# Patient Record
Sex: FEMALE | Race: WHITE | Employment: OTHER | URBAN - METROPOLITAN AREA
[De-identification: names, ages, dates, MRNs, and addresses within clinical notes are randomized per-mention and may not be internally consistent; named-entity substitution may affect disease eponyms.]

---

## 2017-01-01 ENCOUNTER — HOSPITAL ENCOUNTER (OUTPATIENT)
Dept: OTHER | Age: 57
Discharge: HOME | End: 2017-01-31
Attending: NURSE PRACTITIONER | Admitting: NURSE PRACTITIONER

## 2017-01-04 ENCOUNTER — HOSPITAL ENCOUNTER (OUTPATIENT)
Dept: PHYSICAL THERAPY | Age: 57
Discharge: HOME | End: 2017-01-04
Admitting: NURSE PRACTITIONER

## 2017-01-18 ENCOUNTER — HOSPITAL ENCOUNTER (OUTPATIENT)
Dept: PHYSICAL THERAPY | Age: 57
Discharge: HOME | End: 2017-01-18
Admitting: NURSE PRACTITIONER

## 2017-01-24 PROBLEM — R07.9 CHEST PAIN: Status: ACTIVE | Noted: 2017-01-24

## 2017-02-01 ENCOUNTER — HOSPITAL ENCOUNTER (OUTPATIENT)
Dept: OTHER | Age: 57
Discharge: HOME | End: 2017-02-28
Attending: NURSE PRACTITIONER | Admitting: NURSE PRACTITIONER

## 2017-02-09 ENCOUNTER — HOSPITAL ENCOUNTER (OUTPATIENT)
Dept: PHYSICAL THERAPY | Age: 57
Discharge: HOME | End: 2017-02-28
Attending: NURSE PRACTITIONER | Admitting: NURSE PRACTITIONER

## 2017-02-15 ENCOUNTER — HOSPITAL ENCOUNTER (OUTPATIENT)
Dept: PHYSICAL THERAPY | Age: 57
Discharge: HOME | End: 2017-02-15
Admitting: NURSE PRACTITIONER

## 2017-02-16 ENCOUNTER — OFFICE VISIT (OUTPATIENT)
Dept: CARDIOLOGY CLINIC | Age: 57
End: 2017-02-16

## 2017-02-16 VITALS
HEART RATE: 90 BPM | WEIGHT: 166.4 LBS | BODY MASS INDEX: 30.62 KG/M2 | DIASTOLIC BLOOD PRESSURE: 74 MMHG | HEIGHT: 62 IN | SYSTOLIC BLOOD PRESSURE: 134 MMHG

## 2017-02-16 DIAGNOSIS — Z98.61 POST PTCA: ICD-10-CM

## 2017-02-16 DIAGNOSIS — R07.9 CHEST PAIN, UNSPECIFIED TYPE: Primary | ICD-10-CM

## 2017-02-16 PROCEDURE — 99214 OFFICE O/P EST MOD 30 MIN: CPT | Performed by: INTERNAL MEDICINE

## 2017-02-16 PROCEDURE — 93000 ELECTROCARDIOGRAM COMPLETE: CPT | Performed by: INTERNAL MEDICINE

## 2017-02-16 RX ORDER — CLOPIDOGREL BISULFATE 75 MG/1
75 TABLET ORAL DAILY
Qty: 90 TABLET | Refills: 3 | Status: SHIPPED | OUTPATIENT
Start: 2017-02-16 | End: 2017-12-09 | Stop reason: SDUPTHER

## 2017-02-20 DIAGNOSIS — Z98.61 POST PTCA: Primary | ICD-10-CM

## 2017-02-22 ENCOUNTER — HOSPITAL ENCOUNTER (OUTPATIENT)
Dept: PHYSICAL THERAPY | Age: 57
Discharge: HOME | End: 2017-02-22
Admitting: NURSE PRACTITIONER

## 2017-02-23 ENCOUNTER — HOSPITAL ENCOUNTER (OUTPATIENT)
Dept: ULTRASOUND IMAGING | Age: 57
Discharge: HOME | End: 2017-02-23
Attending: NURSE PRACTITIONER | Admitting: NURSE PRACTITIONER

## 2017-02-23 DIAGNOSIS — M79.604 LEG PAIN, BILATERAL: ICD-10-CM

## 2017-02-23 DIAGNOSIS — M79.605 LEG PAIN, BILATERAL: ICD-10-CM

## 2017-02-23 DIAGNOSIS — M79.604 PAIN OF RIGHT LEG: ICD-10-CM

## 2017-02-28 ENCOUNTER — PROCEDURE VISIT (OUTPATIENT)
Dept: CARDIOLOGY CLINIC | Age: 57
End: 2017-02-28

## 2017-02-28 DIAGNOSIS — R07.9 CHEST PAIN, UNSPECIFIED TYPE: ICD-10-CM

## 2017-02-28 DIAGNOSIS — Z98.61 POST PTCA: ICD-10-CM

## 2017-02-28 PROCEDURE — 93015 CV STRESS TEST SUPVJ I&R: CPT | Performed by: INTERNAL MEDICINE

## 2017-03-01 ENCOUNTER — HOSPITAL ENCOUNTER (OUTPATIENT)
Dept: OTHER | Age: 57
Discharge: HOME | End: 2017-03-31
Attending: NURSE PRACTITIONER | Admitting: NURSE PRACTITIONER

## 2017-03-22 ENCOUNTER — HOSPITAL ENCOUNTER (OUTPATIENT)
Dept: PHYSICAL THERAPY | Age: 57
Discharge: HOME | End: 2017-03-22
Admitting: NURSE PRACTITIONER

## 2017-03-29 ENCOUNTER — HOSPITAL ENCOUNTER (OUTPATIENT)
Dept: PHYSICAL THERAPY | Age: 57
Discharge: HOME | End: 2017-03-29
Admitting: NURSE PRACTITIONER

## 2017-03-30 ENCOUNTER — OFFICE VISIT (OUTPATIENT)
Dept: CARDIOLOGY CLINIC | Age: 57
End: 2017-03-30

## 2017-03-30 VITALS
BODY MASS INDEX: 29.81 KG/M2 | DIASTOLIC BLOOD PRESSURE: 70 MMHG | WEIGHT: 162 LBS | HEART RATE: 72 BPM | SYSTOLIC BLOOD PRESSURE: 122 MMHG | HEIGHT: 62 IN

## 2017-03-30 DIAGNOSIS — Z98.61 POST PTCA: Primary | ICD-10-CM

## 2017-03-30 PROCEDURE — 99213 OFFICE O/P EST LOW 20 MIN: CPT | Performed by: INTERNAL MEDICINE

## 2017-03-31 PROBLEM — R07.2 PRECORDIAL PAIN: Status: ACTIVE | Noted: 2017-01-24

## 2017-04-01 ENCOUNTER — HOSPITAL ENCOUNTER (OUTPATIENT)
Dept: OTHER | Age: 57
Discharge: HOME | End: 2017-04-30
Attending: NURSE PRACTITIONER | Admitting: NURSE PRACTITIONER

## 2017-04-03 ENCOUNTER — TELEPHONE (OUTPATIENT)
Dept: CARDIOLOGY CLINIC | Age: 57
End: 2017-04-03

## 2017-04-05 ENCOUNTER — HOSPITAL ENCOUNTER (OUTPATIENT)
Dept: PHYSICAL THERAPY | Age: 57
Discharge: HOME | End: 2017-04-05
Admitting: NURSE PRACTITIONER

## 2017-04-12 ENCOUNTER — HOSPITAL ENCOUNTER (OUTPATIENT)
Dept: PHYSICAL THERAPY | Age: 57
Discharge: HOME | End: 2017-04-12
Admitting: NURSE PRACTITIONER

## 2017-04-13 ENCOUNTER — PROCEDURE VISIT (OUTPATIENT)
Dept: CARDIOLOGY CLINIC | Age: 57
End: 2017-04-13

## 2017-04-13 DIAGNOSIS — I73.9 CLAUDICATION (HCC): Primary | ICD-10-CM

## 2017-04-13 DIAGNOSIS — Z98.61 POST PTCA: ICD-10-CM

## 2017-04-13 PROCEDURE — 93925 LOWER EXTREMITY STUDY: CPT | Performed by: INTERNAL MEDICINE

## 2017-04-13 PROCEDURE — 93922 UPR/L XTREMITY ART 2 LEVELS: CPT | Performed by: INTERNAL MEDICINE

## 2017-04-17 ENCOUNTER — HOSPITAL ENCOUNTER (OUTPATIENT)
Dept: WOMENS IMAGING | Age: 57
Discharge: HOME | End: 2017-04-17
Attending: NURSE PRACTITIONER | Admitting: NURSE PRACTITIONER

## 2017-04-17 DIAGNOSIS — Z12.31 VISIT FOR SCREENING MAMMOGRAM: ICD-10-CM

## 2017-04-18 ENCOUNTER — TELEPHONE (OUTPATIENT)
Dept: CARDIOLOGY CLINIC | Age: 57
End: 2017-04-18

## 2017-04-19 PROBLEM — M79.604 LEG PAIN, BILATERAL: Status: ACTIVE | Noted: 2017-04-19

## 2017-04-19 PROBLEM — M79.605 LEG PAIN, BILATERAL: Status: ACTIVE | Noted: 2017-04-19

## 2017-04-20 ENCOUNTER — OFFICE VISIT (OUTPATIENT)
Dept: CARDIOLOGY CLINIC | Age: 57
End: 2017-04-20

## 2017-04-20 VITALS
HEIGHT: 62 IN | BODY MASS INDEX: 29.59 KG/M2 | SYSTOLIC BLOOD PRESSURE: 128 MMHG | HEART RATE: 78 BPM | DIASTOLIC BLOOD PRESSURE: 74 MMHG | WEIGHT: 160.8 LBS

## 2017-04-20 DIAGNOSIS — Z98.61 POST PTCA: Primary | ICD-10-CM

## 2017-04-20 PROCEDURE — 99214 OFFICE O/P EST MOD 30 MIN: CPT | Performed by: INTERNAL MEDICINE

## 2017-04-20 RX ORDER — TRAZODONE HYDROCHLORIDE 50 MG/1
TABLET ORAL
Refills: 2 | COMMUNITY
Start: 2017-03-28 | End: 2017-06-08 | Stop reason: ALTCHOICE

## 2017-04-24 ENCOUNTER — HOSPITAL ENCOUNTER (OUTPATIENT)
Dept: ULTRASOUND IMAGING | Age: 57
Discharge: HOME | End: 2017-04-24
Attending: NURSE PRACTITIONER | Admitting: NURSE PRACTITIONER

## 2017-04-24 DIAGNOSIS — R19.02 LEFT UPPER QUADRANT ABDOMINAL MASS: ICD-10-CM

## 2017-04-25 ENCOUNTER — TELEPHONE (OUTPATIENT)
Dept: CARDIOLOGY CLINIC | Age: 57
End: 2017-04-25

## 2017-04-26 ENCOUNTER — HOSPITAL ENCOUNTER (OUTPATIENT)
Dept: PHYSICAL THERAPY | Age: 57
Discharge: HOME | End: 2017-04-26
Admitting: NURSE PRACTITIONER

## 2017-05-01 ENCOUNTER — HOSPITAL ENCOUNTER (OUTPATIENT)
Dept: CT IMAGING | Age: 57
Discharge: HOME | End: 2017-05-01
Attending: SURGERY | Admitting: SURGERY

## 2017-05-01 ENCOUNTER — HOSPITAL ENCOUNTER (OUTPATIENT)
Dept: OTHER | Age: 57
Discharge: HOME | End: 2017-05-31
Attending: NURSE PRACTITIONER | Admitting: NURSE PRACTITIONER

## 2017-05-01 DIAGNOSIS — F17.219 NICOTINE DEPENDENCE, CIGARETTES, WITH UNSPECIFIED NICOTINE-INDUCED DISORDERS: ICD-10-CM

## 2017-05-10 ENCOUNTER — HOSPITAL ENCOUNTER (OUTPATIENT)
Dept: PHYSICAL THERAPY | Age: 57
Discharge: HOME | End: 2017-05-10
Admitting: NURSE PRACTITIONER

## 2017-05-26 ENCOUNTER — OFFICE VISIT (OUTPATIENT)
Dept: CARDIOLOGY CLINIC | Age: 57
End: 2017-05-26

## 2017-05-26 ENCOUNTER — PROCEDURE VISIT (OUTPATIENT)
Dept: CARDIOLOGY CLINIC | Age: 57
End: 2017-05-26

## 2017-05-26 VITALS
DIASTOLIC BLOOD PRESSURE: 80 MMHG | HEIGHT: 62 IN | BODY MASS INDEX: 28.45 KG/M2 | WEIGHT: 154.6 LBS | HEART RATE: 66 BPM | SYSTOLIC BLOOD PRESSURE: 118 MMHG

## 2017-05-26 DIAGNOSIS — R42 DIZZINESS: ICD-10-CM

## 2017-05-26 DIAGNOSIS — R07.2 PRECORDIAL PAIN: Primary | ICD-10-CM

## 2017-05-26 DIAGNOSIS — R07.2 PRECORDIAL PAIN: ICD-10-CM

## 2017-05-26 DIAGNOSIS — R42 DIZZINESS: Primary | ICD-10-CM

## 2017-05-26 PROCEDURE — 93880 EXTRACRANIAL BILAT STUDY: CPT | Performed by: INTERNAL MEDICINE

## 2017-05-26 PROCEDURE — 99214 OFFICE O/P EST MOD 30 MIN: CPT | Performed by: INTERNAL MEDICINE

## 2017-05-26 RX ORDER — GABAPENTIN 100 MG/1
100 CAPSULE ORAL 2 TIMES DAILY
COMMUNITY
Start: 2017-05-04 | End: 2018-09-10

## 2017-05-26 RX ORDER — HYDROCHLOROTHIAZIDE 12.5 MG/1
12.5 TABLET ORAL DAILY
COMMUNITY
Start: 2017-04-28 | End: 2017-12-09 | Stop reason: SDUPTHER

## 2017-05-30 ENCOUNTER — TELEPHONE (OUTPATIENT)
Dept: CARDIOLOGY CLINIC | Age: 57
End: 2017-05-30

## 2017-05-30 ENCOUNTER — HOSPITAL ENCOUNTER (OUTPATIENT)
Dept: GENERAL RADIOLOGY | Age: 57
Discharge: HOME | End: 2017-05-30
Attending: INTERNAL MEDICINE | Admitting: INTERNAL MEDICINE

## 2017-05-30 LAB
ANION GAP SERPL CALCULATED.3IONS-SCNC: 12 MMOL/L (ref 4–16)
APTT: 30 SECONDS (ref 21.2–33)
BUN BLDV-MCNC: 11 MG/DL (ref 6–23)
CALCIUM SERPL-MCNC: 9.2 MG/DL (ref 8.3–10.6)
CHLORIDE BLD-SCNC: 104 MMOL/L (ref 99–110)
CO2: 30 MMOL/L (ref 21–32)
CREAT SERPL-MCNC: 0.8 MG/DL (ref 0.6–1.1)
GFR AFRICAN AMERICAN: >60 ML/MIN/1.73M2
GFR NON-AFRICAN AMERICAN: >60 ML/MIN/1.73M2
GLUCOSE BLD-MCNC: 87 MG/DL (ref 70–140)
HCT VFR BLD CALC: 39.7 % (ref 37–47)
HEMOGLOBIN: 13 GM/DL (ref 12.5–16)
INR BLD: 0.92 INDEX
MCH RBC QN AUTO: 29.3 PG (ref 27–31)
MCHC RBC AUTO-ENTMCNC: 32.7 % (ref 32–36)
MCV RBC AUTO: 89.4 FL (ref 78–100)
PDW BLD-RTO: 14.2 % (ref 11.7–14.9)
PLATELET # BLD: 298 K/CU MM (ref 140–440)
PMV BLD AUTO: 10.4 FL (ref 7.5–11.1)
POTASSIUM SERPL-SCNC: 3.4 MMOL/L (ref 3.5–5.1)
PROTHROMBIN TIME: 10.5 SECONDS (ref 9.12–12.5)
RBC # BLD: 4.44 M/CU MM (ref 4.2–5.4)
SODIUM BLD-SCNC: 146 MMOL/L (ref 135–145)
WBC # BLD: 7.1 K/CU MM (ref 4–10.5)

## 2017-06-01 ENCOUNTER — HOSPITAL ENCOUNTER (OUTPATIENT)
Dept: OTHER | Age: 57
Discharge: HOME | End: 2017-06-22
Attending: NURSE PRACTITIONER | Admitting: NURSE PRACTITIONER

## 2017-06-08 ENCOUNTER — OFFICE VISIT (OUTPATIENT)
Dept: CARDIOLOGY CLINIC | Age: 57
End: 2017-06-08

## 2017-06-08 VITALS
DIASTOLIC BLOOD PRESSURE: 78 MMHG | HEIGHT: 62 IN | WEIGHT: 153 LBS | SYSTOLIC BLOOD PRESSURE: 116 MMHG | BODY MASS INDEX: 28.16 KG/M2 | HEART RATE: 68 BPM

## 2017-06-08 DIAGNOSIS — Z98.61 POST PTCA: Primary | ICD-10-CM

## 2017-06-08 PROCEDURE — 99214 OFFICE O/P EST MOD 30 MIN: CPT | Performed by: INTERNAL MEDICINE

## 2017-06-08 RX ORDER — DONEPEZIL HYDROCHLORIDE 10 MG/1
10 TABLET, FILM COATED ORAL NIGHTLY
COMMUNITY
End: 2017-08-23 | Stop reason: ALTCHOICE

## 2017-06-08 RX ORDER — DIPHENOXYLATE HYDROCHLORIDE AND ATROPINE SULFATE 2.5; .025 MG/1; MG/1
TABLET ORAL
COMMUNITY
Start: 2017-05-05 | End: 2018-09-10

## 2017-06-08 RX ORDER — OMEPRAZOLE 40 MG/1
CAPSULE, DELAYED RELEASE ORAL
Refills: 3 | COMMUNITY
Start: 2017-05-23 | End: 2019-07-18

## 2017-07-05 ENCOUNTER — TELEPHONE (OUTPATIENT)
Dept: CARDIOLOGY CLINIC | Age: 57
End: 2017-07-05

## 2017-08-23 ENCOUNTER — HOSPITAL ENCOUNTER (OUTPATIENT)
Dept: SURGERY | Age: 57
Discharge: HOME | End: 2017-08-23
Attending: INTERNAL MEDICINE | Admitting: INTERNAL MEDICINE

## 2017-08-23 VITALS
TEMPERATURE: 97.3 F | BODY MASS INDEX: 27.6 KG/M2 | RESPIRATION RATE: 16 BRPM | OXYGEN SATURATION: 98 % | HEART RATE: 63 BPM | WEIGHT: 150 LBS | HEIGHT: 62 IN | SYSTOLIC BLOOD PRESSURE: 133 MMHG | DIASTOLIC BLOOD PRESSURE: 83 MMHG

## 2017-08-23 RX ORDER — RANITIDINE 150 MG/1
150 CAPSULE ORAL 2 TIMES DAILY
Status: ON HOLD | COMMUNITY
End: 2019-01-28 | Stop reason: ALTCHOICE

## 2017-08-23 RX ORDER — DONEPEZIL HYDROCHLORIDE 10 MG/1
10 TABLET, FILM COATED ORAL NIGHTLY
COMMUNITY
End: 2018-09-10

## 2017-08-23 RX ORDER — HYDROCODONE BITARTRATE AND ACETAMINOPHEN 5; 325 MG/1; MG/1
1 TABLET ORAL 4 TIMES DAILY PRN
COMMUNITY
End: 2018-09-10

## 2017-08-23 RX ORDER — SODIUM CHLORIDE, SODIUM LACTATE, POTASSIUM CHLORIDE, CALCIUM CHLORIDE 600; 310; 30; 20 MG/100ML; MG/100ML; MG/100ML; MG/100ML
INJECTION, SOLUTION INTRAVENOUS CONTINUOUS
Status: DISCONTINUED | OUTPATIENT
Start: 2017-08-23 | End: 2017-08-24 | Stop reason: HOSPADM

## 2017-08-23 RX ORDER — WATER / MINERAL OIL / WHITE PETROLATUM 16 OZ
CREAM TOPICAL PRN
COMMUNITY
End: 2018-09-10

## 2017-08-23 RX ORDER — TRAZODONE HYDROCHLORIDE 50 MG/1
50 TABLET ORAL NIGHTLY
COMMUNITY
End: 2018-09-10

## 2017-08-23 RX ADMIN — SODIUM CHLORIDE, SODIUM LACTATE, POTASSIUM CHLORIDE, CALCIUM CHLORIDE: 600; 310; 30; 20 INJECTION, SOLUTION INTRAVENOUS at 08:14

## 2017-08-23 ASSESSMENT — PAIN SCALES - GENERAL
PAINLEVEL_OUTOF10: 0
PAINLEVEL_OUTOF10: 0

## 2017-08-23 ASSESSMENT — PAIN - FUNCTIONAL ASSESSMENT: PAIN_FUNCTIONAL_ASSESSMENT: 0-10

## 2017-08-24 ENCOUNTER — HOSPITAL ENCOUNTER (OUTPATIENT)
Dept: OTHER | Age: 57
Discharge: HOME | End: 2017-08-31
Attending: NURSE PRACTITIONER | Admitting: NURSE PRACTITIONER

## 2017-08-24 ASSESSMENT — PAIN SCALES - GENERAL: PAINLEVEL_OUTOF10: 5

## 2017-08-24 ASSESSMENT — PAIN DESCRIPTION - ORIENTATION: ORIENTATION: RIGHT;LEFT

## 2017-08-24 ASSESSMENT — PAIN DESCRIPTION - LOCATION: LOCATION: RECTUM

## 2017-08-31 ENCOUNTER — HOSPITAL ENCOUNTER (OUTPATIENT)
Dept: PHYSICAL THERAPY | Age: 57
Discharge: HOME | End: 2017-08-31

## 2017-09-01 ENCOUNTER — HOSPITAL ENCOUNTER (OUTPATIENT)
Dept: OTHER | Age: 57
Discharge: HOME | End: 2017-09-30
Attending: NURSE PRACTITIONER | Admitting: NURSE PRACTITIONER

## 2017-09-07 ENCOUNTER — HOSPITAL ENCOUNTER (OUTPATIENT)
Dept: PHYSICAL THERAPY | Age: 57
Discharge: HOME | End: 2017-09-07

## 2017-09-11 PROBLEM — K21.9 GERD (GASTROESOPHAGEAL REFLUX DISEASE): Status: ACTIVE | Noted: 2017-09-11

## 2017-09-11 PROBLEM — I10 HTN (HYPERTENSION): Status: ACTIVE | Noted: 2017-09-11

## 2017-09-11 PROBLEM — I25.10 CAD (CORONARY ARTERY DISEASE): Status: ACTIVE | Noted: 2017-09-11

## 2017-09-21 ENCOUNTER — HOSPITAL ENCOUNTER (OUTPATIENT)
Dept: PHYSICAL THERAPY | Age: 57
Discharge: HOME | End: 2017-09-21

## 2017-09-28 ENCOUNTER — HOSPITAL ENCOUNTER (OUTPATIENT)
Dept: PHYSICAL THERAPY | Age: 57
Discharge: HOME | End: 2017-09-28

## 2017-10-01 ENCOUNTER — HOSPITAL ENCOUNTER (OUTPATIENT)
Dept: OTHER | Age: 57
Discharge: HOME | End: 2017-10-30
Attending: NURSE PRACTITIONER | Admitting: NURSE PRACTITIONER

## 2017-10-04 ENCOUNTER — HOSPITAL ENCOUNTER (OUTPATIENT)
Dept: PHYSICAL THERAPY | Age: 57
Discharge: HOME | End: 2017-10-04

## 2017-10-04 NOTE — FLOWSHEET NOTE
Physical Therapy Daily Treatment Note  Date:  10/4/2017    Patient Name:  Alannah Moore    :  1960  MRN: [id number]  Restrictions/Precautions:    Diagnosis: B knee pain  Treatment Diagnosis: B knee pain  Insurance/Certification information: was g coded- allowed 27 PCY has had 13    Next Physician Visit:  Referring Physician:    Mauricio Burns  Visit# / total visits: 7 /                  Initial Pain level: 8/10 R knee and ankle       Subjective:  Pt continues to c/o pain.     Any changes to Ambulatory Summary Sheet? no             Goals:  Short term goals  Time Frame for Short term goals: check @10 sessions  Short term goal 1:  KOOS test- @eval=   Long term goals  Time Frame for Long term goals : 11/15/17  Long term goal 1:  KOOS   Patient's goal:less knee pain  Exercise/Equipment/Modalities Date 17 (2) Date 17 (3) Date 17 #4 14 (5) 17 (6) 10/4/17 (7)     Outcome measure used KOOS JR          On visit#1           nustep Seat 7 load 2 x 10 min Seat 7 load 2 x 10 min Seat 7 load 2 x 10 min Seat 7 load 2 x 10 min Seat 7 load 2 x 10 min Seat 7 load 2 x 10 min     Vision fitness bike L1 x 5 min L1 x 5 min L1 x 5 min L1 x 5 min L1 x 5 min L1 x 5 min     Sit to stands  x 5 reps w/ UE support x 5 reps w/ UE support x 5 reps w/ UE support x 5 reps w/ UE support x 5 reps w/ UE support x 5 reps w/ UE support     AAROM R/L FLEX w/ strap focus R/L FLEX w/ strap focus x 8 reps  R/L FLEX w/ strap focus x 8 reps R/L FLEX w/ strap focus x 8 reps R/L FLEX w/ strap focus x 8 reps R/L FLEX w/ strap focus x 8 reps     LAQ R/L x 5 ea R/L x 5 ea R/L x 5 ea R/L x 5 ea R/L x 5 ea R/L x 5 ea     Hot pack R knee post exercise x 10 min R knee post exercise  R knee post exercise x 10 min R knee post exercise x 10 min B knee post exercise x 10 min B knee post exercise x 10 min                                                                                     Progress/goals assessment

## 2017-10-30 ENCOUNTER — HOSPITAL ENCOUNTER (OUTPATIENT)
Dept: OTHER | Age: 57
Discharge: HOME | End: 2017-10-31
Attending: NURSE PRACTITIONER | Admitting: NURSE PRACTITIONER

## 2017-11-01 ENCOUNTER — HOSPITAL ENCOUNTER (OUTPATIENT)
Dept: OTHER | Age: 57
Discharge: HOME | End: 2017-11-30
Attending: NURSE PRACTITIONER | Admitting: NURSE PRACTITIONER

## 2017-11-28 ENCOUNTER — HOSPITAL ENCOUNTER (OUTPATIENT)
Dept: PHYSICAL THERAPY | Age: 57
Discharge: HOME | End: 2017-11-28

## 2017-12-01 ENCOUNTER — HOSPITAL ENCOUNTER (OUTPATIENT)
Dept: OTHER | Age: 57
Discharge: HOME | End: 2017-12-31
Attending: NURSE PRACTITIONER | Admitting: NURSE PRACTITIONER

## 2017-12-05 ENCOUNTER — HOSPITAL ENCOUNTER (OUTPATIENT)
Dept: PHYSICAL THERAPY | Age: 57
Discharge: HOME | End: 2017-12-05

## 2017-12-05 NOTE — FLOWSHEET NOTE
Physical Therapy Daily Treatment Note  Date:  2017    Patient Name:  Monika Swain    :  1960  MRN: [id number]  Restrictions/Precautions:    Diagnosis: B knee pain  Treatment Diagnosis: B knee pain  Insurance/Certification information: was g coded-  Next Physician Visit:  Referring Physician:    [address] [place] [address] 17  Visit# / total visits: 13 / 15                 Initial Pain level: 8/10 B knee      Subjective:  Pt states her back is still a little sore.   Any changes to Ambulatory Summary Sheet? no             Goals:  Short term goals  Time Frame for Short term goals: check @10 sessions  Short term goal 1:  KOOS test- @eval=   Long term goals  Time Frame for Long term goals : 11/15/17  Long term goal 1:  KOOS   Patient's goal:less knee pain  Exercise/Equipment/Modalities 17 (6) 10/4/17 (7) 10/30/17 #8 17 #9 17 #10 17 #11 17 (12) 17 (13)     Outcome measure used KOOS JR          On visit#1    KOOS KOOS         nustep Seat 7 load 2 x 10 min Seat 7 load 2 x 10 min Seat 7 load 2 x 10 min Seat 7 load 2 x 12min Seat 7 load 2 x 12min Seat 7 load 2 x 12min Seat 7 load 2 x 12min Seat 7 load 2 x 12min     Vision fitness bike L1 x 5 min L1 x 5 min Recumbent seat 1` x10 min Recumbent seat 1` x12 min Recumbent seat 1` x12 min Recumbent seat 1` x12 min L1 x 12 min  Vision bike L1 x 12 min  Vision bike     Sit to stands  x 5 reps w/ UE support x 5 reps w/ UE support x 5 reps w/ UE support --- ----        AAROM R/L FLEX w/ strap focus x 8 reps R/L FLEX w/ strap focus x 8 reps --- R/L FLEX w/ AAROM x 5 reps R/L FLEX w/ AAROM x 10 reps --- R/L FLEX w/ AAROM x 10 reps R/L FLEX w/ AAROM x 10 reps     LAQ R/L x 5 ea R/L x 5 ea -- ----            Supine bike AROM R/L x 5 reps Supine bike AROM R/L x 5 reps Supine bike AROM R/L x 5 reps --- Supine bike AAROM R/L x 5 reps Supine bike AAROM R/L x 5 reps          Supine hip ABD w/ YTB on ankles 3 x10- slide board --- Supine hip ABD w/ YTB on ankles  x10- slide board Supine hip ABD w/ YTB on ankles  x10- slide board                                                                         Progress/goals assessment (every 10 visits) by  PT               HEP:           Objective   findings: Right knee AA/flex = 110 degrees Right knee AA/flex = 110 degrees Right knee AA/flex = 110 degrees, ambulating w cane Right knee AA/flex = 110 degrees, ambulating w cane Right knee AA/flex = 110 degrees, ambulating w cane  Was able to complete more of her exercises today without increasing pain.  Requires max cueing for participation   Manual treatments/provider interaction:  Verbal and manual cueing on proper performance of the prescribed exercise or of the designated task Verbal and manual cueing on proper performance of the prescribed exercise or of the designated task Verbal and manual cueing on proper performance of the prescribed exercise or of the designated task Verbal and manual cueing on proper performance of the prescribed exercise or of the designated task Verbal and manual cueing on proper performance of the prescribed exercise or of the designated task Verbal and manual cueing on proper performance of the prescribed exercise or of the designated task Verbal and manual cueing on proper performance of the prescribed exercise or of the designated task Verbal and manual cueing on proper performance of the prescribed exercise or of the designated task   Response to intervention:   Right knee remained 8/10 Muscle fatigue, right knee 8/10 Muscle fatigue, B knee 8/10 Muscle fatigue, B knee 8/10 Muscle fatigue Muscle fatigue Reports of muscle fatigue Reports of muscle fatigue   Plan for Next Session: continue Cont 1x/week Cont 1x/week Cont 1x/week Cont 1x/week Cont 1x/week Cont 1x/week Continue x 1 week     Modality/intervention used:  [x] Therapeutic Exercise  [] Modalities:  [] Therapeutic Activity     [] Ultrasound  [] Elec  Stim  []

## 2017-12-09 ENCOUNTER — OFFICE VISIT (OUTPATIENT)
Dept: CARDIOLOGY CLINIC | Age: 57
End: 2017-12-09

## 2017-12-09 VITALS
SYSTOLIC BLOOD PRESSURE: 110 MMHG | BODY MASS INDEX: 28.34 KG/M2 | HEIGHT: 64 IN | HEART RATE: 86 BPM | DIASTOLIC BLOOD PRESSURE: 70 MMHG | WEIGHT: 166 LBS

## 2017-12-09 DIAGNOSIS — R07.9 CHEST PAIN, UNSPECIFIED TYPE: ICD-10-CM

## 2017-12-09 DIAGNOSIS — K44.9 GASTROESOPHAGEAL REFLUX DISEASE WITH HIATAL HERNIA: Chronic | ICD-10-CM

## 2017-12-09 DIAGNOSIS — Z72.0 CURRENT TOBACCO USE: ICD-10-CM

## 2017-12-09 DIAGNOSIS — K21.9 GASTROESOPHAGEAL REFLUX DISEASE WITH HIATAL HERNIA: Chronic | ICD-10-CM

## 2017-12-09 PROCEDURE — G8428 CUR MEDS NOT DOCUMENT: HCPCS | Performed by: INTERNAL MEDICINE

## 2017-12-09 PROCEDURE — 3017F COLORECTAL CA SCREEN DOC REV: CPT | Performed by: INTERNAL MEDICINE

## 2017-12-09 PROCEDURE — G8417 CALC BMI ABV UP PARAM F/U: HCPCS | Performed by: INTERNAL MEDICINE

## 2017-12-09 PROCEDURE — 99213 OFFICE O/P EST LOW 20 MIN: CPT | Performed by: INTERNAL MEDICINE

## 2017-12-09 PROCEDURE — G8484 FLU IMMUNIZE NO ADMIN: HCPCS | Performed by: INTERNAL MEDICINE

## 2017-12-09 PROCEDURE — G8598 ASA/ANTIPLAT THER USED: HCPCS | Performed by: INTERNAL MEDICINE

## 2017-12-09 PROCEDURE — 3014F SCREEN MAMMO DOC REV: CPT | Performed by: INTERNAL MEDICINE

## 2017-12-09 PROCEDURE — 4004F PT TOBACCO SCREEN RCVD TLK: CPT | Performed by: INTERNAL MEDICINE

## 2017-12-09 RX ORDER — HYDROCHLOROTHIAZIDE 12.5 MG/1
12.5 TABLET ORAL DAILY
Qty: 90 TABLET | Refills: 3 | Status: ON HOLD | OUTPATIENT
Start: 2017-12-09 | End: 2019-05-29 | Stop reason: HOSPADM

## 2017-12-09 RX ORDER — CLOPIDOGREL BISULFATE 75 MG/1
75 TABLET ORAL DAILY
Qty: 90 TABLET | Refills: 3 | Status: SHIPPED | OUTPATIENT
Start: 2017-12-09 | End: 2019-01-03 | Stop reason: SDUPTHER

## 2017-12-09 RX ORDER — RANOLAZINE 1000 MG/1
1000 TABLET, EXTENDED RELEASE ORAL 2 TIMES DAILY
Qty: 180 TABLET | Refills: 3 | Status: SHIPPED | OUTPATIENT
Start: 2017-12-09 | End: 2018-12-15 | Stop reason: SDUPTHER

## 2017-12-09 RX ORDER — LISINOPRIL 30 MG/1
30 TABLET ORAL DAILY
Qty: 30 TABLET | Refills: 6 | Status: ON HOLD | OUTPATIENT
Start: 2017-12-09 | End: 2019-01-28 | Stop reason: ALTCHOICE

## 2018-01-01 ENCOUNTER — HOSPITAL ENCOUNTER (OUTPATIENT)
Dept: OTHER | Age: 58
Discharge: HOME | End: 2018-01-15
Attending: NURSE PRACTITIONER | Admitting: NURSE PRACTITIONER

## 2018-01-16 ENCOUNTER — HOSPITAL ENCOUNTER (OUTPATIENT)
Dept: CT IMAGING | Age: 58
Discharge: HOME | End: 2018-01-16
Attending: NURSE PRACTITIONER | Admitting: NURSE PRACTITIONER

## 2018-01-16 DIAGNOSIS — R16.0 HEPATOMEGALY: ICD-10-CM

## 2018-01-16 RX ORDER — SODIUM CHLORIDE 0.9 % (FLUSH) 0.9 %
10 SYRINGE (ML) INJECTION ONCE
Status: COMPLETED | OUTPATIENT
Start: 2018-01-16 | End: 2018-01-16

## 2018-01-16 RX ADMIN — Medication 10 ML: at 12:13

## 2018-01-29 ENCOUNTER — HOSPITAL ENCOUNTER (OUTPATIENT)
Dept: PHYSICAL THERAPY | Age: 58
Discharge: HOME | End: 2018-01-31
Attending: NURSE PRACTITIONER | Admitting: NURSE PRACTITIONER

## 2018-01-29 ASSESSMENT — PAIN SCALES - GENERAL: PAINLEVEL_OUTOF10: 5

## 2018-01-29 NOTE — PROGRESS NOTES
Physical Therapy  Initial Assessment  Date: 2018  Patient Name: Gustavo Field  MRN: [id number]  : 1960     Treatment Diagnosis: B knee pain/ unsteadiness w/ walking    Restrictions       Subjective   General  Chart Reviewed: Yes  Patient assessed for rehabilitation services?: Yes  Additional Pertinent Hx: COPD; [place]- cochlear implant  Family / Caregiver Present: No  Diagnosis: R knee pain/unsteadiness w/ walking  Follows Commands: Within Functional Limits  PT Visit Information  Onset Date:  (~ 4 years)  Subjective  Subjective: patient reports Hx of ongoing B_ R>>L knee pain- COPD limits walking/exercise activity-uses cane for ADL  Pain Assessment  Pain Level: 5    Vision/Hearing       Orientation  Orientation  Overall Orientation Status: Within Normal Limits    Social/Functional History  Social/Functional History  Home Layout: One level  ADL Assistance: Independent  Homemaking Assistance: Independent  Ambulation Assistance: Independent  Transfer Assistance: Independent  Active : Yes  Patient's  Info: will arrange trasnpsortation for PT sessions  Occupation: Retired  Objective     Observation/Palpation  Posture: Fair    PROM RLE (degrees)  R Knee Flexion 0-145: 100*  PROM LLE (degrees)  L Knee Flexion 0-145: 110*    Strength RLE  Comment: grossly 3+/5 hip/knee/ankle except 3-/5 knee EXT  Strength LLE  Comment: grossly 3+/5 hip/knee/ankle except 3/5 knee EXT                 Ambulation 1  Device: Single point cane  Assistance: Modified Independent  Quality of Gait: ANTALGIC GAIT                            Assessment  Patient is a  61 yo female who presents with knee pain which impacts on  ADL; patient's goal is to have less knee pain ;patient reports that knee pain limits activities including walking activity; PT to address patient's goals, impairments and activity limitations with skilled interventions checked in plan of care;patient's level of function has been impacted by knee pain for @ least 3 1/2 years; did not observe any barriers to learning during PT eval; learning preferences include demonstration, practice, and handouts;patient appears to be motivated to participate in an active PT program and to be compliant with HEP expectations;patient assisted in developing treatment plan and goals;cane DME is currently being used;      Current functional level (based on )   :  Conditions Requiring Skilled Therapeutic Intervention  Body structures, Functions, Activity limitations: Decreased strength;Decreased ROM; Decreased high-level IADLs;Decreased functional mobility   Treatment Diagnosis: B knee pain/ unsteadiness w/ walking  Decision Making: Medium Complexity  History: COPD  Exam: LEFS/knee ROM-strength  Clinical Presentation: evolving - pain increased w/ activity  REQUIRES PT FOLLOW UP: Yes         Plan        G-Code       OutComes Score                                           Goals  Short term goals  Time Frame for Short term goals: check @10 sessions  Short term goal 2: 57/80 LEFS  Long term goals  Time Frame for Long term goals : check by 3/23/18  Long term goal 1: 62/80 LEFS- @ eval= 53/80       Therapy Time   Individual Concurrent Group Co-treatment   Time In 1100         Time Out 1155         Minutes 55         Timed Code Treatment Minutes: 40 Minutes       XIOMARA Glover, PT

## 2018-01-29 NOTE — FLOWSHEET NOTE
Physical Therapy Daily Treatment Note  Date:  2018    Patient Name:  Berenice Bass    :  1960  MRN: [id number]  Restrictions/Precautions:    Diagnosis: R knee pain/unsteadiness w/ walking  Treatment Diagnosis: B knee pain/ unsteadiness w/ walking  Insurance/Certification information:  30 PCY  Next Physician Visit:  Referring Physician:    POC EXPIRATION DATE 3/23/18  Visit# / total visits:  1/ 6                 Initial Pain level: 5/10 R knee    Subjective: Any changes to Ambulatory Summary Sheet?              Goals:  Short term goals  Time Frame for Short term goals: check @10 sessions  Short term goal 2:  LEFS  Long term goals  Time Frame for Long term goals : check by 3/23/18  Long term goal 1:  LEFS- @ eval= 5380  Patient's goal:less knee pain  Skilled PT activities: Date 18 Date Date Date     Outcome measure used   LEFS       On visit#1         sci fit Seat 7 x 10 min load 1        Vision fitness recumbent x15 min L 1        Supine bike X5 reps initiate w/ small roll SAQ - AAROM into FLEX        Supine YTB ABD x10 reps R/L                                                                                            Progress/goals assessment (every 10 visits) by  PT           HEP:       Objective   findings:       Provider interaction:  evaluation      Response to intervention:   Muscle fatigue/pain w/ exercise      Plan for Next Session: progress as able        Modality/intervention used:  [x] Therapeutic Exercise  [] Modalities:  [] Therapeutic Activity     [] Ultrasound  [] Elec  Stim  [] Gait Training      [] Cervical Traction [] Lumbar Traction  [] Neuromuscular Re-education    [] Cold/hotpack [] Iontophoresis   [] Instruction in HEP      [] Vasopneumatic     [] Manual Therapy               [] Self care home management                    (    ) Dry needling    Post Tx Pain Ratin/10 R knee    Plan:(Fequency/duration/# of visits)   [] Continue per plan of care [] Vickey Mora

## 2018-01-30 ENCOUNTER — HOSPITAL ENCOUNTER (OUTPATIENT)
Dept: GENERAL RADIOLOGY | Age: 58
Discharge: HOME | End: 2018-01-30
Attending: INTERNAL MEDICINE | Admitting: INTERNAL MEDICINE

## 2018-01-30 LAB
ALBUMIN SERPL-MCNC: 4.3 GM/DL (ref 3.4–5)
ALP BLD-CCNC: 96 IU/L (ref 40–128)
ALT SERPL-CCNC: <5 U/L (ref 10–40)
ANION GAP SERPL CALCULATED.3IONS-SCNC: 15 MMOL/L (ref 4–16)
AST SERPL-CCNC: 13 IU/L (ref 15–37)
BASOPHILS ABSOLUTE: 0.1 K/CU MM
BASOPHILS RELATIVE PERCENT: 1.3 % (ref 0–1)
BILIRUB SERPL-MCNC: 0.2 MG/DL (ref 0–1)
BUN BLDV-MCNC: 17 MG/DL (ref 6–23)
CALCIUM SERPL-MCNC: 9.4 MG/DL (ref 8.3–10.6)
CHLORIDE BLD-SCNC: 103 MMOL/L (ref 99–110)
CO2: 29 MMOL/L (ref 21–32)
CREAT SERPL-MCNC: 0.8 MG/DL (ref 0.6–1.1)
DIFFERENTIAL TYPE: ABNORMAL
EOSINOPHILS ABSOLUTE: 0.3 K/CU MM
EOSINOPHILS RELATIVE PERCENT: 3.5 % (ref 0–3)
GFR AFRICAN AMERICAN: >60 ML/MIN/1.73M2
GFR NON-AFRICAN AMERICAN: >60 ML/MIN/1.73M2
GLUCOSE FASTING: 80 MG/DL (ref 70–99)
HAV IGM SER IA-ACNC: NON REACTIVE
HCT VFR BLD CALC: 39.8 % (ref 37–47)
HEMOGLOBIN: 12.3 GM/DL (ref 12.5–16)
HEPATITIS B SURFACE ANTIGEN: NON REACTIVE
HEPATITIS C ANTIBODY: NON REACTIVE
IGA: 182 MG/DL (ref 69–382)
IGG,SERUM: 1067 MG/DL (ref 723–1685)
IGM,SERUM: 53 MG/DL (ref 62–277)
IMMATURE NEUTROPHIL %: 0.2 % (ref 0–0.43)
INR BLD: 0.93 INDEX
LYMPHOCYTES ABSOLUTE: 2 K/CU MM
LYMPHOCYTES RELATIVE PERCENT: 22.6 % (ref 24–44)
MCH RBC QN AUTO: 27.8 PG (ref 27–31)
MCHC RBC AUTO-ENTMCNC: 30.9 % (ref 32–36)
MCV RBC AUTO: 89.8 FL (ref 78–100)
MONOCYTES ABSOLUTE: 0.5 K/CU MM
MONOCYTES RELATIVE PERCENT: 6 % (ref 0–4)
NUCLEATED RBC %: 0 %
PDW BLD-RTO: 14.5 % (ref 11.7–14.9)
PLATELET # BLD: 374 K/CU MM (ref 140–440)
PMV BLD AUTO: 9.6 FL (ref 7.5–11.1)
POTASSIUM SERPL-SCNC: 4.6 MMOL/L (ref 3.5–5.1)
PROTHROMBIN TIME: 10.8 SECONDS
RBC # BLD: 4.43 M/CU MM (ref 4.2–5.4)
SEGMENTED NEUTROPHILS ABSOLUTE COUNT: 5.8 K/CU MM
SEGMENTED NEUTROPHILS RELATIVE PERCENT: 66.4 % (ref 36–66)
SODIUM BLD-SCNC: 147 MMOL/L (ref 135–145)
TOTAL IMMATURE NEUTOROPHIL: 0.02 K/CU MM
TOTAL NUCLEATED RBC: 0 K/CU MM
TOTAL PROTEIN: 7 GM/DL (ref 6.4–8.2)
WBC # BLD: 8.7 K/CU MM (ref 4–10.5)

## 2018-02-01 ENCOUNTER — HOSPITAL ENCOUNTER (OUTPATIENT)
Dept: OTHER | Age: 58
Discharge: HOME | End: 2018-02-28
Attending: NURSE PRACTITIONER | Admitting: NURSE PRACTITIONER

## 2018-02-01 LAB
ANTI-MITOCHON TITER: 4.7
ANTI-NUCLEAR ANTIBODY (ANA): NORMAL
CERULOPLASMIN: 32
F-ACTIN AB, IGG: 9
MS ALPHA-FETOPROTEIN: 2

## 2018-02-02 LAB
ALPHA-1 ANTITRYPSIN PHENOTYPE: NORMAL
ALPHA-1 ANTITRYPSIN: 125

## 2018-02-09 ENCOUNTER — HOSPITAL ENCOUNTER (OUTPATIENT)
Dept: GENERAL RADIOLOGY | Age: 58
Discharge: HOME | End: 2018-02-09
Attending: NURSE PRACTITIONER | Admitting: NURSE PRACTITIONER

## 2018-02-09 DIAGNOSIS — R09.89 LUNG CRACKLES: ICD-10-CM

## 2018-02-13 ENCOUNTER — TELEPHONE (OUTPATIENT)
Dept: CARDIOLOGY CLINIC | Age: 58
End: 2018-02-13

## 2018-02-13 ENCOUNTER — HOSPITAL ENCOUNTER (OUTPATIENT)
Dept: GENERAL RADIOLOGY | Age: 58
Discharge: HOME | End: 2018-02-13
Attending: NURSE PRACTITIONER | Admitting: NURSE PRACTITIONER

## 2018-02-13 LAB — TSH HIGH SENSITIVITY: 0.55 UIU/ML (ref 0.27–4.2)

## 2018-02-19 ENCOUNTER — HOSPITAL ENCOUNTER (OUTPATIENT)
Dept: GENERAL RADIOLOGY | Age: 58
Discharge: HOME | End: 2018-02-19
Attending: NURSE PRACTITIONER | Admitting: NURSE PRACTITIONER

## 2018-02-19 DIAGNOSIS — J18.9 PNEUMONIA OF RIGHT LOWER LOBE DUE TO INFECTIOUS ORGANISM: ICD-10-CM

## 2018-03-01 ENCOUNTER — HOSPITAL ENCOUNTER (OUTPATIENT)
Dept: OTHER | Age: 58
Discharge: HOME | End: 2018-03-31
Attending: NURSE PRACTITIONER | Admitting: NURSE PRACTITIONER

## 2018-03-06 ENCOUNTER — HOSPITAL ENCOUNTER (OUTPATIENT)
Dept: GENERAL RADIOLOGY | Age: 58
Discharge: HOME | End: 2018-03-06
Attending: INTERNAL MEDICINE | Admitting: INTERNAL MEDICINE

## 2018-03-06 LAB
ANION GAP SERPL CALCULATED.3IONS-SCNC: 11 MMOL/L (ref 4–16)
BACTERIA: NEGATIVE /HPF
BILIRUBIN URINE: NEGATIVE MG/DL
BLOOD, URINE: NEGATIVE
BUN BLDV-MCNC: 9 MG/DL (ref 6–23)
CALCIUM OXALATE CRYSTALS: ABNORMAL /HPF
CALCIUM SERPL-MCNC: 9.8 MG/DL (ref 8.3–10.6)
CHLORIDE BLD-SCNC: 103 MMOL/L (ref 99–110)
CLARITY: CLEAR
CO2: 31 MMOL/L (ref 21–32)
COLOR: YELLOW
CREAT SERPL-MCNC: 0.8 MG/DL (ref 0.6–1.1)
GFR AFRICAN AMERICAN: >60 ML/MIN/1.73M2
GFR NON-AFRICAN AMERICAN: >60 ML/MIN/1.73M2
GLUCOSE BLD-MCNC: 89 MG/DL (ref 70–99)
GLUCOSE, URINE: NEGATIVE MG/DL
KETONES, URINE: NEGATIVE MG/DL
LEUKOCYTE ESTERASE, URINE: ABNORMAL
MAGNESIUM: 1.8 MG/DL (ref 1.8–2.4)
MUCUS: ABNORMAL HPF
NITRITE URINE, QUANTITATIVE: NEGATIVE
PH, URINE: 5 (ref 5–8)
POTASSIUM SERPL-SCNC: 4.2 MMOL/L (ref 3.5–5.1)
PROTEIN UA: NEGATIVE MG/DL
RBC URINE: 2 /HPF (ref 0–6)
SODIUM BLD-SCNC: 145 MMOL/L (ref 135–145)
SPECIFIC GRAVITY UA: 1.02 (ref 1–1.03)
SQUAMOUS EPITHELIAL: 3 /HPF
TRICHOMONAS: ABNORMAL /HPF
UROBILINOGEN, URINE: NORMAL MG/DL (ref 0.2–1)
WBC UA: <1 /HPF (ref 0–5)

## 2018-03-07 ENCOUNTER — HOSPITAL ENCOUNTER (OUTPATIENT)
Dept: PHYSICAL THERAPY | Age: 58
Discharge: HOME | End: 2018-03-07
Attending: NURSE PRACTITIONER

## 2018-03-20 ENCOUNTER — HOSPITAL ENCOUNTER (OUTPATIENT)
Dept: GENERAL RADIOLOGY | Age: 58
Discharge: HOME | End: 2018-03-20
Attending: NURSE PRACTITIONER | Admitting: NURSE PRACTITIONER

## 2018-03-20 DIAGNOSIS — M25.432 SWELLING OF JOINT, WRIST, LEFT: ICD-10-CM

## 2018-03-20 DIAGNOSIS — M25.561 ACUTE PAIN OF RIGHT KNEE: ICD-10-CM

## 2018-04-01 ENCOUNTER — HOSPITAL ENCOUNTER (OUTPATIENT)
Dept: OTHER | Age: 58
Discharge: HOME | End: 2018-04-30
Attending: NURSE PRACTITIONER | Admitting: NURSE PRACTITIONER

## 2018-04-04 ENCOUNTER — HOSPITAL ENCOUNTER (OUTPATIENT)
Dept: ULTRASOUND IMAGING | Age: 58
Discharge: HOME | End: 2018-04-04
Attending: NURSE PRACTITIONER | Admitting: NURSE PRACTITIONER

## 2018-04-04 DIAGNOSIS — K76.0 FATTY LIVER: ICD-10-CM

## 2018-04-04 DIAGNOSIS — K76.0 FATTY (CHANGE OF) LIVER, NOT ELSEWHERE CLASSIFIED: ICD-10-CM

## 2018-04-04 LAB
BASOPHILS ABSOLUTE: 0.1 K/CU MM
BASOPHILS RELATIVE PERCENT: 1.2 % (ref 0–1)
DIFFERENTIAL TYPE: ABNORMAL
EOSINOPHILS ABSOLUTE: 0.4 K/CU MM
EOSINOPHILS RELATIVE PERCENT: 5.1 % (ref 0–3)
FERRITIN: 17 NG/ML (ref 15–150)
FOLATE: 2.7 NG/ML (ref 3.1–17.5)
HCT VFR BLD CALC: 41.2 % (ref 37–47)
HEMOGLOBIN: 13 GM/DL (ref 12.5–16)
IMMATURE NEUTROPHIL %: 0.4 % (ref 0–0.43)
IRON: 64 UG/DL (ref 37–145)
LYMPHOCYTES ABSOLUTE: 1.7 K/CU MM
LYMPHOCYTES RELATIVE PERCENT: 20.6 % (ref 24–44)
MCH RBC QN AUTO: 28.1 PG (ref 27–31)
MCHC RBC AUTO-ENTMCNC: 31.6 % (ref 32–36)
MCV RBC AUTO: 89.2 FL (ref 78–100)
MONOCYTES ABSOLUTE: 0.6 K/CU MM
MONOCYTES RELATIVE PERCENT: 7 % (ref 0–4)
NUCLEATED RBC %: 0 %
PCT TRANSFERRIN: 18 % (ref 10–44)
PDW BLD-RTO: 15 % (ref 11.7–14.9)
PLATELET # BLD: 320 K/CU MM (ref 140–440)
PMV BLD AUTO: 9.7 FL (ref 7.5–11.1)
RBC # BLD: 4.62 M/CU MM (ref 4.2–5.4)
RETICULOCYTE COUNT PCT: 2.3 % (ref 0.2–2.2)
SEGMENTED NEUTROPHILS ABSOLUTE COUNT: 5.4 K/CU MM
SEGMENTED NEUTROPHILS RELATIVE PERCENT: 65.7 % (ref 36–66)
TOTAL IMMATURE NEUTOROPHIL: 0.03 K/CU MM
TOTAL IRON BINDING CAPACITY: 361 UG/DL (ref 250–450)
TOTAL NUCLEATED RBC: 0 K/CU MM
UNSATURATED IRON BINDING CAPACITY: 297 UG/DL (ref 110–370)
VITAMIN B-12: 563.5 PG/ML (ref 211–911)
WBC # BLD: 8.2 K/CU MM (ref 4–10.5)

## 2018-08-30 ENCOUNTER — HOSPITAL ENCOUNTER (OUTPATIENT)
Dept: WOMENS IMAGING | Age: 58
Discharge: HOME | End: 2018-08-30
Attending: NURSE PRACTITIONER | Admitting: NURSE PRACTITIONER

## 2018-08-30 DIAGNOSIS — Z12.31 VISIT FOR SCREENING MAMMOGRAM: ICD-10-CM

## 2018-09-10 ENCOUNTER — OFFICE VISIT (OUTPATIENT)
Dept: CARDIOLOGY CLINIC | Age: 58
End: 2018-09-10

## 2018-09-10 VITALS
BODY MASS INDEX: 31.65 KG/M2 | HEIGHT: 62 IN | WEIGHT: 172 LBS | SYSTOLIC BLOOD PRESSURE: 120 MMHG | DIASTOLIC BLOOD PRESSURE: 80 MMHG | HEART RATE: 76 BPM

## 2018-09-10 DIAGNOSIS — I10 ESSENTIAL HYPERTENSION: ICD-10-CM

## 2018-09-10 DIAGNOSIS — I25.10 CORONARY ARTERY DISEASE INVOLVING NATIVE CORONARY ARTERY OF NATIVE HEART WITHOUT ANGINA PECTORIS: Primary | ICD-10-CM

## 2018-09-10 PROCEDURE — G8417 CALC BMI ABV UP PARAM F/U: HCPCS | Performed by: INTERNAL MEDICINE

## 2018-09-10 PROCEDURE — G8598 ASA/ANTIPLAT THER USED: HCPCS | Performed by: INTERNAL MEDICINE

## 2018-09-10 PROCEDURE — 99213 OFFICE O/P EST LOW 20 MIN: CPT | Performed by: INTERNAL MEDICINE

## 2018-09-10 PROCEDURE — 4004F PT TOBACCO SCREEN RCVD TLK: CPT | Performed by: INTERNAL MEDICINE

## 2018-09-10 PROCEDURE — G8427 DOCREV CUR MEDS BY ELIG CLIN: HCPCS | Performed by: INTERNAL MEDICINE

## 2018-09-10 PROCEDURE — 3017F COLORECTAL CA SCREEN DOC REV: CPT | Performed by: INTERNAL MEDICINE

## 2018-09-10 PROCEDURE — 3014F SCREEN MAMMO DOC REV: CPT | Performed by: INTERNAL MEDICINE

## 2018-09-10 RX ORDER — CHOLECALCIFEROL (VITAMIN D3) 125 MCG
5 CAPSULE ORAL NIGHTLY PRN
COMMUNITY
End: 2021-12-02

## 2018-09-10 RX ORDER — NITROGLYCERIN 0.4 MG/1
0.4 TABLET SUBLINGUAL EVERY 5 MIN PRN
Qty: 25 TABLET | Refills: 3 | Status: SHIPPED | OUTPATIENT
Start: 2018-09-10 | End: 2020-06-29 | Stop reason: SDUPTHER

## 2018-09-10 NOTE — PROGRESS NOTES
CARDIOLOGY NOTE      9/10/2018    RE: Hue Gutierrez  (1960)                               TO:  Dr. Nataliia Hernandez, APRN - CNP    CC:    HP:  Thank you for involving me in taking care of your  patient Hue Gutierrez, who is a  60y.o. year old      female with past medical  history of  Cad, htn, hyperlipidimea  is  seen today Patient  during this  visit has no cardiac complains.       Vitals:    09/10/18 1145   BP: 120/80   Pulse: 76       Current Outpatient Prescriptions   Medication Sig Dispense Refill    melatonin 3 MG TABS tablet Take 5 mg by mouth daily      CALAMINE-ZINC OXIDE EX Apply topically      memantine (NAMENDA) 5 MG tablet Take 5 mg by mouth 2 times daily      albuterol sulfate HFA (VENTOLIN HFA) 108 (90 Base) MCG/ACT inhaler Inhale 2 puffs into the lungs as needed for Shortness of Breath 1 Inhaler 5    metoprolol tartrate (LOPRESSOR) 25 MG tablet Take 25 mg by mouth 2 times daily Take 1/2 tab      ondansetron (ZOFRAN) 4 MG tablet Take 4 mg by mouth every 8 hours as needed for Nausea or Vomiting      hydrOXYzine (ATARAX) 25 MG tablet Take 25 mg by mouth every 8 hours as needed for Itching      vitamin B-1 (THIAMINE) 100 MG tablet Take 100 mg by mouth daily      vitamin D (ERGOCALCIFEROL) 21998 units CAPS capsule Take 50,000 Units by mouth once a week      lisinopril (PRINIVIL;ZESTRIL) 30 MG tablet Take 1 tablet by mouth daily 30 tablet 6    clopidogrel (PLAVIX) 75 MG tablet Take 1 tablet by mouth daily 90 tablet 3    ranolazine (RANEXA) 1000 MG extended release tablet Take 1 tablet by mouth 2 times daily 180 tablet 3    hydrochlorothiazide (HYDRODIURIL) 12.5 MG tablet Take 1 tablet by mouth daily 90 tablet 3    ranitidine (ZANTAC) 150 MG capsule Take 150 mg by mouth 2 times daily      Acetaminophen (TYLENOL 8 HOUR ARTHRITIS PAIN PO) Take 1,300 mg by mouth 2 times daily as needed      omeprazole (PRILOSEC) 40 MG delayed release capsule TAKE 1 TABLET BY MOUTH EVERY DAY  H/O Doppler ultrasound 05/26/2017    carotid - normal study    H/O exercise stress test 02/28/2017    normal study    Hiatal hernia     History of exercise stress test 02/28/2017    treadmill    History of nuclear stress test 8/7/15    EF 70%. WNL    [place] (hard of hearing)     Bilateral Ears    Hx of cardiovascular stress test 9/2013 9/13 EF70% Normal.10/12- LVSF normal. EF 61%. Normal perfusion althought pt complained of chest pain with Lexiscan. report in epic; 11/11, possible apical ischemia but abnormality secondary to technical artifact needs to be considered, regional wall motion abnormality with low normal LVSF by pierson scan. 8/10, 12/09    Hx of Doppler ultrasound 2/2011 2/2011-Carotid no significant obstructive lesions noted in the extracranial carotid system. Antegrade flow noted inthe vertebral arteries.  Hx of Doppler ultrasound 1/2012 1/2012-peripheral - both abis and duplex studies of both lower extremities do not  reveal any significant peripheral vascular disease at this time.  Hx of Doppler ultrasound 1/22/2016    Arterial: Peripheral vascular noninvasive arterial studies do not reveal any significant atherosclerotic disease.  Hx of echocardiogram 6/2013 6/13- Mild to mod MR/TR. 10/12-LVSF normal. EF 60%. Mild mitral and tricuspid insuff. reprot in epic; 8/6/10, normal dimension of the LV, normal global and regional LVSF with an EF of 60%, no significant valvuopathy is seen.  12/05    Hypertension     \"on medication since age 25's\" follow with Dr Severo Heron Irregular heart beat     \"have irreg heart beat\" dont know what you call it\"    Migraines Last Migraine In 2014    Palpitations     Panic attacks     Pneumonia Last Episode In 2014    Prolonged emergence from general anesthesia     Restless leg     S/P cardiac cath 2/21/14    S/P PTCA (percutaneous transluminal coronary angioplasty) 01/26/2017    2017, CX stented    Shortness of breath     Teeth missing     Upper And Lower    Tobacco use     Unspecified sleep apnea     \"had sleep study 2-3 yrs ago\" have cpap and try to use it\"     Past Surgical History:   Procedure Laterality Date    COLONOSCOPY  2011    COLONOSCOPY  08/23/2017    colon polyp, diverticulosis, hemorrhoids    DENTAL SURGERY      Teeth Extracted In Past    ENDOSCOPY, COLON, DIAGNOSTIC  10-16-15    GASTRIC FUNDOPLICATION  92/75/4974    Robotic laparoscopic nissen with mesh    KIDNEY SURGERY Left 2-15    \"At [facility] Had Left Kidney Mass Removed\" Benign    TONSILLECTOMY  1960's    TUBAL LIGATION  1993     Family History   Problem Relation Age of Onset    Heart Disease Mother     Heart Disease Father     High Blood Pressure Father     Cancer Sister     Early Death Sister 43    Asthma Brother      Social History   Substance Use Topics    Smoking status: Current Some Day Smoker     Packs/day: 0.25     Years: 44.00     Types: Cigarettes     Start date: 10/21/1971     Last attempt to quit: 8/21/2015    Smokeless tobacco: Never Used    Alcohol use 1.2 oz/week     2 Shots of liquor per week      Comment: occassionally          Review of systems:  HEENT: Neg  Card:neg   GI;Neg  : Neg  Neuro: Neg  Psych: Neg  Derm: Neg  MS; Neg  All: Documented  Constitutional: Neg    Objective:      Physical Exam:  /80   Pulse 76   Ht 5' 2\" (1.575 m)   Wt 172 lb (78 kg)   BMI 31.46 kg/m²   Wt Readings from Last 3 Encounters:   09/10/18 172 lb (78 kg)   07/19/18 165 lb (74.8 kg)   04/12/18 167 lb (75.8 kg)     Body mass index is 31.46 kg/m². GENERAL - Alert, oriented, pleasant, in no apparent distress. Head unremarkable  Eyes  Not injected conjunctiva  ENT  normal mucosa  Neck - Supple. No jugular venous distention noted. No carotid bruits. Cardiovascular  Normal S1 and S2 without obvious murmur or gallop. Extremities - No cyanosis, clubbing, or significant edema. Pulmonary  No respiratory distress. No wheezes or rales.

## 2018-09-11 ENCOUNTER — HOSPITAL ENCOUNTER (OUTPATIENT)
Dept: ULTRASOUND IMAGING | Age: 58
Discharge: HOME | End: 2018-09-11
Attending: INTERNAL MEDICINE | Admitting: INTERNAL MEDICINE

## 2018-09-11 DIAGNOSIS — R10.11 RUQ PAIN: ICD-10-CM

## 2018-09-26 ENCOUNTER — HOSPITAL ENCOUNTER (OUTPATIENT)
Dept: PHYSICAL THERAPY | Age: 58
Setting detail: THERAPIES SERIES
Discharge: HOME | End: 2018-09-26
Payer: COMMERCIAL

## 2018-09-26 PROCEDURE — 97110 THERAPEUTIC EXERCISES: CPT

## 2018-09-26 NOTE — FLOWSHEET NOTE
Physical Therapy Daily Treatment Note  Date:  2018    Patient Name:  Melany Monahan    :  1960  MRN: [id number]  Other position/activity restrictions: none   Diagnosis: R knee pain; LBP  Treatment Diagnosis: B knee pain/ unsteadiness w/ walking  Date of injury/Date of Surgery:chronic pain   Insurance: [address] [place] [address]   Practitioner- [address] [place] [address]  Referring Practitioner Follow-Up:     POC Signed: pending  POC Date Range:  -18  Progress Note Due:  10 sessions  Visit# / total visits:3  /10                    Goals:   Long term goals  Time Frame for Long term goals : check @ 10 sessions-18  Long term goal 1: compliant with HEP  Long term goal 2: 10/28 KOOS jr/ @ eval=  Patient goals : less knee pain    Summary of Eval:     Patient primary complaints:knee pain  History of condition:@ least 4 years  Current functional limitations: requires cane,  pain with all ADL  PLOF:sedentary  Prominent clinical findings:knee EXT weakness/ knee ROM WNL -painful  Progressive treatment plan will emphasize:knee EXT strength/HEP  Barriers to learning:[place]  Potential barriers to progress:chronic pain  The patient appears/reports motivated to regain PLOF: yes  INITIAL PAIN LEVEL:  7/10 R knee   SUBJECTIVE: Patient continues to c/o pain. Any changes to Ambulatory Summary Sheet? Any major status changes?      ACTIVITIES: Date 18 Date 18 #2 Date 18 (3)   Outcomes Measure KOOS jr     Vision fitness bike L2 x15 min L2 x15 min L2 x15 min     LAQ X 3 reps R/L x5 rpes R/L CGA  x 5 reps R/L     SLR 10 reps R/L x10 reps R/L   x10 reps R/L   PROM Supine B knees B knees bilat knees                                                                                                                                                                          HEP: SLR -20per day review Encouraged compliance with HEP   Supervision/Cues:  Cued for exercise performance  Verbal and tactile cueing   Objectives: See eval  C/o

## 2018-10-03 ENCOUNTER — HOSPITAL ENCOUNTER (OUTPATIENT)
Dept: PHYSICAL THERAPY | Age: 58
Setting detail: THERAPIES SERIES
Discharge: HOME | End: 2018-10-03
Payer: COMMERCIAL

## 2018-10-03 PROCEDURE — 97110 THERAPEUTIC EXERCISES: CPT

## 2018-10-03 NOTE — FLOWSHEET NOTE
compliance with HEP review   Supervision/Cues:  Cued for exercise performance  Verbal and tactile cueing Verbal and manual cueing on proper performance of the prescribed exercise or of the designated task     Objectives: See eval  C/o increase pain while performing knee ROM.     Response to intervention: Muscle strain with exercise  No increase in pain    Post Tx Pain Ratin/10  R knee 7/10 7/10 5/10   Overall change since Evaluation:       Prognosis: fair      Plan for Next Session: Reinforce HEP Cont PT continue Cont PT     Plan:  1__x/week x _10_ weeks    Intervention used today:  [x] Therapeutic Exercise    [] Therapeutic Activity     [] Ultrasound  [] Elec  Stim  [] Gait Training      [] Cervical Traction [] Lumbar Traction  [] Neuromuscular Re-education    [] Cold/hotpack [] Iontophoresis   [x] Instruction in HEP      [] Vasopneumatic     [] Manual Therapy               [] Self care home management                    (    ) Dry needling  See eval  Time In:145  Time Out:230  Timed Code Treatment Minutes:45   Total Treatment Minutes: 45    Electronically signed by:  Radha Carpenter 10/3/2018, 1:02 PM

## 2018-10-10 ENCOUNTER — HOSPITAL ENCOUNTER (OUTPATIENT)
Dept: PHYSICAL THERAPY | Age: 58
Setting detail: THERAPIES SERIES
Discharge: HOME | End: 2018-10-10
Payer: COMMERCIAL

## 2018-10-10 PROCEDURE — 97530 THERAPEUTIC ACTIVITIES: CPT

## 2018-10-17 ENCOUNTER — HOSPITAL ENCOUNTER (OUTPATIENT)
Dept: PHYSICAL THERAPY | Age: 58
Setting detail: THERAPIES SERIES
Discharge: HOME | End: 2018-10-17
Payer: COMMERCIAL

## 2018-10-17 PROCEDURE — 97110 THERAPEUTIC EXERCISES: CPT

## 2018-10-17 NOTE — FLOWSHEET NOTE
Physical Therapy Daily Treatment Note  Date:  10/17/2018    Patient Name:  Dionicio Le    :  1960  MRN: [id number]  Other position/activity restrictions: none   Diagnosis: R knee pain; LBP  Treatment Diagnosis: B knee pain/ unsteadiness w/ walking  Date of injury/Date of Surgery:chronic pain   Insurance: [address] [place] [address]   Practitioner- [address] [place] [address]  Referring Practitioner Follow-Up:     POC Signed: pending  POC Date Range:  -18  Progress Note Due:  10 sessions  Visit# / total visits:6/10                    Goals:   Long term goals  Time Frame for Long term goals : check @ 10 sessions-18  Long term goal 1: compliant with HEP  Long term goal 2: 10/28 KOOS jr/ @ eval=  Patient goals : less knee pain    Summary of Eval:     Patient primary complaints:knee pain  History of condition:@ least 4 years  Current functional limitations: requires cane,  pain with all ADL  PLOF:sedentary  Prominent clinical findings:knee EXT weakness/ knee ROM WNL -painful  Progressive treatment plan will emphasize:knee EXT strength/HEP  Barriers to learning:[place]  Potential barriers to progress:chronic pain  The patient appears/reports motivated to regain PLOF: yes  INITIAL PAIN LEVEL: 5/10 R knee   SUBJECTIVE: pt reports R knee pain with ADL    Any changes to Ambulatory Summary Sheet? Any major status changes?      ACTIVITIES: Date 18 Date 18 #2 Date 18 (3) Date 10/3/18 #4 10/10/18 #5 10/17/18 #6   Outcomes Measure KOOS jr        Vision fitness bike L2 x15 min L2 x15 min L2 x15 min l2 x15 min L3 12 x 15 min Recumbent x 5 min     LAQ X 3 reps R/L x5 rpes R/L CGA  x 5 reps R/L 2x5 reps R/L 2x5 reps R/L x15     SLR 10 reps R/L x10 reps R/L   x10 reps R/L 10 reps R/L 10 reps R/L 2 x10   PROM Supine B knees B knees bilat knees cher knees Heel slides 10 reps R/L min A ----           nustep x10 min

## 2018-10-24 ENCOUNTER — HOSPITAL ENCOUNTER (OUTPATIENT)
Dept: PHYSICAL THERAPY | Age: 58
Setting detail: THERAPIES SERIES
Discharge: HOME | End: 2018-10-24
Payer: COMMERCIAL

## 2018-10-24 PROCEDURE — 97110 THERAPEUTIC EXERCISES: CPT

## 2018-10-31 ENCOUNTER — HOSPITAL ENCOUNTER (OUTPATIENT)
Dept: PHYSICAL THERAPY | Age: 58
Setting detail: THERAPIES SERIES
Discharge: HOME | End: 2018-10-31
Payer: COMMERCIAL

## 2018-10-31 PROCEDURE — 97110 THERAPEUTIC EXERCISES: CPT

## 2018-11-07 ENCOUNTER — HOSPITAL ENCOUNTER (OUTPATIENT)
Dept: PHYSICAL THERAPY | Age: 58
Setting detail: THERAPIES SERIES
Discharge: HOME | End: 2018-11-07
Payer: COMMERCIAL

## 2018-11-07 PROCEDURE — 97110 THERAPEUTIC EXERCISES: CPT

## 2018-11-07 NOTE — FLOWSHEET NOTE
Physical Therapy Daily Treatment Note  Date:  2018    Patient Name:  Uyen Landry    :  1960  MRN: [id number]  Other position/activity restrictions: none   Diagnosis: R knee pain; LBP  Treatment Diagnosis: B knee pain/ unsteadiness w/ walking  Date of injury/Date of Surgery:chronic pain   Insurance: [address] [place] [address]   Practitioner- [address] [place] [address]  Referring Practitioner Follow-Up:     POC Signed: pending  POC Date Range:  -18  Progress Note Due:  10 sessions  Visit# / total visits:9/10                    Goals:   Long term goals  Time Frame for Long term goals : check @ 10 sessions-18  Long term goal 1: compliant with HEP  Long term goal 2: 10/28 MIKE jr/ @ eval=  Patient goals : less knee pain    Summary of Eval:     Patient primary complaints:knee pain  History of condition:@ least 4 years  Current functional limitations: requires cane,  pain with all ADL  PLOF:sedentary  Prominent clinical findings:knee EXT weakness/ knee ROM WNL -painful  Progressive treatment plan will emphasize:knee EXT strength/HEP  Barriers to learning:[place]  Potential barriers to progress:chronic pain  The patient appears/reports motivated to regain PLOF: yes  INITIAL PAIN LEVEL: 6-/10 R knee   SUBJECTIVE: Pt continues to c/o knee pain. Any changes to Ambulatory Summary Sheet? Any major status changes?      ACTIVITIES: 10/17/18 #6 10/24/18 (7) 10/31/18 (8) 18 (9)   Outcomes Measure       Vision fitness bike Recumbent x 5 min Recumbent x 5 min Recumbent x 5 min Recumbent x 5 min     LAQ x15  x 15 reps R/L x 15 reps R/L x 15 reps R/L     SLR 2 x10 2x10 reps R/L 2x10 reps R/L 2x10 reps R/L   PROM ---- -------        nustep x10 min nustep seat 6 WL 3  x10 min nustep seat 6 WL 3  x10 min nustep seat 6 WL 3  x10 min                                                                                                                                                                                          HEP: Encouraged

## 2018-11-14 ENCOUNTER — HOSPITAL ENCOUNTER (OUTPATIENT)
Dept: PHYSICAL THERAPY | Age: 58
Setting detail: THERAPIES SERIES
Discharge: HOME | End: 2018-11-14
Payer: COMMERCIAL

## 2018-11-14 PROCEDURE — 97110 THERAPEUTIC EXERCISES: CPT

## 2018-12-15 DIAGNOSIS — K44.9 GASTROESOPHAGEAL REFLUX DISEASE WITH HIATAL HERNIA: Chronic | ICD-10-CM

## 2018-12-15 DIAGNOSIS — R07.9 CHEST PAIN, UNSPECIFIED TYPE: ICD-10-CM

## 2018-12-15 DIAGNOSIS — Z72.0 CURRENT TOBACCO USE: ICD-10-CM

## 2018-12-15 DIAGNOSIS — K21.9 GASTROESOPHAGEAL REFLUX DISEASE WITH HIATAL HERNIA: Chronic | ICD-10-CM

## 2018-12-17 RX ORDER — RANOLAZINE 1000 MG/1
1000 TABLET, FILM COATED, EXTENDED RELEASE ORAL 2 TIMES DAILY
Qty: 180 TABLET | Refills: 2 | Status: SHIPPED | OUTPATIENT
Start: 2018-12-17 | End: 2020-01-24

## 2019-01-03 RX ORDER — CLOPIDOGREL BISULFATE 75 MG/1
75 TABLET ORAL DAILY
Qty: 90 TABLET | Refills: 3 | Status: SHIPPED | OUTPATIENT
Start: 2019-01-03 | End: 2020-01-09

## 2019-01-09 ENCOUNTER — HOSPITAL ENCOUNTER (OUTPATIENT)
Dept: ULTRASOUND IMAGING | Age: 59
Discharge: HOME | End: 2019-01-09
Payer: COMMERCIAL

## 2019-01-09 DIAGNOSIS — K76.0 FATTY LIVER: ICD-10-CM

## 2019-01-09 PROCEDURE — 76705 ECHO EXAM OF ABDOMEN: CPT

## 2019-01-09 PROCEDURE — 93975 VASCULAR STUDY: CPT

## 2019-01-23 RX ORDER — DONEPEZIL HYDROCHLORIDE 10 MG/1
10 TABLET, FILM COATED ORAL NIGHTLY
Refills: 3 | COMMUNITY
Start: 2019-01-03

## 2019-01-27 ENCOUNTER — ANESTHESIA EVENT (OUTPATIENT)
Dept: OPERATING ROOM | Age: 59
End: 2019-01-27
Payer: COMMERCIAL

## 2019-01-27 ASSESSMENT — LIFESTYLE VARIABLES: SMOKING_STATUS: 1

## 2019-01-28 ENCOUNTER — ANESTHESIA (OUTPATIENT)
Dept: OPERATING ROOM | Age: 59
End: 2019-01-28
Payer: COMMERCIAL

## 2019-01-28 ENCOUNTER — HOSPITAL ENCOUNTER (OUTPATIENT)
Age: 59
Setting detail: OUTPATIENT SURGERY
Discharge: HOME | End: 2019-01-28
Attending: INTERNAL MEDICINE | Admitting: INTERNAL MEDICINE
Payer: COMMERCIAL

## 2019-01-28 ENCOUNTER — HOSPITAL ENCOUNTER (OUTPATIENT)
Dept: GENERAL RADIOLOGY | Age: 59
Discharge: HOME | End: 2019-01-28
Payer: COMMERCIAL

## 2019-01-28 VITALS — SYSTOLIC BLOOD PRESSURE: 103 MMHG | DIASTOLIC BLOOD PRESSURE: 59 MMHG | OXYGEN SATURATION: 98 %

## 2019-01-28 VITALS
BODY MASS INDEX: 32.57 KG/M2 | HEIGHT: 62 IN | WEIGHT: 177 LBS | TEMPERATURE: 97.6 F | HEART RATE: 74 BPM | RESPIRATION RATE: 16 BRPM | DIASTOLIC BLOOD PRESSURE: 88 MMHG | OXYGEN SATURATION: 98 % | SYSTOLIC BLOOD PRESSURE: 115 MMHG

## 2019-01-28 DIAGNOSIS — R13.10 DYSPHAGIA, UNSPECIFIED TYPE: ICD-10-CM

## 2019-01-28 PROCEDURE — 2580000003 HC RX 258: Performed by: INTERNAL MEDICINE

## 2019-01-28 PROCEDURE — 7100000011 HC PHASE II RECOVERY - ADDTL 15 MIN: Performed by: INTERNAL MEDICINE

## 2019-01-28 PROCEDURE — 3700000000 HC ANESTHESIA ATTENDED CARE: Performed by: INTERNAL MEDICINE

## 2019-01-28 PROCEDURE — 3609012700 HC EGD DILATION SAVORY: Performed by: INTERNAL MEDICINE

## 2019-01-28 PROCEDURE — 2500000003 HC RX 250 WO HCPCS: Performed by: NURSE ANESTHETIST, CERTIFIED REGISTERED

## 2019-01-28 PROCEDURE — 6360000002 HC RX W HCPCS: Performed by: NURSE ANESTHETIST, CERTIFIED REGISTERED

## 2019-01-28 PROCEDURE — 76000 FLUOROSCOPY <1 HR PHYS/QHP: CPT

## 2019-01-28 PROCEDURE — 7100000010 HC PHASE II RECOVERY - FIRST 15 MIN: Performed by: INTERNAL MEDICINE

## 2019-01-28 PROCEDURE — 3700000001 HC ADD 15 MINUTES (ANESTHESIA): Performed by: INTERNAL MEDICINE

## 2019-01-28 PROCEDURE — 2709999900 HC NON-CHARGEABLE SUPPLY: Performed by: INTERNAL MEDICINE

## 2019-01-28 RX ORDER — LIDOCAINE HYDROCHLORIDE 20 MG/ML
INJECTION, SOLUTION EPIDURAL; INFILTRATION; INTRACAUDAL; PERINEURAL PRN
Status: DISCONTINUED | OUTPATIENT
Start: 2019-01-28 | End: 2019-01-28 | Stop reason: SDUPTHER

## 2019-01-28 RX ORDER — LORATADINE 10 MG/1
10 TABLET ORAL DAILY
COMMUNITY
End: 2020-07-23

## 2019-01-28 RX ORDER — LISINOPRIL AND HYDROCHLOROTHIAZIDE 20; 12.5 MG/1; MG/1
1 TABLET ORAL DAILY
Status: ON HOLD | COMMUNITY
End: 2019-05-29 | Stop reason: HOSPADM

## 2019-01-28 RX ORDER — SODIUM CHLORIDE, SODIUM LACTATE, POTASSIUM CHLORIDE, CALCIUM CHLORIDE 600; 310; 30; 20 MG/100ML; MG/100ML; MG/100ML; MG/100ML
INJECTION, SOLUTION INTRAVENOUS CONTINUOUS
Status: DISCONTINUED | OUTPATIENT
Start: 2019-01-28 | End: 2019-01-28 | Stop reason: HOSPADM

## 2019-01-28 RX ORDER — BUDESONIDE AND FORMOTEROL FUMARATE DIHYDRATE 160; 4.5 UG/1; UG/1
2 AEROSOL RESPIRATORY (INHALATION) 2 TIMES DAILY PRN
COMMUNITY
End: 2019-05-21 | Stop reason: SDUPTHER

## 2019-01-28 RX ORDER — DEXTROMETHORPHAN POLISTIREX 30 MG/5ML
60 SUSPENSION ORAL 2 TIMES DAILY PRN
Status: ON HOLD | COMMUNITY
End: 2019-05-29 | Stop reason: HOSPADM

## 2019-01-28 RX ORDER — BETAMETHASONE DIPROPIONATE 0.05 %
OINTMENT (GRAM) TOPICAL 2 TIMES DAILY
Status: ON HOLD | COMMUNITY
End: 2019-05-29 | Stop reason: HOSPADM

## 2019-01-28 RX ORDER — DICYCLOMINE HCL 20 MG
20 TABLET ORAL 3 TIMES DAILY PRN
Status: ON HOLD | COMMUNITY
End: 2021-06-23 | Stop reason: HOSPADM

## 2019-01-28 RX ORDER — QUETIAPINE FUMARATE 25 MG/1
25 TABLET, FILM COATED ORAL DAILY PRN
COMMUNITY
End: 2019-07-18 | Stop reason: ALTCHOICE

## 2019-01-28 RX ORDER — PROPOFOL 10 MG/ML
INJECTION, EMULSION INTRAVENOUS PRN
Status: DISCONTINUED | OUTPATIENT
Start: 2019-01-28 | End: 2019-01-28 | Stop reason: SDUPTHER

## 2019-01-28 RX ADMIN — PROPOFOL 40 MG: 10 INJECTION, EMULSION INTRAVENOUS at 08:18

## 2019-01-28 RX ADMIN — PROPOFOL 30 MG: 10 INJECTION, EMULSION INTRAVENOUS at 08:20

## 2019-01-28 RX ADMIN — SODIUM CHLORIDE, POTASSIUM CHLORIDE, SODIUM LACTATE AND CALCIUM CHLORIDE: 600; 310; 30; 20 INJECTION, SOLUTION INTRAVENOUS at 08:10

## 2019-01-28 RX ADMIN — SODIUM CHLORIDE, POTASSIUM CHLORIDE, SODIUM LACTATE AND CALCIUM CHLORIDE: 600; 310; 30; 20 INJECTION, SOLUTION INTRAVENOUS at 08:16

## 2019-01-28 RX ADMIN — PROPOFOL 60 MG: 10 INJECTION, EMULSION INTRAVENOUS at 08:16

## 2019-01-28 RX ADMIN — LIDOCAINE HYDROCHLORIDE 200 MG: 20 INJECTION, SOLUTION EPIDURAL; INFILTRATION; INTRACAUDAL; PERINEURAL at 08:16

## 2019-01-28 RX ADMIN — PROPOFOL 40 MG: 10 INJECTION, EMULSION INTRAVENOUS at 08:23

## 2019-01-28 ASSESSMENT — ENCOUNTER SYMPTOMS: SHORTNESS OF BREATH: 1

## 2019-01-28 ASSESSMENT — PAIN DESCRIPTION - DESCRIPTORS: DESCRIPTORS: ACHING;CONSTANT

## 2019-01-28 ASSESSMENT — PAIN - FUNCTIONAL ASSESSMENT: PAIN_FUNCTIONAL_ASSESSMENT: 0-10

## 2019-01-28 ASSESSMENT — PAIN SCALES - GENERAL: PAINLEVEL_OUTOF10: 0

## 2019-04-08 ENCOUNTER — HOSPITAL ENCOUNTER (OUTPATIENT)
Dept: GENERAL RADIOLOGY | Age: 59
Discharge: HOME | End: 2019-04-08
Payer: COMMERCIAL

## 2019-04-08 ENCOUNTER — HOSPITAL ENCOUNTER (OUTPATIENT)
Age: 59
Discharge: HOME | End: 2019-04-08
Payer: COMMERCIAL

## 2019-04-08 DIAGNOSIS — J43.2 CENTRILOBULAR EMPHYSEMA (HCC): ICD-10-CM

## 2019-04-08 LAB
ANION GAP SERPL CALCULATED.3IONS-SCNC: 9 MMOL/L (ref 4–16)
BACTERIA: ABNORMAL /HPF
BILIRUBIN URINE: NEGATIVE MG/DL
BLOOD, URINE: NEGATIVE
BUN BLDV-MCNC: 16 MG/DL (ref 6–23)
CALCIUM SERPL-MCNC: 8.7 MG/DL (ref 8.3–10.6)
CHLORIDE BLD-SCNC: 104 MMOL/L (ref 99–110)
CLARITY: ABNORMAL
CO2: 30 MMOL/L (ref 21–32)
COLOR: YELLOW
CREAT SERPL-MCNC: 0.9 MG/DL (ref 0.6–1.1)
GFR AFRICAN AMERICAN: >60 ML/MIN/1.73M2
GFR NON-AFRICAN AMERICAN: >60 ML/MIN/1.73M2
GLUCOSE BLD-MCNC: 73 MG/DL (ref 70–99)
GLUCOSE, URINE: NEGATIVE MG/DL
KETONES, URINE: NEGATIVE MG/DL
LEUKOCYTE ESTERASE, URINE: ABNORMAL
MUCUS: ABNORMAL HPF
NITRITE URINE, QUANTITATIVE: NEGATIVE
PH, URINE: 5 (ref 5–8)
POTASSIUM SERPL-SCNC: 4.3 MMOL/L (ref 3.5–5.1)
PROTEIN UA: 30 MG/DL
RBC URINE: 4 /HPF (ref 0–6)
SODIUM BLD-SCNC: 143 MMOL/L (ref 135–145)
SPECIFIC GRAVITY UA: 1.02 (ref 1–1.03)
SQUAMOUS EPITHELIAL: 18 /HPF
TRICHOMONAS: ABNORMAL /HPF
UROBILINOGEN, URINE: NORMAL MG/DL (ref 0.2–1)
WBC UA: 11 /HPF (ref 0–5)

## 2019-04-08 PROCEDURE — 80048 BASIC METABOLIC PNL TOTAL CA: CPT

## 2019-04-08 PROCEDURE — 81001 URINALYSIS AUTO W/SCOPE: CPT

## 2019-04-08 PROCEDURE — 36415 COLL VENOUS BLD VENIPUNCTURE: CPT

## 2019-04-08 PROCEDURE — 71046 X-RAY EXAM CHEST 2 VIEWS: CPT

## 2019-04-09 ENCOUNTER — HOSPITAL ENCOUNTER (OUTPATIENT)
Dept: PHYSICAL THERAPY | Age: 59
Setting detail: THERAPIES SERIES
Discharge: HOME | End: 2019-04-09
Payer: COMMERCIAL

## 2019-04-09 PROCEDURE — 97162 PT EVAL MOD COMPLEX 30 MIN: CPT

## 2019-04-09 ASSESSMENT — PAIN SCALES - GENERAL: PAINLEVEL_OUTOF10: 5

## 2019-04-29 ENCOUNTER — HOSPITAL ENCOUNTER (OUTPATIENT)
Dept: PHYSICAL THERAPY | Age: 59
Setting detail: THERAPIES SERIES
Discharge: HOME | End: 2019-04-29
Payer: COMMERCIAL

## 2019-04-29 ASSESSMENT — PAIN DESCRIPTION - PAIN TYPE: TYPE: CHRONIC PAIN

## 2019-04-29 ASSESSMENT — PAIN SCALES - GENERAL
PAINLEVEL_OUTOF10: 5
PAINLEVEL_OUTOF10: 5

## 2019-04-29 NOTE — FLOWSHEET NOTE
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  Date of Injury/Surgery: n/a  Treatment Diagnosis: Treatment Diagnosis: B knee pain/ unsteadiness w/ walking    Insurance/Certification information:   [facility]  Referring Physician:  Referring Practitioner: Andree Navarro  Next Doctor Visit:    Plan of care signed (Y/N):    Outcome Measure:   Visit# / total visits:  1 /10  Pain level: 5/10 B knees  Goals:       Short term goals  Time Frame for Short term goals: defer to LTG  Long term goals  Time Frame for Long term goals : check in 10 weeks  Long term goal 1: patient's goal - walk steadier    Summary of Evaluation:    Patient primary complaints: : B knee pain/ unsteadiness w/ walking   History of condition:ongoing mobility limitations - has COPD and cochlear implant  Current functional limitations: ambulates with cane  PLOF:has used cane for over  1 year  Prominent clinical findings:sways with gait pattern  Progressive treatment plan will emphasize:balance/gait activities  Barriers to learning:/preferred learning style:  none/ written- demonstration -practice  Potential barriers to progress:none  The patient appears/reports motivated to regain PLOF: yes          Subjective:  See eval         Any changes in Ambulatory Summary Sheet?   None        Objective:  See eval           Exercises: (No more than 4 columns)   Exercise/Equipment Date 19 Date Date           WARM UP                     TABLE                                       STANDING      Alternate marches      aeromat standing      Solid surface standing with EC      Stair climbing  4\" PROPRIOCEPTION                                    MODALITIES                      Other Therapeutic Activities/Education:        Home Exercise Program:        Manual Treatments:        Modalities:        Communication with other providers:        Assessment:  (Response towards treatment session) (Pain Rating)  No loss of balance         Plan for Next Session:        Time In / Time Out:   1300- 1330        Timed Code/Total Treatment Minutes:  Finished eval- untimed minutes      Next Progress Note due:        Plan of Care Interventions:  [x] Therapeutic Exercise  [] Modalities:  [x] Therapeutic Activity     [] Ultrasound  [] Estim  [x] Gait Training      [] Cervical Traction [] Lumbar Traction  [x] Neuromuscular Re-education    [] Cold/hotpack [] Iontophoresis   [x] Instruction in HEP      [] Vasopneumatic   [] Dry Needling    [x] Manual Therapy               [x] Aquatic Therapy              Electronically signed by:  Xuan Mccurdy 4/29/2019, 5:57 PM

## 2019-04-29 NOTE — PLAN OF CARE
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 Assessment:  ASSESSMENT  Patient primary complaints: : B knee pain/ unsteadiness w/ walking   History of condition:ongoing mobility limitations - has COPD and cochlear implant  Current functional limitations: ambulates with cane  PLOF:has used cane for over  1 year  Prominent clinical findings:sways with gait pattern  Progressive treatment plan will emphasize:balance/gait activities  Barriers to learning:/preferred learning style:  none/ written- demonstration -practice  Potential barriers to progress:none  The patient appears/reports motivated to regain PLOF: yes  Planned Services:  [x] Therapeutic Exercise    [] Aquatics:  [x] Therapeutic Activity    [] Ultrasound  [] Elec Stimulation  [x] Gait Training     [] Cervical Traction [] Lumbar Traction  [x] Neuromuscular Re-education [] Cold/hotpack [] Iontophoresis   [x] Instruction in HEP       [x] Manual Therapy     [] vasopneumatic            [x] Self care home management        []Dry needling trigger point point/pain management    Plan of Care Date Range: 7/15/19     ?    Frequency/Duration:  # Days per week: [x] 1 day # Weeks: [] 1 week [] 5

## 2019-04-29 NOTE — PROGRESS NOTES
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  Follows Commands: Within Functional Limits  Subjective  Subjective: patient reports ongoing difficulty with walking- unsteady on her feet  Pain Screening  Patient Currently in Pain: Yes  Pain Assessment  Pain Level: 5  Vital Signs  Patient Currently in Pain: Yes    Vision/Hearing  Vision  Vision: Within Functional Limits  Hearing  Hearing: Within functional limits  Hearing Exceptions: Right hearing aid;Hard of hearing/hearing concerns    Orientation  Orientation  Overall Orientation Status: Within Normal Limits    Social/Functional History  Social/Functional History  Lives With: Spouse  ADL Assistance: Independent  Homemaking Assistance: Independent  Ambulation Assistance: Independent  Transfer Assistance: Independent  Active : No  Patient's  Info: will arrange trasnpsortation for PT sessions  Occupation: Retired    Objective     Observation/Palpation  Posture: Fair    PROM RLE (degrees)  RLE PROM: WFL  RLE General PROM: limited by pain  PROM LLE (degrees)  LLE PROM: WFL  LLE General PROM: limited by pain    Strength RLE  Comment: grossly 3+/5 hip/knee/ankle except 3-/5 knee EXT  Strength LLE  Comment: grossly 3+/5 hip/knee/ankle except 3-/5 knee EXT                                                   Assessment   Conditions Requiring Skilled Therapeutic Intervention  Body structures, Functions, Activity limitations: Decreased strength;Decreased ROM; Decreased high-level IADLs;Decreased functional mobility   Treatment Diagnosis: B knee pain/ unsteadiness w/ walking  Decision Making: Medium Complexity  History: PF - COPD  Exam: leg strength/ leg ROM  Clinical Presentation: changing characteristics of function due to unsteadiness with walking  REQUIRES PT FOLLOW UP: Yes         Plan        G-Code       OutComes Score                                                  AM-PAC Score             Goals  Short term goals  Time Frame for Short term goals: defer to LTG  Long term goals  Time Frame for Long term goals : check in 10 weeks  Long term goal 1: patient's goal - walk steadier       Therapy Time   Individual Concurrent Group Co-treatment   Time In 1535         Time Out 1600         Minutes 25                 J Misbah Andujar, PT

## 2019-05-08 ENCOUNTER — HOSPITAL ENCOUNTER (OUTPATIENT)
Dept: PHYSICAL THERAPY | Age: 59
Setting detail: THERAPIES SERIES
Discharge: HOME | End: 2019-05-08
Payer: COMMERCIAL

## 2019-05-08 PROCEDURE — 97112 NEUROMUSCULAR REEDUCATION: CPT

## 2019-05-08 NOTE — FLOWSHEET NOTE
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  Date of Injury/Surgery: n/a  Treatment Diagnosis: Treatment Diagnosis: B knee pain/ unsteadiness w/ walking    Insurance/Certification information:   [facility]  Referring Physician:  Referring Practitioner: Alis Garber  Next Doctor Visit:    Plan of care signed (Y/N):    Outcome Measure:   Visit# / total visits:  2 /10  Pain level: 5/10 B knees and right ankle  Goals:       Short term goals  Time Frame for Short term goals: defer to LTG  Long term goals  Time Frame for Long term goals : check in 10 weeks  Long term goal 1: patient's goal - walk steadier    Summary of Evaluation:    Patient primary complaints: : B knee pain/ unsteadiness w/ walking   History of condition:ongoing mobility limitations - has COPD and cochlear implant  Current functional limitations: ambulates with cane  PLOF:has used cane for over  1 year  Prominent clinical findings:sways with gait pattern  Progressive treatment plan will emphasize:balance/gait activities  Barriers to learning:/preferred learning style:  none/ written- demonstration -practice  Potential barriers to progress:none  The patient appears/reports motivated to regain PLOF: yes          Subjective:  Pt c/o pain in both her knees and right ankle. Any changes in Ambulatory Summary Sheet? None        Objective: Intermittent LOB with balance activities.             Exercises: (No more than 4 columns)   Exercise/Equipment Date 19 Date 19 (2) Date           WARM UP                     TABLE                                       STANDING      Alternate

## 2019-05-14 ENCOUNTER — APPOINTMENT (OUTPATIENT)
Dept: GENERAL RADIOLOGY | Age: 59
End: 2019-05-14
Payer: COMMERCIAL

## 2019-05-14 ENCOUNTER — APPOINTMENT (OUTPATIENT)
Dept: CT IMAGING | Age: 59
End: 2019-05-14
Payer: COMMERCIAL

## 2019-05-14 ENCOUNTER — HOSPITAL ENCOUNTER (EMERGENCY)
Age: 59
Discharge: HOME | End: 2019-05-14
Attending: EMERGENCY MEDICINE
Payer: COMMERCIAL

## 2019-05-14 VITALS
OXYGEN SATURATION: 95 % | DIASTOLIC BLOOD PRESSURE: 96 MMHG | BODY MASS INDEX: 30.73 KG/M2 | SYSTOLIC BLOOD PRESSURE: 127 MMHG | TEMPERATURE: 98.2 F | HEIGHT: 62 IN | HEART RATE: 76 BPM | WEIGHT: 167 LBS | RESPIRATION RATE: 18 BRPM

## 2019-05-14 DIAGNOSIS — L03.211 CELLULITIS OF FACE: Primary | ICD-10-CM

## 2019-05-14 DIAGNOSIS — M54.50 CHRONIC RIGHT-SIDED LOW BACK PAIN WITHOUT SCIATICA: ICD-10-CM

## 2019-05-14 DIAGNOSIS — G89.29 CHRONIC RIGHT-SIDED LOW BACK PAIN WITHOUT SCIATICA: ICD-10-CM

## 2019-05-14 LAB
ALBUMIN SERPL-MCNC: 3.8 GM/DL (ref 3.4–5)
ALP BLD-CCNC: 113 IU/L (ref 40–129)
ALT SERPL-CCNC: <5 U/L (ref 10–40)
ANION GAP SERPL CALCULATED.3IONS-SCNC: 11 MMOL/L (ref 4–16)
AST SERPL-CCNC: 13 IU/L (ref 15–37)
BACTERIA: NEGATIVE /HPF
BASOPHILS ABSOLUTE: 0.1 K/CU MM
BASOPHILS RELATIVE PERCENT: 0.8 % (ref 0–1)
BILIRUB SERPL-MCNC: 0.4 MG/DL (ref 0–1)
BILIRUBIN URINE: NEGATIVE MG/DL
BLOOD, URINE: ABNORMAL
BUN BLDV-MCNC: 11 MG/DL (ref 6–23)
CALCIUM SERPL-MCNC: 9.6 MG/DL (ref 8.3–10.6)
CHLORIDE BLD-SCNC: 100 MMOL/L (ref 99–110)
CLARITY: CLEAR
CO2: 27 MMOL/L (ref 21–32)
COLOR: YELLOW
CREAT SERPL-MCNC: 0.9 MG/DL (ref 0.6–1.1)
DIFFERENTIAL TYPE: ABNORMAL
EOSINOPHILS ABSOLUTE: 0.1 K/CU MM
EOSINOPHILS RELATIVE PERCENT: 1.3 % (ref 0–3)
GFR AFRICAN AMERICAN: >60 ML/MIN/1.73M2
GFR NON-AFRICAN AMERICAN: >60 ML/MIN/1.73M2
GLUCOSE BLD-MCNC: 101 MG/DL (ref 70–99)
GLUCOSE, URINE: NEGATIVE MG/DL
HCT VFR BLD CALC: 42 % (ref 37–47)
HEMOGLOBIN: 12.8 GM/DL (ref 12.5–16)
IMMATURE NEUTROPHIL %: 0.4 % (ref 0–0.43)
KETONES, URINE: NEGATIVE MG/DL
LEUKOCYTE ESTERASE, URINE: ABNORMAL
LIPASE: 42 IU/L (ref 13–60)
LYMPHOCYTES ABSOLUTE: 1.3 K/CU MM
LYMPHOCYTES RELATIVE PERCENT: 11.9 % (ref 24–44)
MCH RBC QN AUTO: 27 PG (ref 27–31)
MCHC RBC AUTO-ENTMCNC: 30.5 % (ref 32–36)
MCV RBC AUTO: 88.6 FL (ref 78–100)
MONOCYTES ABSOLUTE: 0.8 K/CU MM
MONOCYTES RELATIVE PERCENT: 7.2 % (ref 0–4)
MUCUS: ABNORMAL HPF
NITRITE URINE, QUANTITATIVE: NEGATIVE
NUCLEATED RBC %: 0 %
PDW BLD-RTO: 14.6 % (ref 11.7–14.9)
PH, URINE: 6 (ref 5–8)
PLATELET # BLD: 335 K/CU MM (ref 140–440)
PMV BLD AUTO: 9.7 FL (ref 7.5–11.1)
POTASSIUM SERPL-SCNC: 4.3 MMOL/L (ref 3.5–5.1)
PROTEIN UA: NEGATIVE MG/DL
RBC # BLD: 4.74 M/CU MM (ref 4.2–5.4)
RBC URINE: 1 /HPF (ref 0–6)
SEGMENTED NEUTROPHILS ABSOLUTE COUNT: 8.5 K/CU MM
SEGMENTED NEUTROPHILS RELATIVE PERCENT: 78.4 % (ref 36–66)
SODIUM BLD-SCNC: 138 MMOL/L (ref 135–145)
SPECIFIC GRAVITY UA: 1.06 (ref 1–1.03)
SPECIFIC GRAVITY UA: ABNORMAL (ref 1–1.03)
SQUAMOUS EPITHELIAL: 1 /HPF
TOTAL IMMATURE NEUTOROPHIL: 0.04 K/CU MM
TOTAL NUCLEATED RBC: 0 K/CU MM
TOTAL PROTEIN: 7.4 GM/DL (ref 6.4–8.2)
TRICHOMONAS: ABNORMAL /HPF
TROPONIN T: <0.01 NG/ML
UROBILINOGEN, URINE: NORMAL MG/DL (ref 0.2–1)
WBC # BLD: 10.9 K/CU MM (ref 4–10.5)
WBC UA: <1 /HPF (ref 0–5)

## 2019-05-14 PROCEDURE — 6370000000 HC RX 637 (ALT 250 FOR IP): Performed by: EMERGENCY MEDICINE

## 2019-05-14 PROCEDURE — 93005 ELECTROCARDIOGRAM TRACING: CPT | Performed by: EMERGENCY MEDICINE

## 2019-05-14 PROCEDURE — 36415 COLL VENOUS BLD VENIPUNCTURE: CPT

## 2019-05-14 PROCEDURE — 70487 CT MAXILLOFACIAL W/DYE: CPT

## 2019-05-14 PROCEDURE — 74176 CT ABD & PELVIS W/O CONTRAST: CPT

## 2019-05-14 PROCEDURE — 84484 ASSAY OF TROPONIN QUANT: CPT

## 2019-05-14 PROCEDURE — 85025 COMPLETE CBC W/AUTO DIFF WBC: CPT

## 2019-05-14 PROCEDURE — 70450 CT HEAD/BRAIN W/O DYE: CPT

## 2019-05-14 PROCEDURE — 80053 COMPREHEN METABOLIC PANEL: CPT

## 2019-05-14 PROCEDURE — 71046 X-RAY EXAM CHEST 2 VIEWS: CPT

## 2019-05-14 PROCEDURE — 2580000003 HC RX 258: Performed by: EMERGENCY MEDICINE

## 2019-05-14 PROCEDURE — 83690 ASSAY OF LIPASE: CPT

## 2019-05-14 PROCEDURE — 99284 EMERGENCY DEPT VISIT MOD MDM: CPT

## 2019-05-14 PROCEDURE — 81001 URINALYSIS AUTO W/SCOPE: CPT

## 2019-05-14 PROCEDURE — 6360000004 HC RX CONTRAST MEDICATION: Performed by: EMERGENCY MEDICINE

## 2019-05-14 RX ORDER — ACETAMINOPHEN 500 MG
1000 TABLET ORAL ONCE
Status: COMPLETED | OUTPATIENT
Start: 2019-05-14 | End: 2019-05-14

## 2019-05-14 RX ORDER — CLINDAMYCIN HYDROCHLORIDE 150 MG/1
450 CAPSULE ORAL 3 TIMES DAILY
Qty: 90 CAPSULE | Refills: 0 | Status: ON HOLD | OUTPATIENT
Start: 2019-05-14 | End: 2019-05-29 | Stop reason: HOSPADM

## 2019-05-14 RX ORDER — SODIUM CHLORIDE 0.9 % (FLUSH) 0.9 %
10 SYRINGE (ML) INJECTION
Status: COMPLETED | OUTPATIENT
Start: 2019-05-14 | End: 2019-05-14

## 2019-05-14 RX ADMIN — SODIUM CHLORIDE, PRESERVATIVE FREE 10 ML: 5 INJECTION INTRAVENOUS at 17:06

## 2019-05-14 RX ADMIN — ACETAMINOPHEN 1000 MG: 500 TABLET ORAL at 15:43

## 2019-05-14 RX ADMIN — IOPAMIDOL 75 ML: 755 INJECTION, SOLUTION INTRAVENOUS at 17:06

## 2019-05-14 ASSESSMENT — PAIN DESCRIPTION - PAIN TYPE: TYPE: ACUTE PAIN

## 2019-05-14 ASSESSMENT — PAIN SCALES - GENERAL
PAINLEVEL_OUTOF10: 6
PAINLEVEL_OUTOF10: 6

## 2019-05-14 ASSESSMENT — PAIN DESCRIPTION - LOCATION: LOCATION: JAW

## 2019-05-14 ASSESSMENT — PAIN DESCRIPTION - ORIENTATION: ORIENTATION: RIGHT

## 2019-05-14 NOTE — ED PROVIDER NOTES
Triage Chief Complaint:   Facial Swelling (onset last week, with headaches starting Sunday night. ) and Dizziness      [place]:  Brittni Lovett is a 60 y.o. female that presents to emergency department with facial swelling. . Patient has a history of dementia per sister who is at bedside. Patient does live alone but Sr. states she lives next door. Patient has a home health nurse that comes to visit her and last saw her on Friday. She saw her today and noticed some facial swelling on the right side. Sister states she did see her over the weekend but did not notice his facial swelling. Patient is a very poor historian and cannot tell me exactly when this started. She does state that she threw up once a couple days ago but none today. She complains of some right sided lower back pain. It does not radiate to her legs. She denies difficulty urinating. She has chronic pain to both her knees and has previously done PT for this but continues with pain. This is not new according to the sister. Patient also complains of right sided abdominal pain, unsure when this actually started    Past Medical History:   Diagnosis Date    Anxiety     Arthritis     \"Both Legs\"    Asthma     Back problem     \"Curved Spine\"    Bronchitis Last Episode In 2015    CAD (coronary artery disease)     Sees Dr. Erinn Boyle; 8-16-16 (noted on 10-4-2012 that patient does not have CAD s/p cardiac catheterization).  Cancer ([address]     skin ca on skin    Chest pain     in ER with this dx 7/2015- admitted- stress test done as outpt 8/7/2015(see report)    Chipped tooth     Upper And Lower    Chronic back pain     COPD (chronic obstructive pulmonary disease) ([facility] [address] 06/01/2017    Sees Dr. Tracey Burnette    Cough     Occ. Nonproductive Cough    Emphysema     GERD (gastroesophageal reflux disease)     H/O 24 hour EKG monitoring     3/14/2013-Signif for runs of sinus tachycardia, fastest recorded at 132 bpm lasting almost 38 mins.  betamethasone dipropionate (DIPROLENE) 0.05 % ointment Apply topically 2 times daily Apply topically 2 times daily.       donepezil (ARICEPT) 10 MG tablet TAKE 1 TABLET BY MOUTH EVERYDAY AT daily  3    clopidogrel (PLAVIX) 75 MG tablet Take 1 tablet by mouth daily 90 tablet 3    RANEXA 1000 MG extended release tablet TAKE 1 TABLET BY MOUTH 2 TIMES DAILY 180 tablet 2    pantoprazole sodium (PROTONIX) 40 MG PACK packet Take 40 mg by mouth 2 times daily (before meals)       traZODone (DESYREL) 50 MG tablet Take 50 mg by mouth nightly as needed       melatonin 3 MG TABS tablet Take 5 mg by mouth nightly as needed       CALAMINE-ZINC OXIDE EX Apply topically      nitroGLYCERIN (NITROSTAT) 0.4 MG SL tablet Place 1 tablet under the tongue every 5 minutes as needed for Chest pain 25 tablet 3    memantine (NAMENDA) 5 MG tablet Take 5 mg by mouth 2 times daily      metoprolol tartrate (LOPRESSOR) 25 MG tablet Take 12.5 mg by mouth 2 times daily Take 1/2 tab       ondansetron (ZOFRAN) 4 MG tablet Take 4 mg by mouth every 8 hours as needed for Nausea or Vomiting      hydrOXYzine (ATARAX) 25 MG tablet Take 50 mg by mouth every 8 hours as needed for Itching       vitamin B-1 (THIAMINE) 100 MG tablet Take 100 mg by mouth daily      vitamin D (ERGOCALCIFEROL) 50142 units CAPS capsule Take 50,000 Units by mouth once a week      hydrochlorothiazide (HYDRODIURIL) 12.5 MG tablet Take 1 tablet by mouth daily 90 tablet 3    Acetaminophen (TYLENOL 8 HOUR ARTHRITIS PAIN PO) Take 1,300 mg by mouth 2 times daily as needed      omeprazole (PRILOSEC) 40 MG delayed release capsule TAKE 1 TABLET BY MOUTH EVERY DAY  3    sertraline (ZOLOFT) 50 MG tablet TAKE 1 TABLET BY ORAL ROUTE EVERY DAY  2    atorvastatin (LIPITOR) 40 MG tablet Take 40 mg by mouth daily      ipratropium-albuterol (DUONEB) 0.5-2.5 (3) MG/3ML SOLN nebulizer solution INHALE 3 MILLILITER BY NEBULIZATION ROUTE 4 TIMES EVERY DAY  2    aspirin EC 81 MG EC tablet Take 1 tablet by mouth daily. (Patient taking differently: Take 81 mg by mouth every morning ) 30 tablet 6     Allergies   Allergen Reactions    Adhesive Tape Rash    Aspirin Nausea And Vomiting     Chewable sts not coated.  Pcn [Penicillins] Nausea And Vomiting and Rash     Nursing Notes Reviewed    ROS:  At least 10 systems reviewed and otherwise negative except as in the [address] Physical Exam:  ED Triage Vitals [05/14/19 1446]   Enc Vitals Group      BP (!) 127/96      Pulse 76      Resp 18      Temp 98.2 °F (36.8 °C)      Temp Source Oral      SpO2 95 %      Weight 167 lb (75.8 kg)      Height 5' 2\" (1.575 m)      Head Circumference       Peak Flow       Pain Score       Pain Loc       Pain Edu? Excl. in [address]? My pulse oximetry interpretation is which is within the normal range    GENERAL APPEARANCE: Awake and alert. Cooperative. No acute distress. HEAD:  Atraumatic. EYES: EOM's grossly intact. ENT: Mucous membranes are moist.  No trismus. Multiple teeth have been extracted. Slight swelling to right upper back gum line. One decaying tooth present. NECK:  Trachea midline. HEART: RRR. Radial pulses 2+. LUNGS: Respirations unlabored. CTAB  ABDOMEN: Soft. Mild right lower abd ttp without guarding or rebound. EXTREMITIES: No acute deformities. No midline CTLS spinal TTP. Full ROM of both lower extremities with normal sensation. Mild right paralumbar muscle TTP. No bruising or skin abnormalities  SKIN: Warm and dry. NEUROLOGICAL: No gross facial drooping. Moves all 4 extremities spontaneously. PSYCHIATRIC: Normal mood.     I have reviewed and interpreted all of the currently available lab results from this visit (if applicable):  Results for orders placed or performed during the hospital encounter of 05/14/19   Comprehensive Metabolic Panel   Result Value Ref Range    Sodium 138 135 - 145 MMOL/L    Potassium 4.3 3.5 - 5.1 MMOL/L    Chloride 100 99 - 110 mMol/L    CO2 27 21 - 32 MMOL/L    BUN 11 6 - 23 MG/DL    CREATININE 0.9 0.6 - 1.1 MG/DL    Glucose 101 (H) 70 - 99 MG/DL    Calcium 9.6 8.3 - 10.6 MG/DL    Alb 3.8 3.4 - 5.0 GM/DL    Total Protein 7.4 6.4 - 8.2 GM/DL    Total Bilirubin 0.4 0.0 - 1.0 MG/DL    ALT <5 (L) 10 - 40 U/L    AST 13 (L) 15 - 37 IU/L    Alkaline Phosphatase 113 40 - 129 IU/L    GFR Non-African American >60 >60 mL/min/1.73m2    GFR African American >60 >60 mL/min/1.73m2    Anion Gap 11 4 - 16   CBC Auto Differential   Result Value Ref Range    WBC 10.9 (H) 4.0 - 10.5 K/CU MM    RBC 4.74 4.2 - 5.4 M/CU MM    Hemoglobin 12.8 12.5 - 16.0 GM/DL    Hematocrit 42.0 37 - 47 %    MCV 88.6 78 - 100 FL    MCH 27.0 27 - 31 PG    MCHC 30.5 (L) 32.0 - 36.0 %    RDW 14.6 11.7 - 14.9 %    Platelets 033 673 - 111 K/CU MM    MPV 9.7 7.5 - 11.1 FL    Differential Type AUTOMATED DIFFERENTIAL     Segs Relative 78.4 (H) 36 - 66 %    Lymphocytes % 11.9 (L) 24 - 44 %    Monocytes % 7.2 (H) 0 - 4 %    Eosinophils % 1.3 0 - 3 %    Basophils % 0.8 0 - 1 %    Segs Absolute 8.5 K/CU MM    Lymphocytes # 1.3 K/CU MM    Monocytes # 0.8 K/CU MM    Eosinophils # 0.1 K/CU MM    Basophils # 0.1 K/CU MM    Nucleated RBC % 0.0 %    Total Nucleated RBC 0.0 K/CU MM    Total Immature Neutrophil 0.04 K/CU MM    Immature Neutrophil % 0.4 0 - 0.43 %   Troponin   Result Value Ref Range    Troponin T <0.010 <0.01 NG/ML   Urinalysis Reflex to Culture   Result Value Ref Range    Color, UA YELLOW YELLOW    Clarity, UA CLEAR CLEAR    Glucose, Urine NEGATIVE NEGATIVE MG/DL    Bilirubin Urine NEGATIVE NEGATIVE MG/DL    Ketones, Urine NEGATIVE NEGATIVE MG/DL    Specific Gravity, UA 1.057 (H) 1.001 - 1.035    Specific Gravity, UA (H) 1.001 - 1.035     (NOTE)  CONSIDER URINE OSMOLARITY TEST IF CLINICALLY INDICATED        Blood, Urine MODERATE (A) NEGATIVE    pH, Urine 6.0 5.0 - 8.0    Protein, UA NEGATIVE NEGATIVE MG/DL    Urobilinogen, Urine NORMAL 0.2 - 1.0 MG/DL    Nitrite Urine, Quantitative NEGATIVE NEGATIVE    Leukocyte Cellulitis of face    2.  Chronic right-sided low back pain without sciatica        Disposition Vitals:  [unfilled], [unfilled], [unfilled], [unfilled]    Disposition referral (if applicable):  LAVERN Woodard - CNP  [place]  [phone]    Schedule an appointment as soon as possible for a visit   If symptoms worsen      Disposition medications (if applicable):  Discharge Medication List as of 5/14/2019  6:44 PM      START taking these medications    Details   clindamycin (CLEOCIN) 150 MG capsule Take 3 capsules by mouth 3 times daily for 10 days, Disp-90 capsule, R-0Print               (Please note that portions of this note may have been completed with a voice recognition program. Efforts were made to edit the dictations but occasionally words are mis-transcribed.)    MD Kenneth Torrez MD  05/14/19 2025

## 2019-05-14 NOTE — ED NOTES
Reviewed discharge instructions with patient and family. All voice understanding/deny questions. Removed from floor in wheelchair.        Rosalinda Arnold RN  05/14/19 0041

## 2019-05-14 NOTE — ED TRIAGE NOTES
Pt presents to ED from home with c/o facial swelling on right jaw, dizziness and facial pain. Pt is [place].

## 2019-05-15 ENCOUNTER — HOSPITAL ENCOUNTER (OUTPATIENT)
Dept: PHYSICAL THERAPY | Age: 59
Setting detail: THERAPIES SERIES
Discharge: HOME | End: 2019-05-15
Payer: COMMERCIAL

## 2019-05-15 PROCEDURE — 97112 NEUROMUSCULAR REEDUCATION: CPT

## 2019-05-15 PROCEDURE — 93010 ELECTROCARDIOGRAM REPORT: CPT | Performed by: INTERNAL MEDICINE

## 2019-05-15 NOTE — FLOWSHEET NOTE
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  Date of Injury/Surgery: n/a  Treatment Diagnosis: Treatment Diagnosis: B knee pain/ unsteadiness w/ walking    Insurance/Certification information:   [employer]  Referring Physician:  Referring Practitioner: Killian Velásquez  Next Doctor Visit:    Plan of care signed (Y/N):    Outcome Measure:   Visit# / total visits:  3 /10  Pain level: 5/10 B knees   Goals:       Short term goals  Time Frame for Short term goals: defer to LTG  Long term goals  Time Frame for Long term goals : check in 10 weeks  Long term goal 1: [facility]    Summary of Evaluation:    Patient primary complaints: : B knee pain/ unsteadiness w/ walking   History of condition:ongoing mobility limitations - has COPD and cochlear implant  Current functional limitations: ambulates with cane  PLOF:has used cane for over  1 year  Prominent clinical findings:sways with gait pattern  Progressive treatment plan will emphasize:balance/gait activities  Barriers to learning:/preferred learning style:  none/ written- demonstration -practice  Potential barriers to progress:none  The patient appears/reports motivated to regain PLOF: yes          Subjective:  Pt c/o pain in both her knees and right ankle. Any changes in Ambulatory Summary Sheet? None        Objective: Intermittent LOB with balance activities.             Exercises: (No more than 4 columns)   Exercise/Equipment Date 19 Date 19 (2) Date 5/15/19           WARM UP                     TABLE                                       STANDING      Alternate march x 10 reps bilat UE supoort x 10 reps bilat UE support 6\"   aeromat standing   x 30 sec w/o UE support x 30 sec w/o UE support   aeromat  holding ball arm raises, side/side x 5 reps each holding ball arm raises, side/side x 5 reps each   Solid surface standing with EC  5ct x 5 reps 5ct x 5 reps   Stair climbing  4\" 4 inch steps with bilat UE support 4 inch steps with bilat UE support- reciprocal patern                          PROPRIOCEPTION                                    MODALITIES                      Other Therapeutic Activities/Education:        Home Exercise Program:        Manual Treatments:        Modalities:        Communication with other providers:        Assessment:  (Response towards treatment session) (Pain Rating)  Pain remained 5/10 bilat knees         Plan for Next Session:        Time In / Time Out:   1430- 1500      Timed Code/Total Treatment Minutes:  30\" NRE x2//30\"      Next Progress Note due:        Plan of Care Interventions:  [x] Therapeutic Exercise  [] Modalities:  [x] Therapeutic Activity     [] Ultrasound  [] Estim  [x] Gait Training      [] Cervical Traction [] Lumbar Traction  [x] Neuromuscular Re-education    [] Cold/hotpack [] Iontophoresis   [x] Instruction in HEP      [] Vasopneumatic   [] Dry Needling    [x] Manual Therapy               [x] Aquatic Therapy              Electronically signed by:  Azael Childs 5/15/2019, 2:21 PM

## 2019-05-17 LAB
EKG ATRIAL RATE: 85 BPM
EKG DIAGNOSIS: NORMAL
EKG P AXIS: 70 DEGREES
EKG P-R INTERVAL: 164 MS
EKG Q-T INTERVAL: 360 MS
EKG QRS DURATION: 74 MS
EKG QTC CALCULATION (BAZETT): 428 MS
EKG R AXIS: 63 DEGREES
EKG T AXIS: 59 DEGREES
EKG VENTRICULAR RATE: 85 BPM

## 2019-05-20 ENCOUNTER — HOSPITAL ENCOUNTER (OUTPATIENT)
Dept: PHYSICAL THERAPY | Age: 59
Setting detail: THERAPIES SERIES
Discharge: HOME | End: 2019-05-20
Payer: COMMERCIAL

## 2019-05-20 PROCEDURE — 97112 NEUROMUSCULAR REEDUCATION: CPT

## 2019-05-20 NOTE — FLOWSHEET NOTE
TABLE                                             STANDING       Alternate marches  x 10 reps bilat UE supoort x 10 reps bilat UE support 6\" 6 inch step alt touch x 10 reps with bilat UE support   aeromat standing   x 30 sec w/o UE support x 30 sec w/o UE support x 30 sec w/o UE support   aeromat  holding ball arm raises, side/side x 5 reps each holding ball arm raises, side/side x 5 reps each holding ball arm raises, side/side x 10 reps each   Solid surface standing with EC  5ct x 5 reps 5ct x 5 reps 5ct x 5 reps   Stair climbing  4\" 4 inch steps with bilat UE support 4 inch steps with bilat UE support- reciprocal patern 4 and 6 inch steps reciprocal pattern with bilat handrails SBA   Standing ball toss R/L     x 10 reps   Standing bounce/catch R/L      x 10 reps               PROPRIOCEPTION                                          MODALITIES                         Other Therapeutic Activities/Education:        Home Exercise Program:        Manual Treatments:        Modalities:        Communication with other providers:        Assessment:  (Response towards treatment session) (Pain Rating)  Pain remained 5/10 bilat knees and right ankle. Reports of min fatigue.          Plan for Next Session:        Time In / Time Out:   6693-7836      Timed Code/Total Treatment Minutes:  15/52, (3) NR      Next Progress Note due:        Plan of Care Interventions:  [x] Therapeutic Exercise  [] Modalities:  [x] Therapeutic Activity     [] Ultrasound  [] Estim  [x] Gait Training      [] Cervical Traction [] Lumbar Traction  [x] Neuromuscular Re-education    [] Cold/hotpack [] Iontophoresis   [x] Instruction in HEP      [] Vasopneumatic   [] Dry Needling    [x] Manual Therapy               [x] Aquatic Therapy              Electronically signed by:  Burke Moscoso 5/20/2019, 1:47 PM

## 2019-05-23 ENCOUNTER — APPOINTMENT (OUTPATIENT)
Dept: GENERAL RADIOLOGY | Age: 59
DRG: 313 | End: 2019-05-23
Payer: COMMERCIAL

## 2019-05-23 ENCOUNTER — HOSPITAL ENCOUNTER (INPATIENT)
Age: 59
LOS: 6 days | Discharge: NURSING, REHAB OR LONG-TERM CARE | DRG: 313 | End: 2019-05-29
Attending: EMERGENCY MEDICINE | Admitting: FAMILY MEDICINE
Payer: COMMERCIAL

## 2019-05-23 ENCOUNTER — ANESTHESIA EVENT (OUTPATIENT)
Dept: OPERATING ROOM | Age: 59
DRG: 313 | End: 2019-05-23
Payer: COMMERCIAL

## 2019-05-23 ENCOUNTER — APPOINTMENT (OUTPATIENT)
Dept: CT IMAGING | Age: 59
DRG: 313 | End: 2019-05-23
Payer: COMMERCIAL

## 2019-05-23 DIAGNOSIS — S82.852A CLOSED TRIMALLEOLAR FRACTURE OF LEFT ANKLE, INITIAL ENCOUNTER: Primary | ICD-10-CM

## 2019-05-23 DIAGNOSIS — S93.432A ANKLE SYNDESMOSIS DISRUPTION, LEFT, INITIAL ENCOUNTER: ICD-10-CM

## 2019-05-23 PROBLEM — S82.892A CLOSED LEFT ANKLE FRACTURE, INITIAL ENCOUNTER: Status: ACTIVE | Noted: 2019-05-23

## 2019-05-23 LAB
ANION GAP SERPL CALCULATED.3IONS-SCNC: 8 MMOL/L (ref 4–16)
BUN BLDV-MCNC: 11 MG/DL (ref 6–23)
CALCIUM SERPL-MCNC: 9.2 MG/DL (ref 8.3–10.6)
CHLORIDE BLD-SCNC: 99 MMOL/L (ref 99–110)
CO2: 31 MMOL/L (ref 21–32)
CREAT SERPL-MCNC: 1 MG/DL (ref 0.6–1.1)
GFR AFRICAN AMERICAN: >60 ML/MIN/1.73M2
GFR NON-AFRICAN AMERICAN: 57 ML/MIN/1.73M2
GLUCOSE BLD-MCNC: 114 MG/DL (ref 70–99)
HCT VFR BLD CALC: 39 % (ref 37–47)
HEMOGLOBIN: 11.8 GM/DL (ref 12.5–16)
INR BLD: 1.06 INDEX
MCH RBC QN AUTO: 27.1 PG (ref 27–31)
MCHC RBC AUTO-ENTMCNC: 30.3 % (ref 32–36)
MCV RBC AUTO: 89.7 FL (ref 78–100)
PDW BLD-RTO: 15.1 % (ref 11.7–14.9)
PLATELET # BLD: 374 K/CU MM (ref 140–440)
PMV BLD AUTO: 9 FL (ref 7.5–11.1)
POTASSIUM SERPL-SCNC: 4.6 MMOL/L (ref 3.5–5.1)
PROTHROMBIN TIME: 12.1 SECONDS (ref 9.12–12.5)
RBC # BLD: 4.35 M/CU MM (ref 4.2–5.4)
SODIUM BLD-SCNC: 138 MMOL/L (ref 135–145)
WBC # BLD: 12.2 K/CU MM (ref 4–10.5)

## 2019-05-23 PROCEDURE — 36415 COLL VENOUS BLD VENIPUNCTURE: CPT

## 2019-05-23 PROCEDURE — 73501 X-RAY EXAM HIP UNI 1 VIEW: CPT

## 2019-05-23 PROCEDURE — 4500000028 HC INTERMEDIATE PROCEDURE

## 2019-05-23 PROCEDURE — 1200000000 HC SEMI PRIVATE

## 2019-05-23 PROCEDURE — 85610 PROTHROMBIN TIME: CPT

## 2019-05-23 PROCEDURE — 27780 TREATMENT OF FIBULA FRACTURE: CPT | Performed by: ORTHOPAEDIC SURGERY

## 2019-05-23 PROCEDURE — 6370000000 HC RX 637 (ALT 250 FOR IP): Performed by: NURSE PRACTITIONER

## 2019-05-23 PROCEDURE — 85027 COMPLETE CBC AUTOMATED: CPT

## 2019-05-23 PROCEDURE — 99222 1ST HOSP IP/OBS MODERATE 55: CPT | Performed by: ORTHOPAEDIC SURGERY

## 2019-05-23 PROCEDURE — 73080 X-RAY EXAM OF ELBOW: CPT

## 2019-05-23 PROCEDURE — 6360000002 HC RX W HCPCS: Performed by: EMERGENCY MEDICINE

## 2019-05-23 PROCEDURE — 2580000003 HC RX 258: Performed by: NURSE PRACTITIONER

## 2019-05-23 PROCEDURE — 71046 X-RAY EXAM CHEST 2 VIEWS: CPT

## 2019-05-23 PROCEDURE — 6360000002 HC RX W HCPCS: Performed by: NURSE PRACTITIONER

## 2019-05-23 PROCEDURE — 73610 X-RAY EXAM OF ANKLE: CPT

## 2019-05-23 PROCEDURE — 70450 CT HEAD/BRAIN W/O DYE: CPT

## 2019-05-23 PROCEDURE — 73560 X-RAY EXAM OF KNEE 1 OR 2: CPT

## 2019-05-23 PROCEDURE — 99285 EMERGENCY DEPT VISIT HI MDM: CPT

## 2019-05-23 PROCEDURE — 96374 THER/PROPH/DIAG INJ IV PUSH: CPT

## 2019-05-23 PROCEDURE — 80048 BASIC METABOLIC PNL TOTAL CA: CPT

## 2019-05-23 RX ORDER — MORPHINE SULFATE 4 MG/ML
4 INJECTION, SOLUTION INTRAMUSCULAR; INTRAVENOUS EVERY 4 HOURS PRN
Status: DISCONTINUED | OUTPATIENT
Start: 2019-05-23 | End: 2019-05-25

## 2019-05-23 RX ORDER — ALBUTEROL SULFATE 90 UG/1
2 AEROSOL, METERED RESPIRATORY (INHALATION) PRN
Status: DISCONTINUED | OUTPATIENT
Start: 2019-05-23 | End: 2019-05-23

## 2019-05-23 RX ORDER — SODIUM CHLORIDE 0.9 % (FLUSH) 0.9 %
10 SYRINGE (ML) INJECTION EVERY 12 HOURS SCHEDULED
Status: DISCONTINUED | OUTPATIENT
Start: 2019-05-23 | End: 2019-05-29 | Stop reason: HOSPADM

## 2019-05-23 RX ORDER — IPRATROPIUM BROMIDE AND ALBUTEROL SULFATE 2.5; .5 MG/3ML; MG/3ML
1 SOLUTION RESPIRATORY (INHALATION) EVERY 4 HOURS PRN
Status: DISCONTINUED | OUTPATIENT
Start: 2019-05-23 | End: 2019-05-29 | Stop reason: HOSPADM

## 2019-05-23 RX ORDER — ALBUTEROL SULFATE 90 UG/1
2 AEROSOL, METERED RESPIRATORY (INHALATION) EVERY 4 HOURS PRN
Status: DISCONTINUED | OUTPATIENT
Start: 2019-05-23 | End: 2019-05-29 | Stop reason: HOSPADM

## 2019-05-23 RX ORDER — ONDANSETRON 2 MG/ML
4 INJECTION INTRAMUSCULAR; INTRAVENOUS EVERY 6 HOURS PRN
Status: DISCONTINUED | OUTPATIENT
Start: 2019-05-23 | End: 2019-05-29 | Stop reason: HOSPADM

## 2019-05-23 RX ORDER — ATORVASTATIN CALCIUM 40 MG/1
40 TABLET, FILM COATED ORAL NIGHTLY
Status: DISCONTINUED | OUTPATIENT
Start: 2019-05-23 | End: 2019-05-29 | Stop reason: HOSPADM

## 2019-05-23 RX ORDER — ACETAMINOPHEN 325 MG/1
650 TABLET ORAL EVERY 4 HOURS PRN
Status: DISCONTINUED | OUTPATIENT
Start: 2019-05-23 | End: 2019-05-29 | Stop reason: HOSPADM

## 2019-05-23 RX ORDER — HYDROXYZINE HYDROCHLORIDE 25 MG/1
50 TABLET, FILM COATED ORAL EVERY 8 HOURS PRN
Status: DISCONTINUED | OUTPATIENT
Start: 2019-05-23 | End: 2019-05-25

## 2019-05-23 RX ORDER — CETIRIZINE HYDROCHLORIDE 10 MG/1
10 TABLET ORAL DAILY
Status: DISCONTINUED | OUTPATIENT
Start: 2019-05-23 | End: 2019-05-29 | Stop reason: HOSPADM

## 2019-05-23 RX ORDER — SODIUM CHLORIDE 9 MG/ML
INJECTION, SOLUTION INTRAVENOUS CONTINUOUS
Status: DISPENSED | OUTPATIENT
Start: 2019-05-24 | End: 2019-05-24

## 2019-05-23 RX ORDER — MORPHINE SULFATE 4 MG/ML
4 INJECTION, SOLUTION INTRAMUSCULAR; INTRAVENOUS ONCE
Status: COMPLETED | OUTPATIENT
Start: 2019-05-23 | End: 2019-05-23

## 2019-05-23 RX ORDER — FAMOTIDINE 20 MG/1
20 TABLET, FILM COATED ORAL 2 TIMES DAILY
Status: DISCONTINUED | OUTPATIENT
Start: 2019-05-23 | End: 2019-05-29 | Stop reason: HOSPADM

## 2019-05-23 RX ORDER — SERTRALINE HYDROCHLORIDE 100 MG/1
100 TABLET, FILM COATED ORAL DAILY
Status: DISCONTINUED | OUTPATIENT
Start: 2019-05-23 | End: 2019-05-29 | Stop reason: HOSPADM

## 2019-05-23 RX ORDER — MEMANTINE HYDROCHLORIDE 5 MG/1
5 TABLET ORAL 2 TIMES DAILY
Status: DISCONTINUED | OUTPATIENT
Start: 2019-05-23 | End: 2019-05-29 | Stop reason: HOSPADM

## 2019-05-23 RX ORDER — HYDROCHLOROTHIAZIDE 12.5 MG/1
12.5 TABLET ORAL DAILY
Status: DISCONTINUED | OUTPATIENT
Start: 2019-05-23 | End: 2019-05-25

## 2019-05-23 RX ORDER — TRAZODONE HYDROCHLORIDE 50 MG/1
50 TABLET ORAL NIGHTLY PRN
Status: DISCONTINUED | OUTPATIENT
Start: 2019-05-23 | End: 2019-05-29 | Stop reason: HOSPADM

## 2019-05-23 RX ORDER — CEFAZOLIN SODIUM 2 G/50ML
2 SOLUTION INTRAVENOUS
Status: DISCONTINUED | OUTPATIENT
Start: 2019-05-23 | End: 2019-05-24 | Stop reason: HOSPADM

## 2019-05-23 RX ORDER — QUETIAPINE FUMARATE 25 MG/1
25 TABLET, FILM COATED ORAL 2 TIMES DAILY
Status: DISCONTINUED | OUTPATIENT
Start: 2019-05-23 | End: 2019-05-29 | Stop reason: HOSPADM

## 2019-05-23 RX ORDER — RANOLAZINE 500 MG/1
1000 TABLET, EXTENDED RELEASE ORAL 2 TIMES DAILY
Status: DISCONTINUED | OUTPATIENT
Start: 2019-05-23 | End: 2019-05-29 | Stop reason: HOSPADM

## 2019-05-23 RX ORDER — DONEPEZIL HYDROCHLORIDE 10 MG/1
10 TABLET, FILM COATED ORAL NIGHTLY
Status: DISCONTINUED | OUTPATIENT
Start: 2019-05-23 | End: 2019-05-29 | Stop reason: HOSPADM

## 2019-05-23 RX ORDER — SODIUM CHLORIDE 0.9 % (FLUSH) 0.9 %
10 SYRINGE (ML) INJECTION PRN
Status: DISCONTINUED | OUTPATIENT
Start: 2019-05-23 | End: 2019-05-29 | Stop reason: HOSPADM

## 2019-05-23 RX ADMIN — MORPHINE SULFATE 4 MG: 4 INJECTION INTRAVENOUS at 17:04

## 2019-05-23 RX ADMIN — MORPHINE SULFATE 4 MG: 4 INJECTION, SOLUTION INTRAMUSCULAR; INTRAVENOUS at 18:27

## 2019-05-23 RX ADMIN — HYDROCHLOROTHIAZIDE 12.5 MG: 12.5 TABLET ORAL at 18:26

## 2019-05-23 RX ADMIN — SODIUM CHLORIDE, PRESERVATIVE FREE 10 ML: 5 INJECTION INTRAVENOUS at 22:33

## 2019-05-23 RX ADMIN — FAMOTIDINE 20 MG: 20 TABLET ORAL at 22:40

## 2019-05-23 RX ADMIN — MORPHINE SULFATE 4 MG: 4 INJECTION, SOLUTION INTRAMUSCULAR; INTRAVENOUS at 22:33

## 2019-05-23 RX ADMIN — ONDANSETRON 4 MG: 2 INJECTION INTRAMUSCULAR; INTRAVENOUS at 18:27

## 2019-05-23 RX ADMIN — RANOLAZINE 1000 MG: 500 TABLET, FILM COATED, EXTENDED RELEASE ORAL at 22:32

## 2019-05-23 RX ADMIN — SERTRALINE HYDROCHLORIDE 100 MG: 100 TABLET ORAL at 18:27

## 2019-05-23 RX ADMIN — QUETIAPINE FUMARATE 25 MG: 25 TABLET ORAL at 22:32

## 2019-05-23 RX ADMIN — ATORVASTATIN CALCIUM 40 MG: 40 TABLET, FILM COATED ORAL at 22:33

## 2019-05-23 RX ADMIN — METOPROLOL TARTRATE 12.5 MG: 25 TABLET ORAL at 22:32

## 2019-05-23 RX ADMIN — DONEPEZIL HYDROCHLORIDE 10 MG: 10 TABLET, FILM COATED ORAL at 22:33

## 2019-05-23 RX ADMIN — CETIRIZINE HYDROCHLORIDE 10 MG: 10 TABLET, FILM COATED ORAL at 18:26

## 2019-05-23 RX ADMIN — TRAZODONE HYDROCHLORIDE 50 MG: 50 TABLET ORAL at 22:32

## 2019-05-23 RX ADMIN — MEMANTINE HYDROCHLORIDE 5 MG: 5 TABLET ORAL at 22:32

## 2019-05-23 ASSESSMENT — PAIN SCALES - GENERAL
PAINLEVEL_OUTOF10: 10

## 2019-05-23 ASSESSMENT — PAIN DESCRIPTION - FREQUENCY: FREQUENCY: CONTINUOUS

## 2019-05-23 ASSESSMENT — PAIN DESCRIPTION - PROGRESSION
CLINICAL_PROGRESSION: NOT CHANGED
CLINICAL_PROGRESSION_2: NOT CHANGED

## 2019-05-23 ASSESSMENT — PAIN DESCRIPTION - ORIENTATION
ORIENTATION: LEFT
ORIENTATION_2: LEFT
ORIENTATION: LEFT

## 2019-05-23 ASSESSMENT — PAIN DESCRIPTION - DESCRIPTORS
DESCRIPTORS_2: SHARP;STABBING
DESCRIPTORS: SHARP;STABBING

## 2019-05-23 ASSESSMENT — PAIN DESCRIPTION - PAIN TYPE
TYPE_2: ACUTE PAIN
TYPE: ACUTE PAIN
TYPE: ACUTE PAIN

## 2019-05-23 ASSESSMENT — PAIN DESCRIPTION - INTENSITY: RATING_2: 10

## 2019-05-23 ASSESSMENT — PAIN DESCRIPTION - LOCATION
LOCATION: ANKLE
LOCATION_2: KNEE
LOCATION: LEG

## 2019-05-23 ASSESSMENT — PAIN DESCRIPTION - DURATION: DURATION_2: CONTINUOUS

## 2019-05-23 ASSESSMENT — PAIN DESCRIPTION - ONSET
ONSET: ON-GOING
ONSET_2: ON-GOING

## 2019-05-23 ASSESSMENT — LIFESTYLE VARIABLES: SMOKING_STATUS: 1

## 2019-05-23 NOTE — H&P
History and Physical      Name:  Vidhya Guidry /Age/Sex: 1960  (60 y.o. female)   MRN & CSN:  [id number] & [id number] Admission Date/Time: 2019  1:34 PM   Location:  [facility] PCP: Jero Leigh, [address] Day: 1    Discussed patient and POC with Dr. Armida Bagley and Plan:     Vidhya Guidry is a 60 y.o.  female  who presents with Left leg pain s/p fall today    - Acute left ankle and left prox fibular metadiaphyseal junction  S/p fall from syncopal sx  Fractures noted on radiograph  Ortho consulted; to take to OR tomorrow  NPO at [facility]  IVF when NPO  EKG, PT/INR, labs  Pain control with cont Spo2    - Fall  Unclear etiology, poor historian  ? ?? Polypharmacy; see below  CT Head negative for acute process    - HTN  Pt unclear what she takes; per her med list she takes hctz and zestoretic? ? As well as metop  Holding zestoretic as ED reports SBP on arrival    - COPD  Stable  Cont ventolin, symbicort, duoneb    - Dementia- Cont aricept    - Depression/mood disorder- Cont seroquel    - HLD- Cont statin      Discussed patient with ED physician    Diet General; NPO at [facility]   DVT Prophylaxis [] Lovenox, []  Heparin, [] SCDs, [] Ambulation Holding do to pending OR   GI Prophylaxis [] PPI,  [x] H2 Blocker,  [] Carafate,  [] Diet/Tube Feeds   Code Status Full    Disposition Patient requires continued admission due to surgical correction of left leg fractures   MDM [] Low, [x] Moderate,[]  High  Patient's risk as above due to      [] One or more chronic illnesses with exacerbation progression      [x] Two or more stable chronic illnesses      [] Undiagnosed new problem with uncertain prognosis      [x] Elective major surgery      []Prescription drug management     History of Present Illness:     Chief Complaint: Left leg pain    Vidhya Guidry is a 60 y.o.  female  who presents from home with left leg pain after a fall, onset today.   The fall was unwitnessed but occurred in the proximity of her sister, who states the patient did not lose consciousness. The patient has a hx of dementia and can answer questions but is a poor historian. Her sister, who is HCPOA, is at bedside and helps with hx. Sister states patient lives next door to her and while the patient was at her house today she fell- this was not witnessed but patient was discovered almost immediately with no LOC. Patient denies mechanical fall and thinks she was light-headed but states she is not sure. She denies she was having chest pain/pressure, palpitations, or SOB at the time. States she was standing when she fell and denies recently standing from sitting position. She has a home health RN who helps take care of her, in addition to her sister. She reports pain in LEFT ankle and left knee, rates at 7/10. She takes plavix for PCI/CAD hx.  Confirms full code status. Ten point ROS reviewed negative, unless as noted above    Objective:   No intake or output data in the 24 hours ending 05/23/19 1842   Vitals:   Vitals:    05/23/19 1800   BP: 117/64   Pulse: 60   Resp: 15   Temp: 98.5 °F (36.9 °C)   SpO2: 94%     Physical Exam:   GEN Awake female, sitting upright in bed in no apparent distress. Appears given age. EYES Pupils are 3mm and PERRLA bilat. EOMs intact. No scleral erythema, discharge, or conjunctivitis. HENT Mucous membranes are moist. Oral pharynx without exudates, no evidence of thrush. NECK Supple, no apparent thyromegaly or masses. RESP Clear to auscultation, no wheezes, rales or rhonchi. Symmetric chest movement while on room air. CARDIO/VASC S1/S2 auscultated. Regular rate without appreciable murmurs, rubs, or gallops. No JVD or carotid bruits. Peripheral pulses equal bilaterally and palpable. No peripheral edema. GI Abdomen is soft without significant tenderness, masses, or guarding. Bowel sounds are normoactive. Rectal exam deferred.  No costovertebral angle tenderness.  Lopez catheter is not present. HEME/LYMPH No palpable cervical lymphadenopathy and no hepatosplenomegaly. No petechiae or ecchymoses. MSK No gross joint deformities. Unable to test strength in LLE due to pain; has + CMS in LLE. Able to move toes. 5/5 Strength bilaterally to Bilat UE and RLE  SKIN Normal coloration, warm, dry. NEURO Cranial nerves appear grossly intact, normal speech, no lateralizing weakness. Sensation intact and equal bilaterally  PSYCH Awake, alert, oriented x 4. Affect appropriate. Past Medical History:      Past Medical History:   Diagnosis Date    Anxiety     Arthritis     \"Both Legs\"    Asthma     Back problem     \"Curved Spine\"    Bronchitis Last Episode In 2015    CAD (coronary artery disease)     Sees Dr. Mauri Olmedo; 8-16-16 (noted on 10-4-2012 that patient does not have CAD s/p cardiac catheterization).  Cancer ([facility] [address]     skin ca on skin    Chest pain     in ER with this dx 7/2015- admitted- stress test done as outpt 8/7/2015(see report)    Chipped tooth     Upper And Lower    Chronic back pain     COPD (chronic obstructive pulmonary disease) ([facility] [address] 06/01/2017    Sees Dr. Cabrera Stands    Cough     Occ. Nonproductive Cough    Emphysema     GERD (gastroesophageal reflux disease)     H/O 24 hour EKG monitoring     3/14/2013-Signif for runs of sinus tachycardia, fastest recorded at 132 bpm lasting almost 38 mins. Dr Siobhan Forte. -report in [facility]    H/O cardiac catheterization     10/4/2012- No CAD. False positive stress test.Dr Valdez-report in [facility];     H/O cardiac catheterization     H/O Doppler ultrasound 04/13/2017    LE doppler - normal study    H/O Doppler ultrasound 05/26/2017    carotid - normal study    H/O exercise stress test 02/28/2017    normal study    Hiatal hernia     History of exercise stress test 02/28/2017    treadmill    History of nuclear stress test 8/7/15    EF 70%.  WNL    [place] (hard of hearing)     Bilateral Ears    Hx of cardiovascular stress test 9/2013 9/13 EF70% Normal.10/12- LVSF normal. EF 61%. Normal perfusion althought pt complained of chest pain with Lexiscan. report in [facility]; 11/11, possible apical ischemia but abnormality secondary to technical artifact needs to be considered, regional wall motion abnormality with low normal LVSF by pierson scan. 8/10, 12/09    Hx of Doppler ultrasound 2/2011 2/2011-Carotid no significant obstructive lesions noted in the extracranial carotid system. Antegrade flow noted inthe vertebral arteries.  Hx of Doppler ultrasound 1/2012 1/2012-peripheral - both abis and duplex studies of both lower extremities do not  reveal any significant peripheral vascular disease at this time.  Hx of Doppler ultrasound 1/22/2016    Arterial: Peripheral vascular noninvasive arterial studies do not reveal any significant atherosclerotic disease.  Hx of echocardiogram 6/2013 6/13- Mild to mod MR/TR. 10/12-LVSF normal. EF 60%. Mild mitral and tricuspid insuff. reprot in epic; 8/6/10, normal dimension of the LV, normal global and regional LVSF with an EF of 60%, no significant valvuopathy is seen. 12/05    Hypertension     \"on medication since age 25's\" follow with Dr Jorge Wynne Irregular heart beat     \"have irreg heart beat\" dont know what you call it\"    Migraines Last Migraine In 2014    Palpitations     Panic attacks     Pneumonia Last Episode In 2014    Prolonged emergence from general anesthesia     Restless leg     S/P cardiac cath 2/21/14    S/P PTCA (percutaneous transluminal coronary angioplasty) 01/26/2017    2017, CX stented    Shortness of breath     Teeth missing     Upper And Lower    Tobacco use     Unspecified sleep apnea     \"had sleep study 2-3 yrs ago\" have cpap and try to use it\"     PSHX:  has a past surgical history that includes Kidney surgery (Left, 2-15); Dental surgery; Tonsillectomy (1960's); Tubal ligation (1993); Gastric fundoplication (83/28/0653); Colonoscopy (2011);  Colonoscopy (08/23/2017); Endoscopy, colon, diagnostic (10-16-15); Endoscopy, colon, diagnostic (01/28/2019); and Upper gastrointestinal endoscopy (N/A, 1/28/2019). Allergies: Allergies   Allergen Reactions    Adhesive Tape Rash    Aspirin Nausea And Vomiting     Chewable sts not coated.  Pcn [Penicillins] Nausea And Vomiting and Rash       FAM HX: family history includes Asthma in her brother; Cancer in her sister; Early Death (age of onset: 43) in her sister; Heart Disease in her father and mother; High Blood Pressure in her father. Soc HX:   Social History     Socioeconomic History    Marital status:      Spouse name: None    Number of children: 1    Years of education: 15    Highest education level: None   Occupational History    None   Social Needs    Financial resource strain: None    Food insecurity:     Worry: None     Inability: None    Transportation needs:     Medical: None     Non-medical: None   Tobacco Use    Smoking status: Current Some Day Smoker     Packs/day: 0.25     Years: 44.00     Pack years: 11.00     Types: Cigarettes     Start date: 10/21/1971     Last attempt to quit: 8/21/2015     Years since quitting: 3.7    Smokeless tobacco: Never Used   Substance and Sexual Activity    Alcohol use:  Yes     Alcohol/week: 1.2 oz     Types: 2 Shots of liquor per week     Comment: occassionally    Drug use: No    Sexual activity: Never   Lifestyle    Physical activity:     Days per week: None     Minutes per session: None    Stress: None   Relationships    Social connections:     Talks on phone: None     Gets together: None     Attends Religion service: None     Active member of club or organization: None     Attends meetings of clubs or organizations: None     Relationship status: None    Intimate partner violence:     Fear of current or ex partner: None     Emotionally abused: None     Physically abused: None     Forced sexual activity: None   Other Topics Concern    None Social History Narrative    None       Medications:   Medications:    ceFAZolin  2 g Intravenous 30 Min Pre-Op    sodium chloride flush  10 mL Intravenous 2 times per day    famotidine  20 mg Oral BID    atorvastatin  40 mg Oral Nightly    mometasone-formoterol  2 puff Inhalation BID    donepezil  10 mg Oral Nightly    hydrochlorothiazide  12.5 mg Oral Daily    cetirizine  10 mg Oral Daily    memantine  5 mg Oral BID    metoprolol tartrate  12.5 mg Oral BID    sertraline  100 mg Oral Daily    ranolazine  1,000 mg Oral BID    QUEtiapine  25 mg Oral BID      Data:     XR ANKLE LEFT (MIN 3 VIEWS) [[id number]] Collected: 05/23/19 1506      Order Status: Completed Updated: 05/23/19 1606     Narrative:       EXAMINATION:  TWO XRAY VIEWS OF THE LEFT KNEE; ONE XRAY VIEW OF THE PELVIS AND TWO XRAY  VIEWS LEFT HIP; 3 XRAY VIEWS OF THE LEFT ANKLE    5/23/2019 2:29 pm    COMPARISON:  None. HISTORY:  ORDERING SYSTEM PROVIDED HISTORY: pain  TECHNOLOGIST PROVIDED HISTORY:  Reason for exam:->pain  Ordering Physician Provided Reason for Exam: fall, left knee pain    FINDINGS:  Left hip: Two views provided.  No focal soft tissue abnormality.  Normal bone  mineral density.  Normal bilateral sacroiliac and hip alignment and joint  spaces.  There is an ovoid well-circumscribed calcification overlying the  left hemipelvis which corresponds with a rim calcified focus of remote  mesenteric fat infarction identified on a prior CT abdomen/pelvis 05/14/2019. No acute fracture. Left knee: Two views provided.  No focal soft tissue swelling or effusion. Normal bone mineral density and knee alignment.  The patella, distal femur,  and proximal tibia are intact.  There is an acute oblique fracture of the  fibular metadiaphyseal junction with minimal displacement and no angulation. Left ankle:  Three views provided.  Medial soft tissue swelling.  Normal bone  mineral density.  There is an acute medial malleolar fracture with 3 mm  lateral displacement of the fragment.  There widening of the distal  tibiofibular syndesmosis and medial ankle mortise space consistent with grade  1 lateral subluxation. Wayna Donte is an acute slightly distracted posterior  malleolar fracture.  Acute nondisplaced nonangulated oblique fracture of the  distal fibular metaphysis extending through the level of the syndesmosis. The talar dome is intact.  The hindfoot demonstrates normal alignment with no  acute talar or calcaneal fracture.  Normal midfoot alignment.     Impression:       1. Normal left hip alignment with no acute fracture. 2. Normal left knee alignment with an acute minimally displaced nonangulated  fracture of the left proximal fibular metadiaphyseal junction. 3. Acute left ankle trimalleolar fracture with 3 mm medial displacement of  the medial malleolar fragment, slight distraction of the posterior malleolar  fracture, and anatomically aligned distal fibular fracture. 4. Posttraumatic distal tibiofibular syndesmotic disruption with grade 1  lateral talar subluxation.     XR HIP 1 VW W PELVIS LEFT [[id number]] Collected: 05/23/19 1506     Order Status: Completed Updated: 05/23/19 1605     Narrative:       EXAMINATION:  TWO XRAY VIEWS OF THE LEFT KNEE; ONE XRAY VIEW OF THE PELVIS AND TWO XRAY  VIEWS LEFT HIP; 3 XRAY VIEWS OF THE LEFT ANKLE    5/23/2019 2:29 pm    COMPARISON:  None. HISTORY:  ORDERING SYSTEM PROVIDED HISTORY: pain  TECHNOLOGIST PROVIDED HISTORY:  Reason for exam:->pain  Ordering Physician Provided Reason for Exam: fall, left knee pain    FINDINGS:  Left hip: Two views provided.  No focal soft tissue abnormality.  Normal bone  mineral density.  Normal bilateral sacroiliac and hip alignment and joint  spaces.  There is an ovoid well-circumscribed calcification overlying the  left hemipelvis which corresponds with a rim calcified focus of remote  mesenteric fat infarction identified on a prior CT abdomen/pelvis 05/14/2019.   No exam:->pain  Ordering Physician Provided Reason for Exam: fall, left knee pain    FINDINGS:  Left hip: Two views provided.  No focal soft tissue abnormality.  Normal bone  mineral density.  Normal bilateral sacroiliac and hip alignment and joint  spaces.  There is an ovoid well-circumscribed calcification overlying the  left hemipelvis which corresponds with a rim calcified focus of remote  mesenteric fat infarction identified on a prior CT abdomen/pelvis 05/14/2019. No acute fracture. Left knee: Two views provided.  No focal soft tissue swelling or effusion. Normal bone mineral density and knee alignment.  The patella, distal femur,  and proximal tibia are intact.  There is an acute oblique fracture of the  fibular metadiaphyseal junction with minimal displacement and no angulation. Left ankle: Three views provided.  Medial soft tissue swelling.  Normal bone  mineral density.  There is an acute medial malleolar fracture with 3 mm  lateral displacement of the fragment.  There widening of the distal  tibiofibular syndesmosis and medial ankle mortise space consistent with grade  1 lateral subluxation. Shari Peaches is an acute slightly distracted posterior  malleolar fracture.  Acute nondisplaced nonangulated oblique fracture of the  distal fibular metaphysis extending through the level of the syndesmosis. The talar dome is intact.  The hindfoot demonstrates normal alignment with no  acute talar or calcaneal fracture.  Normal midfoot alignment.     Impression:       1. Normal left hip alignment with no acute fracture. 2. Normal left knee alignment with an acute minimally displaced nonangulated  fracture of the left proximal fibular metadiaphyseal junction. 3. Acute left ankle trimalleolar fracture with 3 mm medial displacement of  the medial malleolar fragment, slight distraction of the posterior malleolar  fracture, and anatomically aligned distal fibular fracture.   4. Posttraumatic distal tibiofibular APRN - NP on 5/23/2019 at 6:42 PM

## 2019-05-23 NOTE — ED TRIAGE NOTES
Pt presents to the ED today via EMS after having a fall; pt reportedly lost her balance and hit her head with a loc. Pt has c/o left leg pain as well.

## 2019-05-23 NOTE — CONSULTS
althought pt complained of chest pain with Lexiscan. report in [facility]; 11/11, possible apical ischemia but abnormality secondary to technical artifact needs to be considered, regional wall motion abnormality with low normal LVSF by pierson scan. 8/10, 12/09    Hx of Doppler ultrasound 2/2011 2/2011-Carotid no significant obstructive lesions noted in the extracranial carotid system. Antegrade flow noted inthe vertebral arteries.  Hx of Doppler ultrasound 1/2012 1/2012-peripheral - both abis and duplex studies of both lower extremities do not  reveal any significant peripheral vascular disease at this time.  Hx of Doppler ultrasound 1/22/2016    Arterial: Peripheral vascular noninvasive arterial studies do not reveal any significant atherosclerotic disease.  Hx of echocardiogram 6/2013 6/13- Mild to mod MR/TR. 10/12-LVSF normal. EF 60%. Mild mitral and tricuspid insuff. reprot in epic; 8/6/10, normal dimension of the LV, normal global and regional LVSF with an EF of 60%, no significant valvuopathy is seen. 12/05    Hypertension     \"on medication since age 25's\" follow with Dr Zoila Cortez Irregular heart beat     \"have irreg heart beat\" dont know what you call it\"    Migraines Last Migraine In 2014    Palpitations     Panic attacks     Pneumonia Last Episode In 2014    Prolonged emergence from general anesthesia     Restless leg     S/P cardiac cath 2/21/14    S/P PTCA (percutaneous transluminal coronary angioplasty) 01/26/2017    2017, CX stented    Shortness of breath     Teeth missing     Upper And Lower    Tobacco use     Unspecified sleep apnea     \"had sleep study 2-3 yrs ago\" have cpap and try to use it\"       CURRENT MEDICATIONS  Prior to Admission medications    Medication Sig Start Date End Date Taking?  Authorizing Provider   budesonide-formoterol (SYMBICORT) 160-4.5 MCG/ACT AERO Inhale 2 puffs into the lungs daily 5/21/19   Niranjan Johnson MD   clindamycin (CLEOCIN) Paras Huertas MD   memantine (NAMENDA) 5 MG tablet Take 5 mg by mouth 2 times daily    Historical Provider, MD   metoprolol tartrate (LOPRESSOR) 25 MG tablet Take 12.5 mg by mouth 2 times daily Take 1/2 tab     Historical Provider, MD   ondansetron (ZOFRAN) 4 MG tablet Take 4 mg by mouth every 8 hours as needed for Nausea or Vomiting    Historical Provider, MD   hydrOXYzine (ATARAX) 25 MG tablet Take 50 mg by mouth every 8 hours as needed for Itching     Historical Provider, MD   vitamin B-1 (THIAMINE) 100 MG tablet Take 100 mg by mouth daily    Historical Provider, MD   vitamin D (ERGOCALCIFEROL) 58905 units CAPS capsule Take 50,000 Units by mouth once a week    Historical Provider, MD   hydrochlorothiazide (HYDRODIURIL) 12.5 MG tablet Take 1 tablet by mouth daily 12/9/17   Paras Huertas MD   Acetaminophen (TYLENOL 8 HOUR ARTHRITIS PAIN PO) Take 1,300 mg by mouth 2 times daily as needed    Historical Provider, MD   omeprazole (PRILOSEC) 40 MG delayed release capsule TAKE 1 TABLET BY MOUTH EVERY DAY 5/23/17   Historical Provider, MD   sertraline (ZOLOFT) 50 MG tablet TAKE 1 TABLET BY ORAL ROUTE EVERY DAY 5/27/17   Historical Provider, MD   atorvastatin (LIPITOR) 40 MG tablet Take 40 mg by mouth daily    Historical Provider, MD   ipratropium-albuterol (DUONEB) 0.5-2.5 (3) MG/3ML SOLN nebulizer solution INHALE 3 MILLILITER BY NEBULIZATION ROUTE 4 TIMES EVERY DAY 7/20/16   Historical Provider, MD   aspirin EC 81 MG EC tablet Take 1 tablet by mouth daily. Patient taking differently: Take 81 mg by mouth every morning  2/10/15   Oscar Ellison MD       ALLERGIES  Allergies   Allergen Reactions    Adhesive Tape Rash    Aspirin Nausea And Vomiting     Chewable sts not coated.     Pcn [Penicillins] Nausea And Vomiting and Rash       SURGICAL HISTORY  Past Surgical History:   Procedure Laterality Date    COLONOSCOPY  2011    COLONOSCOPY  08/23/2017    colon polyp, diverticulosis, hemorrhoids    DENTAL SURGERY      Teeth Fear of current or ex partner: None     Emotionally abused: None     Physically abused: None     Forced sexual activity: None   Other Topics Concern    None   Social History Narrative    None       REVIEW OF SYSTEMS   Review of Systems no loc  No cp, no sob, no vision problems, has hearing loss, no abdominal pain, no dysuria, no leg swelling, edema, no numbness  PHYSICAL EXAM  VITAL SIGNS: /67   Pulse 69   Temp 98.6 °F (37 °C) (Oral)   Resp 15   Ht 5' 2\" (1.575 m)   Wt 169 lb (76.7 kg)   SpO2 97%   BMI 30.91 kg/m²   General Appearance Alert, well appearing, No acute distress   Eyes clear   Ears, Nose, Throat clear    Neck Supple, non tender   Respiratory Lungs: clear breath sounds bilateral   Cardiovascular Heart regular rate and rythm   Gastrointestinal Abdomen: soft, non-tender, non-distended   Lymphatics No adenopathy   Musculoskeletal Left ankle in ao splint. Good cap refill toes, light touch intact. No swelling left knee. Able to slr bilaterally. 5/5 strength in upper extremities. No pain or deformity in upper ex or right lower ext   Skin Normal. No rash or lesions   Neurological Awake, alert and oriented. No focal deficits. Motor and Sensory intact   Psychiatric Normal       LABS   No results for input(s): WBC, HEMOGLOBIN, HCT, PLT, NA, K, CL, CO2, BUN, CREATININE, CALCIUM, PHOS, ALBUMIN, MG, INR, PTT, CKMB, TROPONINI, AST, ALT, LIPASE, LACTATE, TSH in the last 72 hours.     Invalid input(s): GLU, PT, CK1, TBILI, URINE      IMAGING  LEFT ANKLE TRIMALLEOLAR FX AND SYNDESMOSIS DISRUPTION  , LEFT PROXIMAL FIBULA FRACTURE  ASSESSMENT     Patient Active Problem List   Diagnosis    Chest pain    GERD (gastroesophageal reflux disease)    Current tobacco use    COPD (chronic obstructive pulmonary disease) ([facility])    Essential hypertension    Palpitations    Tobacco use    Gastroesophageal reflux disease with hiatal hernia    S/P Nissen fundoplication (without gastrostomy tube) procedure  Precordial pain    Post PTCA    Leg pain, bilateral    Syncope and collapse    Dizziness    Angina pectoris (HCC)    HTN (hypertension)    CAD (coronary artery disease)    GERD (gastroesophageal reflux disease)    Ankle syndesmosis disruption, left, initial encounter    Closed trimalleolar fracture of left ankle        60 y.o. female with LEFT ANKLE TRIMALLEOLAR FRACTURE AND SYNDESMOSIS INJURY  PLAN   1. ORIF LEFT TRIMALLEOLAR FRACTURE  2.  ORIF LEFT SYNDESMOSIS  3.  nonop fx care proximal fibula  SCHEDULED FOR TOMORROW AM  NPO AFTER MIDNIGHT     Electronically signed by: Nancy David MD, 5/23/2019 4:19 PM

## 2019-05-23 NOTE — ED PROVIDER NOTES
missing     Upper And Lower    Tobacco use     Unspecified sleep apnea     \"had sleep study 2-3 yrs ago\" have cpap and try to use it\"     Past Surgical History:   Procedure Laterality Date    COLONOSCOPY  2011    COLONOSCOPY  08/23/2017    colon polyp, diverticulosis, hemorrhoids    DENTAL SURGERY      Teeth Extracted In Past    ENDOSCOPY, COLON, DIAGNOSTIC  10-16-15    ENDOSCOPY, COLON, DIAGNOSTIC  01/28/2019    Hiatal hernia, savary dil #17 used    GASTRIC FUNDOPLICATION  13/40/4846    Robotic laparoscopic nissen with mesh    KIDNEY SURGERY Left 2-15    \"At [facility] Had Left Kidney Mass Removed\" Benign    TONSILLECTOMY  1960's    TUBAL LIGATION  1993    UPPER GASTROINTESTINAL ENDOSCOPY N/A 1/28/2019    EGD DILATION SAVARY #17MM performed by Nicolas Mchugh MD at [facility] 12     Family History   Problem Relation Age of Onset    Heart Disease Mother     Heart Disease Father     High Blood Pressure Father     Cancer Sister     Early Death Sister 43    Asthma Brother      Social History     Socioeconomic History    Marital status:      Spouse name: Not on file    Number of children: 1    Years of education: 15    Highest education level: Not on file   Occupational History    Not on file   Social Needs    Financial resource strain: Not on file    Food insecurity:     Worry: Not on file     Inability: Not on file    Transportation needs:     Medical: Not on file     Non-medical: Not on file   Tobacco Use    Smoking status: Current Some Day Smoker     Packs/day: 0.25     Years: 44.00     Pack years: 11.00     Types: Cigarettes     Start date: 10/21/1971     Last attempt to quit: 8/21/2015     Years since quitting: 3.7    Smokeless tobacco: Never Used   Substance and Sexual Activity    Alcohol use:  Yes     Alcohol/week: 1.2 oz     Types: 2 Shots of liquor per week     Comment: occassionally    Drug use: No    Sexual activity: Never   Lifestyle    Physical activity:     Days per week: Not on file     Minutes per session: Not on file    Stress: Not on file   Relationships    Social connections:     Talks on phone: Not on file     Gets together: Not on file     Attends Yazidi service: Not on file     Active member of club or organization: Not on file     Attends meetings of clubs or organizations: Not on file     Relationship status: Not on file    Intimate partner violence:     Fear of current or ex partner: Not on file     Emotionally abused: Not on file     Physically abused: Not on file     Forced sexual activity: Not on file   Other Topics Concern    Not on file   Social History Narrative    Not on file     No current facility-administered medications for this encounter. Current Outpatient Medications   Medication Sig Dispense Refill    budesonide-formoterol (SYMBICORT) 160-4.5 MCG/ACT AERO Inhale 2 puffs into the lungs daily 1 Inhaler 3    clindamycin (CLEOCIN) 150 MG capsule Take 3 capsules by mouth 3 times daily for 10 days 90 capsule 0    albuterol sulfate HFA (VENTOLIN HFA) 108 (90 Base) MCG/ACT inhaler Inhale 2 puffs into the lungs as needed for Shortness of Breath 1 Inhaler 5    lisinopril-hydrochlorothiazide (PRINZIDE;ZESTORETIC) 20-12.5 MG per tablet Take 1 tablet by mouth daily      QUEtiapine (SEROQUEL) 25 MG tablet Take 25 mg by mouth daily as needed      dicyclomine (BENTYL) 20 MG tablet Take 20 mg by mouth 3 times daily as needed      mupirocin (BACTROBAN) 2 % ointment Apply topically 3 times daily as needed Apply topically 3 times daily.  dextromethorphan (DELSYM) 30 MG/5ML extended release liquid Take 60 mg by mouth 2 times daily as needed for Cough      loratadine (CLARITIN) 10 MG tablet Take 10 mg by mouth daily      betamethasone dipropionate (DIPROLENE) 0.05 % ointment Apply topically 2 times daily Apply topically 2 times daily.       donepezil (ARICEPT) 10 MG tablet TAKE 1 TABLET BY MOUTH EVERYDAY AT daily  3    clopidogrel (PLAVIX) 75 MG tablet Take 1 tablet by mouth daily 90 tablet 3    RANEXA 1000 MG extended release tablet TAKE 1 TABLET BY MOUTH 2 TIMES DAILY 180 tablet 2    pantoprazole sodium (PROTONIX) 40 MG PACK packet Take 40 mg by mouth 2 times daily (before meals)       traZODone (DESYREL) 50 MG tablet Take 50 mg by mouth nightly as needed       melatonin 3 MG TABS tablet Take 5 mg by mouth nightly as needed       CALAMINE-ZINC OXIDE EX Apply topically      nitroGLYCERIN (NITROSTAT) 0.4 MG SL tablet Place 1 tablet under the tongue every 5 minutes as needed for Chest pain 25 tablet 3    memantine (NAMENDA) 5 MG tablet Take 5 mg by mouth 2 times daily      metoprolol tartrate (LOPRESSOR) 25 MG tablet Take 12.5 mg by mouth 2 times daily Take 1/2 tab       ondansetron (ZOFRAN) 4 MG tablet Take 4 mg by mouth every 8 hours as needed for Nausea or Vomiting      hydrOXYzine (ATARAX) 25 MG tablet Take 50 mg by mouth every 8 hours as needed for Itching       vitamin B-1 (THIAMINE) 100 MG tablet Take 100 mg by mouth daily      vitamin D (ERGOCALCIFEROL) 56482 units CAPS capsule Take 50,000 Units by mouth once a week      hydrochlorothiazide (HYDRODIURIL) 12.5 MG tablet Take 1 tablet by mouth daily 90 tablet 3    Acetaminophen (TYLENOL 8 HOUR ARTHRITIS PAIN PO) Take 1,300 mg by mouth 2 times daily as needed      omeprazole (PRILOSEC) 40 MG delayed release capsule TAKE 1 TABLET BY MOUTH EVERY DAY  3    sertraline (ZOLOFT) 50 MG tablet TAKE 1 TABLET BY ORAL ROUTE EVERY DAY  2    atorvastatin (LIPITOR) 40 MG tablet Take 40 mg by mouth daily      ipratropium-albuterol (DUONEB) 0.5-2.5 (3) MG/3ML SOLN nebulizer solution INHALE 3 MILLILITER BY NEBULIZATION ROUTE 4 TIMES EVERY DAY  2    aspirin EC 81 MG EC tablet Take 1 tablet by mouth daily. (Patient taking differently: Take 81 mg by mouth every morning ) 30 tablet 6     Allergies   Allergen Reactions    Adhesive Tape Rash    Aspirin Nausea And Vomiting     Chewable sts not coated.     Pcn [Penicillins] Nausea And Vomiting and Rash       Nursing Notes Reviewed    Physical Exam:  ED Triage Vitals [05/23/19 1338]   Enc Vitals Group      BP 96/66      Pulse 68      Resp 16      Temp 98.6 °F (37 °C)      Temp Source Oral      SpO2 98 %      Weight 169 lb (76.7 kg)      Height 5' 2\" (1.575 m)      Head Circumference       Peak Flow       Pain Score       Pain Loc       Pain Edu? Excl. in [address]? My pulse ox interpretation is - normal    General appearance:  No acute distress. Head: Normocephalic, atraumatic  Face: No bony deformity  Skin:  Warm. Dry. No acute wounds  Eye:  Extraocular movements intact. Ears, nose, mouth and throat:  Oral mucosa moist   Neck:  Trachea midline. Extremity:  No swelling. Normal ROM. No tenderness to palpation x4 extremities   Back: No midline CTLS tenderness to palpation or step-offs  Heart:  Regular rate and rhythm  Perfusion:  intact  Respiratory:  Lungs clear to auscultation bilaterally. Respirations nonlabored. Abdominal:  Normal bowel sounds. Soft. Nontender. Non distended. Neurological:  Alert and oriented times 3.  5 out of 5 motor strength and sensation intact to light touch in all extremities      I have reviewed and interpreted all of the currently available lab results from this visit (if applicable):  No results found for this visit on 05/23/19. Radiographs (if obtained):  [] The following radiograph was interpreted by myself in the absence of a radiologist:   [] Radiologist's Report Reviewed:  XR ANKLE LEFT (MIN 3 VIEWS)   Final Result   1. Normal left hip alignment with no acute fracture. 2. Normal left knee alignment with an acute minimally displaced nonangulated   fracture of the left proximal fibular metadiaphyseal junction.    3. Acute left ankle trimalleolar fracture with 3 mm medial displacement of   the medial malleolar fragment, slight distraction of the posterior malleolar   fracture, and anatomically aligned distal fibular fracture. 4. Posttraumatic distal tibiofibular syndesmotic disruption with grade 1   lateral talar subluxation. XR CHEST STANDARD (2 VW)   Final Result   Mild left basilar atelectasis. XR ELBOW LEFT (MIN 3 VIEWS)   Final Result   No acute findings in the left elbow. XR HIP 1 VW W PELVIS LEFT   Final Result   1. Normal left hip alignment with no acute fracture. 2. Normal left knee alignment with an acute minimally displaced nonangulated   fracture of the left proximal fibular metadiaphyseal junction. 3. Acute left ankle trimalleolar fracture with 3 mm medial displacement of   the medial malleolar fragment, slight distraction of the posterior malleolar   fracture, and anatomically aligned distal fibular fracture. 4. Posttraumatic distal tibiofibular syndesmotic disruption with grade 1   lateral talar subluxation. XR KNEE LEFT (1-2 VIEWS)   Final Result   1. Normal left hip alignment with no acute fracture. 2. Normal left knee alignment with an acute minimally displaced nonangulated   fracture of the left proximal fibular metadiaphyseal junction. 3. Acute left ankle trimalleolar fracture with 3 mm medial displacement of   the medial malleolar fragment, slight distraction of the posterior malleolar   fracture, and anatomically aligned distal fibular fracture. 4. Posttraumatic distal tibiofibular syndesmotic disruption with grade 1   lateral talar subluxation. CT Head WO Contrast   Final Result   Left parieto-occipital scalp contusion. No underlying displaced calvarial   fracture or acute intracranial abnormality. Stable chronic findings, as above. Unchanged chronic mastoid effusions and postsurgical changes of right   cochlear implant placement.                EKG (if obtained): (All EKG's are interpreted by myself in the absence of a cardiologist)    Chart review shows recent radiographs:  Ct Abdomen Pelvis Wo Contrast Additional Contrast? None    Result Date: 5/14/2019  EXAMINATION: CT OF THE ABDOMEN AND PELVIS WITHOUT CONTRAST 5/14/2019 5:09 pm TECHNIQUE: CT of the abdomen and pelvis was performed without the administration of intravenous contrast. Multiplanar reformatted images are provided for review. Dose modulation, iterative reconstruction, and/or weight based adjustment of the mA/kV was utilized to reduce the radiation dose to as low as reasonably achievable. COMPARISON: 1/16/2018 HISTORY: ORDERING SYSTEM PROVIDED HISTORY: right lower abd pain TECHNOLOGIST PROVIDED HISTORY: Additional Contrast?->None Ordering Physician Provided Reason for Exam: rlq pain Mechanism of Injury: pain Additional signs and symptoms: none Relevant Medical/Surgical History:  lt kidnet sx FINDINGS: Lower Chest: Lung bases are clear. Organs: Evaluation of the solid organs is limited without intravenous contrast. No liver lesion. Cholecystectomy. No pancreatic lesion. No splenomegaly. No left adrenal lesion. Right adrenal adenoma. Left renal cyst.  No hydronephrosis. No renal calculus. GI/Bowel: No bowel obstruction. Small hiatal hernia. No acute inflammatory process. Sigmoid diverticulosis without acute diverticulitis. No appendicitis. Pelvis: No free fluid. No bladder calculus. Peritoneum/Retroperitoneum: No aortic aneurysm. No adenopathy. Bones/Soft Tissues: No acute soft tissue abnormality. Lumbar spine degenerative changes. No acute intra-abdominal process. Xr Chest Standard (2 Vw)    Result Date: 5/23/2019  EXAMINATION: TWO XRAY VIEWS OF THE CHEST 5/23/2019 2:29 pm COMPARISON: May 14, 2019 HISTORY: ORDERING SYSTEM PROVIDED HISTORY: Chest pain TECHNOLOGIST PROVIDED HISTORY: Reason for exam:->Chest pain Ordering Physician Provided Reason for Exam: fall FINDINGS: Stable cardiomediastinal silhouette. Mild left basilar atelectasis is noted. There is no definite pleural effusion or pneumothorax. Osseous structures are stable.      Mild left basilar focal soft tissue swelling or effusion. Normal bone mineral density and knee alignment. The patella, distal femur, and proximal tibia are intact. There is an acute oblique fracture of the fibular metadiaphyseal junction with minimal displacement and no angulation. Left ankle: Three views provided. Medial soft tissue swelling. Normal bone mineral density. There is an acute medial malleolar fracture with 3 mm lateral displacement of the fragment. There widening of the distal tibiofibular syndesmosis and medial ankle mortise space consistent with grade 1 lateral subluxation. There is an acute slightly distracted posterior malleolar fracture. Acute nondisplaced nonangulated oblique fracture of the distal fibular metaphysis extending through the level of the syndesmosis. The talar dome is intact. The hindfoot demonstrates normal alignment with no acute talar or calcaneal fracture. Normal midfoot alignment. 1. Normal left hip alignment with no acute fracture. 2. Normal left knee alignment with an acute minimally displaced nonangulated fracture of the left proximal fibular metadiaphyseal junction. 3. Acute left ankle trimalleolar fracture with 3 mm medial displacement of the medial malleolar fragment, slight distraction of the posterior malleolar fracture, and anatomically aligned distal fibular fracture. 4. Posttraumatic distal tibiofibular syndesmotic disruption with grade 1 lateral talar subluxation. Xr Ankle Left (min 3 Views)    Result Date: 5/23/2019  EXAMINATION: TWO XRAY VIEWS OF THE LEFT KNEE; ONE XRAY VIEW OF THE PELVIS AND TWO XRAY VIEWS LEFT HIP; 3 XRAY VIEWS OF THE LEFT ANKLE 5/23/2019 2:29 pm COMPARISON: None. HISTORY: ORDERING SYSTEM PROVIDED HISTORY: pain TECHNOLOGIST PROVIDED HISTORY: Reason for exam:->pain Ordering Physician Provided Reason for Exam: fall, left knee pain FINDINGS: Left hip: Two views provided. No focal soft tissue abnormality. Normal bone mineral density.   Normal bilateral sacroiliac and hip alignment and joint spaces. There is an ovoid well-circumscribed calcification overlying the left hemipelvis which corresponds with a rim calcified focus of remote mesenteric fat infarction identified on a prior CT abdomen/pelvis 05/14/2019. No acute fracture. Left knee: Two views provided. No focal soft tissue swelling or effusion. Normal bone mineral density and knee alignment. The patella, distal femur, and proximal tibia are intact. There is an acute oblique fracture of the fibular metadiaphyseal junction with minimal displacement and no angulation. Left ankle: Three views provided. Medial soft tissue swelling. Normal bone mineral density. There is an acute medial malleolar fracture with 3 mm lateral displacement of the fragment. There widening of the distal tibiofibular syndesmosis and medial ankle mortise space consistent with grade 1 lateral subluxation. There is an acute slightly distracted posterior malleolar fracture. Acute nondisplaced nonangulated oblique fracture of the distal fibular metaphysis extending through the level of the syndesmosis. The talar dome is intact. The hindfoot demonstrates normal alignment with no acute talar or calcaneal fracture. Normal midfoot alignment. 1. Normal left hip alignment with no acute fracture. 2. Normal left knee alignment with an acute minimally displaced nonangulated fracture of the left proximal fibular metadiaphyseal junction. 3. Acute left ankle trimalleolar fracture with 3 mm medial displacement of the medial malleolar fragment, slight distraction of the posterior malleolar fracture, and anatomically aligned distal fibular fracture. 4. Posttraumatic distal tibiofibular syndesmotic disruption with grade 1 lateral talar subluxation.      Ct Head Wo Contrast    Result Date: 5/23/2019  EXAMINATION: CT OF THE HEAD WITHOUT CONTRAST  5/23/2019 2:21 pm TECHNIQUE: CT of the head was performed without the administration of intravenous contrast. Dose modulation, iterative reconstruction, and/or weight based adjustment of the mA/kV was utilized to reduce the radiation dose to as low as reasonably achievable. COMPARISON: CT head 05/14/2019, 10/06/2017 HISTORY: ORDERING SYSTEM PROVIDED HISTORY: HEAD TRAUMA, CLOSED, MILD, GCS >= 13, NO RISK FACTORS, NEURO EXAM NORMAL TECHNOLOGIST PROVIDED HISTORY: Has a \"code stroke\" or \"stroke alert\" been called? ->No Ordering Physician Provided Reason for Exam: Head trauma, closed, mild, GCS >= 13, no risk factors, neuro exam normal Acuity: Acute Type of Exam: Initial Mechanism of Injury: fall Relevant Medical/Surgical History: lost her balance and hit her head with a loc FINDINGS: BRAIN/VENTRICLES: There is no acute intracranial hemorrhage, mass effect or midline shift. No abnormal extra-axial fluid collection. The gray-white differentiation is maintained without evidence of an acute infarct. There is no evidence of hydrocephalus. Stable mild diffuse cerebral volume loss with proportionate prominence of the ventricles and sulci. Unchanged mild patchy low attenuation in the subcortical, periventricular deep white matter which likely reflects sequela of chronic microvascular ischemia. ORBITS: The visualized portion of the orbits demonstrate no acute abnormality. SINUSES: The visualized paranasal sinuses and mastoid air cells demonstrate no acute abnormality. SOFT TISSUES/SKULL:  Left parieto-occipital scalp contusion. No underlying acute displaced calvarial fracture. Postsurgical changes of right cochlear implants. Associated right mastoid effusion is unchanged. Small left mastoid effusion is unchanged. Clear included paranasal sinuses. Left parieto-occipital scalp contusion. No underlying displaced calvarial fracture or acute intracranial abnormality. Stable chronic findings, as above. Unchanged chronic mastoid effusions and postsurgical changes of right cochlear implant placement.      Ct iterative reconstruction, and/or weight based adjustment of the mA/kV was utilized to reduce the radiation dose to as low as reasonably achievable. COMPARISON: None HISTORY: ORDERING SYSTEM PROVIDED HISTORY: Facial swelling TECHNOLOGIST PROVIDED HISTORY: Ordering Physician Provided Reason for Exam: Dizzy rt side facial swelling. Acuity: Acute Type of Exam: Initial Additional signs and symptoms: 75 mL Isovue 370 Relevant Medical/Surgical History: no sx/ HTN FINDINGS: PHARYNX/LARYNX: The palatine tonsils are normal in appearance. The tongue is normal in appearance. The valleculae, epiglottis, aryepiglottic folds and pyriform sinuses appear unremarkable. No mass or abscess is seen. SALIVARY GLANDS: The parotid and submandibular glands appear unremarkable. LYMPH NODES: No cervical lymphadenopathy is seen. SOFT TISSUES: There is subcutaneous edema in the right face superficial to the right mandible with infiltration of subcutaneous fat and overlying skin thickening. Findings suggest cellulitis. No evidence of discrete drainable fluid collection. No evidence of abscess. DENTITION: Patient is partially edentulous. There are numerous caries of the remaining mandibular and maxillary dentition. There is a periapical lucency surrounding the remaining portion of the right mandibular premolar that may represent a periapical abscess. No evidence of overlying cortical destruction. BRAIN/ORBITS/SINUSES: Mild mucosal thickening of the bilateral maxillary sinuses. No evidence of air-fluid levels. No evidence of acute sinusitis. The left mastoid air cells are clear. Postsurgical changes likely from prior partial right mastoidectomy. The remaining mastoid air cells on the right are opacified. Limited images of the intracranial contents demonstrate no acute abnormality. The globes are intact. BONES:  No evidence of acute facial bone fracture.   Postsurgical changes of the right temporal bone and right mastoid air cells as described above. Nonspecific mild subcutaneous edema and skin thickening of the right facial soft tissues overlying the right mandible suggesting cellulitis. No evidence of abscess. Partially edentulous patient with multiple caries of the remaining dentition. Periapical lucency of the remaining portion of the right mandibular premolar may represent a periapical abscess. Extensive postsurgical changes of the right mastoid air cells with sequela of chronic mastoiditis noted. No acute sinusitis. Mild mucosal thickening of the maxillary sinuses suggesting chronic sinusitis. Xr Hip 1 Vw W Pelvis Left    Result Date: 5/23/2019  EXAMINATION: TWO XRAY VIEWS OF THE LEFT KNEE; ONE XRAY VIEW OF THE PELVIS AND TWO XRAY VIEWS LEFT HIP; 3 XRAY VIEWS OF THE LEFT ANKLE 5/23/2019 2:29 pm COMPARISON: None. HISTORY: ORDERING SYSTEM PROVIDED HISTORY: pain TECHNOLOGIST PROVIDED HISTORY: Reason for exam:->pain Ordering Physician Provided Reason for Exam: fall, left knee pain FINDINGS: Left hip: Two views provided. No focal soft tissue abnormality. Normal bone mineral density. Normal bilateral sacroiliac and hip alignment and joint spaces. There is an ovoid well-circumscribed calcification overlying the left hemipelvis which corresponds with a rim calcified focus of remote mesenteric fat infarction identified on a prior CT abdomen/pelvis 05/14/2019. No acute fracture. Left knee: Two views provided. No focal soft tissue swelling or effusion. Normal bone mineral density and knee alignment. The patella, distal femur, and proximal tibia are intact. There is an acute oblique fracture of the fibular metadiaphyseal junction with minimal displacement and no angulation. Left ankle: Three views provided. Medial soft tissue swelling. Normal bone mineral density. There is an acute medial malleolar fracture with 3 mm lateral displacement of the fragment.   There widening of the distal tibiofibular syndesmosis and medial ankle software and may contain errors related to that system including errors in grammar, punctuation, and spelling, as well as words and phrases that may be inappropriate. If there are any questions or concerns please feel free to contact the dictating provider for clarification.         Kandy Moore MD  05/23/19 [id number]

## 2019-05-24 ENCOUNTER — ANESTHESIA (OUTPATIENT)
Dept: OPERATING ROOM | Age: 59
DRG: 313 | End: 2019-05-24
Payer: COMMERCIAL

## 2019-05-24 ENCOUNTER — APPOINTMENT (OUTPATIENT)
Dept: GENERAL RADIOLOGY | Age: 59
DRG: 313 | End: 2019-05-24
Payer: COMMERCIAL

## 2019-05-24 VITALS
SYSTOLIC BLOOD PRESSURE: 131 MMHG | DIASTOLIC BLOOD PRESSURE: 66 MMHG | RESPIRATION RATE: 2 BRPM | TEMPERATURE: 98.6 F | OXYGEN SATURATION: 99 %

## 2019-05-24 LAB
ANION GAP SERPL CALCULATED.3IONS-SCNC: 8 MMOL/L (ref 4–16)
BASOPHILS ABSOLUTE: 0.1 K/CU MM
BASOPHILS RELATIVE PERCENT: 0.6 % (ref 0–1)
BUN BLDV-MCNC: 13 MG/DL (ref 6–23)
CALCIUM SERPL-MCNC: 8.3 MG/DL (ref 8.3–10.6)
CHLORIDE BLD-SCNC: 102 MMOL/L (ref 99–110)
CO2: 31 MMOL/L (ref 21–32)
CREAT SERPL-MCNC: 1 MG/DL (ref 0.6–1.1)
DIFFERENTIAL TYPE: ABNORMAL
EOSINOPHILS ABSOLUTE: 0.1 K/CU MM
EOSINOPHILS RELATIVE PERCENT: 1.5 % (ref 0–3)
GFR AFRICAN AMERICAN: >60 ML/MIN/1.73M2
GFR NON-AFRICAN AMERICAN: 57 ML/MIN/1.73M2
GLUCOSE BLD-MCNC: 102 MG/DL (ref 70–99)
HCT VFR BLD CALC: 34.7 % (ref 37–47)
HEMOGLOBIN: 10.4 GM/DL (ref 12.5–16)
IMMATURE NEUTROPHIL %: 0.5 % (ref 0–0.43)
LYMPHOCYTES ABSOLUTE: 1.2 K/CU MM
LYMPHOCYTES RELATIVE PERCENT: 13.7 % (ref 24–44)
MCH RBC QN AUTO: 27.2 PG (ref 27–31)
MCHC RBC AUTO-ENTMCNC: 30 % (ref 32–36)
MCV RBC AUTO: 90.8 FL (ref 78–100)
MONOCYTES ABSOLUTE: 0.6 K/CU MM
MONOCYTES RELATIVE PERCENT: 6.9 % (ref 0–4)
NUCLEATED RBC %: 0 %
PDW BLD-RTO: 15.3 % (ref 11.7–14.9)
PLATELET # BLD: 332 K/CU MM (ref 140–440)
PMV BLD AUTO: 9.1 FL (ref 7.5–11.1)
POTASSIUM SERPL-SCNC: 4.2 MMOL/L (ref 3.5–5.1)
RBC # BLD: 3.82 M/CU MM (ref 4.2–5.4)
SEGMENTED NEUTROPHILS ABSOLUTE COUNT: 6.7 K/CU MM
SEGMENTED NEUTROPHILS RELATIVE PERCENT: 76.8 % (ref 36–66)
SODIUM BLD-SCNC: 141 MMOL/L (ref 135–145)
TOTAL IMMATURE NEUTOROPHIL: 0.04 K/CU MM
TOTAL NUCLEATED RBC: 0 K/CU MM
WBC # BLD: 8.7 K/CU MM (ref 4–10.5)

## 2019-05-24 PROCEDURE — 1200000000 HC SEMI PRIVATE

## 2019-05-24 PROCEDURE — 2500000003 HC RX 250 WO HCPCS: Performed by: NURSE ANESTHETIST, CERTIFIED REGISTERED

## 2019-05-24 PROCEDURE — 76000 FLUOROSCOPY <1 HR PHYS/QHP: CPT

## 2019-05-24 PROCEDURE — 2580000003 HC RX 258: Performed by: NURSE PRACTITIONER

## 2019-05-24 PROCEDURE — 2580000003 HC RX 258: Performed by: NURSE ANESTHETIST, CERTIFIED REGISTERED

## 2019-05-24 PROCEDURE — 85025 COMPLETE CBC W/AUTO DIFF WBC: CPT

## 2019-05-24 PROCEDURE — 99231 SBSQ HOSP IP/OBS SF/LOW 25: CPT | Performed by: ORTHOPAEDIC SURGERY

## 2019-05-24 PROCEDURE — 6360000002 HC RX W HCPCS: Performed by: ORTHOPAEDIC SURGERY

## 2019-05-24 PROCEDURE — 36415 COLL VENOUS BLD VENIPUNCTURE: CPT

## 2019-05-24 PROCEDURE — C1713 ANCHOR/SCREW BN/BN,TIS/BN: HCPCS | Performed by: ORTHOPAEDIC SURGERY

## 2019-05-24 PROCEDURE — 27829 TREAT LOWER LEG JOINT: CPT | Performed by: ORTHOPAEDIC SURGERY

## 2019-05-24 PROCEDURE — 3600000014 HC SURGERY LEVEL 4 ADDTL 15MIN: Performed by: ORTHOPAEDIC SURGERY

## 2019-05-24 PROCEDURE — 94761 N-INVAS EAR/PLS OXIMETRY MLT: CPT

## 2019-05-24 PROCEDURE — 6360000002 HC RX W HCPCS: Performed by: ANESTHESIOLOGY

## 2019-05-24 PROCEDURE — 3700000001 HC ADD 15 MINUTES (ANESTHESIA): Performed by: ORTHOPAEDIC SURGERY

## 2019-05-24 PROCEDURE — 3700000000 HC ANESTHESIA ATTENDED CARE: Performed by: ORTHOPAEDIC SURGERY

## 2019-05-24 PROCEDURE — 0QSH04Z REPOSITION LEFT TIBIA WITH INTERNAL FIXATION DEVICE, OPEN APPROACH: ICD-10-PCS | Performed by: ORTHOPAEDIC SURGERY

## 2019-05-24 PROCEDURE — 7100000001 HC PACU RECOVERY - ADDTL 15 MIN: Performed by: ORTHOPAEDIC SURGERY

## 2019-05-24 PROCEDURE — 2709999900 HC NON-CHARGEABLE SUPPLY: Performed by: ORTHOPAEDIC SURGERY

## 2019-05-24 PROCEDURE — 2580000003 HC RX 258: Performed by: ORTHOPAEDIC SURGERY

## 2019-05-24 PROCEDURE — 2720000010 HC SURG SUPPLY STERILE: Performed by: ORTHOPAEDIC SURGERY

## 2019-05-24 PROCEDURE — 6360000002 HC RX W HCPCS: Performed by: NURSE ANESTHETIST, CERTIFIED REGISTERED

## 2019-05-24 PROCEDURE — 93010 ELECTROCARDIOGRAM REPORT: CPT | Performed by: INTERNAL MEDICINE

## 2019-05-24 PROCEDURE — 27822 TREATMENT OF ANKLE FRACTURE: CPT | Performed by: ORTHOPAEDIC SURGERY

## 2019-05-24 PROCEDURE — 80048 BASIC METABOLIC PNL TOTAL CA: CPT

## 2019-05-24 PROCEDURE — 93005 ELECTROCARDIOGRAM TRACING: CPT | Performed by: NURSE PRACTITIONER

## 2019-05-24 PROCEDURE — 6370000000 HC RX 637 (ALT 250 FOR IP): Performed by: ORTHOPAEDIC SURGERY

## 2019-05-24 PROCEDURE — 94640 AIRWAY INHALATION TREATMENT: CPT

## 2019-05-24 PROCEDURE — 7100000000 HC PACU RECOVERY - FIRST 15 MIN: Performed by: ORTHOPAEDIC SURGERY

## 2019-05-24 PROCEDURE — 3600000004 HC SURGERY LEVEL 4 BASE: Performed by: ORTHOPAEDIC SURGERY

## 2019-05-24 PROCEDURE — 6360000002 HC RX W HCPCS: Performed by: NURSE PRACTITIONER

## 2019-05-24 PROCEDURE — 0QSK04Z REPOSITION LEFT FIBULA WITH INTERNAL FIXATION DEVICE, OPEN APPROACH: ICD-10-PCS | Performed by: ORTHOPAEDIC SURGERY

## 2019-05-24 DEVICE — SCREW BNE L16MM DIA2.7MM CORT S STL ST LOK FULL THRD T8: Type: IMPLANTABLE DEVICE | Site: ANKLE | Status: FUNCTIONAL

## 2019-05-24 DEVICE — SCREW BNE L14MM DIA2.7MM CORT S STL ST LOK FULL THRD T8: Type: IMPLANTABLE DEVICE | Site: ANKLE | Status: FUNCTIONAL

## 2019-05-24 DEVICE — SCREW BNE L50MM DIA4.5MM PROX CORT TIB S STL ST LOK FULL: Type: IMPLANTABLE DEVICE | Site: ANKLE | Status: FUNCTIONAL

## 2019-05-24 DEVICE — SCREW BNE L12MM DIA2.7MM CORT S STL ST LOK FULL THRD T8: Type: IMPLANTABLE DEVICE | Site: ANKLE | Status: FUNCTIONAL

## 2019-05-24 DEVICE — SCREW BNE L18MM DIA2.7MM CORT S STL ST LOK FULL THRD T8: Type: IMPLANTABLE DEVICE | Site: ANKLE | Status: FUNCTIONAL

## 2019-05-24 DEVICE — SCREW BNE L40MM DIA4MM S STL CANN SHT 1/3 THRD SM HEX SOCK: Type: IMPLANTABLE DEVICE | Site: ANKLE | Status: FUNCTIONAL

## 2019-05-24 DEVICE — IMPLANTABLE DEVICE: Type: IMPLANTABLE DEVICE | Site: ANKLE | Status: FUNCTIONAL

## 2019-05-24 RX ORDER — ROCURONIUM BROMIDE 10 MG/ML
INJECTION, SOLUTION INTRAVENOUS PRN
Status: DISCONTINUED | OUTPATIENT
Start: 2019-05-24 | End: 2019-05-24 | Stop reason: SDUPTHER

## 2019-05-24 RX ORDER — ACETAMINOPHEN 10 MG/ML
1000 INJECTION, SOLUTION INTRAVENOUS ONCE
Status: COMPLETED | OUTPATIENT
Start: 2019-05-24 | End: 2019-05-24

## 2019-05-24 RX ORDER — GLYCOPYRROLATE 1 MG/5 ML
SYRINGE (ML) INTRAVENOUS PRN
Status: DISCONTINUED | OUTPATIENT
Start: 2019-05-24 | End: 2019-05-24 | Stop reason: SDUPTHER

## 2019-05-24 RX ORDER — MEPERIDINE HYDROCHLORIDE 25 MG/ML
12.5 INJECTION INTRAMUSCULAR; INTRAVENOUS; SUBCUTANEOUS EVERY 5 MIN PRN
Status: DISCONTINUED | OUTPATIENT
Start: 2019-05-24 | End: 2019-05-24 | Stop reason: HOSPADM

## 2019-05-24 RX ORDER — FENTANYL CITRATE 50 UG/ML
INJECTION, SOLUTION INTRAMUSCULAR; INTRAVENOUS PRN
Status: DISCONTINUED | OUTPATIENT
Start: 2019-05-24 | End: 2019-05-24 | Stop reason: SDUPTHER

## 2019-05-24 RX ORDER — EPHEDRINE SULFATE 50 MG/ML
INJECTION, SOLUTION INTRAVENOUS PRN
Status: DISCONTINUED | OUTPATIENT
Start: 2019-05-24 | End: 2019-05-24 | Stop reason: SDUPTHER

## 2019-05-24 RX ORDER — LABETALOL HYDROCHLORIDE 5 MG/ML
5 INJECTION, SOLUTION INTRAVENOUS EVERY 10 MIN PRN
Status: DISCONTINUED | OUTPATIENT
Start: 2019-05-24 | End: 2019-05-24 | Stop reason: HOSPADM

## 2019-05-24 RX ORDER — DIPHENHYDRAMINE HYDROCHLORIDE 50 MG/ML
12.5 INJECTION INTRAMUSCULAR; INTRAVENOUS
Status: DISCONTINUED | OUTPATIENT
Start: 2019-05-24 | End: 2019-05-24 | Stop reason: HOSPADM

## 2019-05-24 RX ORDER — DEXAMETHASONE SODIUM PHOSPHATE 4 MG/ML
INJECTION, SOLUTION INTRA-ARTICULAR; INTRALESIONAL; INTRAMUSCULAR; INTRAVENOUS; SOFT TISSUE PRN
Status: DISCONTINUED | OUTPATIENT
Start: 2019-05-24 | End: 2019-05-24 | Stop reason: SDUPTHER

## 2019-05-24 RX ORDER — HYDROMORPHONE HCL 110MG/55ML
0.5 PATIENT CONTROLLED ANALGESIA SYRINGE INTRAVENOUS EVERY 5 MIN PRN
Status: DISCONTINUED | OUTPATIENT
Start: 2019-05-24 | End: 2019-05-24 | Stop reason: HOSPADM

## 2019-05-24 RX ORDER — SODIUM CHLORIDE, SODIUM LACTATE, POTASSIUM CHLORIDE, CALCIUM CHLORIDE 600; 310; 30; 20 MG/100ML; MG/100ML; MG/100ML; MG/100ML
INJECTION, SOLUTION INTRAVENOUS CONTINUOUS PRN
Status: DISCONTINUED | OUTPATIENT
Start: 2019-05-24 | End: 2019-05-24 | Stop reason: SDUPTHER

## 2019-05-24 RX ORDER — PROMETHAZINE HYDROCHLORIDE 25 MG/ML
6.25 INJECTION, SOLUTION INTRAMUSCULAR; INTRAVENOUS
Status: DISCONTINUED | OUTPATIENT
Start: 2019-05-24 | End: 2019-05-24 | Stop reason: HOSPADM

## 2019-05-24 RX ORDER — ONDANSETRON 2 MG/ML
INJECTION INTRAMUSCULAR; INTRAVENOUS PRN
Status: DISCONTINUED | OUTPATIENT
Start: 2019-05-24 | End: 2019-05-24 | Stop reason: SDUPTHER

## 2019-05-24 RX ORDER — HYDRALAZINE HYDROCHLORIDE 20 MG/ML
5 INJECTION INTRAMUSCULAR; INTRAVENOUS EVERY 10 MIN PRN
Status: DISCONTINUED | OUTPATIENT
Start: 2019-05-24 | End: 2019-05-24 | Stop reason: HOSPADM

## 2019-05-24 RX ORDER — FENTANYL CITRATE 50 UG/ML
50 INJECTION, SOLUTION INTRAMUSCULAR; INTRAVENOUS EVERY 5 MIN PRN
Status: DISCONTINUED | OUTPATIENT
Start: 2019-05-24 | End: 2019-05-24 | Stop reason: HOSPADM

## 2019-05-24 RX ORDER — CEFAZOLIN SODIUM 1 G/50ML
1 INJECTION, SOLUTION INTRAVENOUS EVERY 8 HOURS
Status: COMPLETED | OUTPATIENT
Start: 2019-05-24 | End: 2019-05-25

## 2019-05-24 RX ORDER — SODIUM CHLORIDE, SODIUM LACTATE, POTASSIUM CHLORIDE, CALCIUM CHLORIDE 600; 310; 30; 20 MG/100ML; MG/100ML; MG/100ML; MG/100ML
INJECTION, SOLUTION INTRAVENOUS CONTINUOUS
Status: DISCONTINUED | OUTPATIENT
Start: 2019-05-24 | End: 2019-05-25

## 2019-05-24 RX ORDER — NEOSTIGMINE METHYLSULFATE 5 MG/5 ML
SYRINGE (ML) INTRAVENOUS PRN
Status: DISCONTINUED | OUTPATIENT
Start: 2019-05-24 | End: 2019-05-24 | Stop reason: SDUPTHER

## 2019-05-24 RX ORDER — HYDROMORPHONE HCL 110MG/55ML
PATIENT CONTROLLED ANALGESIA SYRINGE INTRAVENOUS PRN
Status: DISCONTINUED | OUTPATIENT
Start: 2019-05-24 | End: 2019-05-24 | Stop reason: SDUPTHER

## 2019-05-24 RX ORDER — LIDOCAINE HYDROCHLORIDE 20 MG/ML
INJECTION, SOLUTION EPIDURAL; INFILTRATION; INTRACAUDAL; PERINEURAL PRN
Status: DISCONTINUED | OUTPATIENT
Start: 2019-05-24 | End: 2019-05-24 | Stop reason: SDUPTHER

## 2019-05-24 RX ORDER — CEFAZOLIN SODIUM 2 G/100ML
INJECTION, SOLUTION INTRAVENOUS
Status: DISPENSED
Start: 2019-05-24 | End: 2019-05-24

## 2019-05-24 RX ORDER — HEPARIN SODIUM 5000 [USP'U]/ML
5000 INJECTION, SOLUTION INTRAVENOUS; SUBCUTANEOUS EVERY 8 HOURS SCHEDULED
Status: DISCONTINUED | OUTPATIENT
Start: 2019-05-24 | End: 2019-05-29 | Stop reason: HOSPADM

## 2019-05-24 RX ORDER — PROPOFOL 10 MG/ML
INJECTION, EMULSION INTRAVENOUS PRN
Status: DISCONTINUED | OUTPATIENT
Start: 2019-05-24 | End: 2019-05-24 | Stop reason: SDUPTHER

## 2019-05-24 RX ADMIN — QUETIAPINE FUMARATE 25 MG: 25 TABLET ORAL at 21:30

## 2019-05-24 RX ADMIN — EPHEDRINE SULFATE 10 MG: 50 INJECTION, SOLUTION INTRAMUSCULAR; INTRAVENOUS; SUBCUTANEOUS at 10:41

## 2019-05-24 RX ADMIN — Medication 0.4 MG: at 11:34

## 2019-05-24 RX ADMIN — Medication 0.2 MG: at 10:12

## 2019-05-24 RX ADMIN — SODIUM CHLORIDE, POTASSIUM CHLORIDE, SODIUM LACTATE AND CALCIUM CHLORIDE: 600; 310; 30; 20 INJECTION, SOLUTION INTRAVENOUS at 10:04

## 2019-05-24 RX ADMIN — MORPHINE SULFATE 4 MG: 4 INJECTION, SOLUTION INTRAMUSCULAR; INTRAVENOUS at 17:28

## 2019-05-24 RX ADMIN — Medication 2 PUFF: at 07:50

## 2019-05-24 RX ADMIN — Medication 2 PUFF: at 20:45

## 2019-05-24 RX ADMIN — EPHEDRINE SULFATE 20 MG: 50 INJECTION, SOLUTION INTRAMUSCULAR; INTRAVENOUS; SUBCUTANEOUS at 10:28

## 2019-05-24 RX ADMIN — CEFAZOLIN SODIUM 2 G: 2 SOLUTION INTRAVENOUS at 10:24

## 2019-05-24 RX ADMIN — HEPARIN SODIUM 5000 UNITS: 5000 INJECTION, SOLUTION INTRAVENOUS; SUBCUTANEOUS at 21:32

## 2019-05-24 RX ADMIN — MORPHINE SULFATE 4 MG: 4 INJECTION, SOLUTION INTRAMUSCULAR; INTRAVENOUS at 02:36

## 2019-05-24 RX ADMIN — MORPHINE SULFATE 4 MG: 4 INJECTION, SOLUTION INTRAMUSCULAR; INTRAVENOUS at 13:17

## 2019-05-24 RX ADMIN — Medication 3 MG: at 11:33

## 2019-05-24 RX ADMIN — MORPHINE SULFATE 4 MG: 4 INJECTION, SOLUTION INTRAMUSCULAR; INTRAVENOUS at 23:55

## 2019-05-24 RX ADMIN — LIDOCAINE HYDROCHLORIDE 60 MG: 20 INJECTION, SOLUTION EPIDURAL; INFILTRATION; INTRACAUDAL; PERINEURAL at 10:09

## 2019-05-24 RX ADMIN — ATORVASTATIN CALCIUM 40 MG: 40 TABLET, FILM COATED ORAL at 21:30

## 2019-05-24 RX ADMIN — FAMOTIDINE 20 MG: 20 TABLET ORAL at 21:29

## 2019-05-24 RX ADMIN — SODIUM CHLORIDE: 9 INJECTION, SOLUTION INTRAVENOUS at 01:52

## 2019-05-24 RX ADMIN — HYDROMORPHONE HYDROCHLORIDE 1 MG: 2 INJECTION INTRAMUSCULAR; INTRAVENOUS; SUBCUTANEOUS at 11:36

## 2019-05-24 RX ADMIN — ONDANSETRON 4 MG: 2 INJECTION INTRAMUSCULAR; INTRAVENOUS at 10:34

## 2019-05-24 RX ADMIN — EPHEDRINE SULFATE 10 MG: 50 INJECTION, SOLUTION INTRAMUSCULAR; INTRAVENOUS; SUBCUTANEOUS at 11:07

## 2019-05-24 RX ADMIN — MORPHINE SULFATE 4 MG: 4 INJECTION, SOLUTION INTRAMUSCULAR; INTRAVENOUS at 07:17

## 2019-05-24 RX ADMIN — SODIUM CHLORIDE, PRESERVATIVE FREE 10 ML: 5 INJECTION INTRAVENOUS at 21:31

## 2019-05-24 RX ADMIN — EPHEDRINE SULFATE 10 MG: 50 INJECTION, SOLUTION INTRAMUSCULAR; INTRAVENOUS; SUBCUTANEOUS at 11:26

## 2019-05-24 RX ADMIN — CEFAZOLIN SODIUM 1 G: 1 INJECTION, SOLUTION INTRAVENOUS at 18:20

## 2019-05-24 RX ADMIN — ROCURONIUM BROMIDE 40 MG: 10 INJECTION INTRAVENOUS at 10:10

## 2019-05-24 RX ADMIN — PROPOFOL 120 MG: 10 INJECTION, EMULSION INTRAVENOUS at 10:09

## 2019-05-24 RX ADMIN — SODIUM CHLORIDE, POTASSIUM CHLORIDE, SODIUM LACTATE AND CALCIUM CHLORIDE: 600; 310; 30; 20 INJECTION, SOLUTION INTRAVENOUS at 23:55

## 2019-05-24 RX ADMIN — MEMANTINE HYDROCHLORIDE 5 MG: 5 TABLET ORAL at 21:29

## 2019-05-24 RX ADMIN — ACETAMINOPHEN 1000 MG: 10 INJECTION, SOLUTION INTRAVENOUS at 11:50

## 2019-05-24 RX ADMIN — HEPARIN SODIUM 5000 UNITS: 5000 INJECTION, SOLUTION INTRAVENOUS; SUBCUTANEOUS at 15:54

## 2019-05-24 RX ADMIN — RANOLAZINE 1000 MG: 500 TABLET, FILM COATED, EXTENDED RELEASE ORAL at 21:29

## 2019-05-24 RX ADMIN — DONEPEZIL HYDROCHLORIDE 10 MG: 10 TABLET, FILM COATED ORAL at 21:29

## 2019-05-24 RX ADMIN — DEXAMETHASONE SODIUM PHOSPHATE 10 MG: 4 INJECTION, SOLUTION INTRAMUSCULAR; INTRAVENOUS at 10:34

## 2019-05-24 RX ADMIN — FENTANYL CITRATE 100 MCG: 50 INJECTION INTRAMUSCULAR; INTRAVENOUS at 10:04

## 2019-05-24 ASSESSMENT — PULMONARY FUNCTION TESTS
PIF_VALUE: 21
PIF_VALUE: 2
PIF_VALUE: 19
PIF_VALUE: 20
PIF_VALUE: 21
PIF_VALUE: 1
PIF_VALUE: 43
PIF_VALUE: 21
PIF_VALUE: 20
PIF_VALUE: 21
PIF_VALUE: 20
PIF_VALUE: 7
PIF_VALUE: 19
PIF_VALUE: 21
PIF_VALUE: 18
PIF_VALUE: 21
PIF_VALUE: 18
PIF_VALUE: 0
PIF_VALUE: 19
PIF_VALUE: 20
PIF_VALUE: 31
PIF_VALUE: 20
PIF_VALUE: 21
PIF_VALUE: 19
PIF_VALUE: 0
PIF_VALUE: 18
PIF_VALUE: 3
PIF_VALUE: 4
PIF_VALUE: 20
PIF_VALUE: 3
PIF_VALUE: 20
PIF_VALUE: 1
PIF_VALUE: 20
PIF_VALUE: 20
PIF_VALUE: 26
PIF_VALUE: 2
PIF_VALUE: 21
PIF_VALUE: 21
PIF_VALUE: 20
PIF_VALUE: 18
PIF_VALUE: 20
PIF_VALUE: 5
PIF_VALUE: 20
PIF_VALUE: 17
PIF_VALUE: 21
PIF_VALUE: 2
PIF_VALUE: 19
PIF_VALUE: 20
PIF_VALUE: 20
PIF_VALUE: 19
PIF_VALUE: 21
PIF_VALUE: 20
PIF_VALUE: 20
PIF_VALUE: 21
PIF_VALUE: 38
PIF_VALUE: 17
PIF_VALUE: 0
PIF_VALUE: 21
PIF_VALUE: 21
PIF_VALUE: 17
PIF_VALUE: 20
PIF_VALUE: 20
PIF_VALUE: 3
PIF_VALUE: 21
PIF_VALUE: 20
PIF_VALUE: 20
PIF_VALUE: 21
PIF_VALUE: 19
PIF_VALUE: 21
PIF_VALUE: 21
PIF_VALUE: 0
PIF_VALUE: 16
PIF_VALUE: 21
PIF_VALUE: 21
PIF_VALUE: 20
PIF_VALUE: 17
PIF_VALUE: 21
PIF_VALUE: 21
PIF_VALUE: 19
PIF_VALUE: 19
PIF_VALUE: 3
PIF_VALUE: 21
PIF_VALUE: 2
PIF_VALUE: 21
PIF_VALUE: 19
PIF_VALUE: 0
PIF_VALUE: 20
PIF_VALUE: 19
PIF_VALUE: 20

## 2019-05-24 ASSESSMENT — PAIN DESCRIPTION - FREQUENCY: FREQUENCY: CONTINUOUS

## 2019-05-24 ASSESSMENT — PAIN DESCRIPTION - ORIENTATION: ORIENTATION: LEFT

## 2019-05-24 ASSESSMENT — PAIN SCALES - GENERAL
PAINLEVEL_OUTOF10: 10
PAINLEVEL_OUTOF10: 0
PAINLEVEL_OUTOF10: 10
PAINLEVEL_OUTOF10: 0
PAINLEVEL_OUTOF10: 6
PAINLEVEL_OUTOF10: 0
PAINLEVEL_OUTOF10: 10
PAINLEVEL_OUTOF10: 0
PAINLEVEL_OUTOF10: 10

## 2019-05-24 ASSESSMENT — PAIN DESCRIPTION - PAIN TYPE: TYPE: SURGICAL PAIN

## 2019-05-24 ASSESSMENT — PAIN DESCRIPTION - DESCRIPTORS: DESCRIPTORS: ACHING

## 2019-05-24 ASSESSMENT — PAIN DESCRIPTION - ONSET: ONSET: ON-GOING

## 2019-05-24 ASSESSMENT — PAIN - FUNCTIONAL ASSESSMENT: PAIN_FUNCTIONAL_ASSESSMENT: PREVENTS OR INTERFERES SOME ACTIVE ACTIVITIES AND ADLS

## 2019-05-24 ASSESSMENT — PAIN DESCRIPTION - LOCATION: LOCATION: ANKLE

## 2019-05-24 NOTE — CARE COORDINATION
Reviewed chart and Attempted to speak with pt but unable to stay awake. VM left for Gino Manzo. Chart states pt lives with family , has a PCP and insurance. Order for RW with seat. [facility]'s room this pm.  Pt may also need HC if not already open with someone, will need to await therapy evals/recommendations. [phone] Call from Eloina Andrew at [address] [place], [place] [address] and pt received a walker from them in Feb. 2019, pt does not qualify for another walker yet.

## 2019-05-24 NOTE — PLAN OF CARE
Problem: Falls - Risk of:  Goal: Will remain free from falls  Description  Will remain free from falls  5/24/2019 0117 by Naga Justice RN  Outcome: Met This Shift  5/23/2019 1803 by Carlos Geller RN  Outcome: Ongoing  Goal: Absence of physical injury  Description  Absence of physical injury  5/24/2019 0117 by Naga Justice RN  Outcome: Met This Shift  5/23/2019 1803 by Carlos Geller RN  Outcome: Ongoing     Problem: SAFETY  Goal: Free from accidental physical injury  5/24/2019 0117 by Naga Justice RN  Outcome: Met This Shift  5/23/2019 1803 by Carlos Geller RN  Outcome: Ongoing  Goal: Free from intentional harm  5/24/2019 0117 by Naga Justice RN  Outcome: Met This Shift  5/23/2019 1803 by Carlos Geller RN  Outcome: Ongoing     Problem: DAILY CARE  Goal: Daily care needs are met  5/24/2019 0117 by Naga Justice RN  Outcome: Met This Shift  5/23/2019 1803 by Carlos Geller RN  Outcome: Ongoing

## 2019-05-24 NOTE — PROGRESS NOTES
SURGICAL CONSENT  Diagnosis: LEFT ANKLE TRIMALLEOLAR FRACTURE AND SYNDESMOSIS INJURY  Planned Procedure: OPEN REDUCTION INTERNAL FIXATION LEFT ANKLE  Diagnosis, planned procedure, pertinent risks, benefits, alternatives, potential complications, expectations, outcomes, etc. discussed with patient and brother at bedside. Questions answered. The patient desires to proceed. Surgical site marked.     Electronically signed by: Merdis Schlatter, MD, 5/24/2019 9:53 AM

## 2019-05-24 NOTE — CARE COORDINATION
Rajesh Garcia was evaluated today and a DME order was entered for a wheeled walker with seat because she requires this to successfully complete daily living tasks of eating, bathing, toileting, personal cares, ambulating, grooming, hygiene, dressing upper body, dressing lower body, meal preparation and taking own medications. A wheeled walker with seat is necessary due to the patient's unsteady gait, upper body weakness, inability to  and ambulation device, ambulating only short distances by pushing a walker, and the need to sit for a short time before resuming ambulation. These tasks cannot be completed with a lesser ambulation device such as a cane, crutch, or standard walker. The need for this equipment was discussed with the patient and she understands and is in agreement.

## 2019-05-24 NOTE — ANESTHESIA POSTPROCEDURE EVALUATION
Department of Anesthesiology  Postprocedure Note    Patient: Chet Mott  MRN: [id number]  YOB: 1960  Date of evaluation: 5/24/2019  Time:  12:57 PM     Procedure Summary     Date:  05/24/19 Room / Location:  [place] [address] / [facility]    Anesthesia Start:  1004 Anesthesia Stop:  7702    Procedure:  LEFT ANKLE OPEN REDUCTION INTERNAL FIXATION SYNDESMOSIS (Left ) Diagnosis:  (LEFT TRIMALLEOLAR ANKLE FRACTURE)    Surgeon:  Bindu Abdul MD Responsible Provider:  David Romero DO    Anesthesia Type:  general ASA Status:  3          Anesthesia Type: general    Lali Phase I: Lali Score: 8    Lali Phase II:      Last vitals: Reviewed and per EMR flowsheets.        Anesthesia Post Evaluation    Patient location during evaluation: PACU  Patient participation: complete - patient participated  Level of consciousness: sleepy but conscious  Airway patency: patent  Nausea & Vomiting: no nausea  Complications: no  Cardiovascular status: hemodynamically stable  Respiratory status: acceptable  Hydration status: euvolemic

## 2019-05-24 NOTE — ANESTHESIA PRE PROCEDURE
Department of Anesthesiology  Preprocedure Note       Name:  Stuart Schafer   Age:  60 y.o.  :  1960                                          MRN:  [id number]         Date:  2019      Surgeon: Bryant Kilpatrick):  Ansley Lenz MD    Procedure: LEFT ANKLE OPEN REDUCTION INTERNAL FIXATION SYNDESMOSIS (Left )    Medications prior to admission:   Prior to Admission medications    Medication Sig Start Date End Date Taking? Authorizing Provider   budesonide-formoterol (SYMBICORT) 160-4.5 MCG/ACT AERO Inhale 2 puffs into the lungs daily 19   Mariusz Powell MD   clindamycin (CLEOCIN) 150 MG capsule Take 3 capsules by mouth 3 times daily for 10 days 19  Leticia Priest MD   albuterol sulfate HFA (VENTOLIN HFA) 108 (90 Base) MCG/ACT inhaler Inhale 2 puffs into the lungs as needed for Shortness of Breath 19   Mariusz Powell MD   lisinopril-hydrochlorothiazide (PRINZIDE;ZESTORETIC) 20-12.5 MG per tablet Take 1 tablet by mouth daily    Historical Provider, MD   QUEtiapine (SEROQUEL) 25 MG tablet Take 25 mg by mouth daily as needed    Historical Provider, MD   dicyclomine (BENTYL) 20 MG tablet Take 20 mg by mouth 3 times daily as needed    Historical Provider, MD   mupirocin (BACTROBAN) 2 % ointment Apply topically 3 times daily as needed Apply topically 3 times daily. Historical Provider, MD   dextromethorphan (DELSYM) 30 MG/5ML extended release liquid Take 60 mg by mouth 2 times daily as needed for Cough    Historical Provider, MD   loratadine (CLARITIN) 10 MG tablet Take 10 mg by mouth daily    Historical Provider, MD   betamethasone dipropionate (DIPROLENE) 0.05 % ointment Apply topically 2 times daily Apply topically 2 times daily.     Historical Provider, MD   donepezil (ARICEPT) 10 MG tablet TAKE 1 TABLET BY MOUTH EVERYDAY AT daily 1/3/19   Historical Provider, MD   clopidogrel (PLAVIX) 75 MG tablet Take 1 tablet by mouth daily 1/3/19   Leonides Gray MD   RANEXA 1000 MG extended release tablet TAKE 1 TABLET BY MOUTH 2 TIMES DAILY 12/17/18   Sanchez Barth MD   pantoprazole sodium (PROTONIX) 40 MG PACK packet Take 40 mg by mouth 2 times daily (before meals)     Historical Provider, MD   traZODone (DESYREL) 50 MG tablet Take 50 mg by mouth nightly as needed     Historical Provider, MD   melatonin 3 MG TABS tablet Take 5 mg by mouth nightly as needed     Historical Provider, MD [place] [address] topically    Historical Provider, MD   nitroGLYCERIN (NITROSTAT) 0.4 MG SL tablet Place 1 tablet under the tongue every 5 minutes as needed for Chest pain 9/10/18   Sanchez Barth MD   memantine (NAMENDA) 5 MG tablet Take 5 mg by mouth 2 times daily    Historical Provider, MD   metoprolol tartrate (LOPRESSOR) 25 MG tablet Take 12.5 mg by mouth 2 times daily Take 1/2 tab     Historical Provider, MD   ondansetron (ZOFRAN) 4 MG tablet Take 4 mg by mouth every 8 hours as needed for Nausea or Vomiting    Historical Provider, MD   hydrOXYzine (ATARAX) 25 MG tablet Take 50 mg by mouth every 8 hours as needed for Itching     Historical Provider, MD   vitamin B-1 (THIAMINE) 100 MG tablet Take 100 mg by mouth daily    Historical Provider, MD   vitamin D (ERGOCALCIFEROL) 50224 units CAPS capsule Take 50,000 Units by mouth once a week    Historical Provider, MD   hydrochlorothiazide (HYDRODIURIL) 12.5 MG tablet Take 1 tablet by mouth daily 12/9/17   Sanchez Barth MD   Acetaminophen (TYLENOL 8 HOUR ARTHRITIS PAIN PO) Take 1,300 mg by mouth 2 times daily as needed    Historical Provider, MD   omeprazole (PRILOSEC) 40 MG delayed release capsule TAKE 1 TABLET BY MOUTH EVERY DAY 5/23/17   Historical Provider, MD   sertraline (ZOLOFT) 50 MG tablet TAKE 1 TABLET BY ORAL ROUTE EVERY DAY 5/27/17   Historical Provider, MD   atorvastatin (LIPITOR) 40 MG tablet Take 40 mg by mouth daily    Historical Provider, MD   ipratropium-albuterol (DUONEB) 0.5-2.5 (3) MG/3ML SOLN nebulizer solution INHALE 3 MILLILITER BY NEBULIZATION ROUTE 4 TIMES EVERY DAY 7/20/16   Historical Provider, MD   aspirin EC 81 MG EC tablet Take 1 tablet by mouth daily.   Patient taking differently: Take 81 mg by mouth every morning  2/10/15   jR Ryan MD       Current medications:    Current Facility-Administered Medications   Medication Dose Route Frequency Provider Last Rate Last Dose    ceFAZolin (ANCEF) 2 g in dextrose 3 % 50 mL IVPB (duplex)  2 g Intravenous 30 Min Pre-Op Yun Jamil MD        sodium chloride flush 0.9 % injection 10 mL  10 mL Intravenous 2 times per day LAVERN Gorman - NP        sodium chloride flush 0.9 % injection 10 mL  10 mL Intravenous PRN Neela Orr APRN - NP        magnesium hydroxide (MILK OF MAGNESIA) 400 MG/5ML suspension 30 mL  30 mL Oral Daily PRN LAVERN Gorman - MANDI        ondansetron [facility]) injection 4 mg  4 mg Intravenous Q6H PRN LAVERN Gorman - NP   4 mg at 05/23/19 1827    famotidine (PEPCID) tablet 20 mg  20 mg Oral BID Neela Orr APRN - NP        acetaminophen (TYLENOL) tablet 650 mg  650 mg Oral Q4H PRN Neela Orr APRN - NP       [facility] Reford Ha ON 5/24/2019] 0.9 % sodium chloride infusion   Intravenous Continuous LAVERN Gorman - NP        morphine sulfate (PF) injection 4 mg  4 mg Intravenous Q4H PRN Neela Orr APRN - NP   4 mg at 05/23/19 1827    atorvastatin (LIPITOR) tablet 40 mg  40 mg Oral Nightly Neela Orr APRN - NP        mometasone-formoterol (DULERA) 200-5 MCG/ACT inhaler 2 puff  2 puff Inhalation BID Neela Orr APRN - NP        donepezil (ARICEPT) tablet 10 mg  10 mg Oral Nightly Neela Orr APRN - NP        hydrochlorothiazide (HYDRODIURIL) tablet 12.5 mg  12.5 mg Oral Daily LAVERN Gorman - NP   12.5 mg at 05/23/19 1826    hydrOXYzine (ATARAX) tablet 50 mg  50 mg Oral Q8H PRN Neela Orr APRN - NP        ipratropium-albuterol (DUONEB) nebulizer solution 1 ampule  1 ampule Inhalation Q4H PRN Estanislado Parrot, APRN - NP        cetirizine (ZYRTEC) tablet 10 mg  10 mg Oral Daily Estanislado Parrot, APRN - NP   10 mg at 05/23/19 1826    memantine (NAMENDA) tablet 5 mg  5 mg Oral BID Estanislado Parrot, APRN - NP        metoprolol tartrate (LOPRESSOR) tablet 12.5 mg  12.5 mg Oral BID Estanislado Parrot, APRN - NP        sertraline (ZOLOFT) tablet 100 mg  100 mg Oral Daily Estanislado Parrot, APRN - NP   100 mg at 05/23/19 1827    traZODone (DESYREL) tablet 50 mg  50 mg Oral Nightly PRN Estanislado Parrot, APRN - NP        ranolazine [facility]) extended release tablet 1,000 mg  1,000 mg Oral BID Estanislado Parrot, APRN - NP        QUEtiapine (SEROQUEL) tablet 25 mg  25 mg Oral BID Estanislado Parrot, APRN - NP        albuterol sulfate  (90 Base) MCG/ACT inhaler 2 puff  2 puff Inhalation Q4H PRN Estanislado Parrot, APRN - NP           Allergies: Allergies   Allergen Reactions    Adhesive Tape Rash    Aspirin Nausea And Vomiting     Chewable sts not coated.     Pcn [Penicillins] Nausea And Vomiting and Rash       Problem List:    Patient Active Problem List   Diagnosis Code    Chest pain R07.9    GERD (gastroesophageal reflux disease) K21.9    Current tobacco use Z72.0    [facility]) [facility] J44.9    Essential hypertension I10    Palpitations R00.2    Tobacco use Z72.0    Gastroesophageal reflux disease with hiatal hernia K21.9, K44.9    S/P Nissen fundoplication (without gastrostomy tube) procedure Z98.890    Precordial pain R07.2    Post PTCA Z98.61    Leg pain, bilateral M79.604, M79.605    Syncope and collapse R55    Dizziness R42    Angina pectoris ([facility] I20.9    HTN (hypertension) I10    CAD (coronary artery disease) I25.10    GERD (gastroesophageal reflux disease) K21.9    Ankle syndesmosis disruption, left, initial encounter W01.098W    Closed trimalleolar fracture of left ankle S82.852A    Closed left ankle inthe vertebral arteries.  Hx of Doppler ultrasound 1/2012 1/2012-peripheral - both abis and duplex studies of both lower extremities do not  reveal any significant peripheral vascular disease at this time.  Hx of Doppler ultrasound 1/22/2016    Arterial: Peripheral vascular noninvasive arterial studies do not reveal any significant atherosclerotic disease.  Hx of echocardiogram 6/2013 6/13- Mild to mod MR/TR. 10/12-LVSF normal. EF 60%. Mild mitral and tricuspid insuff. reprot in epic; 8/6/10, normal dimension of the LV, normal global and regional LVSF with an EF of 60%, no significant valvuopathy is seen.  12/05    Hypertension     \"on medication since age 25's\" follow with Dr Phong Fraser Irregular heart beat     \"have irreg heart beat\" dont know what you call it\"    Migraines Last Migraine In 2014    Palpitations     Panic attacks     Pneumonia Last Episode In 2014    Prolonged emergence from general anesthesia     Restless leg     S/P cardiac cath 2/21/14    S/P PTCA (percutaneous transluminal coronary angioplasty) 01/26/2017    2017, CX stented    Shortness of breath     Teeth missing     Upper And Lower    Tobacco use     Unspecified sleep apnea     \"had sleep study 2-3 yrs ago\" have cpap and try to use it\"       Past Surgical History:        Procedure Laterality Date    COLONOSCOPY  2011    COLONOSCOPY  08/23/2017    colon polyp, diverticulosis, hemorrhoids    DENTAL SURGERY      Teeth Extracted In Past    ENDOSCOPY, COLON, DIAGNOSTIC  10-16-15    ENDOSCOPY, COLON, DIAGNOSTIC  01/28/2019    Hiatal hernia, savary dil #17 used    GASTRIC FUNDOPLICATION  97/59/1561    Robotic laparoscopic nissen with mesh    KIDNEY SURGERY Left 2-15    \"At [facility] Had Left Kidney Mass Removed\" Benign    TONSILLECTOMY  1960's    TUBAL LIGATION  1993    UPPER GASTROINTESTINAL ENDOSCOPY N/A 1/28/2019    EGD DILATION SAVARY #17MM performed by Uyen Juárez MD at [facility] History: Social History     Tobacco Use    Smoking status: Current Some Day Smoker     Packs/day: 0.25     Years: 44.00     Pack years: 11.00     Types: Cigarettes     Start date: 10/21/1971     Last attempt to quit: 8/21/2015     Years since quitting: 3.7    Smokeless tobacco: Never Used   Substance Use Topics    Alcohol use: Yes     Alcohol/week: 1.2 oz     Types: 2 Shots of liquor per week     Comment: occassionally                                Ready to quit: Not Answered  Counseling given: Not Answered      Vital Signs (Current):   Vitals:    05/23/19 1535 05/23/19 1555 05/23/19 1725 05/23/19 1800   BP: 114/67   117/64   Pulse: 60 69 57 (!) 47   Resp: 18 15 14 15   Temp:    36.9 °C (98.5 °F)   TempSrc:    Oral   SpO2: 99% 97% 98% 94%   Weight:       Height:                                                  BP Readings from Last 3 Encounters:   05/23/19 117/64   05/14/19 (!) 127/96   01/28/19 (!) 103/59       NPO Status:                                                                                 BMI:   Wt Readings from Last 3 Encounters:   05/23/19 169 lb (76.7 kg)   05/21/19 169 lb (76.7 kg)   05/14/19 167 lb (75.8 kg)     Body mass index is 30.91 kg/m². CBC:   Lab Results   Component Value Date    WBC 12.2 05/23/2019    RBC 4.35 05/23/2019    HGB 11.8 05/23/2019    HCT 39.0 05/23/2019    MCV 89.7 05/23/2019    RDW 15.1 05/23/2019     05/23/2019       CMP:   Lab Results   Component Value Date     05/23/2019    K 4.6 05/23/2019    CL 99 05/23/2019    CO2 31 05/23/2019    BUN 11 05/23/2019    CREATININE 1.0 05/23/2019    GFRAA >60 05/23/2019    LABGLOM 57 05/23/2019    GLUCOSE 114 05/23/2019    PROT 7.4 05/14/2019    PROT 7.0 11/26/2012    CALCIUM 9.2 05/23/2019    BILITOT 0.4 05/14/2019    ALKPHOS 113 05/14/2019    AST 13 05/14/2019    ALT <5 05/14/2019       POC Tests: No results for input(s): POCGLU, POCNA, POCK, POCCL, POCBUN, POCHEMO, POCHCT in the last 72 hours.     Coags:   Lab Results Component Value Date    PROTIME 12.1 05/23/2019    PROTIME 10.2 05/23/2012    INR 1.06 05/23/2019    APTT 26.0 10/06/2017       HCG (If Applicable):   Lab Results   Component Value Date    PREGTESTUR NEGATIVE 07/13/2016        ABGs:   Lab Results   Component Value Date    PO2ART 122 06/01/2017    KPD5VYP 48.0 06/01/2017    JCW3FRN 27.7 06/01/2017        Type & Screen (If Applicable):  No results found for: LABABO, [address]    Anesthesia Evaluation  Nursing notes reviewed no history of anesthetic complications:   Airway: Mallampati: II  TM distance: >3 FB   Neck ROM: full  Mouth opening: > = 3 FB Dental:          Pulmonary:   (+) COPD:  sleep apnea:  asthma: current smoker          Patient did not smoke on day of surgery. Cardiovascular:  Exercise tolerance: poor (<4 METS),   (+) hypertension:, angina:, CAD:, CABG/stent:, dysrhythmias:,          Beta Blocker:  Not on Beta Blocker         Neuro/Psych:   (+) headaches: migraine headaches,             GI/Hepatic/Renal:   (+) hiatal hernia, GERD:,           Endo/Other: Negative Endo/Other ROS             Pt had no PAT visit       Abdominal:           Vascular: negative vascular ROS. Anesthesia Plan      general     ASA 3     (Not a candidate for regional anesthesia due to long bone fracture <24 hrs old)  Induction: intravenous. MIPS: Postoperative opioids intended and Prophylactic antiemetics administered. Anesthetic plan and risks discussed with child/children.                   Elvira Hernandez, APRN - CRNA   5/23/2019

## 2019-05-24 NOTE — ANESTHESIA PRE-OP
Reviewed pre-anesthesia evaluation from 5/23/2019. No interval change. Will proceed with GA for surgery per Dr. Jasper Chin.

## 2019-05-24 NOTE — PLAN OF CARE
Problem: Falls - Risk of:  Goal: Will remain free from falls  Description  Will remain free from falls  Outcome: Ongoing  Goal: Absence of physical injury  Description  Absence of physical injury  Outcome: Ongoing     Problem: SAFETY  Goal: Free from accidental physical injury  Outcome: Ongoing  Goal: Free from intentional harm  Outcome: Ongoing     Problem: DAILY CARE  Goal: Daily care needs are met  Outcome: Ongoing     Problem: PAIN  Goal: Patient's pain/discomfort is manageable  Outcome: Ongoing     Problem: SKIN INTEGRITY  Goal: Skin integrity is maintained or improved  Outcome: Ongoing     Problem: KNOWLEDGE DEFICIT  Goal: Patient/S.O. demonstrates understanding of disease process, treatment plan, medications, and discharge instructions. Outcome: Ongoing     Problem: DISCHARGE BARRIERS  Goal: Patient's continuum of care needs are met  Outcome: Ongoing     Problem: Pain:  Goal: Pain level will decrease  Description  Pain level will decrease  Outcome: Ongoing  Goal: Control of acute pain  Description  Control of acute pain  Outcome: Ongoing  Goal: Control of chronic pain  Description  Control of chronic pain  Outcome: Ongoing     Problem: Risk for Impaired Skin Integrity  Goal: Tissue integrity - skin and mucous membranes  Description  Structural intactness and normal physiological function of skin and  mucous membranes.   Outcome: Ongoing

## 2019-05-25 PROCEDURE — 2700000000 HC OXYGEN THERAPY PER DAY

## 2019-05-25 PROCEDURE — 6360000002 HC RX W HCPCS: Performed by: ORTHOPAEDIC SURGERY

## 2019-05-25 PROCEDURE — 2580000003 HC RX 258: Performed by: FAMILY MEDICINE

## 2019-05-25 PROCEDURE — 6370000000 HC RX 637 (ALT 250 FOR IP): Performed by: ORTHOPAEDIC SURGERY

## 2019-05-25 PROCEDURE — 97530 THERAPEUTIC ACTIVITIES: CPT

## 2019-05-25 PROCEDURE — 2580000003 HC RX 258: Performed by: ORTHOPAEDIC SURGERY

## 2019-05-25 PROCEDURE — 94761 N-INVAS EAR/PLS OXIMETRY MLT: CPT

## 2019-05-25 PROCEDURE — 1200000000 HC SEMI PRIVATE

## 2019-05-25 PROCEDURE — 6370000000 HC RX 637 (ALT 250 FOR IP): Performed by: FAMILY MEDICINE

## 2019-05-25 PROCEDURE — 97162 PT EVAL MOD COMPLEX 30 MIN: CPT

## 2019-05-25 PROCEDURE — 99024 POSTOP FOLLOW-UP VISIT: CPT | Performed by: ORTHOPAEDIC SURGERY

## 2019-05-25 RX ORDER — OXYCODONE HYDROCHLORIDE AND ACETAMINOPHEN 5; 325 MG/1; MG/1
1 TABLET ORAL EVERY 6 HOURS PRN
Status: DISCONTINUED | OUTPATIENT
Start: 2019-05-25 | End: 2019-05-26

## 2019-05-25 RX ORDER — 0.9 % SODIUM CHLORIDE 0.9 %
500 INTRAVENOUS SOLUTION INTRAVENOUS ONCE
Status: DISCONTINUED | OUTPATIENT
Start: 2019-05-25 | End: 2019-05-25

## 2019-05-25 RX ORDER — MIDODRINE HYDROCHLORIDE 5 MG/1
10 TABLET ORAL
Status: DISCONTINUED | OUTPATIENT
Start: 2019-05-25 | End: 2019-05-29 | Stop reason: HOSPADM

## 2019-05-25 RX ORDER — 0.9 % SODIUM CHLORIDE 0.9 %
1000 INTRAVENOUS SOLUTION INTRAVENOUS ONCE
Status: COMPLETED | OUTPATIENT
Start: 2019-05-25 | End: 2019-05-25

## 2019-05-25 RX ORDER — CLOPIDOGREL BISULFATE 75 MG/1
75 TABLET ORAL DAILY
Status: DISCONTINUED | OUTPATIENT
Start: 2019-05-25 | End: 2019-05-29 | Stop reason: HOSPADM

## 2019-05-25 RX ORDER — MORPHINE SULFATE 4 MG/ML
2 INJECTION, SOLUTION INTRAMUSCULAR; INTRAVENOUS
Status: DISCONTINUED | OUTPATIENT
Start: 2019-05-25 | End: 2019-05-26

## 2019-05-25 RX ORDER — ASPIRIN 81 MG/1
81 TABLET ORAL EVERY MORNING
Status: DISCONTINUED | OUTPATIENT
Start: 2019-05-25 | End: 2019-05-29 | Stop reason: HOSPADM

## 2019-05-25 RX ORDER — OXYCODONE HYDROCHLORIDE AND ACETAMINOPHEN 5; 325 MG/1; MG/1
1 TABLET ORAL EVERY 6 HOURS PRN
Qty: 10 TABLET | Refills: 0 | Status: SHIPPED | OUTPATIENT
Start: 2019-05-25 | End: 2019-05-28

## 2019-05-25 RX ADMIN — TRAZODONE HYDROCHLORIDE 50 MG: 50 TABLET ORAL at 04:16

## 2019-05-25 RX ADMIN — SERTRALINE HYDROCHLORIDE 100 MG: 100 TABLET ORAL at 10:02

## 2019-05-25 RX ADMIN — HYDROXYZINE HYDROCHLORIDE 50 MG: 25 TABLET ORAL at 04:16

## 2019-05-25 RX ADMIN — MEMANTINE HYDROCHLORIDE 5 MG: 5 TABLET ORAL at 10:01

## 2019-05-25 RX ADMIN — HEPARIN SODIUM 5000 UNITS: 5000 INJECTION, SOLUTION INTRAVENOUS; SUBCUTANEOUS at 22:21

## 2019-05-25 RX ADMIN — FAMOTIDINE 20 MG: 20 TABLET ORAL at 10:00

## 2019-05-25 RX ADMIN — TRAZODONE HYDROCHLORIDE 50 MG: 50 TABLET ORAL at 22:20

## 2019-05-25 RX ADMIN — FAMOTIDINE 20 MG: 20 TABLET ORAL at 22:32

## 2019-05-25 RX ADMIN — ASPIRIN 81 MG: 81 TABLET, COATED ORAL at 10:55

## 2019-05-25 RX ADMIN — QUETIAPINE FUMARATE 25 MG: 25 TABLET ORAL at 10:02

## 2019-05-25 RX ADMIN — ATORVASTATIN CALCIUM 40 MG: 40 TABLET, FILM COATED ORAL at 22:19

## 2019-05-25 RX ADMIN — SODIUM CHLORIDE 1000 ML: 9 INJECTION, SOLUTION INTRAVENOUS at 11:00

## 2019-05-25 RX ADMIN — HEPARIN SODIUM 5000 UNITS: 5000 INJECTION, SOLUTION INTRAVENOUS; SUBCUTANEOUS at 14:13

## 2019-05-25 RX ADMIN — MEMANTINE HYDROCHLORIDE 5 MG: 5 TABLET ORAL at 22:32

## 2019-05-25 RX ADMIN — MORPHINE SULFATE 4 MG: 4 INJECTION, SOLUTION INTRAMUSCULAR; INTRAVENOUS at 05:57

## 2019-05-25 RX ADMIN — CEFAZOLIN SODIUM 1 G: 1 INJECTION, SOLUTION INTRAVENOUS at 11:29

## 2019-05-25 RX ADMIN — MIDODRINE HYDROCHLORIDE 10 MG: 5 TABLET ORAL at 18:10

## 2019-05-25 RX ADMIN — RANOLAZINE 1000 MG: 500 TABLET, FILM COATED, EXTENDED RELEASE ORAL at 10:02

## 2019-05-25 RX ADMIN — CLOPIDOGREL BISULFATE 75 MG: 75 TABLET ORAL at 10:55

## 2019-05-25 RX ADMIN — MIDODRINE HYDROCHLORIDE 10 MG: 5 TABLET ORAL at 12:36

## 2019-05-25 RX ADMIN — HEPARIN SODIUM 5000 UNITS: 5000 INJECTION, SOLUTION INTRAVENOUS; SUBCUTANEOUS at 05:58

## 2019-05-25 RX ADMIN — OXYCODONE HYDROCHLORIDE AND ACETAMINOPHEN 1 TABLET: 5; 325 TABLET ORAL at 22:20

## 2019-05-25 RX ADMIN — CETIRIZINE HYDROCHLORIDE 10 MG: 10 TABLET, FILM COATED ORAL at 10:00

## 2019-05-25 RX ADMIN — CEFAZOLIN SODIUM 1 G: 1 INJECTION, SOLUTION INTRAVENOUS at 02:50

## 2019-05-25 RX ADMIN — DONEPEZIL HYDROCHLORIDE 10 MG: 10 TABLET, FILM COATED ORAL at 22:19

## 2019-05-25 RX ADMIN — OXYCODONE HYDROCHLORIDE AND ACETAMINOPHEN 1 TABLET: 5; 325 TABLET ORAL at 10:54

## 2019-05-25 RX ADMIN — SODIUM CHLORIDE, PRESERVATIVE FREE 10 ML: 5 INJECTION INTRAVENOUS at 22:25

## 2019-05-25 ASSESSMENT — PAIN DESCRIPTION - PROGRESSION
CLINICAL_PROGRESSION: GRADUALLY WORSENING
CLINICAL_PROGRESSION: GRADUALLY WORSENING

## 2019-05-25 ASSESSMENT — PAIN SCALES - GENERAL
PAINLEVEL_OUTOF10: 0
PAINLEVEL_OUTOF10: 6
PAINLEVEL_OUTOF10: 0
PAINLEVEL_OUTOF10: 10
PAINLEVEL_OUTOF10: 6
PAINLEVEL_OUTOF10: 10
PAINLEVEL_OUTOF10: 4
PAINLEVEL_OUTOF10: 0
PAINLEVEL_OUTOF10: 10
PAINLEVEL_OUTOF10: 0

## 2019-05-25 ASSESSMENT — PAIN DESCRIPTION - ONSET
ONSET: ON-GOING

## 2019-05-25 ASSESSMENT — PAIN DESCRIPTION - LOCATION
LOCATION: ANKLE

## 2019-05-25 ASSESSMENT — PAIN DESCRIPTION - FREQUENCY
FREQUENCY: CONTINUOUS

## 2019-05-25 ASSESSMENT — PAIN DESCRIPTION - PAIN TYPE
TYPE: SURGICAL PAIN

## 2019-05-25 ASSESSMENT — PAIN DESCRIPTION - DESCRIPTORS
DESCRIPTORS: ACHING
DESCRIPTORS: ACHING;SHARP
DESCRIPTORS: ACHING

## 2019-05-25 ASSESSMENT — PAIN DESCRIPTION - ORIENTATION
ORIENTATION: LEFT

## 2019-05-25 ASSESSMENT — PAIN - FUNCTIONAL ASSESSMENT
PAIN_FUNCTIONAL_ASSESSMENT: PREVENTS OR INTERFERES SOME ACTIVE ACTIVITIES AND ADLS
PAIN_FUNCTIONAL_ASSESSMENT: ACTIVITIES ARE NOT PREVENTED

## 2019-05-25 NOTE — PROGRESS NOTES
Night Shift RN Notes:  Patient had an uneventful night. Pain medicines given as needed (was given 2x last night). Left leg was maintained elevated with ice the whole night. Repositioned for comfort. Hourly rounding done to assure patient safety. Side rails up x2,  bed alarm at zone 2 and was on the whole night, call light and phone within reach. Side table with reach. Education on safety was provided last night.

## 2019-05-25 NOTE — PLAN OF CARE
1531 by Sree Johnson RN  Outcome: Ongoing  5/25/2019 0637 by Romaine Medina RN  Outcome: Ongoing  Goal: Control of acute pain  Description  Control of acute pain  5/25/2019 1531 by Sree Johnson RN  Outcome: Ongoing  5/25/2019 0637 by Romaine Medina RN  Outcome: Ongoing  Goal: Control of chronic pain  Description  Control of chronic pain  5/25/2019 1531 by Sree Johnson RN  Outcome: Ongoing  5/25/2019 0637 by Romaine Medina RN  Outcome: Ongoing     Problem: Risk for Impaired Skin Integrity  Goal: Tissue integrity - skin and mucous membranes  Description  Structural intactness and normal physiological function of skin and  mucous membranes. 5/25/2019 1531 by Sree Johnson RN  Outcome: Ongoing  5/25/2019 0637 by Romaine Vanessa RN  Outcome: Ongoing

## 2019-05-25 NOTE — OP NOTE
[address] [place] [address], [address] [place] [address]                                OPERATIVE REPORT    PATIENT NAME: Juhi Cat                     :        1960  MED REC NO:   [id number]                          ROOM:       4008  ACCOUNT NO:   [de-identified]                           ADMIT DATE: 2019  PROVIDER:     Rhina Galindo MD    DATE OF PROCEDURE:  2019    PREOPERATIVE DIAGNOSES:  1. Left ankle trimalleolar fracture and dislocation. 2.  Left ankle syndesmosis disruption. POSTOPERATIVE DIAGNOSES:  1. Left ankle trimalleolar fracture and dislocation. 2.  Left ankle syndesmosis disruption. SURGERY:  1. Open reduction and internal fixation, left ankle trimalleolar  fracture without fixation of posterior malleolus. 2.  Open reduction and internal fixation of left ankle syndesmosis. ATTENDING SURGEON:  Rhina Galindo MD    ANESTHESIA:  General.    DESCRIPTION OF PROCEDURE:  The patient was taken to the operating room. After satisfactory anesthesia was induced, she was continued in the  supine position. The left lower extremity was prepped and draped in a  sterile manner. Timeout was performed. Simultaneous medial and lateral  incisions were made. Dissection was carried down to protect the  superficial peroneal nerve laterally and the saphenous neurovascular  structures medially. The patient had a mild medial malleolar fragment,  posterior malleolus did not need any internal fixation. Laterally, the  patient had a nondisplaced Bennett-C fibular fracture. It was able to be  seen on fluoroscopic view and I could see it under direct visualization  and then the syndesmosis was completely disrupted and there was a piece  of bone that was avulsed off the anterior distal tibia and the anterior  distal fibula that was incarcerated in the joint.   Dissection was  carried anteriorly to the syndesmosis anterior tib-fib articulation and  this area was debrided with a rongeur because there was bone that was  stuck in that space. The medial malleolus was reduced first and the  talus tilt was still tilted with manipulation of syndesmosis and the  syndesmosis was then pulled out to length. The tibial was short because  of the proximal fibula fracture, pulled out to length and then pinned  with two Steinmann pins in order to keep it out the length and keep the  reduction. Following this, a lateral distal fibular buttress plate was  placed for the lateral malleolus fracture and then 2 syndesmosis screws  were placed to protect the syndesmosis and repair this reduction. The  Steinmann pins were then removed. Intraoperative fluoroscopy revealed  anatomic alignment. The wounds were irrigated thoroughly and closed  with 2-0 Vicryl and staples and she was placed in a sterile bandage and  a splint. Tourniquet was deflated prior to closure. There was no  significant bleeding. The skin was all pink after closing the skin. A  small frag screw was used for syndesmosis followed by a large frag  screw.         Penelope Lynn MD    D: 05/24/2019 11:59:20       T: 05/24/2019 13:57:19     [id number]  Job#: [id number]     Doc#: [id number]    CC:  <>

## 2019-05-25 NOTE — CONSULTS
[facility] 6 Clicks Goal:  20    Assessment:  Conditions Requiring Skilled Therapeutic Intervention  Body structures, Functions, Activity limitations: Decreased functional mobility , Decreased ROM, Decreased safe awareness, Decreased balance, Decreased high-level IADLs, Decreased ADL status, Decreased strength, Increased Pain  Assessment: pleasant female with evolving medical features, referred for service on POD1, who is recovering well thus far and who has many needs for PT services. Treatment Diagnosis: impaired functional mobility. Prognosis: Good  Decision Making: Medium Complexity  REQUIRES PT FOLLOW UP: Yes  Skilled physical therapy services are appropriate to facilitate functional recovery. There are no significant educational impairments or barriers to learning which would prevent participation with service. Discharge Recommendations:  Recommend SNF with physical therapy service upon discharge from hospital setting. TTWB impairment today was concerning, may need additional service to become safe. Equipment Recommendations:  RW  Achieve goals within 2 weeks:  indep HEP, indep bed mob, indep basic xfers, compliant with TWB LLE, amb 150 feet with NWB vs TTWB LLE and use of RW, with SBA. Plan:  Plan  Times per week: 6  Treatment to include patient/caregiver education, therapeutic exercise, therapeutic activity training, neuromuscular re-education, gait training, and self care training for home management. Recommendations for nursing mobility:  Mod A vs min A  Mobility homework:  OOB with nurse for all meals, should frequently stand with nurse, 4+ times daily, should use RW. D/w sister Emma Seat. Treatment today:    Therapeutic Activity Training:   Therapeutic activity training was instructed today. Cues were given for safety, sequence, UE/LE placement, awareness, and balance. Activities performed today included bed mobility training, sup-sit, sit-stand, SPT.     Time in:  1345  Time out:

## 2019-05-25 NOTE — PROGRESS NOTES
Hospitalist Progress Note      Name:  Vinod Hammond /Age/Sex: 1960  (60 y.o. female)   MRN & CSN:  [id number] & [id number] Admission Date/Time: 2019  1:34 PM   Location:  [facility] [place]-A PCP: LAVERN Benedict CNP       Vinod Hammond is a 60 y.o.  female  who presents with Fall (lost her balance; hit head on blood thinner) and Leg Pain (left sided)      Assessment and Plan:   Left Trimalleolar ankle fx  - s/p ORIF of left ankle and syndesmosis POD#1  - heparin  - pt/ot  - pain mx prn  - ortho following, TTWB, ok to resume plavix, f/u in 2 weeks    Fall/Debility  - CT head neg  - pt.ot consulted    Borderline Hypotension  - give 1L bolus then d/c IVF  - start midodrine 10mg TID  - d/c hctz  - hold lisinopril  - parameters for metoprolol  - monitor    HTN  COPD  Dementia  Depression  HLD  CAD - asa + plavix resumed                Diet DIET GENERAL;   Code Status Full Code     Medications:   Medications:    ceFAZolin  1 g Intravenous Q8H    heparin (porcine)  5,000 Units Subcutaneous 3 times per day    sodium chloride flush  10 mL Intravenous 2 times per day    famotidine  20 mg Oral BID    atorvastatin  40 mg Oral Nightly    mometasone-formoterol  2 puff Inhalation BID    donepezil  10 mg Oral Nightly    hydrochlorothiazide  12.5 mg Oral Daily    cetirizine  10 mg Oral Daily    memantine  5 mg Oral BID    metoprolol tartrate  12.5 mg Oral BID    sertraline  100 mg Oral Daily    ranolazine  1,000 mg Oral BID    QUEtiapine  25 mg Oral BID      Infusions:    lactated ringers 100 mL/hr at 19 9405     PRN Meds:   sodium chloride flush 10 mL PRN   magnesium hydroxide 30 mL Daily PRN   ondansetron 4 mg Q6H PRN   acetaminophen 650 mg Q4H PRN   morphine 4 mg Q4H PRN   hydrOXYzine 50 mg Q8H PRN   ipratropium-albuterol 1 ampule Q4H PRN   traZODone 50 mg Nightly PRN   albuterol sulfate HFA 2 puff Q4H PRN     Subjective:     Doing ok, reports mild pain  Objective:        Intake/Output

## 2019-05-26 LAB
ANION GAP SERPL CALCULATED.3IONS-SCNC: 7 MMOL/L (ref 4–16)
BUN BLDV-MCNC: 12 MG/DL (ref 6–23)
CALCIUM SERPL-MCNC: 8.9 MG/DL (ref 8.3–10.6)
CHLORIDE BLD-SCNC: 101 MMOL/L (ref 99–110)
CO2: 32 MMOL/L (ref 21–32)
CREAT SERPL-MCNC: 0.9 MG/DL (ref 0.6–1.1)
GFR AFRICAN AMERICAN: >60 ML/MIN/1.73M2
GFR NON-AFRICAN AMERICAN: >60 ML/MIN/1.73M2
GLUCOSE BLD-MCNC: 126 MG/DL (ref 70–99)
HCT VFR BLD CALC: 29.7 % (ref 37–47)
HEMOGLOBIN: 8.9 GM/DL (ref 12.5–16)
MCH RBC QN AUTO: 27.6 PG (ref 27–31)
MCHC RBC AUTO-ENTMCNC: 30 % (ref 32–36)
MCV RBC AUTO: 92 FL (ref 78–100)
PDW BLD-RTO: 15.7 % (ref 11.7–14.9)
PLATELET # BLD: 285 K/CU MM (ref 140–440)
PMV BLD AUTO: 9.5 FL (ref 7.5–11.1)
POTASSIUM SERPL-SCNC: 4.7 MMOL/L (ref 3.5–5.1)
RBC # BLD: 3.23 M/CU MM (ref 4.2–5.4)
SODIUM BLD-SCNC: 140 MMOL/L (ref 135–145)
WBC # BLD: 9.4 K/CU MM (ref 4–10.5)

## 2019-05-26 PROCEDURE — 6360000002 HC RX W HCPCS: Performed by: ORTHOPAEDIC SURGERY

## 2019-05-26 PROCEDURE — 1200000000 HC SEMI PRIVATE

## 2019-05-26 PROCEDURE — 6370000000 HC RX 637 (ALT 250 FOR IP): Performed by: ORTHOPAEDIC SURGERY

## 2019-05-26 PROCEDURE — 97116 GAIT TRAINING THERAPY: CPT

## 2019-05-26 PROCEDURE — 6370000000 HC RX 637 (ALT 250 FOR IP): Performed by: FAMILY MEDICINE

## 2019-05-26 PROCEDURE — 94640 AIRWAY INHALATION TREATMENT: CPT

## 2019-05-26 PROCEDURE — 2580000003 HC RX 258: Performed by: ORTHOPAEDIC SURGERY

## 2019-05-26 PROCEDURE — 97530 THERAPEUTIC ACTIVITIES: CPT

## 2019-05-26 PROCEDURE — 2580000003 HC RX 258: Performed by: FAMILY MEDICINE

## 2019-05-26 PROCEDURE — 99024 POSTOP FOLLOW-UP VISIT: CPT | Performed by: ORTHOPAEDIC SURGERY

## 2019-05-26 PROCEDURE — 94761 N-INVAS EAR/PLS OXIMETRY MLT: CPT

## 2019-05-26 PROCEDURE — 36415 COLL VENOUS BLD VENIPUNCTURE: CPT

## 2019-05-26 PROCEDURE — 85027 COMPLETE CBC AUTOMATED: CPT

## 2019-05-26 PROCEDURE — 2700000000 HC OXYGEN THERAPY PER DAY

## 2019-05-26 PROCEDURE — 80048 BASIC METABOLIC PNL TOTAL CA: CPT

## 2019-05-26 PROCEDURE — 76937 US GUIDE VASCULAR ACCESS: CPT

## 2019-05-26 RX ORDER — TRAMADOL HYDROCHLORIDE 50 MG/1
50 TABLET ORAL EVERY 6 HOURS PRN
Status: DISCONTINUED | OUTPATIENT
Start: 2019-05-26 | End: 2019-05-26

## 2019-05-26 RX ORDER — 0.9 % SODIUM CHLORIDE 0.9 %
1000 INTRAVENOUS SOLUTION INTRAVENOUS ONCE
Status: COMPLETED | OUTPATIENT
Start: 2019-05-26 | End: 2019-05-26

## 2019-05-26 RX ORDER — OXYCODONE HYDROCHLORIDE AND ACETAMINOPHEN 5; 325 MG/1; MG/1
1 TABLET ORAL EVERY 6 HOURS PRN
Status: DISCONTINUED | OUTPATIENT
Start: 2019-05-26 | End: 2019-05-26

## 2019-05-26 RX ORDER — OXYCODONE HYDROCHLORIDE AND ACETAMINOPHEN 5; 325 MG/1; MG/1
1 TABLET ORAL EVERY 6 HOURS PRN
Status: DISCONTINUED | OUTPATIENT
Start: 2019-05-26 | End: 2019-05-29 | Stop reason: HOSPADM

## 2019-05-26 RX ADMIN — SODIUM CHLORIDE 1000 ML: 9 INJECTION, SOLUTION INTRAVENOUS at 09:53

## 2019-05-26 RX ADMIN — RANOLAZINE 1000 MG: 500 TABLET, FILM COATED, EXTENDED RELEASE ORAL at 09:47

## 2019-05-26 RX ADMIN — HEPARIN SODIUM 5000 UNITS: 5000 INJECTION, SOLUTION INTRAVENOUS; SUBCUTANEOUS at 06:35

## 2019-05-26 RX ADMIN — SODIUM CHLORIDE, PRESERVATIVE FREE 10 ML: 5 INJECTION INTRAVENOUS at 09:54

## 2019-05-26 RX ADMIN — QUETIAPINE FUMARATE 25 MG: 25 TABLET ORAL at 00:28

## 2019-05-26 RX ADMIN — SERTRALINE HYDROCHLORIDE 100 MG: 100 TABLET ORAL at 09:47

## 2019-05-26 RX ADMIN — CETIRIZINE HYDROCHLORIDE 10 MG: 10 TABLET, FILM COATED ORAL at 09:45

## 2019-05-26 RX ADMIN — OXYCODONE HYDROCHLORIDE AND ACETAMINOPHEN 1 TABLET: 5; 325 TABLET ORAL at 08:08

## 2019-05-26 RX ADMIN — OXYCODONE AND ACETAMINOPHEN 1 TABLET: 5; 325 TABLET ORAL at 22:37

## 2019-05-26 RX ADMIN — ASPIRIN 81 MG: 81 TABLET, COATED ORAL at 09:45

## 2019-05-26 RX ADMIN — ATORVASTATIN CALCIUM 40 MG: 40 TABLET, FILM COATED ORAL at 22:25

## 2019-05-26 RX ADMIN — MEMANTINE HYDROCHLORIDE 5 MG: 5 TABLET ORAL at 09:47

## 2019-05-26 RX ADMIN — FAMOTIDINE 20 MG: 20 TABLET ORAL at 09:46

## 2019-05-26 RX ADMIN — RANOLAZINE 1000 MG: 500 TABLET, FILM COATED, EXTENDED RELEASE ORAL at 00:28

## 2019-05-26 RX ADMIN — MIDODRINE HYDROCHLORIDE 10 MG: 5 TABLET ORAL at 09:02

## 2019-05-26 RX ADMIN — DONEPEZIL HYDROCHLORIDE 10 MG: 10 TABLET, FILM COATED ORAL at 22:25

## 2019-05-26 RX ADMIN — HEPARIN SODIUM 5000 UNITS: 5000 INJECTION, SOLUTION INTRAVENOUS; SUBCUTANEOUS at 14:03

## 2019-05-26 RX ADMIN — FAMOTIDINE 20 MG: 20 TABLET ORAL at 22:25

## 2019-05-26 RX ADMIN — MEMANTINE HYDROCHLORIDE 5 MG: 5 TABLET ORAL at 22:25

## 2019-05-26 RX ADMIN — Medication 2 PUFF: at 08:14

## 2019-05-26 RX ADMIN — RANOLAZINE 1000 MG: 500 TABLET, FILM COATED, EXTENDED RELEASE ORAL at 22:24

## 2019-05-26 RX ADMIN — MIDODRINE HYDROCHLORIDE 10 MG: 5 TABLET ORAL at 17:50

## 2019-05-26 RX ADMIN — HEPARIN SODIUM 5000 UNITS: 5000 INJECTION, SOLUTION INTRAVENOUS; SUBCUTANEOUS at 22:27

## 2019-05-26 RX ADMIN — QUETIAPINE FUMARATE 25 MG: 25 TABLET ORAL at 22:25

## 2019-05-26 RX ADMIN — CLOPIDOGREL BISULFATE 75 MG: 75 TABLET ORAL at 09:46

## 2019-05-26 RX ADMIN — SODIUM CHLORIDE, PRESERVATIVE FREE 10 ML: 5 INJECTION INTRAVENOUS at 22:32

## 2019-05-26 RX ADMIN — MIDODRINE HYDROCHLORIDE 10 MG: 5 TABLET ORAL at 12:41

## 2019-05-26 RX ADMIN — QUETIAPINE FUMARATE 25 MG: 25 TABLET ORAL at 09:47

## 2019-05-26 ASSESSMENT — PAIN SCALES - GENERAL
PAINLEVEL_OUTOF10: 0
PAINLEVEL_OUTOF10: 8
PAINLEVEL_OUTOF10: 0
PAINLEVEL_OUTOF10: 7
PAINLEVEL_OUTOF10: 10
PAINLEVEL_OUTOF10: 7

## 2019-05-26 ASSESSMENT — PAIN DESCRIPTION - PAIN TYPE
TYPE: SURGICAL PAIN
TYPE: SURGICAL PAIN

## 2019-05-26 ASSESSMENT — PAIN DESCRIPTION - ORIENTATION
ORIENTATION: LEFT
ORIENTATION: LEFT

## 2019-05-26 ASSESSMENT — PAIN DESCRIPTION - LOCATION
LOCATION: ANKLE
LOCATION: ANKLE

## 2019-05-26 ASSESSMENT — PAIN - FUNCTIONAL ASSESSMENT: PAIN_FUNCTIONAL_ASSESSMENT: ACTIVITIES ARE NOT PREVENTED

## 2019-05-26 ASSESSMENT — PAIN DESCRIPTION - PROGRESSION
CLINICAL_PROGRESSION: GRADUALLY WORSENING
CLINICAL_PROGRESSION: GRADUALLY WORSENING

## 2019-05-26 ASSESSMENT — PAIN DESCRIPTION - DESCRIPTORS: DESCRIPTORS: SHARP

## 2019-05-26 ASSESSMENT — PAIN DESCRIPTION - ONSET: ONSET: ON-GOING

## 2019-05-26 ASSESSMENT — PAIN DESCRIPTION - FREQUENCY: FREQUENCY: CONTINUOUS

## 2019-05-26 NOTE — PROGRESS NOTES
Hospitalist Progress Note      Name:  Vinod Hammond /Age/Sex: 1960  (60 y.o. female)   MRN & CSN:  [id number] & [id number] Admission Date/Time: 2019  1:34 PM   Location:  [address] [place], [place] [address]A PCP: LAVERN Benedict CNP       Vinod Hammond is a 60 y.o.  female  who presents with Fall (lost her balance; hit head on blood thinner) and Leg Pain (left sided)      Assessment and Plan:   Left Trimalleolar ankle fx  - s/p ORIF of left ankle and syndesmosis POD#2  - heparin  - pt/ot  - pain mx prn  - ortho following, TTWB, ok to resume plavix, f/u in 2 weeks     Fall/Debility  - CT head neg  - pt.ot consulted  - CM for SNF arrangement     Hypotension  - give 1L bolus over 2 hrs and re-check  - start midodrine 10mg TID  - d/c hctz  - d/c lisinopril  - holding metoprolol  - monitor     HTN  COPD  Dementia  Depression  HLD  CAD - asa + plavix resumed                Diet DIET GENERAL;   Code Status Full Code     Medications:   Medications:    sodium chloride  1,000 mL Intravenous Once    clopidogrel  75 mg Oral Daily    aspirin EC  81 mg Oral QAM    midodrine  10 mg Oral TID WC    heparin (porcine)  5,000 Units Subcutaneous 3 times per day    sodium chloride flush  10 mL Intravenous 2 times per day    famotidine  20 mg Oral BID    atorvastatin  40 mg Oral Nightly    mometasone-formoterol  2 puff Inhalation BID    donepezil  10 mg Oral Nightly    cetirizine  10 mg Oral Daily    memantine  5 mg Oral BID    [Held by provider] metoprolol tartrate  12.5 mg Oral BID    sertraline  100 mg Oral Daily    ranolazine  1,000 mg Oral BID    QUEtiapine  25 mg Oral BID      Infusions:   PRN Meds:   oxyCODONE-acetaminophen 1 tablet Q6H PRN   morphine 2 mg Q3H PRN   sodium chloride flush 10 mL PRN   magnesium hydroxide 30 mL Daily PRN   ondansetron 4 mg Q6H PRN   acetaminophen 650 mg Q4H PRN   ipratropium-albuterol 1 ampule Q4H PRN   traZODone 50 mg Nightly PRN   albuterol sulfate HFA 2 puff Q4H PRN Subjective:     Doing ok, no distress  Objective: Intake/Output Summary (Last 24 hours) at 5/26/2019 1016  Last data filed at 5/26/2019 0854  Gross per 24 hour   Intake 710 ml   Output 2950 ml   Net -2240 ml      Vitals:   Vitals:    05/26/19 0857   BP: (!) 85/51   Pulse: 65   Resp:    Temp:    SpO2:      Physical Exam:   Gen:  awake, alert, cooperative, no apparent distress  Head/Eyes:  Normocephalic atraumatic, EOMI   NECK:   symmetrical, trachea midline  LUNGS: Normal Effort   CARDIOVASCULAR:  Normal rate  ABDOMEN: Non tender, non distended, no HSM noted. MUSCULOSKELETAL:  ROM limited  NEUROLOGIC: Alert and Oriented,  Cranial nerves II-XII are grossly intact.    SKIN:  no bruising or bleeding, normal skin color,  no redness      Data:       CBC   Recent Labs     05/23/19  1851 05/24/19  0633   WBC 12.2* 8.7   HGB 11.8* 10.4*   HCT 39.0 34.7*    332      BMP Recent Labs     05/23/19  1851 05/24/19  0633    141   K 4.6 4.2   CL 99 102   CO2 31 31   BUN 11 13   CREATININE 1.0 1.0         Electronically signed by Tobey Merlin, MD on 5/26/2019 at 10:16 AM

## 2019-05-26 NOTE — PROGRESS NOTES
POD 2   UP WITH PT WITH MOD ASSIST  WILL LIKELY NEED ECF/SKILLED  KEEP SPLINT ON   GOOD CAP REFILL TOES    TTWB LLE   FOLLOW UP IN 2 WEEKS

## 2019-05-26 NOTE — PLAN OF CARE
Problem: Falls - Risk of:  Goal: Will remain free from falls  Description  Will remain free from falls  Outcome: Ongoing  Goal: Absence of physical injury  Description  Absence of physical injury  Outcome: Ongoing     Problem: SAFETY  Goal: Free from accidental physical injury  Outcome: Ongoing  Goal: Free from intentional harm  Outcome: Ongoing     Problem: DAILY CARE  Goal: Daily care needs are met  Outcome: Ongoing     Problem: PAIN  Goal: Patient's pain/discomfort is manageable  Outcome: Ongoing     Problem: SKIN INTEGRITY  Goal: Skin integrity is maintained or improved  Outcome: Ongoing     Problem: KNOWLEDGE DEFICIT  Goal: Patient/S.O. demonstrates understanding of disease process, treatment plan, medications, and discharge instructions. Outcome: Ongoing     Problem: DISCHARGE BARRIERS  Goal: Patient's continuum of care needs are met  Outcome: Ongoing     Problem: Pain:  Description  Pain management should include both nonpharmacologic and pharmacologic interventions. Goal: Pain level will decrease  Description  Pain level will decrease  Outcome: Ongoing  Goal: Control of acute pain  Description  Control of acute pain  Outcome: Ongoing  Goal: Control of chronic pain  Description  Control of chronic pain  Outcome: Ongoing     Problem: Risk for Impaired Skin Integrity  Goal: Tissue integrity - skin and mucous membranes  Description  Structural intactness and normal physiological function of skin and  mucous membranes.   Outcome: Ongoing

## 2019-05-27 PROCEDURE — 97530 THERAPEUTIC ACTIVITIES: CPT

## 2019-05-27 PROCEDURE — 97167 OT EVAL HIGH COMPLEX 60 MIN: CPT

## 2019-05-27 PROCEDURE — 94761 N-INVAS EAR/PLS OXIMETRY MLT: CPT

## 2019-05-27 PROCEDURE — 6360000002 HC RX W HCPCS: Performed by: ORTHOPAEDIC SURGERY

## 2019-05-27 PROCEDURE — 2700000000 HC OXYGEN THERAPY PER DAY

## 2019-05-27 PROCEDURE — 6370000000 HC RX 637 (ALT 250 FOR IP): Performed by: ORTHOPAEDIC SURGERY

## 2019-05-27 PROCEDURE — 1200000000 HC SEMI PRIVATE

## 2019-05-27 PROCEDURE — 94640 AIRWAY INHALATION TREATMENT: CPT

## 2019-05-27 PROCEDURE — 6370000000 HC RX 637 (ALT 250 FOR IP): Performed by: FAMILY MEDICINE

## 2019-05-27 PROCEDURE — 97535 SELF CARE MNGMENT TRAINING: CPT

## 2019-05-27 PROCEDURE — 2580000003 HC RX 258: Performed by: ORTHOPAEDIC SURGERY

## 2019-05-27 RX ADMIN — SODIUM CHLORIDE, PRESERVATIVE FREE 10 ML: 5 INJECTION INTRAVENOUS at 08:52

## 2019-05-27 RX ADMIN — FAMOTIDINE 20 MG: 20 TABLET ORAL at 08:37

## 2019-05-27 RX ADMIN — RANOLAZINE 1000 MG: 500 TABLET, FILM COATED, EXTENDED RELEASE ORAL at 20:53

## 2019-05-27 RX ADMIN — MEMANTINE HYDROCHLORIDE 5 MG: 5 TABLET ORAL at 08:37

## 2019-05-27 RX ADMIN — HEPARIN SODIUM 5000 UNITS: 5000 INJECTION, SOLUTION INTRAVENOUS; SUBCUTANEOUS at 22:43

## 2019-05-27 RX ADMIN — MIDODRINE HYDROCHLORIDE 10 MG: 5 TABLET ORAL at 12:32

## 2019-05-27 RX ADMIN — ATORVASTATIN CALCIUM 40 MG: 40 TABLET, FILM COATED ORAL at 20:52

## 2019-05-27 RX ADMIN — HEPARIN SODIUM 5000 UNITS: 5000 INJECTION, SOLUTION INTRAVENOUS; SUBCUTANEOUS at 14:07

## 2019-05-27 RX ADMIN — RANOLAZINE 1000 MG: 500 TABLET, FILM COATED, EXTENDED RELEASE ORAL at 08:38

## 2019-05-27 RX ADMIN — DONEPEZIL HYDROCHLORIDE 10 MG: 10 TABLET, FILM COATED ORAL at 20:52

## 2019-05-27 RX ADMIN — Medication 2 PUFF: at 11:58

## 2019-05-27 RX ADMIN — CLOPIDOGREL BISULFATE 75 MG: 75 TABLET ORAL at 08:37

## 2019-05-27 RX ADMIN — OXYCODONE AND ACETAMINOPHEN 1 TABLET: 5; 325 TABLET ORAL at 20:52

## 2019-05-27 RX ADMIN — QUETIAPINE FUMARATE 25 MG: 25 TABLET ORAL at 20:52

## 2019-05-27 RX ADMIN — MEMANTINE HYDROCHLORIDE 5 MG: 5 TABLET ORAL at 20:53

## 2019-05-27 RX ADMIN — MIDODRINE HYDROCHLORIDE 10 MG: 5 TABLET ORAL at 08:36

## 2019-05-27 RX ADMIN — MIDODRINE HYDROCHLORIDE 10 MG: 5 TABLET ORAL at 17:48

## 2019-05-27 RX ADMIN — CETIRIZINE HYDROCHLORIDE 10 MG: 10 TABLET, FILM COATED ORAL at 08:36

## 2019-05-27 RX ADMIN — SERTRALINE HYDROCHLORIDE 100 MG: 100 TABLET ORAL at 08:44

## 2019-05-27 RX ADMIN — FAMOTIDINE 20 MG: 20 TABLET ORAL at 20:52

## 2019-05-27 RX ADMIN — OXYCODONE AND ACETAMINOPHEN 1 TABLET: 5; 325 TABLET ORAL at 11:45

## 2019-05-27 RX ADMIN — SODIUM CHLORIDE, PRESERVATIVE FREE 10 ML: 5 INJECTION INTRAVENOUS at 20:58

## 2019-05-27 RX ADMIN — Medication 2 PUFF: at 20:06

## 2019-05-27 RX ADMIN — QUETIAPINE FUMARATE 25 MG: 25 TABLET ORAL at 08:38

## 2019-05-27 RX ADMIN — HEPARIN SODIUM 5000 UNITS: 5000 INJECTION, SOLUTION INTRAVENOUS; SUBCUTANEOUS at 05:34

## 2019-05-27 RX ADMIN — ASPIRIN 81 MG: 81 TABLET, COATED ORAL at 08:36

## 2019-05-27 ASSESSMENT — PAIN SCALES - GENERAL
PAINLEVEL_OUTOF10: 5
PAINLEVEL_OUTOF10: 0
PAINLEVEL_OUTOF10: 6
PAINLEVEL_OUTOF10: 7
PAINLEVEL_OUTOF10: 0

## 2019-05-27 ASSESSMENT — PAIN DESCRIPTION - LOCATION: LOCATION: ANKLE;LEG

## 2019-05-27 ASSESSMENT — PAIN DESCRIPTION - ORIENTATION: ORIENTATION: LEFT

## 2019-05-27 ASSESSMENT — PAIN DESCRIPTION - DESCRIPTORS: DESCRIPTORS: SORE

## 2019-05-27 ASSESSMENT — PAIN DESCRIPTION - PAIN TYPE: TYPE: SURGICAL PAIN

## 2019-05-27 NOTE — PROGRESS NOTES
history of Anxiety, Arthritis, Asthma, Back problem, Bronchitis, CAD (coronary artery disease), Cancer ([facility] [address] Chest pain, Chipped tooth, Chronic back pain, COPD (chronic obstructive pulmonary disease) (HCC), Cough, Emphysema, GERD (gastroesophageal reflux disease), H/O 24 hour EKG monitoring, H/O cardiac catheterization, H/O cardiac catheterization, H/O Doppler ultrasound, H/O Doppler ultrasound, H/O exercise stress test, Hiatal hernia, History of exercise stress test, History of nuclear stress test, [place] (hard of hearing), Hx of cardiovascular stress test, Hx of Doppler ultrasound, Hx of Doppler ultrasound, Hx of Doppler ultrasound, Hx of echocardiogram, Hypertension, Irregular heart beat, Migraines, Palpitations, Panic attacks, Pneumonia, Prolonged emergence from general anesthesia, Restless leg, S/P cardiac cath, S/P PTCA (percutaneous transluminal coronary angioplasty), Shortness of breath, Teeth missing, Tobacco use, and Unspecified sleep apnea. has a past surgical history that includes Kidney surgery (Left, 2-15); Dental surgery; Tonsillectomy (1960's); Tubal ligation (1993); Gastric fundoplication (97/40/5620); Colonoscopy (2011); Colonoscopy (08/23/2017); Endoscopy, colon, diagnostic (10-16-15); Endoscopy, colon, diagnostic (01/28/2019); and Upper gastrointestinal endoscopy (N/A, 1/28/2019). Restrictions  Restrictions/Precautions  Restrictions/Precautions: Weight Bearing, General Precautions, Fall Risk  Lower Extremity Weight Bearing Restrictions  Left Lower Extremity Weight Bearing: Toe Touch Weight Bearing  Position Activity Restriction  Other position/activity restrictions: Cleared by RNVince, for OT eval. O2 sat 97% via pulse ox, bed exit, OH trapeze, O2 at . 5L via NC, OH trapeze.      Subjective   General  Chart Reviewed: Yes  Patient assessed for rehabilitation services?: Yes  Family / Caregiver Present: No  Subjective  Subjective: Pt asleep in bed upon therapy's arrival. Able to be awakened to participate in therapy. Pt is [place] w/hx of dementia per charting, making communication difficult. Pain Assessment  Pain Assessment: 0-10  Pain Level: 5  Pain Type: Surgical pain  Pain Location: Ankle;Leg  Pain Orientation: Left  Pain Descriptors: Sore    Social/Functional History  Social/Functional History  Additional Comments: As pt is [place] w/hx of dementia per charting, and demonstrates poor ability to attend to conversation during OT eval, making communication difficult, PLOF unable to be gathered from pt. CM Charting reports pt is from home w/family at baseline. Objective   Vision: Within Functional Limits  Hearing: Exceptions to [facility]  Hearing Exceptions: Hard of hearing/hearing concerns(reads lips; speak to pt directly facing)    Orientation  Overall Orientation Status: (PUNEET)  Observation/Palpation  Observation: ace wrap over splint LLE, just distal to knee down to toes  Balance  Sitting Balance: Stand by assistance(static at EOB/light dynamic in supported sitting w/LE dressing attempt to manage R footie)  Standing Balance: Minimal assistance(to mod  A w/UE support on RW)     Tone RUE  RUE Tone: Normotonic  Tone LUE  LUE Tone: Normotonic     Bed mobility  Supine to Sit: (min A for trunk, min to mod A w/LLE, HOB at 30*, no use of bed rail)  Scooting: Minimal assistance(seated to EOB)  Transfers  Stand Pivot Transfers: Moderate assistance(bed to chair w/RW. Pt scoots/slides RLE on floor to complete, unable to follow directions for TTWB LLE, states she is unable to put any wt through LLE d/t pain in toes)  Sit to stand:  Moderate assistance(from bed to RW w/cues for hand placement)  Stand to sit: Moderate assistance(to chair w/cues for positioning and hand placement)  Transfer Comments: amb deferred today, as pt unsafe to attempt     Cognition  Overall Cognitive Status: Exceptions  Arousal/Alertness: Delayed responses to stimuli  Following Commands: Inconsistently follows commands(needs verbal and tactile cues to complete + speaking directly to pt for her to read lips d/t hearing imp)  Attention Span: Attends with cues to redirect(decreased ability to attend to directions; easily distracted by own thoughts)  Memory: (needs ongoing assessment)  Safety Judgement: (imp)  Problem Solving: Decreased awareness of errors  Insights: Decreased awareness of deficits  Initiation: Requires cues for some  Sequencing: Requires cues for some        Sensation  Overall Sensation Status: (appears grossly intact per observation; unable to formally assess d/t cognitive imp)        LUE AROM (degrees)  LUE AROM : WFL  RUE AROM (degrees)  RUE AROM : WFL  LUE Strength  Gross LUE Strength: WFL(per observation; unable to formally assess d/t cognitive imp)  RUE Strength  Gross RUE Strength: WFL(per observation; unable to formally assess d/t cognitive imp)     Hand Dominance  Hand Dominance: Right(per observation w/feeding self)             Plan   Plan  Times per week: 2x+  Times per day: Daily  Current Treatment Recommendations: Strengthening, ROM, Balance Training, Functional Mobility Training, Neuromuscular Re-education, Equipment Evaluation, Education, & procurement, Patient/Caregiver Education & Training, Self-Care / ADL, Endurance Training, Safety Education & Training    G-Code     OutComes Score                                                  AM-PAC Score  AM-PAC 6 click short form for inpatient daily activity:   How much help from another person does the patient currently need. .. Unable  Dep A Lot  Max A A Lot   Mod A A Little  Min A A Little   CGA  SBA None   Mod I  Indep  Sup   1. Putting on and taking off regular lower body clothing? [] 1    [x] 2   [] 2   [] 3   [] 3   [] 4      2. Bathing (including washing, rinsing, drying)? [] 1   [x] 2   [] 2 [] 3 [] 3 [] 4   3. Toileting, which includes using toilet, bedpan, or urinal? [x] 1    [] 2   [] 2   [] 3   [] 3   [] 4     4.  Putting on and taking off regular upper body clothing? [] 1   [] 2   [] 2   [x] 3   [] 3    [] 4      5. Taking care of personal grooming such as brushing teeth? [] 1   [] 2    [] 2 [] 3    [x] 3   [] 4      6. Eating meals? [] 1   [] 2   [] 2   [] 3   [] 3   [x] 4      Raw Score:  15   [24=0% impaired(CH), 23=1-19%(CI), 20-22=20-39%(CJ), 15-19=40-59%(CK), 10-14=60-79%(CL), 7-9=80-99%(CM), 6=100%(CN)]               Goals  Short term goals  Time Frame for Short term goals: until pt discharged from [facility] or goals met  Short term goal 1: Pt will complete bed mobility at [facility]. Short term goal 2: Pt will complete bed, chair, BSC transfers w/RW at min A x 1, observing TTWB prec LLE. Short term goal 3: Pt will complete toileting at min A x 1. Short term goal 4: Pt will complete sp bath at s/u and sup UB, min A LB w/use of A/E prn. Short term goal 5: Pt will complete resistive UE ther ex at SBA w/prn vcues to increase act tolerance and strength.         Therapy Time   Individual Concurrent Group Co-treatment   Time In 1634         Time Out 0833         Minutes 38         Timed Code Treatment Minutes: 25 Minutes       Kusum Camacho, OT/L

## 2019-05-27 NOTE — PROGRESS NOTES
Hospitalist Progress Note      Name:  Ozzy Badillo /Age/Sex: 1960  (60 y.o. female)   MRN & CSN:  [id number] & [id number] Admission Date/Time: 2019  1:34 PM   Location:  [facility] [place] PCP: LAVERN Schwab - MOJGAN       Ozzy Badillo is a 60 y.o.  female  who presents with Fall (lost her balance; hit head on blood thinner) and Leg Pain (left sided)      Assessment and Plan:   Left Trimalleolar ankle fx  - s/p ORIF of left ankle and syndesmosis POD#3  - heparin  - pt/ot  - pain mx prn  - ortho following, TTWB, ok to resume plavix, f/u in 2 weeks     Fall/Debility  - CT head neg  - pt.ot consulted  - CM for SNF arrangement     Hypotension  - stable, runs low at baseline  - got bolus' over last 2 days  - started midodrine 10mg TID  - d/c hctz  - d/c lisinopril  - holding metoprolol  - monitor     HTN  COPD  Dementia  Depression  HLD  CAD - asa + plavix resumed    SNF pending                Diet DIET GENERAL;   Code Status Full Code     Medications:   Medications:    clopidogrel  75 mg Oral Daily    aspirin EC  81 mg Oral QAM    midodrine  10 mg Oral TID WC    heparin (porcine)  5,000 Units Subcutaneous 3 times per day    sodium chloride flush  10 mL Intravenous 2 times per day    famotidine  20 mg Oral BID    atorvastatin  40 mg Oral Nightly    mometasone-formoterol  2 puff Inhalation BID    donepezil  10 mg Oral Nightly    cetirizine  10 mg Oral Daily    memantine  5 mg Oral BID    [Held by provider] metoprolol tartrate  12.5 mg Oral BID    sertraline  100 mg Oral Daily    ranolazine  1,000 mg Oral BID    QUEtiapine  25 mg Oral BID      Infusions:   PRN Meds:   oxyCODONE-acetaminophen 1 tablet Q6H PRN   sodium chloride flush 10 mL PRN   magnesium hydroxide 30 mL Daily PRN   ondansetron 4 mg Q6H PRN   acetaminophen 650 mg Q4H PRN   ipratropium-albuterol 1 ampule Q4H PRN   traZODone 50 mg Nightly PRN   albuterol sulfate HFA 2 puff Q4H PRN     Subjective:   Doing well    Objective: Intake/Output Summary (Last 24 hours) at 5/27/2019 1019  Last data filed at 5/27/2019 0959  Gross per 24 hour   Intake 360 ml   Output 1325 ml   Net -965 ml      Vitals:   Vitals:    05/27/19 0834   BP: 113/61   Pulse: 65   Resp:    Temp:    SpO2:      Physical Exam:   Gen:  awake, alert, cooperative, no apparent distress  Head/Eyes:  Normocephalic atraumatic, EOMI   NECK:   symmetrical, trachea midline  LUNGS: Normal Effort   CARDIOVASCULAR:  Normal rate  ABDOMEN: Non tender, non distended, no HSM noted. MUSCULOSKELETAL:  ROM limited  NEUROLOGIC: Alert and Oriented,  Cranial nerves II-XII are grossly intact.    SKIN:  no bruising or bleeding, normal skin color,  no redness      Data:       CBC   Recent Labs     05/26/19  0925   WBC 9.4   HGB 8.9*   HCT 29.7*         BMP Recent Labs     05/26/19  0925      K 4.7      CO2 32   BUN 12   CREATININE 0.9         Electronically signed by Олег Lopez MD on 5/27/2019 at 10:19 AM

## 2019-05-28 PROCEDURE — 97530 THERAPEUTIC ACTIVITIES: CPT

## 2019-05-28 PROCEDURE — 94761 N-INVAS EAR/PLS OXIMETRY MLT: CPT

## 2019-05-28 PROCEDURE — 6360000002 HC RX W HCPCS: Performed by: ORTHOPAEDIC SURGERY

## 2019-05-28 PROCEDURE — 6370000000 HC RX 637 (ALT 250 FOR IP): Performed by: ORTHOPAEDIC SURGERY

## 2019-05-28 PROCEDURE — 97116 GAIT TRAINING THERAPY: CPT

## 2019-05-28 PROCEDURE — 6370000000 HC RX 637 (ALT 250 FOR IP): Performed by: FAMILY MEDICINE

## 2019-05-28 PROCEDURE — 2580000003 HC RX 258: Performed by: ORTHOPAEDIC SURGERY

## 2019-05-28 PROCEDURE — 2700000000 HC OXYGEN THERAPY PER DAY

## 2019-05-28 PROCEDURE — 97535 SELF CARE MNGMENT TRAINING: CPT

## 2019-05-28 PROCEDURE — 94640 AIRWAY INHALATION TREATMENT: CPT

## 2019-05-28 PROCEDURE — 97110 THERAPEUTIC EXERCISES: CPT

## 2019-05-28 PROCEDURE — 1200000000 HC SEMI PRIVATE

## 2019-05-28 RX ADMIN — SERTRALINE HYDROCHLORIDE 100 MG: 100 TABLET ORAL at 11:09

## 2019-05-28 RX ADMIN — HEPARIN SODIUM 5000 UNITS: 5000 INJECTION, SOLUTION INTRAVENOUS; SUBCUTANEOUS at 21:43

## 2019-05-28 RX ADMIN — MIDODRINE HYDROCHLORIDE 10 MG: 5 TABLET ORAL at 11:20

## 2019-05-28 RX ADMIN — ATORVASTATIN CALCIUM 40 MG: 40 TABLET, FILM COATED ORAL at 21:43

## 2019-05-28 RX ADMIN — OXYCODONE AND ACETAMINOPHEN 1 TABLET: 5; 325 TABLET ORAL at 19:29

## 2019-05-28 RX ADMIN — MIDODRINE HYDROCHLORIDE 10 MG: 5 TABLET ORAL at 17:07

## 2019-05-28 RX ADMIN — MEMANTINE HYDROCHLORIDE 5 MG: 5 TABLET ORAL at 21:44

## 2019-05-28 RX ADMIN — CLOPIDOGREL BISULFATE 75 MG: 75 TABLET ORAL at 11:09

## 2019-05-28 RX ADMIN — HEPARIN SODIUM 5000 UNITS: 5000 INJECTION, SOLUTION INTRAVENOUS; SUBCUTANEOUS at 06:12

## 2019-05-28 RX ADMIN — QUETIAPINE FUMARATE 25 MG: 25 TABLET ORAL at 21:44

## 2019-05-28 RX ADMIN — Medication 2 PUFF: at 08:46

## 2019-05-28 RX ADMIN — CETIRIZINE HYDROCHLORIDE 10 MG: 10 TABLET, FILM COATED ORAL at 11:08

## 2019-05-28 RX ADMIN — OXYCODONE AND ACETAMINOPHEN 1 TABLET: 5; 325 TABLET ORAL at 06:12

## 2019-05-28 RX ADMIN — FAMOTIDINE 20 MG: 20 TABLET ORAL at 21:44

## 2019-05-28 RX ADMIN — FAMOTIDINE 20 MG: 20 TABLET ORAL at 11:09

## 2019-05-28 RX ADMIN — DONEPEZIL HYDROCHLORIDE 10 MG: 10 TABLET, FILM COATED ORAL at 21:44

## 2019-05-28 RX ADMIN — MEMANTINE HYDROCHLORIDE 5 MG: 5 TABLET ORAL at 11:08

## 2019-05-28 RX ADMIN — Medication 2 PUFF: at 21:03

## 2019-05-28 RX ADMIN — OXYCODONE AND ACETAMINOPHEN 1 TABLET: 5; 325 TABLET ORAL at 12:51

## 2019-05-28 RX ADMIN — QUETIAPINE FUMARATE 25 MG: 25 TABLET ORAL at 11:09

## 2019-05-28 RX ADMIN — RANOLAZINE 1000 MG: 500 TABLET, FILM COATED, EXTENDED RELEASE ORAL at 21:43

## 2019-05-28 RX ADMIN — RANOLAZINE 1000 MG: 500 TABLET, FILM COATED, EXTENDED RELEASE ORAL at 11:10

## 2019-05-28 RX ADMIN — SODIUM CHLORIDE, PRESERVATIVE FREE 10 ML: 5 INJECTION INTRAVENOUS at 21:44

## 2019-05-28 RX ADMIN — HEPARIN SODIUM 5000 UNITS: 5000 INJECTION, SOLUTION INTRAVENOUS; SUBCUTANEOUS at 15:02

## 2019-05-28 RX ADMIN — SODIUM CHLORIDE, PRESERVATIVE FREE 10 ML: 5 INJECTION INTRAVENOUS at 11:19

## 2019-05-28 RX ADMIN — ASPIRIN 81 MG: 81 TABLET, COATED ORAL at 11:08

## 2019-05-28 ASSESSMENT — PAIN DESCRIPTION - DESCRIPTORS
DESCRIPTORS: CONSTANT;ACHING;DISCOMFORT
DESCRIPTORS: ACHING
DESCRIPTORS: ACHING;SHARP

## 2019-05-28 ASSESSMENT — PAIN - FUNCTIONAL ASSESSMENT
PAIN_FUNCTIONAL_ASSESSMENT: PREVENTS OR INTERFERES SOME ACTIVE ACTIVITIES AND ADLS
PAIN_FUNCTIONAL_ASSESSMENT: PREVENTS OR INTERFERES SOME ACTIVE ACTIVITIES AND ADLS

## 2019-05-28 ASSESSMENT — PAIN DESCRIPTION - PAIN TYPE
TYPE: ACUTE PAIN
TYPE: SURGICAL PAIN
TYPE: ACUTE PAIN

## 2019-05-28 ASSESSMENT — PAIN DESCRIPTION - FREQUENCY
FREQUENCY: CONTINUOUS
FREQUENCY: CONTINUOUS

## 2019-05-28 ASSESSMENT — PAIN DESCRIPTION - ORIENTATION
ORIENTATION: LEFT

## 2019-05-28 ASSESSMENT — PAIN DESCRIPTION - ONSET
ONSET: ON-GOING
ONSET: ON-GOING

## 2019-05-28 ASSESSMENT — PAIN DESCRIPTION - LOCATION
LOCATION: ANKLE;KNEE
LOCATION: ANKLE
LOCATION: ANKLE

## 2019-05-28 ASSESSMENT — PAIN SCALES - GENERAL
PAINLEVEL_OUTOF10: 7
PAINLEVEL_OUTOF10: 3
PAINLEVEL_OUTOF10: 9
PAINLEVEL_OUTOF10: 10

## 2019-05-28 ASSESSMENT — PAIN DESCRIPTION - PROGRESSION: CLINICAL_PROGRESSION: NOT CHANGED

## 2019-05-28 NOTE — DISCHARGE INSTR - COC
Continuity of Care Form    Patient Name: Rajesh Garcia   :  1960  MRN:  [id number]    Admit date:  2019  Discharge date:      Code Status Order: Full Code   Advance Directives:   Advance Care Flowsheet Documentation     Date/Time Healthcare Directive Type of Healthcare Directive Copy in [facility] [address] Agent's Name Healthcare Agent's Phone Number    19 0315  No, patient does not have an advance directive for healthcare treatment -- -- -- -- --    19 1844  No, patient does not have an advance directive for healthcare treatment -- -- -- -- --          Admitting Physician:  Malu Zuluaga MD  PCP: LAVERN Ceballos CNP    Discharging Nurse: [address] Unit/Room#: 4008/4008-A  Discharging Unit Phone Number: [phone]    Emergency Contact:   Extended Emergency Contact Information  Primary Emergency Contact: Minhashwini Castrejon  Address: [address] [place], [place] [address] Phone: [phone]  Relation: Brother/Sister  Secondary Emergency Contact: Giovanna Watts  Address: [address] [facility] [address] [place] [address] Phone: [phone]  Relation: Brother/Sister    Past Surgical History:  Past Surgical History:   Procedure Laterality Date    COLONOSCOPY      COLONOSCOPY  2017    colon polyp, diverticulosis, hemorrhoids    DENTAL SURGERY      Teeth Extracted In Past    ENDOSCOPY, COLON, DIAGNOSTIC  10-16-15    ENDOSCOPY, COLON, DIAGNOSTIC  2019    Hiatal hernia, savary dil #17 used    GASTRIC FUNDOPLICATION  15/71/5193    Robotic laparoscopic nissen with mesh    KIDNEY SURGERY Left 2-15    \"At [facility] Had Left Kidney Mass Removed\" Benign    TONSILLECTOMY  1960's    TUBAL LIGATION      UPPER GASTROINTESTINAL ENDOSCOPY N/A 2019    EGD DILATION SAVARY #17MM performed by Cher Becerra MD at [place] [address]       Immunization History:   Immunization History   Administered Date(s) Administered    Influenza Vaccine, unspecified formulation 10/15/2018    Influenza Virus Vaccine 10/03/2012, 02/21/2014, 10/22/2014, 10/29/2015    Pneumococcal Polysaccharide ([id number]) 10/03/2012       Active Problems:  Patient Active Problem List   Diagnosis Code    Chest pain R07.9    GERD (gastroesophageal reflux disease) K21.9    Current tobacco use Z72.0    [facility] J44.9    Essential hypertension I10    Palpitations R00.2    Tobacco use Z72.0    Gastroesophageal reflux disease with hiatal hernia K21.9, K44.9    S/P Nissen fundoplication (without gastrostomy tube) procedure Z98.890    Precordial pain R07.2    Post PTCA Z98.61    Leg pain, bilateral M79.604, M79.605    Syncope and collapse R55    Dizziness R42    Angina pectoris ([facility] I20.9    HTN (hypertension) I10    CAD (coronary artery disease) I25.10    GERD (gastroesophageal reflux disease) K21.9    Ankle syndesmosis disruption, left, initial encounter I70.152L    Closed trimalleolar fracture of left ankle S82.852A    Closed left ankle fracture, initial encounter S82.892A    Closed fracture of upper end of left fibula S82.832A       Isolation/Infection:   Isolation          No Isolation            Nurse Assessment:  Last Vital Signs: BP (!) 112/56   Pulse 61   Temp 98.4 °F (36.9 °C) (Oral)   Resp 18   Ht 5' 2\" (1.575 m)   Wt 187 lb 11.2 oz (85.1 kg)   SpO2 96%   BMI 34.33 kg/m²     Last documented pain score (0-10 scale): Pain Level: 7  Last Weight:   Wt Readings from Last 1 Encounters:   05/28/19 187 lb 11.2 oz (85.1 kg)     Mental Status:  oriented and alert    IV Access:  - None    Nursing Mobility/ADLs:  Walking   Dependent  Transfer  Assisted  Bathing  Assisted  Dressing  Assisted  Toileting  Assisted  Feeding  Independent  Med Admin  Assisted  Med Delivery   whole    Wound Care Documentation and Therapy:        Elimination:  Continence: · Bowel: Yes  · Bladder: Yes  Urinary Catheter: None   Colostomy/Ileostomy/Ileal Conduit: No       Date of Last BM: 05/25/2019    Intake/Output Summary (Last 24 hours) at 5/28/2019 0912  Last data filed at 5/28/2019 0600  Gross per 24 hour   Intake 720 ml   Output --   Net 720 ml     I/O last 3 completed shifts: In: 5 [P.O.:720]  Out: 300 [Urine:300]    Safety Concerns:     None    Impairments/Disabilities:      Vision and Hearing    Patient's personal belongings (please select all that are sent with patient):  Glasses    RN SIGNATURE:  Electronically signed by Marbella Corbin LPN on 2/47/31 at 2:66 PM        PHYSICIAN SECTION    Nutrition Therapy:  Current Nutrition Therapy:   - Oral Diet:  General    Routes of Feeding: Oral  Liquids: No Restrictions  Daily Fluid Restriction: no  Last Modified Barium Swallow with Video (Video Swallowing Test): not done    Treatments at the Time of Hospital Discharge:   Respiratory Treatments: none  Oxygen Therapy:  is not on home oxygen therapy. Ventilator:    - No ventilator support    Rehab Therapies: Physical Therapy and Occupational Therapy  Weight Bearing Status/Restrictions: Toe Touch weight bearing (10-25 lbs) only on left leg  Other Medical Equipment (for information only, NOT a DME order): Walker  Other Treatments: none    Prognosis: Good    Condition at Discharge: Stable    Rehab Potential (if transferring to Rehab): Fair    Recommended Labs or Other Treatments After Discharge: pt/ot, watch BP closely, pt runs low normally    Physician Certification: I certify the above information and transfer of Ayad Murray  is necessary for the continuing treatment of the diagnosis listed and that she requires [facility] for greater 30 days.      Update Admission H&P: No change in H&P    PHYSICIAN SIGNATURE:  Electronically signed by Faye Hicks MD on 5/29/19 at 3:15 PM

## 2019-05-28 NOTE — PROGRESS NOTES
Hospitalist Progress Note      Name:  Tae Talley /Age/Sex: 1960  (60 y.o. female)   MRN & CSN:  [id number] & [id number] Admission Date/Time: 2019  1:34 PM   Location:  [facility] PCP: Merlinda Pontiff, APRN - CNP       Tae Talley is a 60 y.o.  female  who presents with Fall (lost her balance; hit head on blood thinner) and Leg Pain (left sided)      Assessment and Plan:   Left Trimalleolar ankle fx  - s/p ORIF of left ankle and syndesmosis POD#4  - heparin  - pt/ot  - pain mx prn  - ortho following, TTWB, ok to resume plavix, f/u in 2 weeks     Fall/Debility  - CT head neg  - pt.ot consulted  - CM for SNF arrangement     Hypotension  - stable, runs low at baseline  - got bolus'  - started midodrine 10mg TID  - d/c hctz  - d/c lisinopril  - holding metoprolol  - monitor     HTN  COPD  Dementia  Depression  HLD  CAD - asa + plavix resumed     SNF pending                Diet DIET GENERAL;   Code Status Full Code     Medications:   Medications:    clopidogrel  75 mg Oral Daily    aspirin EC  81 mg Oral QAM    midodrine  10 mg Oral TID WC    heparin (porcine)  5,000 Units Subcutaneous 3 times per day    sodium chloride flush  10 mL Intravenous 2 times per day    famotidine  20 mg Oral BID    atorvastatin  40 mg Oral Nightly    mometasone-formoterol  2 puff Inhalation BID    donepezil  10 mg Oral Nightly    cetirizine  10 mg Oral Daily    memantine  5 mg Oral BID    [Held by provider] metoprolol tartrate  12.5 mg Oral BID    sertraline  100 mg Oral Daily    ranolazine  1,000 mg Oral BID    QUEtiapine  25 mg Oral BID      Infusions:   PRN Meds:   oxyCODONE-acetaminophen 1 tablet Q6H PRN   sodium chloride flush 10 mL PRN   magnesium hydroxide 30 mL Daily PRN   ondansetron 4 mg Q6H PRN   acetaminophen 650 mg Q4H PRN   ipratropium-albuterol 1 ampule Q4H PRN   traZODone 50 mg Nightly PRN   albuterol sulfate HFA 2 puff Q4H PRN     Subjective:   Had issues with nursing last night she states, no distress currently    Objective: Intake/Output Summary (Last 24 hours) at 5/28/2019 0950  Last data filed at 5/28/2019 0913  Gross per 24 hour   Intake 1200 ml   Output --   Net 1200 ml      Vitals:   Vitals:    05/28/19 0553   BP: (!) 112/56   Pulse: 61   Resp: 18   Temp: 98.4 °F (36.9 °C)   SpO2:      Physical Exam:   Gen:  awake, alert, cooperative, no apparent distress  Head/Eyes:  Normocephalic atraumatic, EOMI   NECK:   symmetrical, trachea midline  LUNGS: Normal Effort   CARDIOVASCULAR:  Normal rate  ABDOMEN: Non tender, non distended, no HSM noted. MUSCULOSKELETAL:  ROM limited  NEUROLOGIC: Alert and Oriented,  Cranial nerves II-XII are grossly intact.    SKIN:  no bruising or bleeding, normal skin color,  no redness      Data:       CBC   Recent Labs     05/26/19  0925   WBC 9.4   HGB 8.9*   HCT 29.7*         BMP Recent Labs     05/26/19  0925      K 4.7      CO2 32   BUN 12   CREATININE 0.9         Electronically signed by Sohail Rome MD on 5/28/2019 at 9:50 AM

## 2019-05-28 NOTE — PLAN OF CARE
Problem: Falls - Risk of:  Goal: Will remain free from falls  Description  Will remain free from falls  Outcome: Ongoing  Goal: Absence of physical injury  Description  Absence of physical injury  Outcome: Ongoing     Problem: SAFETY  Goal: Free from accidental physical injury  Outcome: Ongoing  Goal: Free from intentional harm  Outcome: Ongoing     Problem: DAILY CARE  Goal: Daily care needs are met  Outcome: Ongoing     Problem: PAIN  Goal: Patient's pain/discomfort is manageable  Outcome: Ongoing     Problem: SKIN INTEGRITY  Goal: Skin integrity is maintained or improved  Outcome: Ongoing     Problem: KNOWLEDGE DEFICIT  Goal: Patient/S.O. demonstrates understanding of disease process, treatment plan, medications, and discharge instructions. Outcome: Ongoing     Problem: DISCHARGE BARRIERS  Goal: Patient's continuum of care needs are met  Outcome: Ongoing     Problem: Pain:  Goal: Pain level will decrease  Description  Pain level will decrease  Outcome: Ongoing  Goal: Control of acute pain  Description  Control of acute pain  Outcome: Ongoing  Goal: Control of chronic pain  Description  Control of chronic pain  Outcome: Ongoing     Problem: Risk for Impaired Skin Integrity  Goal: Tissue integrity - skin and mucous membranes  Description  Structural intactness and normal physiological function of skin and  mucous membranes.   Outcome: Ongoing     Problem: Musculor/Skeletal Functional Status  Goal: Highest potential functional level  Outcome: Ongoing

## 2019-05-28 NOTE — CARE COORDINATION
Received call from [facility] that [facility] is not in network with [facility], per chart review family next choice is [facility], referral called and faxed. Left vm for pt's brother Adriano Ramirez [phone] to update on plan, left my number to contact me for any questions/ concerns. Received call back from Adriano Ramirez stating he no longer wants [facility] and would like [facility]. Sent referral to [facility] with [facility]/ info faxed. Met c pt and brother at bedside, confirmed discharge plan is in process for [facility] pending insurance approval.  Keeley Doll it could take up to anywhere from today up to 72 hrs to receive approval.  Advised I will let them know as soon as approval received. Provided them with address for [facility] per pt's request.  Also informed nursing of Rico's inquiry about pt's \"anxiety medication\" as she is getting \"wound up\". And nurse manager of pt's complaint about lying in bed in urine for several hours last night. Both will follow up with pt. CM to cont to follow for discharge planning.

## 2019-05-28 NOTE — PROGRESS NOTES
I met with patient on 5/28/19 for bedside tobacco rounding. Lin Diaz stated that she smokes about 3-4 cigarettes per day. I explained that [facility]) cares about her health and advised her to quit. Lin Diaz stated that she would like to quit. Lin Diaz is not using the nicotine patch and not having intense cravings. We discussed health concerns, reported by the patient-COPD-and the benefits of quitting. We discussed building a quit plan, identifying triggers, ways to fight cravings, modifying behaviors and building a quit kit to assist. I explained the [facility] cessation program, provided educational information, and strongly encouraged Lin Diaz to contact me for outpatient services. I also explained the [address]. Lin Diaz stated that stress is her biggest trigger.       Travis Nam, Certified Tobacco Treatment Specialist, [facility]

## 2019-05-28 NOTE — PROGRESS NOTES
Occupational Therapy    Occupational Therapy Treatment Note  Name: Jacqui Carlson MRN: [id number] :   1960   Date:  2019   Admission Date: 2019 Room:  [address] [place], [place] [address]A   Restrictions/Precautions:  Restrictions/Precautions  Restrictions/Precautions: Weight Bearing, General Precautions, Fall Risk TTWB L LE    Communication with other providers:   Cleared for treatment by Eliana Girard RN. Subjective:  Patient states:  Pt states \"it hurts when I get up\". Pain:   Location, Type, Intensity (0/10 to 10/10):  6/10 L LE    Objective:    Observation:  Pt alert and oriented. Objective Measures:  82-97 SPO2 on 2L    Treatment, including education:  Supine to sit to EOB and sit to supine with Min A to CGA. Pt completed bathing with Min A to wash buttocks while lying down in bed. Pt was able stand x 30-45 seconds (static)on R LE to maintain WB status on L LE. Pt was unable to maintain balance when attempting dynamic task and had to sit down. Pt was able to don/doff R sock. Pt completed brushing teeth and hair at EOB with supervision. Assessment / Impression:        Patient's tolerance of treatment: Good   Adverse Reaction: None  Significant change in status and impact:  None  Barriers to improvement:  None    Plan for Next Session:    Continue with POC. Time in:  926  Time out:    Timed treatment minutes:  55  Total treatment time:  54  Electronically signed by:    Bianka Faustin, [address]    2019, 10:17 AM    Previously filed values:     Short term goals  Time Frame for Short term goals: until pt discharged from [facility] or goals met  Short term goal 1: Pt will complete bed mobility at [facility]. Short term goal 2: Pt will complete bed, chair, BSC transfers w/RW at min A x 1, observing TTWB prec LLE. Short term goal 3: Pt will complete toileting at min A x 1. Short term goal 4: Pt will complete sp bath at s/u and sup UB, min A LB w/use of A/E prn.   Short term goal 5: Pt will complete resistive UE ther

## 2019-05-29 ENCOUNTER — HOSPITAL ENCOUNTER (OUTPATIENT)
Dept: PHYSICAL THERAPY | Age: 59
Discharge: HOME | End: 2019-05-29

## 2019-05-29 VITALS
SYSTOLIC BLOOD PRESSURE: 121 MMHG | DIASTOLIC BLOOD PRESSURE: 59 MMHG | RESPIRATION RATE: 16 BRPM | OXYGEN SATURATION: 100 % | WEIGHT: 187.7 LBS | HEART RATE: 58 BPM | BODY MASS INDEX: 34.54 KG/M2 | HEIGHT: 62 IN | TEMPERATURE: 98.3 F

## 2019-05-29 PROCEDURE — 6360000002 HC RX W HCPCS: Performed by: ORTHOPAEDIC SURGERY

## 2019-05-29 PROCEDURE — 6370000000 HC RX 637 (ALT 250 FOR IP): Performed by: FAMILY MEDICINE

## 2019-05-29 PROCEDURE — 6370000000 HC RX 637 (ALT 250 FOR IP): Performed by: ORTHOPAEDIC SURGERY

## 2019-05-29 PROCEDURE — 97110 THERAPEUTIC EXERCISES: CPT

## 2019-05-29 PROCEDURE — 97116 GAIT TRAINING THERAPY: CPT

## 2019-05-29 PROCEDURE — 94640 AIRWAY INHALATION TREATMENT: CPT

## 2019-05-29 PROCEDURE — 2580000003 HC RX 258: Performed by: ORTHOPAEDIC SURGERY

## 2019-05-29 PROCEDURE — 2700000000 HC OXYGEN THERAPY PER DAY

## 2019-05-29 PROCEDURE — 97530 THERAPEUTIC ACTIVITIES: CPT

## 2019-05-29 RX ORDER — MIDODRINE HYDROCHLORIDE 10 MG/1
10 TABLET ORAL
Qty: 90 TABLET | Refills: 3 | DISCHARGE
Start: 2019-05-29 | End: 2020-01-16

## 2019-05-29 RX ADMIN — MIDODRINE HYDROCHLORIDE 10 MG: 5 TABLET ORAL at 17:51

## 2019-05-29 RX ADMIN — SERTRALINE HYDROCHLORIDE 100 MG: 100 TABLET ORAL at 11:13

## 2019-05-29 RX ADMIN — CETIRIZINE HYDROCHLORIDE 10 MG: 10 TABLET, FILM COATED ORAL at 11:13

## 2019-05-29 RX ADMIN — MEMANTINE HYDROCHLORIDE 5 MG: 5 TABLET ORAL at 11:13

## 2019-05-29 RX ADMIN — Medication 2 PUFF: at 20:13

## 2019-05-29 RX ADMIN — SODIUM CHLORIDE, PRESERVATIVE FREE 10 ML: 5 INJECTION INTRAVENOUS at 11:19

## 2019-05-29 RX ADMIN — CLOPIDOGREL BISULFATE 75 MG: 75 TABLET ORAL at 11:12

## 2019-05-29 RX ADMIN — HEPARIN SODIUM 5000 UNITS: 5000 INJECTION, SOLUTION INTRAVENOUS; SUBCUTANEOUS at 14:30

## 2019-05-29 RX ADMIN — Medication 2 PUFF: at 07:47

## 2019-05-29 RX ADMIN — MIDODRINE HYDROCHLORIDE 10 MG: 5 TABLET ORAL at 11:12

## 2019-05-29 RX ADMIN — ASPIRIN 81 MG: 81 TABLET, COATED ORAL at 11:13

## 2019-05-29 RX ADMIN — HEPARIN SODIUM 5000 UNITS: 5000 INJECTION, SOLUTION INTRAVENOUS; SUBCUTANEOUS at 05:03

## 2019-05-29 RX ADMIN — QUETIAPINE FUMARATE 25 MG: 25 TABLET ORAL at 11:13

## 2019-05-29 RX ADMIN — OXYCODONE AND ACETAMINOPHEN 1 TABLET: 5; 325 TABLET ORAL at 05:04

## 2019-05-29 RX ADMIN — FAMOTIDINE 20 MG: 20 TABLET ORAL at 11:13

## 2019-05-29 RX ADMIN — OXYCODONE AND ACETAMINOPHEN 1 TABLET: 5; 325 TABLET ORAL at 11:14

## 2019-05-29 RX ADMIN — RANOLAZINE 1000 MG: 500 TABLET, FILM COATED, EXTENDED RELEASE ORAL at 11:12

## 2019-05-29 ASSESSMENT — PAIN SCALES - GENERAL
PAINLEVEL_OUTOF10: 0
PAINLEVEL_OUTOF10: 5
PAINLEVEL_OUTOF10: 9

## 2019-05-29 ASSESSMENT — PAIN DESCRIPTION - PROGRESSION: CLINICAL_PROGRESSION: NOT CHANGED

## 2019-05-29 ASSESSMENT — PAIN DESCRIPTION - ORIENTATION: ORIENTATION: LEFT

## 2019-05-29 ASSESSMENT — PAIN - FUNCTIONAL ASSESSMENT: PAIN_FUNCTIONAL_ASSESSMENT: PREVENTS OR INTERFERES SOME ACTIVE ACTIVITIES AND ADLS

## 2019-05-29 ASSESSMENT — PAIN DESCRIPTION - LOCATION: LOCATION: ANKLE

## 2019-05-29 ASSESSMENT — PAIN DESCRIPTION - DESCRIPTORS: DESCRIPTORS: CONSTANT;ACHING;DISCOMFORT

## 2019-05-29 ASSESSMENT — PAIN DESCRIPTION - FREQUENCY: FREQUENCY: CONTINUOUS

## 2019-05-29 ASSESSMENT — PAIN DESCRIPTION - PAIN TYPE: TYPE: SURGICAL PAIN

## 2019-05-29 ASSESSMENT — PAIN DESCRIPTION - ONSET: ONSET: ON-GOING

## 2019-05-29 NOTE — PROGRESS NOTES
Hospitalist Progress Note      Name:  Hudson Kimball /Age/Sex: 1960  (60 y.o. female)   MRN & CSN:  [id number] & [id number] Admission Date/Time: 2019  1:34 PM   Location:  [address] [place] [address]-A PCP: LAVERN Yusuf - MOJGAN       Hudson Kimball is a 60 y.o.  female  who presents with Fall (lost her balance; hit head on blood thinner) and Leg Pain (left sided)      Assessment and Plan:   Left Trimalleolar ankle fx  - s/p ORIF of left ankle and syndesmosis POD#5  - heparin  - pt/ot  - pain mx prn  - ortho following, TTWB, ok to resume plavix, f/u in 2 weeks     Fall/Debility  - CT head neg  - pt.ot consulted  - CM for SNF arrangement     Hypotension  - stable, runs low at baseline  - got bolus'  - started midodrine 10mg TID  - d/c hctz  - d/c lisinopril  - holding metoprolol  - monitor     HTN  COPD  Dementia  Depression  HLD  CAD - asa + plavix resumed     SNF pending                Diet DIET GENERAL;   Code Status Full Code     Medications:   Medications:    clopidogrel  75 mg Oral Daily    aspirin EC  81 mg Oral QAM    midodrine  10 mg Oral TID WC    heparin (porcine)  5,000 Units Subcutaneous 3 times per day    sodium chloride flush  10 mL Intravenous 2 times per day    famotidine  20 mg Oral BID    atorvastatin  40 mg Oral Nightly    mometasone-formoterol  2 puff Inhalation BID    donepezil  10 mg Oral Nightly    cetirizine  10 mg Oral Daily    memantine  5 mg Oral BID    [Held by provider] metoprolol tartrate  12.5 mg Oral BID    sertraline  100 mg Oral Daily    ranolazine  1,000 mg Oral BID    QUEtiapine  25 mg Oral BID      Infusions:   PRN Meds:   oxyCODONE-acetaminophen 1 tablet Q6H PRN   sodium chloride flush 10 mL PRN   magnesium hydroxide 30 mL Daily PRN   ondansetron 4 mg Q6H PRN   acetaminophen 650 mg Q4H PRN   ipratropium-albuterol 1 ampule Q4H PRN   traZODone 50 mg Nightly PRN   albuterol sulfate HFA 2 puff Q4H PRN     Subjective:   No complaints    Objective: Intake/Output Summary (Last 24 hours) at 5/29/2019 1042  Last data filed at 5/29/2019 0401  Gross per 24 hour   Intake 10 ml   Output 900 ml   Net -890 ml      Vitals:   Vitals:    05/29/19 0749   BP:    Pulse:    Resp: 16   Temp:    SpO2: 97%     Physical Exam:   Gen:  awake, alert, cooperative, no apparent distress  Head/Eyes:  Normocephalic atraumatic, EOMI   NECK:   symmetrical, trachea midline  LUNGS: Normal Effort   CARDIOVASCULAR:  Normal rate  ABDOMEN: Non tender, non distended, no HSM noted. MUSCULOSKELETAL:  ROM limited  NEUROLOGIC: Alert and Oriented,  Cranial nerves II-XII are grossly intact. SKIN:  no bruising or bleeding, normal skin color,  no redness      Data:       CBC No results for input(s): WBC, HGB, HCT, PLT in the last 72 hours. BMP No results for input(s): NA, K, CL, CO2, PHOS, BUN, CREATININE in the last 72 hours.     Invalid input(s): CA      Electronically signed by Zoey Currie MD on 5/29/2019 at 10:42 AM

## 2019-05-29 NOTE — CARE COORDINATION
Received call from Linda with approval for plcmt at [facility], updated Dr. Michelle Romero via PS>     Notified Linda of transport time, faxed orders/ packet prepared.

## 2019-05-29 NOTE — DISCHARGE SUMMARY
Tae Talley 1960 [id number]  PCP:  Merlinda Pontiff, APRN - CNP    Admit date: 5/23/2019  Admitting Physician: Deanna Terrell MD    Discharge date: 5/29/2019 Discharge Physician: Deanna Terrell MD         Hospital Course and Discharge Diagnoses Include:    Left Trimalleolar ankle fx  - s/p ORIF of left ankle and syndesmosis POD#5  - heparin  - pt/ot  - pain mx prn  - ortho following, TTWB, ok to resume plavix, f/u in 2 weeks     Fall/Debility  - CT head neg  - pt.ot consulted  - CM for SNF arrangement     Hypotension  - stable, runs low at baseline  - got bolus'  - started midodrine 10mg TID  - d/c hctz  - d/c lisinopril  - parameters added for metoprolol  - monitor     HTN  COPD  Dementia  Depression  HLD  CAD - asa + plavix resumed     SNF approved            Physical Exam on Discharge date: 05/29/19  Gen:  awake, alert, cooperative, no apparent distress  Head/Eyes:  Normocephalic atraumatic, EOMI   NECK:   symmetrical, trachea midline  LUNGS: Normal Effort   CARDIOVASCULAR:  Normal rate  ABDOMEN: Non tender, non distended, no HSM noted. MUSCULOSKELETAL:  ROM WNL  NEUROLOGIC: Alert and Oriented,  Cranial nerves II-XII are grossly intact. SKIN:  no bruising or bleeding, normal skin color,  no redness    Procedures:  See above  Ct Abdomen Pelvis Wo Contrast Additional Contrast? None    Result Date: 5/14/2019  EXAMINATION: CT OF THE ABDOMEN AND PELVIS WITHOUT CONTRAST 5/14/2019 5:09 pm TECHNIQUE: CT of the abdomen and pelvis was performed without the administration of intravenous contrast. Multiplanar reformatted images are provided for review. Dose modulation, iterative reconstruction, and/or weight based adjustment of the mA/kV was utilized to reduce the radiation dose to as low as reasonably achievable.  COMPARISON: 1/16/2018 HISTORY: ORDERING SYSTEM PROVIDED HISTORY: right lower abd pain TECHNOLOGIST PROVIDED HISTORY: Additional Contrast?->None Ordering Physician Provided Reason for Exam: rlq pain Mechanism of Injury: pain Additional signs and symptoms: none Relevant Medical/Surgical History:  lt kidnet sx FINDINGS: Lower Chest: Lung bases are clear. Organs: Evaluation of the solid organs is limited without intravenous contrast. No liver lesion. Cholecystectomy. No pancreatic lesion. No splenomegaly. No left adrenal lesion. Right adrenal adenoma. Left renal cyst.  No hydronephrosis. No renal calculus. GI/Bowel: No bowel obstruction. Small hiatal hernia. No acute inflammatory process. Sigmoid diverticulosis without acute diverticulitis. No appendicitis. Pelvis: No free fluid. No bladder calculus. Peritoneum/Retroperitoneum: No aortic aneurysm. No adenopathy. Bones/Soft Tissues: No acute soft tissue abnormality. Lumbar spine degenerative changes. No acute intra-abdominal process. Xr Chest Standard (2 Vw)    Result Date: 5/23/2019  EXAMINATION: TWO XRAY VIEWS OF THE CHEST 5/23/2019 2:29 pm COMPARISON: May 14, 2019 HISTORY: ORDERING SYSTEM PROVIDED HISTORY: Chest pain TECHNOLOGIST PROVIDED HISTORY: Reason for exam:->Chest pain Ordering Physician Provided Reason for Exam: fall FINDINGS: Stable cardiomediastinal silhouette. Mild left basilar atelectasis is noted. There is no definite pleural effusion or pneumothorax. Osseous structures are stable. Mild left basilar atelectasis. Xr Chest Standard (2 Vw)    Result Date: 5/14/2019  EXAMINATION: TWO XRAY VIEWS OF THE CHEST 5/14/2019 4:00 pm COMPARISON: 04/08/2019 HISTORY: ORDERING SYSTEM PROVIDED HISTORY: cp TECHNOLOGIST PROVIDED HISTORY: Reason for exam:->cp Ordering Physician Provided Reason for Exam: chest pain FINDINGS: Study was obtained at low lung volumes. There is a small band of chronic atelectasis/fibrosis in the lingula. No acute infiltrate, pneumothorax or pleural effusion. The heart size is normal.  No acute bone finding. No acute cardiopulmonary disease.      Xr Elbow Left (min 3 Views)    Result Date: 5/23/2019  EXAMINATION: 3 XRAY VIEWS OF THE LEFT ELBOW 5/23/2019 2:29 pm COMPARISON: None HISTORY: ORDERING SYSTEM PROVIDED HISTORY: pain, trauma TECHNOLOGIST PROVIDED HISTORY: Reason for exam:->pain, trauma Ordering Physician Provided Reason for Exam: fall, left elbow pain Initial evaluation. FINDINGS: No acute fracture. Joints maintain anatomic alignment. No joint effusion. No obvious acute soft tissue abnormality. No radiopaque foreign body. No acute findings in the left elbow. Xr Knee Left (1-2 Views)    Result Date: 5/23/2019  EXAMINATION: TWO XRAY VIEWS OF THE LEFT KNEE; ONE XRAY VIEW OF THE PELVIS AND TWO XRAY VIEWS LEFT HIP; 3 XRAY VIEWS OF THE LEFT ANKLE 5/23/2019 2:29 pm COMPARISON: None. HISTORY: ORDERING SYSTEM PROVIDED HISTORY: pain TECHNOLOGIST PROVIDED HISTORY: Reason for exam:->pain Ordering Physician Provided Reason for Exam: fall, left knee pain FINDINGS: Left hip: Two views provided. No focal soft tissue abnormality. Normal bone mineral density. Normal bilateral sacroiliac and hip alignment and joint spaces. There is an ovoid well-circumscribed calcification overlying the left hemipelvis which corresponds with a rim calcified focus of remote mesenteric fat infarction identified on a prior CT abdomen/pelvis 05/14/2019. No acute fracture. Left knee: Two views provided. No focal soft tissue swelling or effusion. Normal bone mineral density and knee alignment. The patella, distal femur, and proximal tibia are intact. There is an acute oblique fracture of the fibular metadiaphyseal junction with minimal displacement and no angulation. Left ankle: Three views provided. Medial soft tissue swelling. Normal bone mineral density. There is an acute medial malleolar fracture with 3 mm lateral displacement of the fragment. There widening of the distal tibiofibular syndesmosis and medial ankle mortise space consistent with grade 1 lateral subluxation.   There is an acute slightly distracted Medical/Surgical History: lost her balance and hit her head with a loc FINDINGS: BRAIN/VENTRICLES: There is no acute intracranial hemorrhage, mass effect or midline shift. No abnormal extra-axial fluid collection. The gray-white differentiation is maintained without evidence of an acute infarct. There is no evidence of hydrocephalus. Stable mild diffuse cerebral volume loss with proportionate prominence of the ventricles and sulci. Unchanged mild patchy low attenuation in the subcortical, periventricular deep white matter which likely reflects sequela of chronic microvascular ischemia. ORBITS: The visualized portion of the orbits demonstrate no acute abnormality. SINUSES: The visualized paranasal sinuses and mastoid air cells demonstrate no acute abnormality. SOFT TISSUES/SKULL:  Left parieto-occipital scalp contusion. No underlying acute displaced calvarial fracture. Postsurgical changes of right cochlear implants. Associated right mastoid effusion is unchanged. Small left mastoid effusion is unchanged. Clear included paranasal sinuses. Left parieto-occipital scalp contusion. No underlying displaced calvarial fracture or acute intracranial abnormality. Stable chronic findings, as above. Unchanged chronic mastoid effusions and postsurgical changes of right cochlear implant placement. Ct Head Wo Contrast    Result Date: 5/14/2019  EXAMINATION: CT OF THE HEAD WITHOUT CONTRAST  5/14/2019 4:53 pm TECHNIQUE: CT of the head was performed without the administration of intravenous contrast. Dose modulation, iterative reconstruction, and/or weight based adjustment of the mA/kV was utilized to reduce the radiation dose to as low as reasonably achievable. COMPARISON: 10/4/2017 HISTORY: ORDERING SYSTEM PROVIDED HISTORY: dizziness, facial swelling TECHNOLOGIST PROVIDED HISTORY: Has a \"code stroke\" or \"stroke alert\" been called? ->No Ordering Physician Provided Reason for Exam: dizziness, facial swelling Acuity: Unknown Type of Exam: Initial Additional signs and symptoms: none Relevant Medical/Surgical History: hx cochlear implant, htn FINDINGS: BRAIN/VENTRICLES: There is no acute intracranial hemorrhage, mass effect or midline shift. No abnormal extra-axial fluid collection. The gray-white differentiation is maintained without evidence of an acute infarct. There is no evidence of hydrocephalus. There are chronic lacunar infarcts within the bilateral internal capsules, unchanged from prior exam.  There is mild age-appropriate atrophy. No focus of acute abnormal brain attenuation is identified. ORBITS: The visualized portion of the orbits demonstrate no acute abnormality. SINUSES: The paranasal sinuses are clear. There is chronic right mastoid air cell opacification. The left mastoids are clear. There is a right inner ear implant device in place which causes streak artifact. There has been partial right mastoidectomy. SOFT TISSUES/SKULL:  No acute abnormality of the visualized skull or soft tissues. No acute intracranial abnormality. Ct Facial Bones W Contrast    Result Date: 5/16/2019  EXAMINATION: CT OF THE FACE WITH CONTRAST, 5/14/2019 TECHNIQUE: CT of the face was performed with the administration of intravenous contrast. Multiplanar reformatted images are provided for review. Dose modulation, iterative reconstruction, and/or weight based adjustment of the mA/kV was utilized to reduce the radiation dose to as low as reasonably achievable. COMPARISON: None HISTORY: ORDERING SYSTEM PROVIDED HISTORY: Facial swelling TECHNOLOGIST PROVIDED HISTORY: Ordering Physician Provided Reason for Exam: Dizzy rt side facial swelling. Acuity: Acute Type of Exam: Initial Additional signs and symptoms: 75 mL Isovue 370 Relevant Medical/Surgical History: no sx/ HTN FINDINGS: PHARYNX/LARYNX: The palatine tonsils are normal in appearance. The tongue is normal in appearance.   The valleculae, epiglottis, aryepiglottic folds and pyriform sinuses appear unremarkable. No mass or abscess is seen. SALIVARY GLANDS: The parotid and submandibular glands appear unremarkable. LYMPH NODES: No cervical lymphadenopathy is seen. SOFT TISSUES: There is subcutaneous edema in the right face superficial to the right mandible with infiltration of subcutaneous fat and overlying skin thickening. Findings suggest cellulitis. No evidence of discrete drainable fluid collection. No evidence of abscess. DENTITION: Patient is partially edentulous. There are numerous caries of the remaining mandibular and maxillary dentition. There is a periapical lucency surrounding the remaining portion of the right mandibular premolar that may represent a periapical abscess. No evidence of overlying cortical destruction. BRAIN/ORBITS/SINUSES: Mild mucosal thickening of the bilateral maxillary sinuses. No evidence of air-fluid levels. No evidence of acute sinusitis. The left mastoid air cells are clear. Postsurgical changes likely from prior partial right mastoidectomy. The remaining mastoid air cells on the right are opacified. Limited images of the intracranial contents demonstrate no acute abnormality. The globes are intact. BONES:  No evidence of acute facial bone fracture. Postsurgical changes of the right temporal bone and right mastoid air cells as described above. Nonspecific mild subcutaneous edema and skin thickening of the right facial soft tissues overlying the right mandible suggesting cellulitis. No evidence of abscess. Partially edentulous patient with multiple caries of the remaining dentition. Periapical lucency of the remaining portion of the right mandibular premolar may represent a periapical abscess. Extensive postsurgical changes of the right mastoid air cells with sequela of chronic mastoiditis noted. No acute sinusitis. Mild mucosal thickening of the maxillary sinuses suggesting chronic sinusitis.      Fl Less Than 1 Hour    Result Date: 5/24/2019  EXAMINATION: SPOT FLUOROSCOPIC IMAGES 5/24/2019 10:19 am TECHNIQUE: Fluoroscopy was provided by the radiology department for procedure. Radiologist was not present during examination. FLUOROSCOPY DOSE AND TYPE OR TIME AND EXPOSURES: 1.2 minutes COMPARISON: Previous day. HISTORY: Intraprocedural imaging. FINDINGS: 3 spot images of the left ankle were obtained. Internal fixation of the distal fibula and tibia. A metallic instrument overlies the tibiofibular syndesmosis. Intraprocedural fluoroscopic spot images as above. See separate procedure report for more information. Xr Hip 1 Vw W Pelvis Left    Result Date: 5/23/2019  EXAMINATION: TWO XRAY VIEWS OF THE LEFT KNEE; ONE XRAY VIEW OF THE PELVIS AND TWO XRAY VIEWS LEFT HIP; 3 XRAY VIEWS OF THE LEFT ANKLE 5/23/2019 2:29 pm COMPARISON: None. HISTORY: ORDERING SYSTEM PROVIDED HISTORY: pain TECHNOLOGIST PROVIDED HISTORY: Reason for exam:->pain Ordering Physician Provided Reason for Exam: fall, left knee pain FINDINGS: Left hip: Two views provided. No focal soft tissue abnormality. Normal bone mineral density. Normal bilateral sacroiliac and hip alignment and joint spaces. There is an ovoid well-circumscribed calcification overlying the left hemipelvis which corresponds with a rim calcified focus of remote mesenteric fat infarction identified on a prior CT abdomen/pelvis 05/14/2019. No acute fracture. Left knee: Two views provided. No focal soft tissue swelling or effusion. Normal bone mineral density and knee alignment. The patella, distal femur, and proximal tibia are intact. There is an acute oblique fracture of the fibular metadiaphyseal junction with minimal displacement and no angulation. Left ankle: Three views provided. Medial soft tissue swelling. Normal bone mineral density. There is an acute medial malleolar fracture with 3 mm lateral displacement of the fragment.   There widening of the distal tibiofibular syndesmosis and medial ankle mortise space consistent with grade 1 lateral subluxation. There is an acute slightly distracted posterior malleolar fracture. Acute nondisplaced nonangulated oblique fracture of the distal fibular metaphysis extending through the level of the syndesmosis. The talar dome is intact. The hindfoot demonstrates normal alignment with no acute talar or calcaneal fracture. Normal midfoot alignment. 1. Normal left hip alignment with no acute fracture. 2. Normal left knee alignment with an acute minimally displaced nonangulated fracture of the left proximal fibular metadiaphyseal junction. 3. Acute left ankle trimalleolar fracture with 3 mm medial displacement of the medial malleolar fragment, slight distraction of the posterior malleolar fracture, and anatomically aligned distal fibular fracture. 4. Posttraumatic distal tibiofibular syndesmotic disruption with grade 1 lateral talar subluxation. Significant Diagnostic Studies at discharge:   CBC:   Lab Results   Component Value Date    WBC 9.4 05/26/2019    RBC 3.23 05/26/2019    HGB 8.9 05/26/2019    HCT 29.7 05/26/2019    MCV 92.0 05/26/2019    MCH 27.6 05/26/2019    MCHC 30.0 05/26/2019    RDW 15.7 05/26/2019     05/26/2019    MPV 9.5 05/26/2019       Patient Instructions:     Medication List      START taking these medications    midodrine 10 MG tablet  Commonly known as:  PROAMATINE  Take 1 tablet by mouth 3 times daily (with meals)        CHANGE how you take these medications    aspirin EC 81 MG EC tablet  Take 1 tablet by mouth daily.   What changed:  when to take this     metoprolol tartrate 25 MG tablet  Commonly known as:  LOPRESSOR  Take 0.5 tablets by mouth 2 times daily Hold for SBP < 110 or HR < 60  What changed:  additional instructions        CONTINUE taking these medications    albuterol sulfate  (90 Base) MCG/ACT inhaler  Commonly known as:  VENTOLIN HFA  Inhale 2 puffs into the lungs as needed for Shortness of Breath atorvastatin 40 MG tablet  Commonly known as:  LIPITOR     budesonide-formoterol 160-4.5 MCG/ACT Aero  Commonly known as:  SYMBICORT  Inhale 2 puffs into the lungs daily     CLARITIN 10 MG tablet  Generic drug:  loratadine     clopidogrel 75 MG tablet  Commonly known as:  PLAVIX  Take 1 tablet by mouth daily     dicyclomine 20 MG tablet  Commonly known as:  BENTYL     donepezil 10 MG tablet  Commonly known as:  ARICEPT     hydrOXYzine 25 MG tablet  Commonly known as:  ATARAX     ipratropium-albuterol 0.5-2.5 (3) MG/3ML Soln nebulizer solution  Commonly known as:  DUONEB     melatonin 3 MG Tabs tablet     memantine 5 MG tablet  Commonly known as:  NAMENDA     nitroGLYCERIN 0.4 MG SL tablet  Commonly known as:  NITROSTAT  Place 1 tablet under the tongue every 5 minutes as needed for Chest pain     omeprazole 40 MG delayed release capsule  Commonly known as:  PRILOSEC     ondansetron 4 MG tablet  Commonly known as:  ZOFRAN     pantoprazole sodium 40 MG Pack packet  Commonly known as:  PROTONIX     QUEtiapine 25 MG tablet  Commonly known as:  SEROQUEL     RANEXA 1000 MG extended release tablet  Generic drug:  ranolazine  TAKE 1 TABLET BY MOUTH 2 TIMES DAILY     sertraline 50 MG tablet  Commonly known as:  ZOLOFT     traZODone 50 MG tablet  Commonly known as:  DESYREL     vitamin B-1 100 MG tablet  Commonly known as:  THIAMINE     vitamin D 67690 units Caps capsule  Commonly known as:  ERGOCALCIFEROL        STOP taking these medications    betamethasone dipropionate 0.05 % ointment  Commonly known as:  DIPROLENE     CALAMINE-ZINC OXIDE EX     clindamycin 150 MG capsule  Commonly known as:  CLEOCIN     DELSYM 30 MG/5ML extended release liquid  Generic drug:  dextromethorphan     hydrochlorothiazide 12.5 MG tablet  Commonly known as:  HYDRODIURIL     lisinopril-hydrochlorothiazide 20-12.5 MG per tablet  Commonly known as:  PRINZIDE;ZESTORETIC     mupirocin 2 % ointment  Commonly known as:  BACTROBAN     TYLENOL 8 HOUR ARTHRITIS PAIN PO        ASK your doctor about these medications    oxyCODONE-acetaminophen 5-325 MG per tablet  Commonly known as:  PERCOCET  Take 1 tablet by mouth every 6 hours as needed for Pain for up to 3 days. Ask about: Should I take this medication? Where to Get Your Medications      You can get these medications from any pharmacy    Bring a paper prescription for each of these medications  · oxyCODONE-acetaminophen 5-325 MG per tablet     Information about where to get these medications is not yet available    Ask your nurse or doctor about these medications  · metoprolol tartrate 25 MG tablet  · midodrine 10 MG tablet            Code Status: Full Code     Consults:   IP CONSULT TO HOSPITALIST  IP CONSULT TO ORTHOPEDIC SURGERY  IP CONSULT TO IV TEAM    Diet: regular diet    Activity: activity as tolerated   Work:    Discharged Condition: good    Prognosis: Fair - Good    Disposition: SNF      Follow-up with   1. PCP within   5-7    Days    Follow up labs: none       Discharge Physician Signed: Malu Zuluaga M.D. The patient was seen and examined on day of discharge and this discharge summary is in conjunction with any daily progress note from day of discharge.   Time spent on discharge in the examination, evaluation, counseling and review of medications and discharge plan: 34 minutes

## 2019-05-29 NOTE — PROGRESS NOTES
ability to attend to conversation during OT eval, making communication difficult, PLOF unable to be gathered from pt. CM Charting reports pt is from home w/family at baseline.            Electronically signed by:    Amanda Casper PTA  5/29/2019, 8:13 AM

## 2019-05-29 NOTE — FLOWSHEET NOTE
Physical Therapy  Cancellation/No-show Note  Patient Name:  Concepción George  :  1960   Date:  2019  Cancelled visits to date: 0  No-shows to date: 0    For today's appointment patient:  [x]  Cancelled  []  Rescheduled appointment  []  No-show     Reason given by patient:  [x]  Patient ill  []  Conflicting appointment  []  No transportation    []  Conflict with work  []  No reason given  []  Other:  Patient is in the Hospital due to Fractured Ankle.    Comments:      Electronically signed by:  Sanju Campbell, 2019, 10:38 AM

## 2019-05-30 ENCOUNTER — HOSPITAL ENCOUNTER (OUTPATIENT)
Age: 59
Discharge: HOME | End: 2019-05-30
Payer: COMMERCIAL

## 2019-05-30 LAB
ALBUMIN SERPL-MCNC: 3.6 GM/DL (ref 3.4–5)
ALP BLD-CCNC: 187 IU/L (ref 40–128)
ALT SERPL-CCNC: 27 U/L (ref 10–40)
ANION GAP SERPL CALCULATED.3IONS-SCNC: 10 MMOL/L (ref 4–16)
AST SERPL-CCNC: 67 IU/L (ref 15–37)
BASOPHILS ABSOLUTE: 0.1 K/CU MM
BASOPHILS RELATIVE PERCENT: 0.9 % (ref 0–1)
BILIRUB SERPL-MCNC: 0.3 MG/DL (ref 0–1)
BUN BLDV-MCNC: 18 MG/DL (ref 6–23)
CALCIUM SERPL-MCNC: 9 MG/DL (ref 8.3–10.6)
CHLORIDE BLD-SCNC: 101 MMOL/L (ref 99–110)
CO2: 28 MMOL/L (ref 21–32)
CREAT SERPL-MCNC: 1 MG/DL (ref 0.6–1.1)
DIFFERENTIAL TYPE: ABNORMAL
EKG ATRIAL RATE: 66 BPM
EKG DIAGNOSIS: NORMAL
EKG P AXIS: 63 DEGREES
EKG P-R INTERVAL: 188 MS
EKG Q-T INTERVAL: 406 MS
EKG QRS DURATION: 84 MS
EKG QTC CALCULATION (BAZETT): 425 MS
EKG R AXIS: 26 DEGREES
EKG T AXIS: 13 DEGREES
EKG VENTRICULAR RATE: 66 BPM
EOSINOPHILS ABSOLUTE: 0.4 K/CU MM
EOSINOPHILS RELATIVE PERCENT: 4.6 % (ref 0–3)
GFR AFRICAN AMERICAN: >60 ML/MIN/1.73M2
GFR NON-AFRICAN AMERICAN: 57 ML/MIN/1.73M2
GLUCOSE BLD-MCNC: 99 MG/DL (ref 70–99)
HCT VFR BLD CALC: 31.1 % (ref 37–47)
HEMOGLOBIN: 9.4 GM/DL (ref 12.5–16)
IMMATURE NEUTROPHIL %: 2.4 % (ref 0–0.43)
LYMPHOCYTES ABSOLUTE: 1.6 K/CU MM
LYMPHOCYTES RELATIVE PERCENT: 18.4 % (ref 24–44)
MCH RBC QN AUTO: 27.3 PG (ref 27–31)
MCHC RBC AUTO-ENTMCNC: 30.2 % (ref 32–36)
MCV RBC AUTO: 90.4 FL (ref 78–100)
MONOCYTES ABSOLUTE: 0.8 K/CU MM
MONOCYTES RELATIVE PERCENT: 9.6 % (ref 0–4)
NUCLEATED RBC %: 0 %
PDW BLD-RTO: 15.5 % (ref 11.7–14.9)
PLATELET # BLD: 371 K/CU MM (ref 140–440)
PMV BLD AUTO: 9.5 FL (ref 7.5–11.1)
POTASSIUM SERPL-SCNC: 4.8 MMOL/L (ref 3.5–5.1)
RBC # BLD: 3.44 M/CU MM (ref 4.2–5.4)
SEGMENTED NEUTROPHILS ABSOLUTE COUNT: 5.5 K/CU MM
SEGMENTED NEUTROPHILS RELATIVE PERCENT: 64.1 % (ref 36–66)
SODIUM BLD-SCNC: 139 MMOL/L (ref 135–145)
TOTAL IMMATURE NEUTOROPHIL: 0.2 K/CU MM
TOTAL NUCLEATED RBC: 0 K/CU MM
TOTAL PROTEIN: 5.9 GM/DL (ref 6.4–8.2)
WBC # BLD: 8.5 K/CU MM (ref 4–10.5)

## 2019-05-30 PROCEDURE — 80053 COMPREHEN METABOLIC PANEL: CPT

## 2019-05-30 PROCEDURE — 85025 COMPLETE CBC W/AUTO DIFF WBC: CPT

## 2019-05-30 PROCEDURE — 36415 COLL VENOUS BLD VENIPUNCTURE: CPT

## 2019-05-30 PROCEDURE — 82977 ASSAY OF GGT: CPT

## 2019-05-30 NOTE — PROGRESS NOTES
[facility] picked patient up to transport to [facility]. Pt's brother at bedside and aware. Report called by [facility] nurse Ayanna Bonilla.   Bonnie Whipple RN

## 2019-05-31 LAB — GAMMA GLUTAMYL TRANSFERASE: 78 IU/L (ref 5–36)

## 2019-06-07 ENCOUNTER — HOSPITAL ENCOUNTER (OUTPATIENT)
Age: 59
Discharge: HOME | End: 2019-06-07
Payer: COMMERCIAL

## 2019-06-07 LAB
ALBUMIN SERPL-MCNC: 3.8 GM/DL (ref 3.4–5)
ALP BLD-CCNC: 130 IU/L (ref 40–128)
ALT SERPL-CCNC: 7 U/L (ref 10–40)
AMMONIA: 31 UMOL/L (ref 11–51)
AMYLASE: 93 U/L (ref 25–115)
ANION GAP SERPL CALCULATED.3IONS-SCNC: 10 MMOL/L (ref 4–16)
AST SERPL-CCNC: 12 IU/L (ref 15–37)
BILIRUB SERPL-MCNC: 0.3 MG/DL (ref 0–1)
BUN BLDV-MCNC: 14 MG/DL (ref 6–23)
CALCIUM SERPL-MCNC: 9.2 MG/DL (ref 8.3–10.6)
CHLORIDE BLD-SCNC: 100 MMOL/L (ref 99–110)
CHOLESTEROL: 104 MG/DL
CO2: 28 MMOL/L (ref 21–32)
CREAT SERPL-MCNC: 1 MG/DL (ref 0.6–1.1)
GAMMA GLUTAMYL TRANSFERASE: 36 IU/L (ref 5–36)
GFR AFRICAN AMERICAN: >60 ML/MIN/1.73M2
GFR NON-AFRICAN AMERICAN: 57 ML/MIN/1.73M2
GLUCOSE BLD-MCNC: 133 MG/DL (ref 70–99)
HCT VFR BLD CALC: 32.4 % (ref 37–47)
HDLC SERPL-MCNC: 49 MG/DL
HEMOGLOBIN: 9.8 GM/DL (ref 12.5–16)
LDL CHOLESTEROL DIRECT: 48 MG/DL
LIPASE: 39 IU/L (ref 13–60)
MCH RBC QN AUTO: 27.3 PG (ref 27–31)
MCHC RBC AUTO-ENTMCNC: 30.2 % (ref 32–36)
MCV RBC AUTO: 90.3 FL (ref 78–100)
PDW BLD-RTO: 16 % (ref 11.7–14.9)
PLATELET # BLD: 379 K/CU MM (ref 140–440)
PMV BLD AUTO: 9.5 FL (ref 7.5–11.1)
POTASSIUM SERPL-SCNC: 4.6 MMOL/L (ref 3.5–5.1)
RBC # BLD: 3.59 M/CU MM (ref 4.2–5.4)
SODIUM BLD-SCNC: 138 MMOL/L (ref 135–145)
TOTAL PROTEIN: 6.5 GM/DL (ref 6.4–8.2)
TRIGL SERPL-MCNC: 70 MG/DL
WBC # BLD: 9.4 K/CU MM (ref 4–10.5)

## 2019-06-07 PROCEDURE — 82977 ASSAY OF GGT: CPT

## 2019-06-07 PROCEDURE — 85027 COMPLETE CBC AUTOMATED: CPT

## 2019-06-07 PROCEDURE — 80061 LIPID PANEL: CPT

## 2019-06-07 PROCEDURE — 36415 COLL VENOUS BLD VENIPUNCTURE: CPT

## 2019-06-07 PROCEDURE — 80053 COMPREHEN METABOLIC PANEL: CPT

## 2019-06-07 PROCEDURE — 81001 URINALYSIS AUTO W/SCOPE: CPT

## 2019-06-07 PROCEDURE — 82140 ASSAY OF AMMONIA: CPT

## 2019-06-07 PROCEDURE — 83690 ASSAY OF LIPASE: CPT

## 2019-06-07 PROCEDURE — 82150 ASSAY OF AMYLASE: CPT

## 2019-06-07 PROCEDURE — 83721 ASSAY OF BLOOD LIPOPROTEIN: CPT

## 2019-06-07 PROCEDURE — 87086 URINE CULTURE/COLONY COUNT: CPT

## 2019-06-10 LAB
BACTERIA: NEGATIVE /HPF
BILIRUBIN URINE: NEGATIVE MG/DL
BLOOD, URINE: ABNORMAL
CLARITY: CLEAR
COLOR: YELLOW
CULTURE: NORMAL
GLUCOSE, URINE: NEGATIVE MG/DL
KETONES, URINE: NEGATIVE MG/DL
LEUKOCYTE ESTERASE, URINE: NEGATIVE
Lab: NORMAL
NITRITE URINE, QUANTITATIVE: NEGATIVE
PH, URINE: 6 (ref 5–8)
PROTEIN UA: NEGATIVE MG/DL
RBC URINE: <1 /HPF (ref 0–6)
SPECIFIC GRAVITY UA: 1.01 (ref 1–1.03)
SPECIMEN: NORMAL
SQUAMOUS EPITHELIAL: 1 /HPF
TRICHOMONAS: ABNORMAL /HPF
UROBILINOGEN, URINE: NORMAL MG/DL (ref 0.2–1)
WBC UA: 1 /HPF (ref 0–5)

## 2019-06-10 NOTE — PROGRESS NOTES
ORTHOPEDIC PROGRESS NOTE    2019    Patient name: Hudson Kimball  : 1960    Chief Complaint   Patient presents with    Post-Op Check     left ankle open reduction internal fixation       SUBJECTIVE   The patient was seen and examined. Hudson Kimball is a 60 y.o. female who presents today for postoperative appointment s/p ORIF left ankle trimalleolar fracture without fixation of posterior malleolus and ORIF of left ankle syndesmosis, Surgeon Dr. Marino Morris. Date of surgery 19. Patient appears to be recovering well but expresses a strong desire to go home from nursing facility. OBJECTIVE     There were no vitals filed for this visit. Physical Exam:   GEN: alert and following commands   MS: LLE: CR<2sec, SILT, wiggles toes, DF/PF ankle, incisions c/d/i, staples in place    X-rays: no x-rays obtained today    ASSESSMENT     Chief Complaint   Patient presents with    Post-Op Check     left ankle open reduction internal fixation     2 weeks postop from ORIF Left ankle fracture and syndesmosis. PLAN     1. Activity, PT/OT: No weightbearing left ankle  2. DVT prophylaxis: complete lovenox prescription and then Aspirin 325mg PO daily for 4 weeks   3. Staples removed today  4. Follow up:  Follow up in 4 weeks with Dr. Nancy David      Electronically signed by:Alison Warner PA-C, 2019 2:45 PM

## 2019-06-11 ENCOUNTER — OFFICE VISIT (OUTPATIENT)
Dept: ORTHOPEDIC SURGERY | Age: 59
End: 2019-06-11

## 2019-06-11 VITALS — HEART RATE: 67 BPM | OXYGEN SATURATION: 96 %

## 2019-06-11 DIAGNOSIS — S82.852D CLOSED TRIMALLEOLAR FRACTURE OF LEFT ANKLE WITH ROUTINE HEALING, SUBSEQUENT ENCOUNTER: ICD-10-CM

## 2019-06-11 DIAGNOSIS — S93.432A ANKLE SYNDESMOSIS DISRUPTION, LEFT, INITIAL ENCOUNTER: ICD-10-CM

## 2019-06-11 DIAGNOSIS — S82.892A CLOSED LEFT ANKLE FRACTURE, INITIAL ENCOUNTER: ICD-10-CM

## 2019-06-11 PROCEDURE — 99024 POSTOP FOLLOW-UP VISIT: CPT | Performed by: ORTHOPAEDIC SURGERY

## 2019-06-11 NOTE — PATIENT INSTRUCTIONS
1.) Non-weight bearing left ankle for 2-3 months  2.) OK for active ankle range of motion  3.) Remove boot brace 3 times daily for skin checks  4.) Follow up in 4 weeks with Dr. Bindu Abdul

## 2019-06-13 ENCOUNTER — HOSPITAL ENCOUNTER (OUTPATIENT)
Age: 59
Discharge: HOME | End: 2019-06-13
Payer: COMMERCIAL

## 2019-06-13 LAB
ALBUMIN SERPL-MCNC: 3.7 GM/DL (ref 3.4–5)
ALP BLD-CCNC: 114 IU/L (ref 40–129)
ALT SERPL-CCNC: <5 U/L (ref 10–40)
ANION GAP SERPL CALCULATED.3IONS-SCNC: 7 MMOL/L (ref 4–16)
AST SERPL-CCNC: 11 IU/L (ref 15–37)
BASOPHILS ABSOLUTE: 0.1 K/CU MM
BASOPHILS RELATIVE PERCENT: 1.2 % (ref 0–1)
BILIRUB SERPL-MCNC: 0.3 MG/DL (ref 0–1)
BUN BLDV-MCNC: 16 MG/DL (ref 6–23)
CALCIUM SERPL-MCNC: 9.6 MG/DL (ref 8.3–10.6)
CHLORIDE BLD-SCNC: 103 MMOL/L (ref 99–110)
CO2: 32 MMOL/L (ref 21–32)
CREAT SERPL-MCNC: 1 MG/DL (ref 0.6–1.1)
DIFFERENTIAL TYPE: ABNORMAL
EOSINOPHILS ABSOLUTE: 0.6 K/CU MM
EOSINOPHILS RELATIVE PERCENT: 6.2 % (ref 0–3)
GFR AFRICAN AMERICAN: >60 ML/MIN/1.73M2
GFR NON-AFRICAN AMERICAN: 57 ML/MIN/1.73M2
GLUCOSE BLD-MCNC: 94 MG/DL (ref 70–99)
HCT VFR BLD CALC: 33 % (ref 37–47)
HEMOGLOBIN: 9.9 GM/DL (ref 12.5–16)
IMMATURE NEUTROPHIL %: 0.7 % (ref 0–0.43)
LYMPHOCYTES ABSOLUTE: 2 K/CU MM
LYMPHOCYTES RELATIVE PERCENT: 22.1 % (ref 24–44)
MCH RBC QN AUTO: 27.3 PG (ref 27–31)
MCHC RBC AUTO-ENTMCNC: 30 % (ref 32–36)
MCV RBC AUTO: 91.2 FL (ref 78–100)
MONOCYTES ABSOLUTE: 0.8 K/CU MM
MONOCYTES RELATIVE PERCENT: 8.6 % (ref 0–4)
NUCLEATED RBC %: 0 %
PDW BLD-RTO: 15.9 % (ref 11.7–14.9)
PLATELET # BLD: 389 K/CU MM (ref 140–440)
PMV BLD AUTO: 9.5 FL (ref 7.5–11.1)
POTASSIUM SERPL-SCNC: 4.8 MMOL/L (ref 3.5–5.1)
RBC # BLD: 3.62 M/CU MM (ref 4.2–5.4)
SEGMENTED NEUTROPHILS ABSOLUTE COUNT: 5.5 K/CU MM
SEGMENTED NEUTROPHILS RELATIVE PERCENT: 61.2 % (ref 36–66)
SODIUM BLD-SCNC: 142 MMOL/L (ref 135–145)
TOTAL IMMATURE NEUTOROPHIL: 0.06 K/CU MM
TOTAL NUCLEATED RBC: 0 K/CU MM
TOTAL PROTEIN: 6.2 GM/DL (ref 6.4–8.2)
WBC # BLD: 9 K/CU MM (ref 4–10.5)

## 2019-06-13 PROCEDURE — 80053 COMPREHEN METABOLIC PANEL: CPT

## 2019-06-13 PROCEDURE — 85025 COMPLETE CBC W/AUTO DIFF WBC: CPT

## 2019-06-13 PROCEDURE — 36415 COLL VENOUS BLD VENIPUNCTURE: CPT

## 2019-06-28 ENCOUNTER — HOSPITAL ENCOUNTER (OUTPATIENT)
Age: 59
Setting detail: SPECIMEN
Discharge: HOME | End: 2019-06-28
Payer: COMMERCIAL

## 2019-06-28 PROCEDURE — 87324 CLOSTRIDIUM AG IA: CPT

## 2019-06-29 LAB
CLOSTRIDIUM DIFFICILE, PCR: NORMAL
CLOSTRIDIUM DIFFICILE, PCR: NORMAL

## 2019-07-01 ENCOUNTER — HOSPITAL ENCOUNTER (OUTPATIENT)
Age: 59
Setting detail: SPECIMEN
Discharge: HOME | End: 2019-07-01
Payer: COMMERCIAL

## 2019-07-01 LAB
ANION GAP SERPL CALCULATED.3IONS-SCNC: 12 MMOL/L (ref 4–16)
BUN BLDV-MCNC: 15 MG/DL (ref 6–23)
CALCIUM SERPL-MCNC: 8.9 MG/DL (ref 8.3–10.6)
CHLORIDE BLD-SCNC: 102 MMOL/L (ref 99–110)
CO2: 27 MMOL/L (ref 21–32)
CREAT SERPL-MCNC: 1 MG/DL (ref 0.6–1.1)
GFR AFRICAN AMERICAN: >60 ML/MIN/1.73M2
GFR NON-AFRICAN AMERICAN: 57 ML/MIN/1.73M2
GLUCOSE BLD-MCNC: 76 MG/DL (ref 70–99)
HCT VFR BLD CALC: 34.9 % (ref 37–47)
HEMOGLOBIN: 10.5 GM/DL (ref 12.5–16)
MCH RBC QN AUTO: 27.1 PG (ref 27–31)
MCHC RBC AUTO-ENTMCNC: 30.1 % (ref 32–36)
MCV RBC AUTO: 90.2 FL (ref 78–100)
PDW BLD-RTO: 15.6 % (ref 11.7–14.9)
PLATELET # BLD: 338 K/CU MM (ref 140–440)
PMV BLD AUTO: 9.7 FL (ref 7.5–11.1)
POTASSIUM SERPL-SCNC: 4.3 MMOL/L (ref 3.5–5.1)
RBC # BLD: 3.87 M/CU MM (ref 4.2–5.4)
SODIUM BLD-SCNC: 141 MMOL/L (ref 135–145)
WBC # BLD: 7.7 K/CU MM (ref 4–10.5)

## 2019-07-01 PROCEDURE — 85027 COMPLETE CBC AUTOMATED: CPT

## 2019-07-01 PROCEDURE — 80048 BASIC METABOLIC PNL TOTAL CA: CPT

## 2019-07-01 PROCEDURE — 36415 COLL VENOUS BLD VENIPUNCTURE: CPT

## 2019-07-11 ENCOUNTER — OFFICE VISIT (OUTPATIENT)
Dept: ORTHOPEDIC SURGERY | Age: 59
End: 2019-07-11
Payer: COMMERCIAL

## 2019-07-11 VITALS — HEART RATE: 72 BPM | BODY MASS INDEX: 34.33 KG/M2 | HEIGHT: 62 IN | OXYGEN SATURATION: 95 % | RESPIRATION RATE: 14 BRPM

## 2019-07-11 DIAGNOSIS — S82.852D CLOSED TRIMALLEOLAR FRACTURE OF LEFT ANKLE WITH ROUTINE HEALING, SUBSEQUENT ENCOUNTER: Primary | ICD-10-CM

## 2019-07-11 PROCEDURE — 73610 X-RAY EXAM OF ANKLE: CPT | Performed by: ORTHOPAEDIC SURGERY

## 2019-07-11 PROCEDURE — 99024 POSTOP FOLLOW-UP VISIT: CPT | Performed by: ORTHOPAEDIC SURGERY

## 2019-07-11 NOTE — PROGRESS NOTES
Radiology Report    Name: Gabino Lau  YOB: 1960  Procedure: 3 v left ankle  Date: 7/11/2019  Comparison: 5/24/19  Reason for X-ray:   Patient Active Problem List   Diagnosis    Chest pain    GERD (gastroesophageal reflux disease)    Current tobacco use    [facility] [address]    Essential hypertension    Palpitations    Tobacco use    Gastroesophageal reflux disease with hiatal hernia    S/P Nissen fundoplication (without gastrostomy tube) procedure    Precordial pain    Post PTCA    Leg pain, bilateral    Syncope and collapse    Dizziness    Angina pectoris ([facility] [address]    HTN (hypertension)    CAD (coronary artery disease)    GERD (gastroesophageal reflux disease)    Ankle syndesmosis disruption, left, initial encounter    Closed trimalleolar fracture of left ankle    Closed left ankle fracture, initial encounter    Closed fracture of upper end of left fibula      Reading: left ankle stable fixation bimalleolar & syndesmotic fixation; closed treatment posterior malleolus fracture stable  Impression: stable fixation left ankle trimalleolar fracture    Electronically signed by: Sophia Leos MD, 7/11/2019 3:49 PM

## 2019-07-18 PROBLEM — N18.31 CHRONIC KIDNEY DISEASE (CKD) STAGE G3A/A1, MODERATELY DECREASED GLOMERULAR FILTRATION RATE (GFR) BETWEEN 45-59 ML/MIN/1.73 SQUARE METER AND ALBUMINURIA CREATININE RATIO LESS THAN 30 MG/G (HCC): Status: ACTIVE | Noted: 2019-07-18

## 2019-07-18 PROBLEM — I95.9 HYPOTENSION: Status: ACTIVE | Noted: 2019-07-18

## 2019-07-28 NOTE — PLAN OF CARE
Problem: Falls - Risk of:  Goal: Will remain free from falls  Description  Will remain free from falls  5/29/2019 0206 by Yenifer Shah RN  Outcome: Ongoing  5/28/2019 1826 by Brianna Rico LPN  Outcome: Ongoing  Goal: Absence of physical injury  Description  Absence of physical injury  5/29/2019 0206 by Yenifer Shah RN  Outcome: Ongoing  5/28/2019 1826 by Brianna Rico LPN  Outcome: Ongoing     Problem: SAFETY  Goal: Free from accidental physical injury  5/29/2019 0206 by Yenifer Shah RN  Outcome: Ongoing  5/28/2019 1826 by Brianna Rico LPN  Outcome: Ongoing  Goal: Free from intentional harm  5/29/2019 0206 by Yenifer Shah RN  Outcome: Ongoing  5/28/2019 1826 by Brianna Rico LPN  Outcome: Ongoing     Problem: DAILY CARE  Goal: Daily care needs are met  5/29/2019 0206 by Yenifer Shah RN  Outcome: Ongoing  5/28/2019 1826 by Brianna Rico LPN  Outcome: Ongoing     Problem: PAIN  Goal: Patient's pain/discomfort is manageable  5/29/2019 0206 by Yenifer Shah RN  Outcome: Ongoing  5/28/2019 1826 by Brianna Rico LPN  Outcome: Ongoing     Problem: SKIN INTEGRITY  Goal: Skin integrity is maintained or improved  5/29/2019 0206 by Yenifer Shah RN  Outcome: Ongoing  5/28/2019 1826 by Brianna Rico LPN  Outcome: Ongoing     Problem: KNOWLEDGE DEFICIT  Goal: Patient/S.O. demonstrates understanding of disease process, treatment plan, medications, and discharge instructions.   5/29/2019 0206 by Yenifer Shah RN  Outcome: Ongoing  5/28/2019 1826 by Brianna Rico LPN  Outcome: Ongoing     Problem: DISCHARGE BARRIERS  Goal: Patient's continuum of care needs are met  5/29/2019 0206 by Yenifer Shah RN  Outcome: Ongoing  5/28/2019 1826 by Brianna Rico LPN  Outcome: Ongoing     Problem: Pain:  Goal: Pain level will decrease  Description  Pain level will decrease  5/29/2019 0206 by Yenifer Shah RN  Outcome: Ongoing  5/28/2019 1826 by Boy Grace LPN  Outcome: Ongoing  Goal: Control of acute pain  Description  Control of acute pain  5/29/2019 0206 by Nahid Vang RN  Outcome: Ongoing  5/28/2019 1826 by Boy Grace LPN  Outcome: Ongoing  Goal: Control of chronic pain  Description  Control of chronic pain  5/29/2019 0206 by Nahid Vang RN  Outcome: Ongoing  5/28/2019 1826 by Boy Grace LPN  Outcome: Ongoing     Problem: Risk for Impaired Skin Integrity  Goal: Tissue integrity - skin and mucous membranes  Description  Structural intactness and normal physiological function of skin and  mucous membranes.   5/29/2019 0206 by Nahid Vang RN  Outcome: Ongoing  5/28/2019 1826 by Boy Grace LPN  Outcome: Ongoing     Problem: Musculor/Skeletal Functional Status  Goal: Highest potential functional level  5/29/2019 0206 by Nahid Vang RN  Outcome: Ongoing  5/28/2019 1826 by Boy Grace LPN  Outcome: Ongoing f/u Hgb a1c.  Holding home januvia.  Continue MISS And FS.     - VTE: Lovenox  - PT Consulted   - Dispo:  continue to monitor, f/u bedside echo, discuss w/ outpatient oncologist.

## 2019-08-15 ENCOUNTER — OFFICE VISIT (OUTPATIENT)
Dept: ORTHOPEDIC SURGERY | Age: 59
End: 2019-08-15

## 2019-08-15 VITALS — HEIGHT: 62 IN | BODY MASS INDEX: 34.33 KG/M2 | RESPIRATION RATE: 14 BRPM

## 2019-08-15 DIAGNOSIS — S93.432D ANKLE SYNDESMOSIS DISRUPTION, LEFT, SUBSEQUENT ENCOUNTER: Primary | ICD-10-CM

## 2019-08-15 DIAGNOSIS — S82.852D CLOSED TRIMALLEOLAR FRACTURE OF LEFT ANKLE WITH ROUTINE HEALING, SUBSEQUENT ENCOUNTER: ICD-10-CM

## 2019-08-15 PROCEDURE — 99024 POSTOP FOLLOW-UP VISIT: CPT | Performed by: PHYSICIAN ASSISTANT

## 2019-08-15 NOTE — PATIENT INSTRUCTIONS
[address] [place] [address] for regular shoe  Starting home physical therapy - weightbear as tolerated, strengthening and ROM  Follow up as needed.

## 2019-09-10 ENCOUNTER — OFFICE VISIT (OUTPATIENT)
Dept: CARDIOLOGY CLINIC | Age: 59
End: 2019-09-10
Payer: COMMERCIAL

## 2019-09-10 VITALS
SYSTOLIC BLOOD PRESSURE: 122 MMHG | BODY MASS INDEX: 33.57 KG/M2 | HEIGHT: 62 IN | DIASTOLIC BLOOD PRESSURE: 68 MMHG | HEART RATE: 66 BPM | WEIGHT: 182.4 LBS

## 2019-09-10 DIAGNOSIS — I25.10 CORONARY ARTERY DISEASE INVOLVING NATIVE CORONARY ARTERY OF NATIVE HEART WITHOUT ANGINA PECTORIS: Primary | ICD-10-CM

## 2019-09-10 PROCEDURE — G8427 DOCREV CUR MEDS BY ELIG CLIN: HCPCS | Performed by: INTERNAL MEDICINE

## 2019-09-10 PROCEDURE — 3017F COLORECTAL CA SCREEN DOC REV: CPT | Performed by: INTERNAL MEDICINE

## 2019-09-10 PROCEDURE — G8417 CALC BMI ABV UP PARAM F/U: HCPCS | Performed by: INTERNAL MEDICINE

## 2019-09-10 PROCEDURE — G8598 ASA/ANTIPLAT THER USED: HCPCS | Performed by: INTERNAL MEDICINE

## 2019-09-10 PROCEDURE — 99214 OFFICE O/P EST MOD 30 MIN: CPT | Performed by: INTERNAL MEDICINE

## 2019-09-10 PROCEDURE — 1036F TOBACCO NON-USER: CPT | Performed by: INTERNAL MEDICINE

## 2019-09-10 NOTE — PROGRESS NOTES
CARDIOLOGY NOTE      9/10/2019    RE: Ron Alvin  (1960)                               TO:  Dr. Brice Cueto, APRN - CNP            Clifton Awad is a 60 y.o. female who was seen today for management of  cad                                    HPI:   Patient is here for    - Coronary artery disease, does not have chest pain. Patient is  compliant with prescribed medicines. - Hypertension,is  well controlled, pt is  compliant with medicines    - Hyperlipidimea, lipids are in acceptable range.  Pt  is  compliant with medicines  Is [place]                  The patient does not have cardiac complaints    Ron Esquivel has the following history recorded in care path:  Patient Active Problem List    Diagnosis Date Noted    Angina pectoris [facility])      Priority: High    Syncope and collapse      Priority: High    Dizziness 05/26/2017     Priority: Low    Leg pain, bilateral 04/19/2017     Priority: Low    Post PTCA 02/16/2017     Priority: Low    Precordial pain 01/24/2017     Priority: Low    S/P Nissen fundoplication (without gastrostomy tube) procedure 11/05/2015     Priority: Low    Gastroesophageal reflux disease with hiatal hernia 02/21/2014     Priority: Low    Palpitations      Priority: Low    Tobacco use      Priority: Low    COPD (chronic obstructive pulmonary disease) ([facility] [address] 10/03/2012     Priority: Low    Essential hypertension 10/03/2012     Priority: Low    Chest pain 11/16/2011     Priority: Low    GERD (gastroesophageal reflux disease) 11/16/2011     Priority: Low    Current tobacco use 11/16/2011     Priority: Low    Chronic kidney disease (CKD) stage G3a/A1, moderately decreased glomerular filtration rate (GFR) between 45-59 mL/min/1.73 square meter and albuminuria creatinine ratio less than 30 mg/g ([facility]) 07/18/2019    Hypotension 07/18/2019    Closed fracture of upper end of left fibula     Ankle syndesmosis disruption, left, subsequent encounter 05/23/2019  Closed trimalleolar fracture of left ankle 05/23/2019    Closed left ankle fracture, initial encounter 05/23/2019    HTN (hypertension) 09/11/2017    CAD (coronary artery disease) 09/11/2017    GERD (gastroesophageal reflux disease) 09/11/2017     Current Outpatient Medications   Medication Sig Dispense Refill    fluticasone-vilanterol (BREO ELLIPTA) 100-25 MCG/INH AEPB inhaler Inhale 1 puff into the lungs daily      traMADol (ULTRAM) 50 MG tablet Take 50 mg by mouth every 6 hours as needed for Pain.       midodrine (PROAMATINE) 10 MG tablet Take 1 tablet by mouth 3 times daily (with meals) 90 tablet 3    metoprolol tartrate (LOPRESSOR) 25 MG tablet Take 0.5 tablets by mouth 2 times daily Hold for SBP < 110 or HR < 60 60 tablet 3    budesonide-formoterol (SYMBICORT) 160-4.5 MCG/ACT AERO Inhale 2 puffs into the lungs daily 1 Inhaler 3    albuterol sulfate HFA (VENTOLIN HFA) 108 (90 Base) MCG/ACT inhaler Inhale 2 puffs into the lungs as needed for Shortness of Breath 1 Inhaler 5    dicyclomine (BENTYL) 20 MG tablet Take 20 mg by mouth 3 times daily as needed      loratadine (CLARITIN) 10 MG tablet Take 10 mg by mouth daily      donepezil (ARICEPT) 10 MG tablet TAKE 1 TABLET BY MOUTH EVERYDAY AT daily  3    clopidogrel (PLAVIX) 75 MG tablet Take 1 tablet by mouth daily 90 tablet 3    RANEXA 1000 MG extended release tablet TAKE 1 TABLET BY MOUTH 2 TIMES DAILY 180 tablet 2    pantoprazole sodium (PROTONIX) 40 MG PACK packet Take 40 mg by mouth 2 times daily (before meals)       traZODone (DESYREL) 50 MG tablet Take 50 mg by mouth nightly as needed       melatonin 3 MG TABS tablet Take 5 mg by mouth nightly as needed       nitroGLYCERIN (NITROSTAT) 0.4 MG SL tablet Place 1 tablet under the tongue every 5 minutes as needed for Chest pain 25 tablet 3    memantine (NAMENDA) 5 MG tablet Take 5 mg by mouth 2 times daily      hydrOXYzine (ATARAX) 25 MG tablet Take 50 mg by mouth every 8 hours as needed  History of nuclear stress test 8/7/15    EF 70%. WNL    [place] (hard of hearing)     Bilateral Ears    Hx of cardiovascular stress test 9/2013 9/13 EF70% Normal.10/12- LVSF normal. EF 61%. Normal perfusion althought pt complained of chest pain with Lexiscan. report in epic; 11/11, possible apical ischemia but abnormality secondary to technical artifact needs to be considered, regional wall motion abnormality with low normal LVSF by pierson scan. 8/10, 12/09    Hx of Doppler ultrasound 2/2011 2/2011-Carotid no significant obstructive lesions noted in the extracranial carotid system. Antegrade flow noted inthe vertebral arteries.  Hx of Doppler ultrasound 1/2012 1/2012-peripheral - both abis and duplex studies of both lower extremities do not  reveal any significant peripheral vascular disease at this time.  Hx of Doppler ultrasound 1/22/2016    Arterial: Peripheral vascular noninvasive arterial studies do not reveal any significant atherosclerotic disease.  Hx of echocardiogram 6/2013 6/13- Mild to mod MR/TR. 10/12-LVSF normal. EF 60%. Mild mitral and tricuspid insuff. reprot in epic; 8/6/10, normal dimension of the LV, normal global and regional LVSF with an EF of 60%, no significant valvuopathy is seen.  12/05    Hypertension     \"on medication since age 25's\" follow with Dr Eulalia Portillo Irregular heart beat     \"have irreg heart beat\" dont know what you call it\"    Migraines Last Migraine In 2014    Palpitations     Panic attacks     Pneumonia Last Episode In 2014    Prolonged emergence from general anesthesia     Restless leg     S/P cardiac cath 2/21/14    S/P PTCA (percutaneous transluminal coronary angioplasty) 01/26/2017    2017, CX stented    Shortness of breath     Teeth missing     Upper And Lower    Tobacco use     Unspecified sleep apnea     \"had sleep study 2-3 yrs ago\" have cpap and try to use it\"     Past Surgical History:   Procedure Laterality Date    ANKLE FRACTURE SURGERY Left 2019    LEFT ANKLE OPEN REDUCTION INTERNAL FIXATION SYNDESMOSIS performed by Beverly Rogel MD at [address]      COLONOSCOPY  2017    colon polyp, diverticulosis, hemorrhoids    DENTAL SURGERY      Teeth Extracted In Past    ENDOSCOPY, COLON, DIAGNOSTIC  10-16-15    ENDOSCOPY, COLON, DIAGNOSTIC  2019    Hiatal hernia, savary dil #17 used    GASTRIC FUNDOPLICATION      Robotic laparoscopic nissen with mesh    KIDNEY SURGERY Left 2-15    \"At [facility] Had Left Kidney Mass Removed\" Benign    TONSILLECTOMY      TUBAL LIGATION      UPPER GASTROINTESTINAL ENDOSCOPY N/A 2019    EGD DILATION SAVARY #17MM performed by Myra Sevilla MD at [place] [address]      As reviewed   Family History   Problem Relation Age of Onset    Heart Disease Mother     Heart Disease Father     High Blood Pressure Father     Cancer Sister     Early Death Sister 43    Asthma Brother      Social History     Tobacco Use    Smoking status: Former Smoker     Packs/day: 0.25     Years: 44.00     Pack years: 11.00     Types: Cigarettes     Start date: 10/21/1971     Last attempt to quit: 2015     Years since quittin.0    Smokeless tobacco: Never Used   Substance Use Topics    Alcohol use: Yes     Alcohol/week: 2.0 standard drinks     Types: 2 Shots of liquor per week     Comment: occassionally      Review of Systems:    Constitutional: Negative for diaphoresis and fatigue  Psychological:Negative for anxiety or depression  HENT: Negative for headaches, nasal congestion, sinus pain or vertigo  Eyes: Negative for visual disturbance.    Endocrine: Negative for polydipsia/polyuria  Respiratory: Negative for shortness of breath  Cardiovascular: Negative for chest pain, dyspnea on exertion, claudication, edema, irregular heartbeat, murmur, palpitations or shortness of breath  Gastrointestinal: Negative for abdominal pain or heartburn  Genito-Urinary: Negative for urinary frequency/urgency  Musculoskeletal: Negative for muscle pain, muscular weakness, negative for pain in arm and leg or swelling in foot and leg  Neurological: Negative for dizziness, headaches, memory loss, numbness/tingling, visual changes, syncope  Dermatological: Negative for rash    Objective:  /68 (Site: Left Upper Arm, Position: Sitting, Cuff Size: Large Adult)   Pulse 66   Ht 5' 2\" (1.575 m)   Wt 182 lb 6.4 oz (82.7 kg)   BMI 33.36 kg/m²   Wt Readings from Last 3 Encounters:   09/10/19 182 lb 6.4 oz (82.7 kg)   08/20/19 162 lb (73.5 kg)   05/28/19 187 lb 11.2 oz (85.1 kg)     Body mass index is 33.36 kg/m². GENERAL - Alert, oriented, pleasant, in no apparent distress. EYES: No jaundice, no conjunctival pallor. SKIN: It is warm & dry. No rashes. No Echhymosis    HEENT - No clinically significant abnormalities seen. Neck - Supple. No jugular venous distention noted. No carotid bruits. Cardiovascular - Normal S1 and S2 without obvious murmur or gallop. Extremities - No cyanosis, clubbing, or significant edema. Pulmonary - No respiratory distress. No wheezes or rales. Abdomen - No masses, tenderness, or organomegaly. Musculoskeletal - No significant edema. No joint deformities. No muscle wasting. Neurologic - Cranial nerves II through XII are grossly intact. There were no gross focal neurologic abnormalities.     Lab Review   Lab Results   Component Value Date    CKTOTAL 29 05/11/2017    TROPONINT <0.010 05/14/2019     BNP:    Lab Results   Component Value Date    BNP 18 04/22/2014     PT/INR:    Lab Results   Component Value Date    INR 1.06 05/23/2019     Lab Results   Component Value Date    LABA1C 5.4 11/21/2014    LABA1C 5.4 05/19/2011     Lab Results   Component Value Date    WBC 7.7 07/01/2019    HCT 34.9 (L) 07/01/2019    MCV 90.2 07/01/2019     07/01/2019     Lab Results   Component Value Date    CHOL 104 06/07/2019    TRIG 70 06/07/2019    HDL 49 06/07/2019 LDLCALC 92 11/26/2012    LDLDIRECT 48 06/07/2019     Lab Results   Component Value Date    ALT <5 (L) 06/13/2019    AST 11 (L) 06/13/2019     BMP:    Lab Results   Component Value Date     07/01/2019    K 4.3 07/01/2019     07/01/2019    CO2 27 07/01/2019    BUN 15 07/01/2019    CREATININE 1.0 07/01/2019     CMP:   Lab Results   Component Value Date     07/01/2019    K 4.3 07/01/2019     07/01/2019    CO2 27 07/01/2019    BUN 15 07/01/2019    PROT 6.2 06/13/2019    PROT 7.0 11/26/2012     TSH:    Lab Results   Component Value Date    TSHHS 0.548 02/13/2018       QUALITY MEASURES REVIEWED:  Impression:    1.  Coronary artery disease involving native coronary artery of native heart without angina pectoris       Patient Active Problem List   Diagnosis Code    Chest pain R07.9    GERD (gastroesophageal reflux disease) K21.9    Current tobacco use Z72.0    [facility] J44.9    Essential hypertension I10    Palpitations R00.2    Tobacco use Z72.0    Gastroesophageal reflux disease with hiatal hernia K21.9, K44.9    S/P Nissen fundoplication (without gastrostomy tube) procedure Z98.890    Precordial pain R07.2    Post PTCA Z98.61    Leg pain, bilateral M79.604, M79.605    Syncope and collapse R55    Dizziness R42    Angina pectoris ([facility] [address] I20.9    HTN (hypertension) I10    CAD (coronary artery disease) I25.10    GERD (gastroesophageal reflux disease) K21.9    Ankle syndesmosis disruption, left, subsequent encounter F42.831J    Closed trimalleolar fracture of left ankle S82.852A    Closed left ankle fracture, initial encounter S82.892A    Closed fracture of upper end of left fibula S82.832A    Chronic kidney disease (CKD) stage G3a/A1, moderately decreased glomerular filtration rate (GFR) between 45-59 mL/min/1.73 square meter and albuminuria creatinine ratio less than 30 mg/g ([facility]) N18.3    Hypotension I95.9       Assessment & Plan:

## 2019-10-11 ENCOUNTER — HOSPITAL ENCOUNTER (EMERGENCY)
Age: 59
Discharge: HOME | End: 2019-10-11
Attending: EMERGENCY MEDICINE
Payer: COMMERCIAL

## 2019-10-11 VITALS
HEART RATE: 80 BPM | OXYGEN SATURATION: 100 % | WEIGHT: 188 LBS | BODY MASS INDEX: 34.6 KG/M2 | RESPIRATION RATE: 22 BRPM | HEIGHT: 62 IN | DIASTOLIC BLOOD PRESSURE: 89 MMHG | TEMPERATURE: 98.7 F | SYSTOLIC BLOOD PRESSURE: 140 MMHG

## 2019-10-11 DIAGNOSIS — R41.82 ALTERED MENTAL STATUS, UNSPECIFIED ALTERED MENTAL STATUS TYPE: Primary | ICD-10-CM

## 2019-10-11 LAB
ANION GAP SERPL CALCULATED.3IONS-SCNC: 9 MMOL/L (ref 4–16)
BACTERIA: NEGATIVE /HPF
BASOPHILS ABSOLUTE: 0.1 K/CU MM
BASOPHILS RELATIVE PERCENT: 1.6 % (ref 0–1)
BILIRUBIN URINE: NEGATIVE MG/DL
BLOOD, URINE: NEGATIVE
BUN BLDV-MCNC: 11 MG/DL (ref 6–23)
CALCIUM SERPL-MCNC: 8.9 MG/DL (ref 8.3–10.6)
CHLORIDE BLD-SCNC: 100 MMOL/L (ref 99–110)
CLARITY: CLEAR
CO2: 26 MMOL/L (ref 21–32)
COLOR: YELLOW
CREAT SERPL-MCNC: 0.9 MG/DL (ref 0.6–1.1)
DIFFERENTIAL TYPE: ABNORMAL
EOSINOPHILS ABSOLUTE: 0.3 K/CU MM
EOSINOPHILS RELATIVE PERCENT: 3.5 % (ref 0–3)
GFR AFRICAN AMERICAN: >60 ML/MIN/1.73M2
GFR NON-AFRICAN AMERICAN: >60 ML/MIN/1.73M2
GLUCOSE BLD-MCNC: 91 MG/DL (ref 70–99)
GLUCOSE, URINE: NEGATIVE MG/DL
HCT VFR BLD CALC: 40.8 % (ref 37–47)
HEMOGLOBIN: 11.9 GM/DL (ref 12.5–16)
IMMATURE NEUTROPHIL %: 0.4 % (ref 0–0.43)
KETONES, URINE: NEGATIVE MG/DL
LEUKOCYTE ESTERASE, URINE: NEGATIVE
LYMPHOCYTES ABSOLUTE: 2 K/CU MM
LYMPHOCYTES RELATIVE PERCENT: 24.7 % (ref 24–44)
MCH RBC QN AUTO: 26.5 PG (ref 27–31)
MCHC RBC AUTO-ENTMCNC: 29.2 % (ref 32–36)
MCV RBC AUTO: 90.9 FL (ref 78–100)
MONOCYTES ABSOLUTE: 0.7 K/CU MM
MONOCYTES RELATIVE PERCENT: 8.1 % (ref 0–4)
MUCUS: ABNORMAL HPF
NITRITE URINE, QUANTITATIVE: NEGATIVE
NUCLEATED RBC %: 0 %
PDW BLD-RTO: 15.9 % (ref 11.7–14.9)
PH, URINE: 7 (ref 5–8)
PLATELET # BLD: 391 K/CU MM (ref 140–440)
PMV BLD AUTO: 9.7 FL (ref 7.5–11.1)
POTASSIUM SERPL-SCNC: 4.7 MMOL/L (ref 3.5–5.1)
PROTEIN UA: NEGATIVE MG/DL
RBC # BLD: 4.49 M/CU MM (ref 4.2–5.4)
RBC URINE: ABNORMAL /HPF (ref 0–6)
SEGMENTED NEUTROPHILS ABSOLUTE COUNT: 5.1 K/CU MM
SEGMENTED NEUTROPHILS RELATIVE PERCENT: 61.7 % (ref 36–66)
SODIUM BLD-SCNC: 135 MMOL/L (ref 135–145)
SPECIFIC GRAVITY UA: 1.02 (ref 1–1.03)
SQUAMOUS EPITHELIAL: 1 /HPF
TOTAL IMMATURE NEUTOROPHIL: 0.03 K/CU MM
TOTAL NUCLEATED RBC: 0 K/CU MM
TRICHOMONAS: ABNORMAL /HPF
TROPONIN T: <0.01 NG/ML
UROBILINOGEN, URINE: NORMAL MG/DL (ref 0.2–1)
WBC # BLD: 8.3 K/CU MM (ref 4–10.5)
WBC UA: ABNORMAL /HPF (ref 0–5)

## 2019-10-11 PROCEDURE — 80048 BASIC METABOLIC PNL TOTAL CA: CPT

## 2019-10-11 PROCEDURE — 81001 URINALYSIS AUTO W/SCOPE: CPT

## 2019-10-11 PROCEDURE — 93005 ELECTROCARDIOGRAM TRACING: CPT | Performed by: EMERGENCY MEDICINE

## 2019-10-11 PROCEDURE — 99285 EMERGENCY DEPT VISIT HI MDM: CPT

## 2019-10-11 PROCEDURE — 85025 COMPLETE CBC W/AUTO DIFF WBC: CPT

## 2019-10-11 PROCEDURE — 93010 ELECTROCARDIOGRAM REPORT: CPT | Performed by: INTERNAL MEDICINE

## 2019-10-11 PROCEDURE — 84484 ASSAY OF TROPONIN QUANT: CPT

## 2019-10-11 PROCEDURE — 36415 COLL VENOUS BLD VENIPUNCTURE: CPT

## 2019-10-15 LAB
EKG ATRIAL RATE: 67 BPM
EKG DIAGNOSIS: NORMAL
EKG P AXIS: 53 DEGREES
EKG P-R INTERVAL: 156 MS
EKG Q-T INTERVAL: 390 MS
EKG QRS DURATION: 70 MS
EKG QTC CALCULATION (BAZETT): 412 MS
EKG R AXIS: 31 DEGREES
EKG T AXIS: 20 DEGREES
EKG VENTRICULAR RATE: 67 BPM

## 2020-01-09 RX ORDER — CLOPIDOGREL BISULFATE 75 MG/1
75 TABLET ORAL DAILY
Qty: 30 TABLET | Refills: 5 | Status: SHIPPED | OUTPATIENT
Start: 2020-01-09 | End: 2020-07-02

## 2020-01-24 RX ORDER — RANOLAZINE 1000 MG/1
1000 TABLET, EXTENDED RELEASE ORAL 2 TIMES DAILY
Qty: 180 TABLET | Refills: 3 | Status: SHIPPED | OUTPATIENT
Start: 2020-01-24 | End: 2020-09-15 | Stop reason: SDUPTHER

## 2020-02-11 ENCOUNTER — HOSPITAL ENCOUNTER (OUTPATIENT)
Dept: GENERAL RADIOLOGY | Age: 60
Discharge: HOME | End: 2020-02-11
Payer: COMMERCIAL

## 2020-02-11 ENCOUNTER — HOSPITAL ENCOUNTER (OUTPATIENT)
Age: 60
Discharge: HOME | End: 2020-02-11
Payer: COMMERCIAL

## 2020-02-11 PROCEDURE — 72100 X-RAY EXAM L-S SPINE 2/3 VWS: CPT

## 2020-02-11 PROCEDURE — 72170 X-RAY EXAM OF PELVIS: CPT

## 2020-02-17 ENCOUNTER — TELEPHONE (OUTPATIENT)
Dept: CARDIOLOGY CLINIC | Age: 60
End: 2020-02-17

## 2020-02-17 NOTE — TELEPHONE ENCOUNTER
Cardiologist: Dr. Jong Quispe  Surgeon: Dr. David Monzon  Surgery: Colonscopy  Anesthesia: Propofol  Date: Pending  FAX# [phone]  # [phone]    Last OV 9/10/19 w/Courtney    Assessment & Plan:    -     CORONARY ARTERY DISEASE:  asymptomatic     All available  tests in chart reviewed. Management discussed . Testing ordered  no                 -  Hypertension: Patients blood pressure is normal. Patient is advised about low sodium diet. Present medical regimen will not be changed.       -  LIPID MANAGEMENT:  Available lipid  lab data reviewed  and patient was given dietary advice. NCEP- ATP III guidelines reviewed with patient. -   Changes  in medicines made: No        Echo 1/24/17     Ejection fraction is visually estimated at 50%. Mild to Moderate mitral regurgitation is present. Mild tricuspid regurgitation. Right ventricular systolic pressure of 42 mm Hg consistent with mild   pulmonary hypertension    NM-1/24/17    1. The patient did complain of chest pain; however, no ischemic EKG changes  were seen. 2.  The Cardiolite study demonstrates mild degree of septal ischemia.   The  perfusion of the rest of the myocardium is normal.  The global function is  Intact    CATH- 6/1/2017    Plavix  aspirin

## 2020-02-19 ENCOUNTER — TELEPHONE (OUTPATIENT)
Dept: CARDIOLOGY CLINIC | Age: 60
End: 2020-02-19

## 2020-02-19 NOTE — LETTER
Deloris Pozo  [address], [address], [address]  Phone: [phone]    Fax [phone]                  Moe Parsons MD, Maciej Trevino MD, Eryn Tomlin MD, Carlota Lefevre, MD Sherie Latino, MD Belva Mutters, MD Doreen Hodgkin, LAVERN-BIJAN Jimenez, LAVERN-BIJAN Mir    Cardiac Risk Assessment   2/19/2020        Surgery Date: Pending  Surgery: Colonoscopy  Anesthesia Type: Propofol  Fax Number: [phone]    To: Dr. Pascual Salas  From : BIJAN Ivey    Patient Name: Osito Burrell     YOB: 1960  MRN: [id number]    Osito Burrell was evaluated from a cardiac standpoint for her surgery or procedure and based on her history, diagnosis, recent cardiac testing, she is considered a low risk candidate for any monserrat-operative cardiac complications. Antiplatelet/anticoagulant therapy can be held prior to the surgery or procedure at the discretion of the surgeon to be resumed as soon as possible if held. Please call with any further questions.     Respectfully,     BIJAN Ivey  AS/cjs

## 2020-02-19 NOTE — TELEPHONE ENCOUNTER
Cardiologist: Dr. Benito Correa  Surgeon: Dr. Norma Cruz  Surgery: Colonscopy  Anesthesia: Propofol  Date: Pending  FAX# [phone]  # [phone]     Last OV 9/10/19 w/Courtney     Assessment & Plan:    HX:  CAD, HTN, HLP, COPD     -  CORONARY ARTERY DISEASE:  asymptomatic         -  Hypertension: Patients blood pressure is normal.    -  LIPID MANAGEMENT:  Available lipid  lab data reviewed  and patient was given dietary advice. NCEP- ATP III guidelines reviewed with patient.    -   Changes  in medicines made: No     Echo 1/24/17    Ejection fraction is visually estimated at 50%.    Mild to Moderate MR   Mild TR   Mild pulmonary hypertension     GXT-2/28/17 Normal test     CATH- 6/1/2017  Cx stent     MEDS: Plavix & aspirin

## 2020-03-06 ENCOUNTER — HOSPITAL ENCOUNTER (OUTPATIENT)
Age: 60
End: 2020-03-06
Payer: COMMERCIAL

## 2020-03-06 ENCOUNTER — APPOINTMENT (OUTPATIENT)
Dept: GENERAL RADIOLOGY | Age: 60
End: 2020-03-06
Payer: COMMERCIAL

## 2020-03-06 ENCOUNTER — HOSPITAL ENCOUNTER (OUTPATIENT)
Dept: GENERAL RADIOLOGY | Age: 60
Discharge: HOME | End: 2020-03-06
Payer: COMMERCIAL

## 2020-03-06 ENCOUNTER — HOSPITAL ENCOUNTER (OUTPATIENT)
Age: 60
Setting detail: OBSERVATION
Discharge: HOME | End: 2020-03-07
Attending: FAMILY MEDICINE | Admitting: HOSPITALIST
Payer: COMMERCIAL

## 2020-03-06 LAB
ALBUMIN SERPL-MCNC: 3.5 GM/DL (ref 3.4–5)
ALP BLD-CCNC: 100 IU/L (ref 40–129)
ALT SERPL-CCNC: <5 U/L (ref 10–40)
ANION GAP SERPL CALCULATED.3IONS-SCNC: 14 MMOL/L (ref 4–16)
AST SERPL-CCNC: 14 IU/L (ref 15–37)
BASOPHILS ABSOLUTE: 0.1 K/CU MM
BASOPHILS RELATIVE PERCENT: 1.4 % (ref 0–1)
BILIRUB SERPL-MCNC: 0.5 MG/DL (ref 0–1)
BUN BLDV-MCNC: 8 MG/DL (ref 6–23)
CALCIUM SERPL-MCNC: 9.1 MG/DL (ref 8.3–10.6)
CHLORIDE BLD-SCNC: 102 MMOL/L (ref 99–110)
CO2: 20 MMOL/L (ref 21–32)
CREAT SERPL-MCNC: 0.9 MG/DL (ref 0.6–1.1)
DIFFERENTIAL TYPE: ABNORMAL
EOSINOPHILS ABSOLUTE: 0.2 K/CU MM
EOSINOPHILS RELATIVE PERCENT: 2 % (ref 0–3)
GFR AFRICAN AMERICAN: >60 ML/MIN/1.73M2
GFR NON-AFRICAN AMERICAN: >60 ML/MIN/1.73M2
GLUCOSE BLD-MCNC: 88 MG/DL (ref 70–99)
HCT VFR BLD CALC: 38.6 % (ref 37–47)
HEMOGLOBIN: 11.7 GM/DL (ref 12.5–16)
IMMATURE NEUTROPHIL %: 0.3 % (ref 0–0.43)
LYMPHOCYTES ABSOLUTE: 1.7 K/CU MM
LYMPHOCYTES RELATIVE PERCENT: 22.2 % (ref 24–44)
MCH RBC QN AUTO: 27.7 PG (ref 27–31)
MCHC RBC AUTO-ENTMCNC: 30.3 % (ref 32–36)
MCV RBC AUTO: 91.3 FL (ref 78–100)
MONOCYTES ABSOLUTE: 0.6 K/CU MM
MONOCYTES RELATIVE PERCENT: 7.7 % (ref 0–4)
NUCLEATED RBC %: 0 %
PDW BLD-RTO: 14.5 % (ref 11.7–14.9)
PLATELET # BLD: 373 K/CU MM (ref 140–440)
PMV BLD AUTO: 9.7 FL (ref 7.5–11.1)
POTASSIUM SERPL-SCNC: 3.7 MMOL/L (ref 3.5–5.1)
RBC # BLD: 4.23 M/CU MM (ref 4.2–5.4)
SEGMENTED NEUTROPHILS ABSOLUTE COUNT: 5.1 K/CU MM
SEGMENTED NEUTROPHILS RELATIVE PERCENT: 66.4 % (ref 36–66)
SODIUM BLD-SCNC: 136 MMOL/L (ref 135–145)
TOTAL IMMATURE NEUTOROPHIL: 0.02 K/CU MM
TOTAL NUCLEATED RBC: 0 K/CU MM
TOTAL PROTEIN: 7.7 GM/DL (ref 6.4–8.2)
TROPONIN T: <0.01 NG/ML
TROPONIN T: <0.01 NG/ML
WBC # BLD: 7.6 K/CU MM (ref 4–10.5)

## 2020-03-06 PROCEDURE — 85025 COMPLETE CBC W/AUTO DIFF WBC: CPT

## 2020-03-06 PROCEDURE — G0378 HOSPITAL OBSERVATION PER HR: HCPCS

## 2020-03-06 PROCEDURE — 71045 X-RAY EXAM CHEST 1 VIEW: CPT

## 2020-03-06 PROCEDURE — 2580000003 HC RX 258: Performed by: HOSPITALIST

## 2020-03-06 PROCEDURE — 6370000000 HC RX 637 (ALT 250 FOR IP): Performed by: HOSPITALIST

## 2020-03-06 PROCEDURE — 73030 X-RAY EXAM OF SHOULDER: CPT

## 2020-03-06 PROCEDURE — 84484 ASSAY OF TROPONIN QUANT: CPT

## 2020-03-06 PROCEDURE — 99285 EMERGENCY DEPT VISIT HI MDM: CPT

## 2020-03-06 PROCEDURE — 93005 ELECTROCARDIOGRAM TRACING: CPT | Performed by: FAMILY MEDICINE

## 2020-03-06 PROCEDURE — 80053 COMPREHEN METABOLIC PANEL: CPT

## 2020-03-06 PROCEDURE — 6370000000 HC RX 637 (ALT 250 FOR IP): Performed by: PHYSICIAN ASSISTANT

## 2020-03-06 RX ORDER — PROMETHAZINE HYDROCHLORIDE 25 MG/1
12.5 TABLET ORAL EVERY 6 HOURS PRN
Status: DISCONTINUED | OUTPATIENT
Start: 2020-03-06 | End: 2020-03-07 | Stop reason: HOSPADM

## 2020-03-06 RX ORDER — NITROGLYCERIN 0.4 MG/1
0.4 TABLET SUBLINGUAL EVERY 5 MIN PRN
Status: DISCONTINUED | OUTPATIENT
Start: 2020-03-06 | End: 2020-03-07 | Stop reason: HOSPADM

## 2020-03-06 RX ORDER — CETIRIZINE HYDROCHLORIDE 10 MG/1
10 TABLET ORAL DAILY
Status: DISCONTINUED | OUTPATIENT
Start: 2020-03-07 | End: 2020-03-07 | Stop reason: HOSPADM

## 2020-03-06 RX ORDER — ASPIRIN 81 MG/1
81 TABLET ORAL DAILY
Status: DISCONTINUED | OUTPATIENT
Start: 2020-03-07 | End: 2020-03-07 | Stop reason: HOSPADM

## 2020-03-06 RX ORDER — DICYCLOMINE HCL 20 MG
20 TABLET ORAL 3 TIMES DAILY PRN
Status: DISCONTINUED | OUTPATIENT
Start: 2020-03-06 | End: 2020-03-07 | Stop reason: HOSPADM

## 2020-03-06 RX ORDER — DONEPEZIL HYDROCHLORIDE 5 MG/1
5 TABLET, FILM COATED ORAL NIGHTLY
Status: DISCONTINUED | OUTPATIENT
Start: 2020-03-06 | End: 2020-03-07 | Stop reason: HOSPADM

## 2020-03-06 RX ORDER — ALBUTEROL SULFATE 90 UG/1
2 AEROSOL, METERED RESPIRATORY (INHALATION) EVERY 6 HOURS PRN
Status: DISCONTINUED | OUTPATIENT
Start: 2020-03-06 | End: 2020-03-07 | Stop reason: HOSPADM

## 2020-03-06 RX ORDER — TRAZODONE HYDROCHLORIDE 50 MG/1
50 TABLET ORAL NIGHTLY PRN
Status: DISCONTINUED | OUTPATIENT
Start: 2020-03-06 | End: 2020-03-07 | Stop reason: HOSPADM

## 2020-03-06 RX ORDER — BUDESONIDE AND FORMOTEROL FUMARATE DIHYDRATE 80; 4.5 UG/1; UG/1
2 AEROSOL RESPIRATORY (INHALATION) 2 TIMES DAILY
Status: DISCONTINUED | OUTPATIENT
Start: 2020-03-06 | End: 2020-03-06 | Stop reason: SDUPTHER

## 2020-03-06 RX ORDER — CLOPIDOGREL BISULFATE 75 MG/1
75 TABLET ORAL DAILY
Status: DISCONTINUED | OUTPATIENT
Start: 2020-03-07 | End: 2020-03-07 | Stop reason: HOSPADM

## 2020-03-06 RX ORDER — ASPIRIN 81 MG/1
324 TABLET, CHEWABLE ORAL ONCE
Status: COMPLETED | OUTPATIENT
Start: 2020-03-06 | End: 2020-03-06

## 2020-03-06 RX ORDER — PANTOPRAZOLE SODIUM 40 MG/1
40 TABLET, DELAYED RELEASE ORAL
Status: DISCONTINUED | OUTPATIENT
Start: 2020-03-07 | End: 2020-03-07 | Stop reason: HOSPADM

## 2020-03-06 RX ORDER — THIAMINE MONONITRATE (VIT B1) 100 MG
100 TABLET ORAL DAILY
Status: DISCONTINUED | OUTPATIENT
Start: 2020-03-07 | End: 2020-03-07 | Stop reason: HOSPADM

## 2020-03-06 RX ORDER — ERGOCALCIFEROL 1.25 MG/1
50000 CAPSULE ORAL WEEKLY
Status: DISCONTINUED | OUTPATIENT
Start: 2020-03-07 | End: 2020-03-07 | Stop reason: HOSPADM

## 2020-03-06 RX ORDER — NITROGLYCERIN 0.4 MG/1
0.4 TABLET SUBLINGUAL EVERY 5 MIN PRN
Status: COMPLETED | OUTPATIENT
Start: 2020-03-06 | End: 2020-03-06

## 2020-03-06 RX ORDER — HYDROXYZINE HYDROCHLORIDE 25 MG/1
50 TABLET, FILM COATED ORAL EVERY 8 HOURS PRN
Status: DISCONTINUED | OUTPATIENT
Start: 2020-03-06 | End: 2020-03-07 | Stop reason: HOSPADM

## 2020-03-06 RX ORDER — POLYETHYLENE GLYCOL 3350 17 G/17G
17 POWDER, FOR SOLUTION ORAL DAILY PRN
Status: DISCONTINUED | OUTPATIENT
Start: 2020-03-06 | End: 2020-03-07 | Stop reason: HOSPADM

## 2020-03-06 RX ORDER — ACETAMINOPHEN 325 MG/1
650 TABLET ORAL EVERY 6 HOURS PRN
Status: DISCONTINUED | OUTPATIENT
Start: 2020-03-06 | End: 2020-03-07 | Stop reason: HOSPADM

## 2020-03-06 RX ORDER — ACETAMINOPHEN 650 MG/1
650 SUPPOSITORY RECTAL EVERY 6 HOURS PRN
Status: DISCONTINUED | OUTPATIENT
Start: 2020-03-06 | End: 2020-03-07 | Stop reason: HOSPADM

## 2020-03-06 RX ORDER — TRAMADOL HYDROCHLORIDE 50 MG/1
50 TABLET ORAL EVERY 6 HOURS PRN
Status: DISCONTINUED | OUTPATIENT
Start: 2020-03-06 | End: 2020-03-07 | Stop reason: HOSPADM

## 2020-03-06 RX ORDER — ONDANSETRON 2 MG/ML
4 INJECTION INTRAMUSCULAR; INTRAVENOUS EVERY 6 HOURS PRN
Status: DISCONTINUED | OUTPATIENT
Start: 2020-03-06 | End: 2020-03-07 | Stop reason: HOSPADM

## 2020-03-06 RX ORDER — BUDESONIDE AND FORMOTEROL FUMARATE DIHYDRATE 160; 4.5 UG/1; UG/1
2 AEROSOL RESPIRATORY (INHALATION) DAILY
Status: DISCONTINUED | OUTPATIENT
Start: 2020-03-07 | End: 2020-03-07 | Stop reason: HOSPADM

## 2020-03-06 RX ORDER — MORPHINE SULFATE 4 MG/ML
4 INJECTION, SOLUTION INTRAMUSCULAR; INTRAVENOUS EVERY 30 MIN PRN
Status: DISCONTINUED | OUTPATIENT
Start: 2020-03-06 | End: 2020-03-06 | Stop reason: ALTCHOICE

## 2020-03-06 RX ORDER — RANOLAZINE 500 MG/1
1000 TABLET, EXTENDED RELEASE ORAL 2 TIMES DAILY
Status: DISCONTINUED | OUTPATIENT
Start: 2020-03-06 | End: 2020-03-07 | Stop reason: HOSPADM

## 2020-03-06 RX ORDER — SODIUM CHLORIDE 0.9 % (FLUSH) 0.9 %
10 SYRINGE (ML) INJECTION EVERY 12 HOURS SCHEDULED
Status: DISCONTINUED | OUTPATIENT
Start: 2020-03-06 | End: 2020-03-07 | Stop reason: HOSPADM

## 2020-03-06 RX ORDER — SODIUM CHLORIDE 0.9 % (FLUSH) 0.9 %
10 SYRINGE (ML) INJECTION PRN
Status: DISCONTINUED | OUTPATIENT
Start: 2020-03-06 | End: 2020-03-07 | Stop reason: HOSPADM

## 2020-03-06 RX ORDER — MEMANTINE HYDROCHLORIDE 5 MG/1
5 TABLET ORAL 2 TIMES DAILY
Status: DISCONTINUED | OUTPATIENT
Start: 2020-03-06 | End: 2020-03-07 | Stop reason: HOSPADM

## 2020-03-06 RX ORDER — ATORVASTATIN CALCIUM 40 MG/1
40 TABLET, FILM COATED ORAL DAILY
Status: DISCONTINUED | OUTPATIENT
Start: 2020-03-07 | End: 2020-03-07 | Stop reason: HOSPADM

## 2020-03-06 RX ADMIN — NITROGLYCERIN 0.4 MG: 0.4 TABLET, ORALLY DISINTEGRATING SUBLINGUAL at 18:49

## 2020-03-06 RX ADMIN — ASPIRIN 81 MG 324 MG: 81 TABLET ORAL at 21:56

## 2020-03-06 RX ADMIN — MEMANTINE HYDROCHLORIDE 5 MG: 5 TABLET ORAL at 23:18

## 2020-03-06 RX ADMIN — DONEPEZIL HYDROCHLORIDE 5 MG: 5 TABLET, FILM COATED ORAL at 23:18

## 2020-03-06 RX ADMIN — SODIUM CHLORIDE, PRESERVATIVE FREE 10 ML: 5 INJECTION INTRAVENOUS at 23:18

## 2020-03-06 RX ADMIN — RANOLAZINE 1000 MG: 500 TABLET, FILM COATED, EXTENDED RELEASE ORAL at 23:18

## 2020-03-06 RX ADMIN — NITROGLYCERIN 0.4 MG: 0.4 TABLET, ORALLY DISINTEGRATING SUBLINGUAL at 19:14

## 2020-03-06 ASSESSMENT — PAIN SCALES - GENERAL: PAINLEVEL_OUTOF10: 10

## 2020-03-06 NOTE — ED PROVIDER NOTES
[place]  Lungs: Lungs are clear to auscultation there is no wheezing rhonchi or rales. There is no use of accessory muscles no nasal flaring identified. Chest wall: There is no tenderness to palpation over the chest wall or over ribs  Abdomen: Abdomen is soft nontender nondistended. There is no firm or pulsatile masses no rebound rigidity or guarding negative Martin's negative McBurney, no peritoneal signs  Suprapubic:  there is no tenderness to palpation over the external bladder   Musculoskeletal: 5 out of 5 strength in all 4 extremities full flexion extension abduction and adduction supination pronation of all extremities and all digits. No obvious muscle atrophy is noted. No focal muscle deficits are appreciated  Dermatology: Skin is warm and dry there is no obvious abscesses lacerations or lesions noted  Psych: Mentation is grossly normal cognition is grossly normal. Affect is appropriate  Neuro: Motor intact sensory intact cranial nerves II through XII are intact level of consciousness is normal cerebellar function is normal reflexes are grossly normal. No evidence of incontinence or loss of bowel or bladder no saddle anesthesia noted Lymphatic: There is no submandibular or cervical adenopathy appreciated.         I have reviewed and interpreted all of the currently available lab results from this visit (if applicable):  Results for orders placed or performed during the hospital encounter of 03/06/20   CBC auto diff   Result Value Ref Range    WBC 7.6 4.0 - 10.5 K/CU MM    RBC 4.23 4.2 - 5.4 M/CU MM    Hemoglobin 11.7 (L) 12.5 - 16.0 GM/DL    Hematocrit 38.6 37 - 47 %    MCV 91.3 78 - 100 FL    MCH 27.7 27 - 31 PG    MCHC 30.3 (L) 32.0 - 36.0 %    RDW 14.5 11.7 - 14.9 %    Platelets 493 288 - 016 K/CU MM    MPV 9.7 7.5 - 11.1 FL    Differential Type AUTOMATED DIFFERENTIAL     Segs Relative 66.4 (H) 36 - 66 %    Lymphocytes % 22.2 (L) 24 - 44 %    Monocytes % 7.7 (H) 0 - 4 %    Eosinophils % 2.0 0 - 3 % Basophils % 1.4 (H) 0 - 1 %    Segs Absolute 5.1 K/CU MM    Lymphocytes Absolute 1.7 K/CU MM    Monocytes Absolute 0.6 K/CU MM    Eosinophils Absolute 0.2 K/CU MM    Basophils Absolute 0.1 K/CU MM    Nucleated RBC % 0.0 %    Total Nucleated RBC 0.0 K/CU MM    Total Immature Neutrophil 0.02 K/CU MM    Immature Neutrophil % 0.3 0 - 0.43 %   CMP   Result Value Ref Range    Sodium 136 135 - 145 MMOL/L    Potassium 3.7 3.5 - 5.1 MMOL/L    Chloride 102 99 - 110 mMol/L    CO2 20 (L) 21 - 32 MMOL/L    BUN 8 6 - 23 MG/DL    CREATININE 0.9 0.6 - 1.1 MG/DL    Glucose 88 70 - 99 MG/DL    Calcium 9.1 8.3 - 10.6 MG/DL    Alb 3.5 3.4 - 5.0 GM/DL    Total Protein 7.7 6.4 - 8.2 GM/DL    Total Bilirubin 0.5 0.0 - 1.0 MG/DL    ALT <5 (L) 10 - 40 U/L    AST 14 (L) 15 - 37 IU/L    Alkaline Phosphatase 100 40 - 129 IU/L    GFR Non-African American >60 >60 mL/min/1.73m2    GFR African American >60 >60 mL/min/1.73m2    Anion Gap 14 4 - 16   Troponin   Result Value Ref Range    Troponin T <0.010 <0.01 NG/ML   Troponin   Result Value Ref Range    Troponin T <0.010 <0.01 NG/ML   Troponin   Result Value Ref Range    Troponin T <0.010 <0.01 NG/ML   Lipid panel   Result Value Ref Range    Triglycerides 102 <150 MG/DL    Cholesterol 157 <200 MG/DL    HDL 38 (L) >40 MG/DL    LDL Direct 105 (H) <100 MG/DL   Hemoglobin A1c   Result Value Ref Range    Hemoglobin A1C 5.6 4.2 - 6.3 %    eAG 114 mg/dL   EKG 12 Lead   Result Value Ref Range    Ventricular Rate 68 BPM    Atrial Rate 68 BPM    P-R Interval 156 ms    QRS Duration 80 ms    Q-T Interval 400 ms    QTc Calculation (Bazett) 425 ms    P Axis 66 degrees    R Axis 23 degrees    T Axis 25 degrees    Diagnosis       Normal sinus rhythm  Normal ECG  When compared with ECG of 11-OCT-2019 15:29,  No significant change was found  Confirmed by Jomar Cancino MD, Hiwot Salvador [id number]) on 3/8/2020 9:44:54 PM     EKG 12 Lead   Result Value Ref Range    Ventricular Rate 61 BPM    Atrial Rate 61 BPM    P-R Interval fracture or other acute osseous abnormality. No suspicious lytic or blastic osseous lesion. Probable phlebolith in the right hemipelvis. Status post cholecystectomy. 1. Multilevel degenerative changes of the lumbar spine most pronounced at L2-3 and L3-4 to a moderate degree. 2. Mild degenerative changes of the bilateral sacroiliac joints. Xr Pelvis (1-2 Views)    Result Date: 2/11/2020  EXAMINATION: ONE XRAY VIEW OF THE PELVIS; THREE XRAY VIEWS OF THE LUMBAR SPINE 2/11/2020 3:59 pm COMPARISON: Lumbar spine radiographs 03/13/2013. HISTORY: ORDERING SYSTEM PROVIDED HISTORY: Low back pain, unspecified back pain laterality, unspecified chronicity, unspecified whether sciatica present TECHNOLOGIST PROVIDED HISTORY: Reason for Exam: patient reports lower back pain that radiates into her right hip; ORDERING SYSTEM PROVIDED HISTORY: Low back pain, unspecified back pain laterality, unspecified chronicity, unspecified whether sciatica present TECHNOLOGIST PROVIDED HISTORY: Reason for Exam: patient reports lower back pain that radiates into right hip Additional signs and symptoms: patient reports lower back pain that radiates into right hip FINDINGS: 5 non rib-bearing lumbar type vertebrae. Mild mid lumbar spine levoscoliosis. Normal alignment is otherwise maintained. The vertebral body heights are preserved. Multilevel degenerative disc disease most pronounced at L2-3 and L3-4 to a moderate degree. The bilateral sacroiliac joints and pubic symphysis are intact. Mild bilateral sacroiliac joint degenerative changes. The bilateral hips are unremarkable. No fracture or other acute osseous abnormality. No suspicious lytic or blastic osseous lesion. Probable phlebolith in the right hemipelvis. Status post cholecystectomy. 1. Multilevel degenerative changes of the lumbar spine most pronounced at L2-3 and L3-4 to a moderate degree. 2. Mild degenerative changes of the bilateral sacroiliac joints.      Josefa Holder Shoulder Left (min 2 Views)    Result Date: 3/6/2020  EXAMINATION: THREE X-RAY VIEWS OF THE LEFT SHOULDER 3/6/2020 2:50 pm COMPARISON: None HISTORY: ORDERING SYSTEM PROVIDED HISTORY: Acute pain of left shoulder TECHNOLOGIST PROVIDED HISTORY: Acuity: Acute Type of Exam: Initial Additional signs and symptoms: No specific injury. Acute pain with limited ROM. FINDINGS: Three views of the left shoulder were performed. There is no acute fracture or dislocation. There is mild osteoarthritis at the [facility] joint. The glenohumeral joint is preserved. No destructive osseous lesion is seen. The visualized left-sided ribs and left lung are unremarkable. 1. No acute abnormality of the left shoulder. 2. Mild osteoarthritis at the [facility] joint. Xr Chest Portable    Result Date: 3/6/2020  EXAMINATION: ONE XRAY VIEW OF THE CHEST 3/6/2020 4:35 pm COMPARISON: Chest radiograph 05/23/2019. HISTORY: ORDERING SYSTEM PROVIDED HISTORY: chest pain TECHNOLOGIST PROVIDED HISTORY: Reason for exam:->chest pain Reason for Exam: chest pain Acuity: Acute Type of Exam: Initial Additional signs and symptoms: na Relevant Medical/Surgical History: copd, hypertension FINDINGS: The lungs are without acute focal process. There is no effusion or pneumothorax. The cardiomediastinal silhouette is without acute process. The osseous structures are without acute process. No acute process. MDM:   Patient presents with chest pain. Patient has a heart score >3. Has continued pain here. Initial cardiac workup is negative including negative troponin, cxr (per radiologist interpretation) ekg (per attending physician interpretation) will require admission for chest pain rule out. Blood count and metabolic panel reveal no acuity to account for pt symptoms. On-call physician  Was consulted regarding patient.   After thorough discussion regarding patient's history, physical exam.  laboratory values, radiographic evidence (if applicable  theymyself as well as my attending physician agreed Given the patient's presenting concerns, medical history and clinical findings, the patient will be admitted at this time to undergo further evaluation and disposition. . During patient's entire stay in the ED patient remained stable and comfortable. Analgesia is well-controlled. Patient will be admitted for all information regarding ongoing management and care of patient please see note of Admitting physician. All EKG interpretations are performed by Attending physician    I managed patient in conjunction with attending physician .     BP (!) 135/95   Pulse 66   Temp 97.7 °F (36.5 °C) (Oral)   Resp 20   Ht 5' 2\" (1.575 m)   Wt 184 lb 1.6 oz (83.5 kg)   SpO2 95%   BMI 33.67 kg/m²       Clinical Impression:  1. Precordial pain        Disposition referral (if applicable):  LAVERN Saunders - NP  [facility]  [phone]    Schedule an appointment as soon as possible for a visit in 1 week      Melissa Garcia MD  [address] [id number] [phone]    Schedule an appointment as soon as possible for a visit in 1 week  chest pain    Disposition medications (if applicable):  Discharge Medication List as of 3/7/2020  3:06 PM            Comment: Please note this report has been produced using speech recognition software and may contain errors related to that system including errors in grammar, punctuation, and spelling, as well as words and phrases that may be inappropriate. If there are any questions or concerns please feel free to contact the dictating provider for clarification.       Shiv Ledesma, [address] [place] [address]  03/09/20 [id number]

## 2020-03-07 VITALS
HEART RATE: 66 BPM | HEIGHT: 62 IN | RESPIRATION RATE: 20 BRPM | WEIGHT: 184.1 LBS | OXYGEN SATURATION: 95 % | SYSTOLIC BLOOD PRESSURE: 135 MMHG | DIASTOLIC BLOOD PRESSURE: 95 MMHG | BODY MASS INDEX: 33.88 KG/M2 | TEMPERATURE: 97.7 F

## 2020-03-07 LAB
CHOLESTEROL: 157 MG/DL
ESTIMATED AVERAGE GLUCOSE: 114 MG/DL
HBA1C MFR BLD: 5.6 % (ref 4.2–6.3)
HDLC SERPL-MCNC: 38 MG/DL
LDL CHOLESTEROL DIRECT: 105 MG/DL
TRIGL SERPL-MCNC: 102 MG/DL
TROPONIN T: <0.01 NG/ML

## 2020-03-07 PROCEDURE — 96374 THER/PROPH/DIAG INJ IV PUSH: CPT

## 2020-03-07 PROCEDURE — 2580000003 HC RX 258: Performed by: HOSPITALIST

## 2020-03-07 PROCEDURE — 94640 AIRWAY INHALATION TREATMENT: CPT

## 2020-03-07 PROCEDURE — G0378 HOSPITAL OBSERVATION PER HR: HCPCS

## 2020-03-07 PROCEDURE — 99243 OFF/OP CNSLTJ NEW/EST LOW 30: CPT | Performed by: INTERNAL MEDICINE

## 2020-03-07 PROCEDURE — 94761 N-INVAS EAR/PLS OXIMETRY MLT: CPT

## 2020-03-07 PROCEDURE — 83721 ASSAY OF BLOOD LIPOPROTEIN: CPT

## 2020-03-07 PROCEDURE — 93005 ELECTROCARDIOGRAM TRACING: CPT | Performed by: INTERNAL MEDICINE

## 2020-03-07 PROCEDURE — 83036 HEMOGLOBIN GLYCOSYLATED A1C: CPT

## 2020-03-07 PROCEDURE — 6370000000 HC RX 637 (ALT 250 FOR IP): Performed by: HOSPITALIST

## 2020-03-07 PROCEDURE — 6360000002 HC RX W HCPCS: Performed by: HOSPITALIST

## 2020-03-07 PROCEDURE — 84484 ASSAY OF TROPONIN QUANT: CPT

## 2020-03-07 PROCEDURE — 80061 LIPID PANEL: CPT

## 2020-03-07 PROCEDURE — 96372 THER/PROPH/DIAG INJ SC/IM: CPT

## 2020-03-07 PROCEDURE — 36415 COLL VENOUS BLD VENIPUNCTURE: CPT

## 2020-03-07 RX ADMIN — ONDANSETRON 4 MG: 2 INJECTION INTRAMUSCULAR; INTRAVENOUS at 10:04

## 2020-03-07 RX ADMIN — Medication 100 MG: at 10:00

## 2020-03-07 RX ADMIN — ENOXAPARIN SODIUM 40 MG: 40 INJECTION SUBCUTANEOUS at 10:00

## 2020-03-07 RX ADMIN — ATORVASTATIN CALCIUM 40 MG: 40 TABLET, FILM COATED ORAL at 10:00

## 2020-03-07 RX ADMIN — TRAMADOL HYDROCHLORIDE 50 MG: 50 TABLET, FILM COATED ORAL at 04:56

## 2020-03-07 RX ADMIN — RANOLAZINE 1000 MG: 500 TABLET, FILM COATED, EXTENDED RELEASE ORAL at 09:59

## 2020-03-07 RX ADMIN — SODIUM CHLORIDE, PRESERVATIVE FREE 10 ML: 5 INJECTION INTRAVENOUS at 10:00

## 2020-03-07 RX ADMIN — BUDESONIDE AND FORMOTEROL FUMARATE DIHYDRATE 2 PUFF: 160; 4.5 AEROSOL RESPIRATORY (INHALATION) at 09:11

## 2020-03-07 RX ADMIN — METOPROLOL TARTRATE 12.5 MG: 25 TABLET, FILM COATED ORAL at 10:00

## 2020-03-07 RX ADMIN — CETIRIZINE HYDROCHLORIDE 10 MG: 10 TABLET, FILM COATED ORAL at 10:00

## 2020-03-07 RX ADMIN — CLOPIDOGREL BISULFATE 75 MG: 75 TABLET ORAL at 10:00

## 2020-03-07 RX ADMIN — ERGOCALCIFEROL 50000 UNITS: 1.25 CAPSULE ORAL at 10:00

## 2020-03-07 RX ADMIN — SERTRALINE HYDROCHLORIDE 50 MG: 50 TABLET ORAL at 10:00

## 2020-03-07 RX ADMIN — MEMANTINE HYDROCHLORIDE 5 MG: 5 TABLET ORAL at 10:00

## 2020-03-07 RX ADMIN — ASPIRIN 81 MG: 81 TABLET, COATED ORAL at 10:00

## 2020-03-07 ASSESSMENT — PAIN SCALES - GENERAL
PAINLEVEL_OUTOF10: 0
PAINLEVEL_OUTOF10: 8
PAINLEVEL_OUTOF10: 0
PAINLEVEL_OUTOF10: 0

## 2020-03-07 NOTE — DISCHARGE SUMMARY
Discharge Summary    Name:  Miguel Gonzalez /Age/Sex: 1960  (63 y.o. female)   MRN & CSN:  [id number] & [id number] Admission Date/Time: 3/6/2020  5:19 PM   Attending:  Raffi Ferguson MD Discharging Physician: Raffi Ferguson MD     Hospital Course:   Miguel Gonzalez is a 63 y.o.  female who was admitted to the hospital for chest pain rule out. Principal admission diagnosis  Chest pain-- known coronary artery disease, ST/P PCI with a stent to left circumflex in the past.  Initial troponins and EKG have been negative. Patient is poor historian and is very hard of hearing. She was evaluated by cardiology and recommended outpatient stress test and 2D echo. Patient discharged in stable condition. Additional discharge diagnosis  COPD  JIMBO on CPAP  Hyperlipidemia  Dementia versus mild cognitive impairment-- on Namenda and Aricept    The patient expressed appropriate understanding of and agreement with the discharge recommendations, medications, and plan. Consults this admission:  IP CONSULT TO HOSPITALIST  IP CONSULT TO CARDIOLOGY  IP CONSULT TO [address] [place] [address] NEEDS    Discharge Instruction:   Follow-up with cardiology in 1 week    Diet:  cardiac diet   Activity: activity as tolerated  Disposition: Discharged to:   [x]Home, [x]C, []SNF, []Acute Rehab, []Hospice  Condition on discharge: Stable    Discharge Medications:      [facility]   Home Medication Instructions TXA:[id number]    Printed on:20 1876   Medication Information                      albuterol sulfate HFA (VENTOLIN HFA) 108 (90 Base) MCG/ACT inhaler  Inhale 2 puffs into the lungs as needed for Shortness of Breath             aspirin EC 81 MG EC tablet  Take 1 tablet by mouth daily.              atorvastatin (LIPITOR) 40 MG tablet  Take 40 mg by mouth daily             clopidogrel (PLAVIX) 75 MG tablet  Take 1 tablet by mouth daily             dicyclomine (BENTYL) 20 MG tablet  Take 20 mg by mouth 3 times daily as needed

## 2020-03-07 NOTE — CONSULTS
ligation (1993); Gastric fundoplication (00/57/3854); Colonoscopy (2011); Colonoscopy (08/23/2017); Endoscopy, colon, diagnostic (10-16-15); Endoscopy, colon, diagnostic (01/28/2019); Upper gastrointestinal endoscopy (N/A, 1/28/2019); and Ankle fracture surgery (Left, 5/24/2019). Social History:   reports that she quit smoking about 4 years ago. Her smoking use included cigarettes. She started smoking about 48 years ago. She has a 11.00 pack-year smoking history. She has never used smokeless tobacco. She reports current alcohol use of about 2.0 standard drinks of alcohol per week. She reports that she does not use drugs.   Family history:   no family history of CAD, STROKE of DM    atorvastatin (LIPITOR) tablet 40 mg, Daily  aspirin EC tablet 81 mg, Daily  hydrOXYzine (ATARAX) tablet 50 mg, Q8H PRN  vitamin B-1 (THIAMINE) tablet 100 mg, Daily  vitamin D (ERGOCALCIFEROL) capsule 50,000 Units, Weekly  memantine (NAMENDA) tablet 5 mg, BID  melatonin tablet 5 mg, Nightly PRN  nitroGLYCERIN (NITROSTAT) SL tablet 0.4 mg, Q5 Min PRN  pantoprazole (PROTONIX) tablet 40 mg, QAM AC  traZODone (DESYREL) tablet 50 mg, Nightly PRN  donepezil (ARICEPT) tablet 5 mg, Nightly  dicyclomine (BENTYL) tablet 20 mg, TID PRN  cetirizine (ZYRTEC) tablet 10 mg, Daily  albuterol sulfate  (90 Base) MCG/ACT inhaler 2 puff, Q6H PRN  budesonide-formoterol (SYMBICORT) 160-4.5 MCG/ACT inhaler 2 puff, Daily  metoprolol tartrate (LOPRESSOR) tablet 12.5 mg, BID  traMADol (ULTRAM) tablet 50 mg, Q6H PRN  clopidogrel (PLAVIX) tablet 75 mg, Daily  ranolazine (RANEXA) extended release tablet 1,000 mg, BID  sertraline (ZOLOFT) tablet 50 mg, Daily  sodium chloride flush 0.9 % injection 10 mL, 2 times per day  sodium chloride flush 0.9 % injection 10 mL, PRN  acetaminophen (TYLENOL) tablet 650 mg, Q6H PRN    Or  acetaminophen (TYLENOL) suppository 650 mg, Q6H PRN  polyethylene glycol (GLYCOLAX) packet 17 g, Daily PRN  promethazine (PHENERGAN) tablet 12.5 mg, Q6H PRN    Or  ondansetron (ZOFRAN) injection 4 mg, Q6H PRN  enoxaparin (LOVENOX) injection 40 mg, Daily      Current Facility-Administered Medications   Medication Dose Route Frequency Provider Last Rate Last Dose    atorvastatin (LIPITOR) tablet 40 mg  40 mg Oral Daily Marilyn Andrews MD   40 mg at 03/07/20 1000    aspirin EC tablet 81 mg  81 mg Oral Daily Marilyn Andrews MD   81 mg at 03/07/20 1000    hydrOXYzine (ATARAX) tablet 50 mg  50 mg Oral Q8H PRN Marilyn Andrews MD        vitamin B-1 (THIAMINE) tablet 100 mg  100 mg Oral Daily Marilyn Andrews MD   100 mg at 03/07/20 1000    vitamin D (ERGOCALCIFEROL) capsule 50,000 Units  50,000 Units Oral Weekly Marilyn Andrews MD   50,000 Units at 03/07/20 1000    memantine (NAMENDA) tablet 5 mg  5 mg Oral BID Marilyn Andrews MD   5 mg at 03/07/20 1000    melatonin tablet 5 mg  5 mg Oral Nightly PRN Marilyn Andrews MD        nitroGLYCERIN (NITROSTAT) SL tablet 0.4 mg  0.4 mg Sublingual Q5 Min PRN Marilyn Andrews MD        pantoprazole (PROTONIX) tablet 40 mg  40 mg Oral QAM AC Marilyn Andrews MD        traZODone (DESYREL) tablet 50 mg  50 mg Oral Nightly PRN Marilyn Andrews MD        donepezil (ARICEPT) tablet 5 mg  5 mg Oral Nightly Marilyn Andrews MD   5 mg at 03/06/20 2318    dicyclomine (BENTYL) tablet 20 mg  20 mg Oral TID PRN Marilyn Andrews MD        cetirizine (ZYRTEC) tablet 10 mg  10 mg Oral Daily Marilyn Andrwes MD   10 mg at 03/07/20 1000    albuterol sulfate  (90 Base) MCG/ACT inhaler 2 puff  2 puff Inhalation Q6H PRN Marilyn Andrews MD        budesonide-formoterol (SYMBICORT) 160-4.5 MCG/ACT inhaler 2 puff  2 puff Inhalation Daily Marilyn Andrews MD   2 puff at 03/07/20 0911    metoprolol tartrate (LOPRESSOR) tablet 12.5 mg  12.5 mg Oral BID Marilyn Andrews MD   12.5 mg at 03/07/20 1000    traMADol (ULTRAM) tablet 50 mg  50 mg Oral Q6H PRN Marilyn Andrews MD   50 mg at 03/07/20 0456    clopidogrel (PLAVIX) tablet 75 mg  75 mg Oral Daily Charan Subramanian MD   75 mg at 03/07/20 1000    ranolazine (RANEXA) extended release tablet 1,000 mg  1,000 mg Oral BID Charan Subramanian MD   1,000 mg at 03/07/20 0959    sertraline (ZOLOFT) tablet 50 mg  50 mg Oral Daily Charan Subramanian MD   50 mg at 03/07/20 1000    sodium chloride flush 0.9 % injection 10 mL  10 mL Intravenous 2 times per day Charan Subramanian MD   10 mL at 03/07/20 1000    sodium chloride flush 0.9 % injection 10 mL  10 mL Intravenous PRN Charan Subramanian MD        acetaminophen (TYLENOL) tablet 650 mg  650 mg Oral Q6H PRN Charan Subramanian MD        Or    acetaminophen (TYLENOL) suppository 650 mg  650 mg Rectal Q6H PRN Charan Subramanian MD        polyethylene glycol (GLYCOLAX) packet 17 g  17 g Oral Daily PRN Charan Subramanian MD        promethazine (PHENERGAN) tablet 12.5 mg  12.5 mg Oral Q6H PRN Charan Subramanian MD        Or    ondansetron (ZOFRAN) injection 4 mg  4 mg Intravenous Q6H PRN Charan Subramanian MD   4 mg at 03/07/20 1004    enoxaparin (LOVENOX) injection 40 mg  40 mg Subcutaneous Daily Charan Subramanian MD   40 mg at 03/07/20 1000          Review of Systems:    · Constitutional: No Fever or Weight Loss    · Eyes: No Decreased Vision  · ENT: No Headaches, Hearing Loss or Vertigo  · Cardiovascular: + chest pain, dyspnea on exertion, palpitations or loss of consciousness  · Respiratory: No cough or wheezing    · Gastrointestinal: No abdominal pain, appetite loss, blood in stools, constipation, diarrhea or heartburn  · Genitourinary: No dysuria, trouble voiding, or hematuria  · Musculoskeletal: No gait disturbance, weakness or joint complaints  · Integumentary: No rash or pruritis  · Neurological: No TIA or stroke symptoms  · Psychiatric: No anxiety or depression  · Endocrine: No malaise, fatigue or temperature intolerance  · Hematologic/Lymphatic: No bleeding problems, blood clots or

## 2020-03-07 NOTE — H&P
[facility] History & Physical      Name: Claudell Sellers    PCP: LAVERN Jay NP    Date of Admission: 3/6/2020    Admitting Physician: Meek Gil MD Hospitalist    Date of Service: Pt seen/examined on 3/6/2020     Chief Complaint:  Back Pain; Shoulder Pain; and Chest Pain    History Of Present Illness: The patient is a 63 y.o. female with h/o CAD s/p PCI, COPD, JIMBO on CPAP who presented to ED with chest pain for the past 2 days. The pain has been continuous, pressure in left chest, occasionally sharp radiating to her left shoulder and back. She says it feels like someone is sitting on her chest.  She admits having nausea, and SOB which is chronic from COPD. She admits diaphoresis, saying that \"I get that all the times\". The pain is worse on physical movement like getting up, sitting up on bed. Has chronic cough. No sputum. No fever/chills. ED course summary:   The case was discussed with the ED provider.       Past Medical History Reviewed:    Patient  has a past medical history of Anxiety, Arthritis, Asthma, Back problem, Bronchitis, CAD (coronary artery disease), Cancer ([address] Chest pain, Chipped tooth, Chronic back pain, COPD (chronic obstructive pulmonary disease) (HCC), Cough, Emphysema, GERD (gastroesophageal reflux disease), H/O 24 hour EKG monitoring, H/O cardiac catheterization, H/O cardiac catheterization, H/O Doppler ultrasound, H/O Doppler ultrasound, H/O exercise stress test, Hiatal hernia, History of exercise stress test, History of nuclear stress test, [place] (hard of hearing), Hx of cardiovascular stress test, Hx of Doppler ultrasound, Hx of Doppler ultrasound, Hx of Doppler ultrasound, Hx of echocardiogram, Hypertension, Irregular heart beat, Migraines, Palpitations, Panic attacks, Pneumonia, Prolonged emergence from general anesthesia, Restless leg, S/P cardiac cath, S/P PTCA (percutaneous transluminal coronary angioplasty), Shortness of breath, Teeth missing, Sister     Early Death Sister 43    Asthma Brother        ROS:  Ten systems were reviewed and otherwise acutely negative except as noted in the HPI and documented below:     Physical Exam:  Vitals:  /84   Pulse 63   Temp 98.9 °F (37.2 °C) (Oral)   Resp 17   Ht 5' 2\" (1.575 m)   Wt 186 lb (84.4 kg)   SpO2 97%   BMI 34.02 kg/m²   General: The patient appears as stated age. Well appearing, and in no distress. Mental status: Awake, oriented but seems to be confused and has memory problem. Eyes: ROCKY. Normal conjunctiva. ENT/Mouth: normal appearing jaw and neck, no neck nodes or sinus tenderness. Clear oropharynx with moist mucous membrane. Cardiovascular:  normal rate, regular rhythm, normal S1, S2, no murmurs, rubs, clicks or gallops. No peripheral edema. Dorsal pedis pulses 2+ bilaterally. Respiratory: clear to auscultation, no wheezes, rales or rhonchi, symmetric air entry. Gastrointestinal: soft, nontender, nondistended, no masses or organomegaly. Genitourinary:  No CVA tenderness. Musculoskletal:  no clubbing or cyanosis. No joint swelling, warmth, or tenderness. Skin:  normal coloration and turgor, no rashes, no suspicious skin lesions noted. Neurologic: Normal speech, no focal findings or movement disorder noted. Hematologic/Lymphatic: No cervical lymphadenopathy. Data  (4-points for high level)  Laboratory this visit:  Reviewed    Radiology this visit:  Personally reviewed the following imaging films, and agree with the radiologist readings. Xr Lumbar Spine (2-3 Views)    Result Date: 2/11/2020  EXAMINATION: ONE XRAY VIEW OF THE PELVIS; THREE XRAY VIEWS OF THE LUMBAR SPINE 2/11/2020 3:59 pm COMPARISON: Lumbar spine radiographs 03/13/2013.  HISTORY: ORDERING SYSTEM PROVIDED HISTORY: Low back pain, unspecified back pain laterality, unspecified chronicity, unspecified whether sciatica present TECHNOLOGIST PROVIDED HISTORY: Reason for Exam: patient reports lower back radiates into right hip FINDINGS: 5 non rib-bearing lumbar type vertebrae. Mild mid lumbar spine levoscoliosis. Normal alignment is otherwise maintained. The vertebral body heights are preserved. Multilevel degenerative disc disease most pronounced at L2-3 and L3-4 to a moderate degree. The bilateral sacroiliac joints and pubic symphysis are intact. Mild bilateral sacroiliac joint degenerative changes. The bilateral hips are unremarkable. No fracture or other acute osseous abnormality. No suspicious lytic or blastic osseous lesion. Probable phlebolith in the right hemipelvis. Status post cholecystectomy. 1. Multilevel degenerative changes of the lumbar spine most pronounced at L2-3 and L3-4 to a moderate degree. 2. Mild degenerative changes of the bilateral sacroiliac joints. Xr Shoulder Left (min 2 Views)    Result Date: 3/6/2020  EXAMINATION: THREE X-RAY VIEWS OF THE LEFT SHOULDER 3/6/2020 2:50 pm COMPARISON: None HISTORY: ORDERING SYSTEM PROVIDED HISTORY: Acute pain of left shoulder TECHNOLOGIST PROVIDED HISTORY: Acuity: Acute Type of Exam: Initial Additional signs and symptoms: No specific injury. Acute pain with limited ROM. FINDINGS: Three views of the left shoulder were performed. There is no acute fracture or dislocation. There is mild osteoarthritis at the [facility] joint. The glenohumeral joint is preserved. No destructive osseous lesion is seen. The visualized left-sided ribs and left lung are unremarkable. 1. No acute abnormality of the left shoulder. 2. Mild osteoarthritis at the [facility] joint. Xr Chest Portable    Result Date: 3/6/2020  EXAMINATION: ONE XRAY VIEW OF THE CHEST 3/6/2020 4:35 pm COMPARISON: Chest radiograph 05/23/2019.  HISTORY: ORDERING SYSTEM PROVIDED HISTORY: chest pain TECHNOLOGIST PROVIDED HISTORY: Reason for exam:->chest pain Reason for Exam: chest pain Acuity: Acute Type of Exam: Initial Additional signs and symptoms: na Relevant Medical/Surgical History: copd,

## 2020-03-07 NOTE — PLAN OF CARE
HR SB 40's while sleeping and returns to SR 70's when awakened. On metoprolol 12.5 mg BID. Notified Dr. Liz Morgan.

## 2020-03-08 LAB
EKG ATRIAL RATE: 61 BPM
EKG ATRIAL RATE: 68 BPM
EKG DIAGNOSIS: NORMAL
EKG DIAGNOSIS: NORMAL
EKG P AXIS: 53 DEGREES
EKG P AXIS: 66 DEGREES
EKG P-R INTERVAL: 156 MS
EKG P-R INTERVAL: 174 MS
EKG Q-T INTERVAL: 400 MS
EKG Q-T INTERVAL: 428 MS
EKG QRS DURATION: 80 MS
EKG QRS DURATION: 86 MS
EKG QTC CALCULATION (BAZETT): 425 MS
EKG QTC CALCULATION (BAZETT): 430 MS
EKG R AXIS: 22 DEGREES
EKG R AXIS: 23 DEGREES
EKG T AXIS: 25 DEGREES
EKG T AXIS: 7 DEGREES
EKG VENTRICULAR RATE: 61 BPM
EKG VENTRICULAR RATE: 68 BPM

## 2020-03-08 PROCEDURE — 93010 ELECTROCARDIOGRAM REPORT: CPT | Performed by: INTERNAL MEDICINE

## 2020-04-03 ENCOUNTER — TELEPHONE (OUTPATIENT)
Dept: CARDIOLOGY CLINIC | Age: 60
End: 2020-04-03

## 2020-05-22 ENCOUNTER — HOSPITAL ENCOUNTER (OUTPATIENT)
Age: 60
Setting detail: SPECIMEN
Discharge: HOME | End: 2020-05-22
Payer: COMMERCIAL

## 2020-05-22 PROCEDURE — U0002 COVID-19 LAB TEST NON-CDC: HCPCS

## 2020-05-23 LAB
SARS-COV-2: NOT DETECTED
SOURCE: NORMAL

## 2020-06-26 ENCOUNTER — TELEPHONE (OUTPATIENT)
Dept: CARDIOLOGY CLINIC | Age: 60
End: 2020-06-26

## 2020-06-26 NOTE — TELEPHONE ENCOUNTER
Cardiologist: Dr. Aaron Arriaga  Surgeon: Dr. Leger Necessary  Surgery: Lt shoulder rotator cuff repair  Anesthesia: General  Date: TBD  FAX# [phone]  # [phone]    Last OV 9/10/2019 diane Valdez    Was seen at [facility] on 3/7/2020-Dr Rae    1. Chest pain: out patient stress test and echo  2. CAD; stable, h.o LCX stent in 2017, continue aspirin, statins, BB   3. HTN: stable  4. Health maintenance: exerise and diet      NM 1/27/2017  1. The patient did complain of chest pain; however, no ischemic EKG changes  were seen. 2.  The Cardiolite study demonstrates mild degree of septal ischemia. The  perfusion of the rest of the myocardium is normal.  The global function is  Intact. Echo- 1/25/2017   Ejection fraction is visually estimated at 50%. Mild to Moderate mitral regurgitation is present. Mild tricuspid regurgitation. Right ventricular systolic pressure of 42 mm Hg consistent with mild   pulmonary hypertension.       Plavix  Aspirin

## 2020-06-29 ENCOUNTER — HOSPITAL ENCOUNTER (OUTPATIENT)
Age: 60
Discharge: HOME | End: 2020-06-29
Payer: COMMERCIAL

## 2020-06-29 ENCOUNTER — OFFICE VISIT (OUTPATIENT)
Dept: CARDIOLOGY CLINIC | Age: 60
End: 2020-06-29
Payer: COMMERCIAL

## 2020-06-29 VITALS
DIASTOLIC BLOOD PRESSURE: 86 MMHG | SYSTOLIC BLOOD PRESSURE: 130 MMHG | HEART RATE: 96 BPM | WEIGHT: 186.6 LBS | HEIGHT: 62 IN | BODY MASS INDEX: 34.34 KG/M2

## 2020-06-29 LAB
ANION GAP SERPL CALCULATED.3IONS-SCNC: 8 MMOL/L (ref 4–16)
BACTERIA: NEGATIVE /HPF
BILIRUBIN URINE: NEGATIVE MG/DL
BLOOD, URINE: NEGATIVE
BUN BLDV-MCNC: 13 MG/DL (ref 6–23)
CALCIUM SERPL-MCNC: 9.5 MG/DL (ref 8.3–10.6)
CHLORIDE BLD-SCNC: 104 MMOL/L (ref 99–110)
CLARITY: CLEAR
CO2: 27 MMOL/L (ref 21–32)
COLOR: YELLOW
CREAT SERPL-MCNC: 1 MG/DL (ref 0.6–1.1)
GFR AFRICAN AMERICAN: >60 ML/MIN/1.73M2
GFR NON-AFRICAN AMERICAN: 56 ML/MIN/1.73M2
GLUCOSE BLD-MCNC: 80 MG/DL (ref 70–99)
GLUCOSE, URINE: NEGATIVE MG/DL
KETONES, URINE: NEGATIVE MG/DL
LEUKOCYTE ESTERASE, URINE: ABNORMAL
MAGNESIUM: 1.8 MG/DL (ref 1.8–2.4)
MUCUS: ABNORMAL HPF
NITRITE URINE, QUANTITATIVE: NEGATIVE
PH, URINE: 5 (ref 5–8)
PHOSPHORUS: 2.8 MG/DL (ref 2.5–4.9)
POTASSIUM SERPL-SCNC: 4.2 MMOL/L (ref 3.5–5.1)
PROTEIN UA: NEGATIVE MG/DL
RBC URINE: 2 /HPF (ref 0–6)
SODIUM BLD-SCNC: 139 MMOL/L (ref 135–145)
SPECIFIC GRAVITY UA: 1.02 (ref 1–1.03)
SQUAMOUS EPITHELIAL: 5 /HPF
TRICHOMONAS: ABNORMAL /HPF
UROBILINOGEN, URINE: NORMAL MG/DL (ref 0.2–1)
WBC UA: 5 /HPF (ref 0–5)

## 2020-06-29 PROCEDURE — 93000 ELECTROCARDIOGRAM COMPLETE: CPT | Performed by: NURSE PRACTITIONER

## 2020-06-29 PROCEDURE — 1036F TOBACCO NON-USER: CPT | Performed by: NURSE PRACTITIONER

## 2020-06-29 PROCEDURE — 3017F COLORECTAL CA SCREEN DOC REV: CPT | Performed by: NURSE PRACTITIONER

## 2020-06-29 PROCEDURE — 80048 BASIC METABOLIC PNL TOTAL CA: CPT

## 2020-06-29 PROCEDURE — 36415 COLL VENOUS BLD VENIPUNCTURE: CPT

## 2020-06-29 PROCEDURE — G8417 CALC BMI ABV UP PARAM F/U: HCPCS | Performed by: NURSE PRACTITIONER

## 2020-06-29 PROCEDURE — 84100 ASSAY OF PHOSPHORUS: CPT

## 2020-06-29 PROCEDURE — 99214 OFFICE O/P EST MOD 30 MIN: CPT | Performed by: NURSE PRACTITIONER

## 2020-06-29 PROCEDURE — G8427 DOCREV CUR MEDS BY ELIG CLIN: HCPCS | Performed by: NURSE PRACTITIONER

## 2020-06-29 PROCEDURE — 83735 ASSAY OF MAGNESIUM: CPT

## 2020-06-29 PROCEDURE — 81001 URINALYSIS AUTO W/SCOPE: CPT

## 2020-06-29 RX ORDER — NITROGLYCERIN 0.4 MG/1
0.4 TABLET SUBLINGUAL EVERY 5 MIN PRN
Qty: 25 TABLET | Refills: 3 | Status: SHIPPED | OUTPATIENT
Start: 2020-06-29 | End: 2020-10-28

## 2020-06-29 ASSESSMENT — ENCOUNTER SYMPTOMS
PHOTOPHOBIA: 0
COUGH: 0
SHORTNESS OF BREATH: 0

## 2020-06-29 NOTE — ASSESSMENT & PLAN NOTE
 Patient had 1 stents placed 2017   Patient is  having cardiac symptoms, chest pain intermmitantly   continue BB, antiplatelet, ASA   Low salt, Low cholesterol diet   Last stress test 2017.     Will recheck stress test   Pre op clearance yang for L shoulder repair will be determined after testing to be done

## 2020-06-29 NOTE — PROGRESS NOTES
Base) MCG/ACT inhaler Inhale 2 puffs into the lungs as needed for Shortness of Breath 1 Inhaler 5    glycopyrrolate-formoterol (BEVESPI AEROSPHERE) 9-4.8 MCG/ACT AERO Inhale 2 puffs into the lungs 2 times daily 3 Inhaler 1    ranolazine (RANEXA) 1000 MG extended release tablet Take 1 tablet by mouth 2 times daily Do not crush or break. 180 tablet 3    atorvastatin (LIPITOR) 40 MG tablet Take 40 mg by mouth daily      clopidogrel (PLAVIX) 75 MG tablet Take 1 tablet by mouth daily 30 tablet 5    dicyclomine (BENTYL) 20 MG tablet Take 20 mg by mouth 3 times daily as needed      loratadine (CLARITIN) 10 MG tablet Take 10 mg by mouth daily      donepezil (ARICEPT) 10 MG tablet TAKE 1 TABLET BY MOUTH EVERYDAY AT daily  3    pantoprazole sodium (PROTONIX) 40 MG PACK packet Take 40 mg by mouth 2 times daily (before meals)       melatonin 3 MG TABS tablet Take 5 mg by mouth nightly as needed       nitroGLYCERIN (NITROSTAT) 0.4 MG SL tablet Place 1 tablet under the tongue every 5 minutes as needed for Chest pain 25 tablet 3    memantine (NAMENDA) 5 MG tablet Take 5 mg by mouth 2 times daily      hydrOXYzine (ATARAX) 25 MG tablet Take 50 mg by mouth every 8 hours as needed for Itching       vitamin B-1 (THIAMINE) 100 MG tablet Take 100 mg by mouth daily      vitamin D (ERGOCALCIFEROL) 08204 units CAPS capsule Take 50,000 Units by mouth once a week      ipratropium-albuterol (DUONEB) 0.5-2.5 (3) MG/3ML SOLN nebulizer solution INHALE 3 MILLILITER BY NEBULIZATION ROUTE 4 TIMES EVERY DAY  2    aspirin EC 81 MG EC tablet Take 1 tablet by mouth daily. (Patient taking differently: Take 81 mg by mouth every morning ) 30 tablet 6     No current facility-administered medications for this visit.       Allergies: Adhesive tape; Aspirin; and Pcn [penicillins]  Past Medical History:   Diagnosis Date    Anxiety     Arthritis     \"Both Legs\"    Asthma     Back problem     \"Curved Spine\"    Bronchitis Last Episode In 2015  CAD (coronary artery disease)     Sees Dr. Jessie Srivastava; 8-16-16 (noted on 10-4-2012 that patient does not have CAD s/p cardiac catheterization).  Cancer ([address]     skin ca on skin    Chest pain     in ER with this dx 7/2015- admitted- stress test done as outpt 8/7/2015(see report)    Chipped tooth     Upper And Lower    Chronic back pain     COPD (chronic obstructive pulmonary disease) ([facility] [address] 06/01/2017    Sees Dr. Jadiel Warner    Cough     Occ. Nonproductive Cough    Emphysema     GERD (gastroesophageal reflux disease)     H/O 24 hour EKG monitoring     3/14/2013-Signif for runs of sinus tachycardia, fastest recorded at 132 bpm lasting almost 38 mins. Dr Isaiah Lopez. -report in [facility]    H/O cardiac catheterization     10/4/2012- No CAD. False positive stress test.Dr Valdez-report in [facility];     H/O cardiac catheterization     H/O Doppler ultrasound 04/13/2017    LE doppler - normal study    H/O Doppler ultrasound 05/26/2017    carotid - normal study    H/O exercise stress test 02/28/2017    normal study    Hiatal hernia     History of exercise stress test 02/28/2017    treadmill    History of nuclear stress test 8/7/15    EF 70%. WNL    [place] (hard of hearing)     Bilateral Ears    Hx of cardiovascular stress test 9/2013 9/13 EF70% Normal.10/12- LVSF normal. EF 61%. Normal perfusion althought pt complained of chest pain with Lexiscan. report in epic; 11/11, possible apical ischemia but abnormality secondary to technical artifact needs to be considered, regional wall motion abnormality with low normal LVSF by pierson scan. 8/10, 12/09    Hx of Doppler ultrasound 2/2011 2/2011-Carotid no significant obstructive lesions noted in the extracranial carotid system. Antegrade flow noted inthe vertebral arteries.  Hx of Doppler ultrasound 1/2012 1/2012-peripheral - both abis and duplex studies of both lower extremities do not  reveal any significant peripheral vascular disease at this time.  Hx of Doppler ultrasound 1/22/2016    Arterial: Peripheral vascular noninvasive arterial studies do not reveal any significant atherosclerotic disease.  Hx of echocardiogram 6/2013 6/13- Mild to mod MR/TR. 10/12-LVSF normal. EF 60%. Mild mitral and tricuspid insuff. reprot in epic; 8/6/10, normal dimension of the LV, normal global and regional LVSF with an EF of 60%, no significant valvuopathy is seen.  12/05    Hypertension     \"on medication since age 25's\" follow with Dr Kowalski Class Irregular heart beat     \"have irreg heart beat\" dont know what you call it\"    Migraines Last Migraine In 2014    Palpitations     Panic attacks     Pneumonia Last Episode In 2014    Prolonged emergence from general anesthesia     Restless leg     S/P cardiac cath 2/21/14    S/P PTCA (percutaneous transluminal coronary angioplasty) 01/26/2017    2017, CX stented    Shortness of breath     Teeth missing     Upper And Lower    Tobacco use     Unspecified sleep apnea     \"had sleep study 2-3 yrs ago\" have cpap and try to use it\"     Past Surgical History:   Procedure Laterality Date    ANKLE FRACTURE SURGERY Left 5/24/2019    LEFT ANKLE OPEN REDUCTION INTERNAL FIXATION SYNDESMOSIS performed by Shahbaz Monsivais MD at [facility]  2011    COLONOSCOPY  08/23/2017    colon polyp, diverticulosis, hemorrhoids    DENTAL SURGERY      Teeth Extracted In Past    ENDOSCOPY, COLON, DIAGNOSTIC  10-16-15    ENDOSCOPY, COLON, DIAGNOSTIC  01/28/2019    Hiatal hernia, savary dil #17 used    GASTRIC FUNDOPLICATION  97/15/8863    Robotic laparoscopic nissen with mesh    KIDNEY SURGERY Left 2-15    \"At [facility] Had Left Kidney Mass Removed\" Benign    TONSILLECTOMY  1960's    TUBAL LIGATION  1993    UPPER GASTROINTESTINAL ENDOSCOPY N/A 1/28/2019    EGD DILATION SAVARY #17MM performed by Day Braun MD at [place] [address]     Family History   Problem Relation Age of Onset    Heart Disease Mother     Heart Disease Father

## 2020-07-02 RX ORDER — CLOPIDOGREL BISULFATE 75 MG/1
75 TABLET ORAL DAILY
Qty: 30 TABLET | Refills: 3 | Status: SHIPPED | OUTPATIENT
Start: 2020-07-02 | End: 2020-09-15 | Stop reason: SDUPTHER

## 2020-07-17 ENCOUNTER — PROCEDURE VISIT (OUTPATIENT)
Dept: CARDIOLOGY CLINIC | Age: 60
End: 2020-07-17
Payer: COMMERCIAL

## 2020-07-17 LAB
LV EF: 58 %
LVEF MODALITY: NORMAL

## 2020-07-17 PROCEDURE — 78452 HT MUSCLE IMAGE SPECT MULT: CPT | Performed by: INTERNAL MEDICINE

## 2020-07-17 PROCEDURE — A9500 TC99M SESTAMIBI: HCPCS | Performed by: INTERNAL MEDICINE

## 2020-07-17 PROCEDURE — 93015 CV STRESS TEST SUPVJ I&R: CPT | Performed by: INTERNAL MEDICINE

## 2020-07-21 ENCOUNTER — TELEPHONE (OUTPATIENT)
Dept: CARDIOLOGY CLINIC | Age: 60
End: 2020-07-21

## 2020-07-21 NOTE — TELEPHONE ENCOUNTER
Notified pt of results and that  will call her to set up F/U. Summary     Supervising physician Dr. Rogerio Art with arms down. abnormal stress test, breast tissue artifact also     noted, Myocardial perfusion scan shows small size, moderate intensity,     reversible perfusion defect in anterior wall. normal LVEF          Recommendation     office visit to discuss results

## 2020-07-30 ENCOUNTER — TELEPHONE (OUTPATIENT)
Dept: CARDIOLOGY CLINIC | Age: 60
End: 2020-07-30

## 2020-07-30 ENCOUNTER — HOSPITAL ENCOUNTER (OUTPATIENT)
Age: 60
Discharge: HOME | End: 2020-07-30
Payer: COMMERCIAL

## 2020-07-30 ENCOUNTER — OFFICE VISIT (OUTPATIENT)
Dept: CARDIOLOGY CLINIC | Age: 60
End: 2020-07-30
Payer: COMMERCIAL

## 2020-07-30 VITALS
BODY MASS INDEX: 35.22 KG/M2 | WEIGHT: 191.4 LBS | HEART RATE: 68 BPM | DIASTOLIC BLOOD PRESSURE: 80 MMHG | HEIGHT: 62 IN | SYSTOLIC BLOOD PRESSURE: 138 MMHG

## 2020-07-30 LAB
ABO/RH: NORMAL
ANION GAP SERPL CALCULATED.3IONS-SCNC: 10 MMOL/L (ref 4–16)
ANTIBODY SCREEN: NEGATIVE
APTT: 27.3 SECONDS (ref 25.1–37.1)
BASOPHILS ABSOLUTE: 0.1 K/CU MM
BASOPHILS RELATIVE PERCENT: 1 % (ref 0–1)
BUN BLDV-MCNC: 10 MG/DL (ref 6–23)
CALCIUM SERPL-MCNC: 9.4 MG/DL (ref 8.3–10.6)
CHLORIDE BLD-SCNC: 101 MMOL/L (ref 99–110)
CO2: 30 MMOL/L (ref 21–32)
COMMENT: NORMAL
CREAT SERPL-MCNC: 1 MG/DL (ref 0.6–1.1)
DIFFERENTIAL TYPE: ABNORMAL
EOSINOPHILS ABSOLUTE: 0.3 K/CU MM
EOSINOPHILS RELATIVE PERCENT: 3.3 % (ref 0–3)
GFR AFRICAN AMERICAN: >60 ML/MIN/1.73M2
GFR NON-AFRICAN AMERICAN: 56 ML/MIN/1.73M2
GLUCOSE BLD-MCNC: 85 MG/DL (ref 70–99)
HCT VFR BLD CALC: 36.9 % (ref 37–47)
HEMOGLOBIN: 11.7 GM/DL (ref 12.5–16)
IMMATURE NEUTROPHIL %: 0.3 % (ref 0–0.43)
INR BLD: 0.9 INDEX
LYMPHOCYTES ABSOLUTE: 1.7 K/CU MM
LYMPHOCYTES RELATIVE PERCENT: 17.6 % (ref 24–44)
MCH RBC QN AUTO: 27.8 PG (ref 27–31)
MCHC RBC AUTO-ENTMCNC: 31.7 % (ref 32–36)
MCV RBC AUTO: 87.6 FL (ref 78–100)
MONOCYTES ABSOLUTE: 0.7 K/CU MM
MONOCYTES RELATIVE PERCENT: 7.5 % (ref 0–4)
NUCLEATED RBC %: 0 %
PDW BLD-RTO: 14.8 % (ref 11.7–14.9)
PLATELET # BLD: 351 K/CU MM (ref 140–440)
PMV BLD AUTO: 9.9 FL (ref 7.5–11.1)
POTASSIUM SERPL-SCNC: 4.9 MMOL/L (ref 3.5–5.1)
PROTHROMBIN TIME: 10.9 SECONDS (ref 11.7–14.5)
RBC # BLD: 4.21 M/CU MM (ref 4.2–5.4)
SEGMENTED NEUTROPHILS ABSOLUTE COUNT: 6.8 K/CU MM
SEGMENTED NEUTROPHILS RELATIVE PERCENT: 70.3 % (ref 36–66)
SODIUM BLD-SCNC: 141 MMOL/L (ref 135–145)
TOTAL IMMATURE NEUTOROPHIL: 0.03 K/CU MM
TOTAL NUCLEATED RBC: 0 K/CU MM
WBC # BLD: 9.7 K/CU MM (ref 4–10.5)

## 2020-07-30 PROCEDURE — 86900 BLOOD TYPING SEROLOGIC ABO: CPT

## 2020-07-30 PROCEDURE — 85025 COMPLETE CBC W/AUTO DIFF WBC: CPT

## 2020-07-30 PROCEDURE — 99214 OFFICE O/P EST MOD 30 MIN: CPT | Performed by: INTERNAL MEDICINE

## 2020-07-30 PROCEDURE — 80048 BASIC METABOLIC PNL TOTAL CA: CPT

## 2020-07-30 PROCEDURE — 86850 RBC ANTIBODY SCREEN: CPT

## 2020-07-30 PROCEDURE — G8427 DOCREV CUR MEDS BY ELIG CLIN: HCPCS | Performed by: INTERNAL MEDICINE

## 2020-07-30 PROCEDURE — 86901 BLOOD TYPING SEROLOGIC RH(D): CPT

## 2020-07-30 PROCEDURE — 36415 COLL VENOUS BLD VENIPUNCTURE: CPT

## 2020-07-30 PROCEDURE — 3017F COLORECTAL CA SCREEN DOC REV: CPT | Performed by: INTERNAL MEDICINE

## 2020-07-30 PROCEDURE — 85730 THROMBOPLASTIN TIME PARTIAL: CPT

## 2020-07-30 PROCEDURE — 85610 PROTHROMBIN TIME: CPT

## 2020-07-30 PROCEDURE — G8417 CALC BMI ABV UP PARAM F/U: HCPCS | Performed by: INTERNAL MEDICINE

## 2020-07-30 PROCEDURE — 1036F TOBACCO NON-USER: CPT | Performed by: INTERNAL MEDICINE

## 2020-07-30 NOTE — LETTER
[address]ilyn [address] [facility] WITH POSSIBLE PERCUTANEOUS CORONARY INTERVENTION       Patient Name: Marty Currie   : 1960   MRN# [id number]      Date of Procedure:     Time:     Arrival Time:       The catheterization and angiogram are usually outpatient procedures, however if stenting is needed you will stay overnight. You will need to be at the hospital two hours before the procedure. You will need to arrange for someone to drive you home. You will go to registration in the main lobby. HOSPITAL:  [facility]  Call to Pre-[place]ram[place] at: [phone] 1 day before your procedure. X Please do not have anything by mouth after midnight prior to or 8 hours before the procedure. X You may take your medications with a sip of water in the morning before your procedure or  take them with you. X Please have COVID-19 Test done on     after labs and Xray are completed. You will need to Quarantine yourself until procedure. Patient Signature:  _________________________         Staff Signature: Carlos Sandoval         Staff Given Instructions:_______________________________                  Ailyn Díaz       Patient Name: Marty Currie   : 1960   MRN# [id number]      Date of Procedure:  Time:        DIAGNOSIS:   Z01.810      LEFT HEART CATHETERIZATION WITH POSSIBLE PERCUTANEOUS CORONARY INTERVENTION            X Type & Screen  X CBC  X BMP  X PT  X PTT              ? PLEASE CALL ABNORMAL RESULTS TO THE  PHYSICIAN? ATTENTION PATIENT: Pretesting is to be done before the cath. You do not have to fast for the lab work.                         PHYSICIAN SIGNATURE:      DATE:                                     Ailyn Matos    Pre Cath Lab Orders     Patient Name: Marty Currie   : 1960   MRN# [id number]     Pre Cath Lab orders 1. IV of 0.9 NS@ 75ml/hr started 2 hours prior to procedure. (#20 Gauge Insyte or larger)    2. Diazepam (Valium) 5 mg po ONCE in Cath Lab. 3. Diphenhydramine (Benadryl) 25 mg ONCE. PHYSICIAN SIGNATURE:      DATE:                                                              [facility] ([facility]) Informed Consent for Anesthesia/Sedation, Surgery, Invasive Procedures, and other High-risk Interventions and Medication use      *This consent is applicable for 30 days following patient signature*    Procedure(s)   [place] authorize, Dr. Lyssa Hathaway    and the associate(s) or assistant(s) of his/her choice, to perform the following procedure(s): LEFT HEART CATHETERIZATION WITH POSSIBLE PERCUTANEOUS CORONARY INTERVENTION       I know that unexpected conditions may require additional or different procedures than those above. I authorize the above named practitioner(s) perform these as necessary and desirable. This is based on the practitioners professional judgment. The above named practitioner has discussed the above procedure(s) with me, including:  ? Potential benefits, including likelihood of success of the procedure(s) goals  ? Risks  ? Side effects, risk of death, and risk of infection  ? Any potential problems that might occur during recuperation or healing post-procedure  ? Reasonable alternatives  ? Risks of NOT performing the procedure(s)    I acknowledge that no warranty or guarantee has been made to the results the procedure(s). I consent to the above named practitioner(s) providing additional services to me as deemed reasonable and necessary, including but not limited to:    ? Use of medications for anesthesia or sedation. ? All anesthesia and sedation carry risks. My practitioner has discussed my anticipated anesthesia and/or sedation and the risks of using, risk of not using, benefits, side effects, and alternatives.       ? Use of pathology ? I authorize [facility]) to dispose of tissues, specimens or organs when pathology is complete. ? Use of radiology  ? A contrast agent may be required for radiology procedures. My practitioner has advised me of the risks of using, risks of not using, benefits, side effects, and alternatives. ? Observers or use of photography, video/audio recording, or televising of the procedure(s). This is for medical, scientific, or educational purposes. This includes appropriate portions of my body. My identity will not be revealed. ? I consent to release of my social security number and other identifying information to [place] (FDA), and the supplier/, if I receive tissue, a device, or implant. This is to track the tissue, device, or implant for defect, recall, infection, etc.     ? Use of blood and/or blood products, if needed, through my hospital stay. My practitioner has advised me of the risks of using, risks of not using, benefits, side effects, and alternatives. ___ I do NOT want Blood or Blood products given. (Complete separate  refusal form)        Code Status (zac one):    ___ I do NOT HAVE a DNR order. I am a Full-code.   I will receive CPR, intubation,  chest compressions, medications, and/or other life saving measures if I have a  cardiac or respiratory arrest.    ___ I have a Do Not Resuscitate (DNR)order.   (zac one below)  ___  I rescind my DNR for surgery and immediate post-operative period through Phase 2 recovery. This means, for that time period, I will be a Full-code and receive CPR, intubation, chest compressions, medications, and/or other life saving measures, if I have a cardiac or respiratory arrest.    ___ I WANT to keep my DNR in effect during my procedure(s) and immediate post-operative recovery period through Phase 2 recovery.   (Complete separate refusal form) This form has been fully explained to me. I understand its contents. Patients Signature: ___________________________Date: ________  Time: ________    If patient unable to sign, has engaged the [address] [place] [address] [employer], is a minor, or has a court-appointed Guardian:  [facility] Representative Name (Print):  ____________________________________      Relationship ([place] one):    Guardian   Parent    Spouse    HCPOA   Child   Sibling  Next-of-Kin Friend    Patients Representative Signature: _______________________________________              Date: ______________  Time: __________    An  was used.  name/ID: _________________________________      [facility] ([facility]) Witness________________________  Date: ________   Time: _________    Physician/Practitioner _______________________  Date: ________   Time: _________           Revision 2017                                            [facility]      Dr. Debra Herrera TO SCHEDULE:      LEFT HEART CATHETERIZATION WITH POSSIBLE PERCUTANEOUS CORONARY INTERVENTION       Patient Name: Parvin Benitez   : 1960   MRN# [id number]      Home Phone Number: [phone]   Weight:    Wt Readings from Last 3 Encounters:   20 170 lb (77.1 kg)   20 186 lb (84.4 kg)   20 186 lb 9.6 oz (84.6 kg)          Insurance: Payor: Brayan Vasquez / Plan: Carlos Gave / Product Type: *No Product type* /     Date of Procedure:    Time:      Arrival Time:     Diagnosis:  Abnormal NM      Allergies: Allergies   Allergen Reactions    Adhesive Tape Rash    Aspirin Nausea And Vomiting     Chewable sts not coated.     Pcn [Penicillins] Nausea And Vomiting and Rash        1) Call [facility] scheduling ([phone]) or Instant Message    CONFIRMED WITH     PHONE OR   INSTANT MESSAGE    2) PREAUTHORIZATION NUMBER:    Spoke to:        From date:     expiration date:        Dahlia Sheehan

## 2020-07-30 NOTE — PROGRESS NOTES
CARDIOLOGY NOTE      7/30/2020    RE: Renny Hess  ([id number])                               TO:  Dr. Larry Ambrose, APRN - NP            Maurice Lepe is a 63 y.o. female who was seen today for management of  cad                     Here for abn stress               HPI:   Patient is here for    - Coronary artery disease, has chest pain. Patient is  compliant with prescribed medicines. - Hyperlipidimea, lipids are in acceptable range.  Pt  is  compliant with medicines                  The patient has cardiac complaints of mild recurrent exertional cp for a few weeks    Renny Hess has the following history recorded in care path:  Patient Active Problem List    Diagnosis Date Noted    Angina pectoris [facility])      Priority: High    Syncope and collapse      Priority: High    Dizziness 05/26/2017     Priority: Low    Leg pain, bilateral 04/19/2017     Priority: Low    Post PTCA 02/16/2017     Priority: Low    Precordial pain 01/24/2017     Priority: Low    S/P Nissen fundoplication (without gastrostomy tube) procedure 11/05/2015     Priority: Low    Gastroesophageal reflux disease with hiatal hernia 02/21/2014     Priority: Low    Palpitations      Priority: Low    Tobacco use      Priority: Low    COPD (chronic obstructive pulmonary disease) ([facility] [address] 10/03/2012     Priority: Low    Essential hypertension 10/03/2012     Priority: Low    Chest pain 11/16/2011     Priority: Low    Current tobacco use 11/16/2011     Priority: Low    Chronic kidney disease (CKD) stage G3a/A1, moderately decreased glomerular filtration rate (GFR) between 45-59 mL/min/1.73 square meter and albuminuria creatinine ratio less than 30 mg/g ([facility]) 07/18/2019    Hypotension 07/18/2019    Closed fracture of upper end of left fibula     Ankle syndesmosis disruption, left, subsequent encounter 05/23/2019    Closed trimalleolar fracture of left ankle 05/23/2019    Closed left ankle fracture, initial encounter 05/23/2019    HTN (hypertension) 09/11/2017    CAD (coronary artery disease) 09/11/2017    GERD (gastroesophageal reflux disease) 09/11/2017     Current Outpatient Medications   Medication Sig Dispense Refill    Cholecalciferol (VITAMIN D3) 50 MCG (2000 UT) CAPS Take by mouth      metoprolol tartrate (LOPRESSOR) 25 MG tablet Take 12.5 mg by mouth 2 times daily      cyclobenzaprine (FLEXERIL) 5 MG tablet Take 5 mg by mouth 2 times daily as needed for Muscle spasms      acetaminophen (TYLENOL 8 HOUR ARTHRITIS PAIN) 650 MG extended release tablet Take 650 mg by mouth 2 times daily as needed for Pain      fluticasone (FLONASE) 50 MCG/ACT nasal spray 2 sprays by Each Nostril route daily      Lactobacillus (ACIDOPHILUS PO) Take 1 capsule by mouth daily      triamcinolone (KENALOG) 0.1 % cream Apply topically 2 times daily Apply topically 2 times daily.  clopidogrel (PLAVIX) 75 MG tablet Take 1 tablet by mouth daily 30 tablet 3    nitroGLYCERIN (NITROSTAT) 0.4 MG SL tablet Place 1 tablet under the tongue every 5 minutes as needed for Chest pain 25 tablet 3    cetirizine (ZYRTEC) 10 MG tablet Take 10 mg by mouth daily      sertraline (ZOLOFT) 100 MG tablet Take 100 mg by mouth daily       ondansetron (ZOFRAN-ODT) 4 MG disintegrating tablet ondansetron 4 mg disintegrating tablet      albuterol sulfate HFA (VENTOLIN HFA) 108 (90 Base) MCG/ACT inhaler Inhale 2 puffs into the lungs as needed for Shortness of Breath 1 Inhaler 5    glycopyrrolate-formoterol (BEVESPI AEROSPHERE) 9-4.8 MCG/ACT AERO Inhale 2 puffs into the lungs 2 times daily 3 Inhaler 1    ranolazine (RANEXA) 1000 MG extended release tablet Take 1 tablet by mouth 2 times daily Do not crush or break.  180 tablet 3    atorvastatin (LIPITOR) 40 MG tablet Take 40 mg by mouth daily      dicyclomine (BENTYL) 20 MG tablet Take 20 mg by mouth 3 times daily as needed      donepezil (ARICEPT) 10 MG tablet TAKE 1 TABLET BY MOUTH EVERYDAY AT daily  3  pantoprazole sodium (PROTONIX) 40 MG PACK packet Take 40 mg by mouth daily       melatonin 3 MG TABS tablet Take 5 mg by mouth nightly as needed       memantine (NAMENDA) 5 MG tablet Take 10 mg by mouth 2 times daily       hydrOXYzine (ATARAX) 25 MG tablet Take 50 mg by mouth every 8 hours as needed for Itching       aspirin EC 81 MG EC tablet Take 1 tablet by mouth daily. (Patient taking differently: Take 81 mg by mouth every morning ) 30 tablet 6     No current facility-administered medications for this visit. Allergies: Adhesive tape; Aspirin; and Pcn [penicillins]  Past Medical History:   Diagnosis Date    Anxiety     Arthritis     \"Both Legs\"    Asthma     Back problem     \"Curved Spine\"    Bronchitis Last Episode In 2015    CAD (coronary artery disease)     Sees Dr. Eston Lennox; 8-16-16 (noted on 10-4-2012 that patient does not have CAD s/p cardiac catheterization).  Cancer ([facility] [address]     skin ca on skin    Chest pain     in ER with this dx 7/2015- admitted- stress test done as outpt 8/7/2015(see report)    Chipped tooth     Upper And Lower    Chronic back pain     COPD (chronic obstructive pulmonary disease) ([facility] [address] 06/01/2017    Sees Dr. Alicia retirement    Cough     Occ. Nonproductive Cough    Emphysema     GERD (gastroesophageal reflux disease)     H/O 24 hour EKG monitoring     3/14/2013-Signif for runs of sinus tachycardia, fastest recorded at 132 bpm lasting almost 38 mins. Dr Eliot Mcmahan. -report in [facility]    H/O cardiac catheterization     10/4/2012- No CAD. False positive stress test.Dr Valdez-report in [facility];     H/O cardiac catheterization     H/O Doppler ultrasound 04/13/2017    LE doppler - normal study    H/O Doppler ultrasound 05/26/2017    carotid - normal study    H/O exercise stress test 02/28/2017    normal study    Hiatal hernia     History of exercise stress test 02/28/2017    treadmill    History of nuclear stress test 8/7/15    EF 70%.  WNL    History of nuclear it\"     Past Surgical History:   Procedure Laterality Date    ANKLE FRACTURE SURGERY Left 2019    LEFT ANKLE OPEN REDUCTION INTERNAL FIXATION SYNDESMOSIS performed by Doris Nolasco MD at [address]      COLONOSCOPY  2017    colon polyp, diverticulosis, hemorrhoids    DENTAL SURGERY      Teeth Extracted In Past    ENDOSCOPY, COLON, DIAGNOSTIC  10-16-15    ENDOSCOPY, COLON, DIAGNOSTIC  2019    Hiatal hernia, savary dil #17 used    GASTRIC FUNDOPLICATION      Robotic laparoscopic nissen with mesh    KIDNEY SURGERY Left 2-15    \"At [facility] Had Left Kidney Mass Removed\" Benign    TONSILLECTOMY      TUBAL LIGATION      UPPER GASTROINTESTINAL ENDOSCOPY N/A 2019    EGD DILATION SAVARY #17MM performed by Dmitry Bernard MD at [place] [address]      As reviewed   Family History   Problem Relation Age of Onset    Heart Disease Mother     Heart Disease Father     High Blood Pressure Father     Cancer Sister     Early Death Sister 43    Asthma Brother      Social History     Tobacco Use    Smoking status: Former Smoker     Packs/day: 0.25     Years: 44.00     Pack years: 11.00     Types: Cigarettes     Start date: 10/21/1971     Last attempt to quit: 2015     Years since quittin.9    Smokeless tobacco: Never Used   Substance Use Topics    Alcohol use: Yes     Alcohol/week: 2.0 standard drinks     Types: 2 Shots of liquor per week     Comment: occassionally      Review of Systems:    Constitutional: Negative for diaphoresis and fatigue  Psychological:Negative for anxiety or depression  HENT: Negative for headaches, nasal congestion, sinus pain or vertigo  Eyes: Negative for visual disturbance.    Endocrine: Negative for polydipsia/polyuria  Respiratory: Negative for shortness of breath  Cardiovascular:  CP  Gastrointestinal: Negative for abdominal pain or heartburn  Genito-Urinary: Negative for urinary frequency/urgency  Musculoskeletal: Negative for muscle pain, muscular weakness, negative for pain in arm and leg or swelling in foot and leg  Neurological: Negative for dizziness, headaches, memory loss, numbness/tingling, visual changes, syncope  Dermatological: Negative for rash    Objective:    Vitals:    07/30/20 1157   BP: 138/80   Pulse: 68   Weight: 191 lb 6.4 oz (86.8 kg)   Height: 5' 2\" (1.575 m)     /80   Pulse 68   Ht 5' 2\" (1.575 m)   Wt 191 lb 6.4 oz (86.8 kg)   BMI 35.01 kg/m²     No flowsheet data found. Wt Readings from Last 3 Encounters:   07/30/20 191 lb 6.4 oz (86.8 kg)   07/29/20 170 lb (77.1 kg)   07/23/20 186 lb (84.4 kg)     Body mass index is 35.01 kg/m². GENERAL - Alert, oriented, pleasant, in no apparent distress. EYES: No jaundice, no conjunctival pallor. SKIN: It is warm & dry. No rashes. No Echhymosis    HEENT - No clinically significant abnormalities seen. Neck - Supple. No jugular venous distention noted. No carotid bruits. Cardiovascular - Normal S1 and S2 without obvious murmur or gallop. Extremities - No cyanosis, clubbing, or significant edema. Pulmonary - No respiratory distress. No wheezes or rales. Abdomen - No masses, tenderness, or organomegaly. Musculoskeletal - No significant edema. No joint deformities. No muscle wasting. Neurologic - Cranial nerves II through XII are grossly intact. There were no gross focal neurologic abnormalities.     Lab Review   Lab Results   Component Value Date    CKTOTAL 29 05/11/2017    TROPONINT <0.010 03/07/2020     BNP:    Lab Results   Component Value Date    BNP 18 04/22/2014     PT/INR:    Lab Results   Component Value Date    INR 1.06 05/23/2019     Lab Results   Component Value Date    LABA1C 5.6 03/07/2020    LABA1C 5.4 11/21/2014     Lab Results   Component Value Date    WBC 7.6 03/06/2020    HCT 38.6 03/06/2020    MCV 91.3 03/06/2020     03/06/2020     Lab Results   Component Value Date    CHOL 157 03/07/2020    TRIG 102 03/07/2020    HDL 38 (L) 03/07/2020    LDLCALC 92 11/26/2012    LDLDIRECT 105 (H) 03/07/2020     Lab Results   Component Value Date    ALT <5 (L) 03/06/2020    AST 14 (L) 03/06/2020     BMP:    Lab Results   Component Value Date     06/29/2020    K 4.2 06/29/2020     06/29/2020    CO2 27 06/29/2020    BUN 13 06/29/2020    CREATININE 1.0 06/29/2020     CMP:   Lab Results   Component Value Date     06/29/2020    K 4.2 06/29/2020     06/29/2020    CO2 27 06/29/2020    BUN 13 06/29/2020    PROT 7.7 03/06/2020    PROT 7.0 11/26/2012     TSH:    Lab Results   Component Value Date    TSHHS 0.548 02/13/2018       QUALITY MEASURES REVIEWED:    Impression:    No diagnosis found. Patient Active Problem List   Diagnosis Code    Chest pain R07.9    Current tobacco use Z72.0    COPD (chronic obstructive pulmonary disease) ([facility] J44.9    Essential hypertension I10    Palpitations R00.2    Tobacco use Z72.0    Gastroesophageal reflux disease with hiatal hernia K21.9, K44.9    S/P Nissen fundoplication (without gastrostomy tube) procedure Z98.890    Precordial pain R07.2    Post PTCA Z98.61    Leg pain, bilateral M79.604, M79.605    Syncope and collapse R55    Dizziness R42    Angina pectoris ([facility]) I20.9    HTN (hypertension) I10    CAD (coronary artery disease) I25.10    GERD (gastroesophageal reflux disease) K21.9    Ankle syndesmosis disruption, left, subsequent encounter U24.082X    Closed trimalleolar fracture of left ankle S82.852A    Closed left ankle fracture, initial encounter S82.892A    Closed fracture of upper end of left fibula S82.832A    Chronic kidney disease (CKD) stage G3a/A1, moderately decreased glomerular filtration rate (GFR) between 45-59 mL/min/1.73 square meter and albuminuria creatinine ratio less than 30 mg/g ([facility]) N18.3    Hypotension I95.9       Assessment & Plan:               -     CORONARY ARTERY DISEASE:  symptomatic     All available  tests in chart reviewed.  Management discussed . Testing ordered  yes, [facility]                                 -  LIPID MANAGEMENT:  Available lipid  lab data reviewed  and patient was given dietary advice. NCEP- ATP III guidelines reviewed with patient. -   Changes  in medicines made:  No                                        Larry Tello MD    [facility]

## 2020-08-02 NOTE — H&P
Damaso Ross is a 63 y.o. female who was seen today for management of cad   Here for  [facility]  HPI:   Patient is here for   - Coronary artery disease, has chest pain. Patient is compliant with prescribed medicines. - Hyperlipidimea, lipids are in acceptable range.  Pt is compliant with medicines   The patient has cardiac complaints of mild recurrent exertional cp for a few weeks   Rom Francisco has the following history recorded in care path:         Patient Active Problem List    Diagnosis Date Noted    Angina pectoris [facility])      Priority: High    Syncope and collapse      Priority: High    Dizziness 05/26/2017     Priority: Low    Leg pain, bilateral 04/19/2017     Priority: Low    Post PTCA 02/16/2017     Priority: Low    Precordial pain 01/24/2017     Priority: Low    S/P Nissen fundoplication (without gastrostomy tube) procedure 11/05/2015     Priority: Low    Gastroesophageal reflux disease with hiatal hernia 02/21/2014     Priority: Low    Palpitations      Priority: Low    Tobacco use      Priority: Low    [facility] 10/03/2012     Priority: Low    Essential hypertension 10/03/2012     Priority: Low    Chest pain 11/16/2011     Priority: Low    Current tobacco use 11/16/2011     Priority: Low    Chronic kidney disease (CKD) stage G3a/A1, moderately decreased glomerular filtration rate (GFR) between 45-59 mL/min/1.73 square meter and albuminuria creatinine ratio less than 30 mg/g (formerly [facility]) 07/18/2019    Hypotension 07/18/2019    Closed fracture of upper end of left fibula     Ankle syndesmosis disruption, left, subsequent encounter 05/23/2019    Closed trimalleolar fracture of left ankle 05/23/2019    Closed left ankle fracture, initial encounter 05/23/2019    HTN (hypertension) 09/11/2017    CAD (coronary artery disease) 09/11/2017    GERD (gastroesophageal reflux disease) 09/11/2017     Current Facility-Administered Medications Current Outpatient Medications   Medication Sig Dispense Refill    Cholecalciferol (VITAMIN D3) 50 MCG (2000 UT) CAPS Take by mouth      metoprolol tartrate (LOPRESSOR) 25 MG tablet Take 12.5 mg by mouth 2 times daily      cyclobenzaprine (FLEXERIL) 5 MG tablet Take 5 mg by mouth 2 times daily as needed for Muscle spasms      acetaminophen (TYLENOL 8 HOUR ARTHRITIS PAIN) 650 MG extended release tablet Take 650 mg by mouth 2 times daily as needed for Pain      fluticasone (FLONASE) 50 MCG/ACT nasal spray 2 sprays by Each Nostril route daily      Lactobacillus (ACIDOPHILUS PO) Take 1 capsule by mouth daily      triamcinolone (KENALOG) 0.1 % cream Apply topically 2 times daily Apply topically 2 times daily.  clopidogrel (PLAVIX) 75 MG tablet Take 1 tablet by mouth daily 30 tablet 3    nitroGLYCERIN (NITROSTAT) 0.4 MG SL tablet Place 1 tablet under the tongue every 5 minutes as needed for Chest pain 25 tablet 3    cetirizine (ZYRTEC) 10 MG tablet Take 10 mg by mouth daily      sertraline (ZOLOFT) 100 MG tablet Take 100 mg by mouth daily       ondansetron (ZOFRAN-ODT) 4 MG disintegrating tablet ondansetron 4 mg disintegrating tablet      albuterol sulfate HFA (VENTOLIN HFA) 108 (90 Base) MCG/ACT inhaler Inhale 2 puffs into the lungs as needed for Shortness of Breath 1 Inhaler 5    glycopyrrolate-formoterol (BEVESPI AEROSPHERE) 9-4.8 MCG/ACT AERO Inhale 2 puffs into the lungs 2 times daily 3 Inhaler 1    ranolazine (RANEXA) 1000 MG extended release tablet Take 1 tablet by mouth 2 times daily Do not crush or break.  180 tablet 3    atorvastatin (LIPITOR) 40 MG tablet Take 40 mg by mouth daily      dicyclomine (BENTYL) 20 MG tablet Take 20 mg by mouth 3 times daily as needed      donepezil (ARICEPT) 10 MG tablet TAKE 1 TABLET BY MOUTH EVERYDAY AT daily  3    pantoprazole sodium (PROTONIX) 40 MG PACK packet Take 40 mg by mouth daily       melatonin 3 MG TABS tablet Take 5 mg by mouth nightly as needed       memantine (NAMENDA) 5 MG tablet Take 10 mg by mouth 2 times daily       hydrOXYzine (ATARAX) 25 MG tablet Take 50 mg by mouth every 8 hours as needed for Itching       aspirin EC 81 MG EC tablet Take 1 tablet by mouth daily. (Patient taking differently: Take 81 mg by mouth every morning ) 30 tablet 6   No current facility-administered medications for this visit. Allergies: Adhesive tape; Aspirin; and Pcn [penicillins]   Past Medical History        Past Medical History:   Diagnosis Date    Anxiety     Arthritis     \"Both Legs\"    Asthma     Back problem     \"Curved Spine\"    Bronchitis Last Episode In 2015    CAD (coronary artery disease)     Sees Dr. Pricila Costello; 8-16-16 (noted on 10-4-2012 that patient does not have CAD s/p cardiac catheterization).  Cancer ([facility]     skin ca on skin    Chest pain     in ER with this dx 7/2015- admitted- stress test done as outpt 8/7/2015(see report)    Chipped tooth     Upper And Lower    Chronic back pain     COPD (chronic obstructive pulmonary disease) ([facility] 06/01/2017    Sees Dr. Mercer Safe    Cough     Occ. Nonproductive Cough    Emphysema     GERD (gastroesophageal reflux disease)     H/O 24 hour EKG monitoring     3/14/2013-Signif for runs of sinus tachycardia, fastest recorded at 132 bpm lasting almost 38 mins. Dr Марина Mae. -report in epic    H/O cardiac catheterization     10/4/2012- No CAD. False positive stress test.Dr Valdez-report in epic;     H/O cardiac catheterization     H/O Doppler ultrasound 04/13/2017    LE doppler - normal study    H/O Doppler ultrasound 05/26/2017    carotid - normal study    H/O exercise stress test 02/28/2017    normal study    Hiatal hernia     History of exercise stress test 02/28/2017    treadmill    History of nuclear stress test 8/7/15    EF 70%.  WNL    History of nuclear stress test 07/17/2020    scan shows small size, moderate intensity, reversible perfusion defect in anterior wall.normal LVEF .    [place] (hard of hearing)     Bilateral Ears    Hx of cardiovascular stress test 9/2013 9/13 EF70% Normal.10/12- LVSF normal. EF 61%. Normal perfusion althought pt complained of chest pain with Lexiscan. report in [facility]; 11/11, possible apical ischemia but abnormality secondary to technical artifact needs to be considered, regional wall motion abnormality with low normal LVSF by pierson scan. 8/10, 12/09    Hx of Doppler ultrasound 2/2011 2/2011-Carotid no significant obstructive lesions noted in the extracranial carotid system. Antegrade flow noted inthe vertebral arteries.  Hx of Doppler ultrasound 1/2012 1/2012-peripheral - both abis and duplex studies of both lower extremities do not reveal any significant peripheral vascular disease at this time.  Hx of Doppler ultrasound 1/22/2016    Arterial: Peripheral vascular noninvasive arterial studies do not reveal any significant atherosclerotic disease.  Hx of echocardiogram 6/2013 6/13- Mild to mod MR/TR. 10/12-LVSF normal. EF 60%. Mild mitral and tricuspid insuff. reprot in epic; 8/6/10, normal dimension of the LV, normal global and regional LVSF with an EF of 60%, no significant valvuopathy is seen.  12/05    Hypertension     \"on medication since age 25's\" follow with Dr Merlinda Radon Irregular heart beat     \"have irreg heart beat\" dont know what you call it\"    Migraines Last Migraine In 2014    Palpitations     Panic attacks     Pneumonia Last Episode In 2014    Prolonged emergence from general anesthesia     Restless leg     S/P cardiac cath 2/21/14    S/P PTCA (percutaneous transluminal coronary angioplasty) 01/26/2017    2017, CX stented    Shortness of breath     Teeth missing     Upper And Lower    Tobacco use     Unspecified sleep apnea     \"had sleep study 2-3 yrs ago\" have cpap and try to use it\"     Past Surgical History         Past Surgical History:   Procedure Laterality Date    ANKLE FRACTURE SURGERY Left 2019    LEFT ANKLE OPEN REDUCTION INTERNAL FIXATION SYNDESMOSIS performed [facility]      COLONOSCOPY  2017    colon polyp, diverticulosis, hemorrhoids    DENTAL SURGERY      Teeth Extracted In Past    ENDOSCOPY, COLON, DIAGNOSTIC  10-16-15    ENDOSCOPY, COLON, DIAGNOSTIC  2019    Hiatal hernia, savary dil #17 used    GASTRIC FUNDOPLICATION      Robotic laparoscopic nissen with mesh    KIDNEY SURGERY Left -15    \"At [facility] Had Left Kidney Mass Removed\" Benign    TONSILLECTOMY      TUBAL LIGATION      UPPER GASTROINTESTINAL ENDOSCOPY N/A 2019    EGD DILATION SAVARY #17MM performed by Fahad Albarran MD at [place] [id number]   As reviewed   Family HistoryExpand by Default         Family History   Problem Relation Age of Onset    Heart Disease Mother     Heart Disease Father     High Blood Pressure Father     Cancer Sister     Early Death Sister 43    Asthma Brother      Social History           Tobacco Use    Smoking status: Former Smoker     Packs/day: 0.25     Years: 44.00     Pack years: 11.00     Types: Cigarettes     Start date: 10/21/1971     Last attempt to quit: 2015     Years since quittin.9    Smokeless tobacco: Never Used   Substance Use Topics    Alcohol use: Yes     Alcohol/week: 2.0 standard drinks     Types: 2 Shots of liquor per week     Comment: occassionally   Review of Systems:   Constitutional: Negative for diaphoresis and fatigue   Psychological:Negative for anxiety or depression   HENT: Negative for headaches, nasal congestion, sinus pain or vertigo   Eyes: Negative for visual disturbance.    Endocrine: Negative for polydipsia/polyuria   Respiratory: Negative for shortness of breath   Cardiovascular: CP   Gastrointestinal: Negative for abdominal pain or heartburn   Genito-Urinary: Negative for urinary frequency/urgency   Musculoskeletal: Negative for muscle pain, muscular weakness, negative for pain in arm and leg or swelling in foot and leg   Neurological: Negative for dizziness, headaches, memory loss, numbness/tingling, visual changes, syncope   Dermatological: Negative for rash   Objective:   Vitals                                     /80  Pulse 68  Ht 5' 2\" (1.575 m)  Wt 191 lb 6.4 oz (86.8 kg)  BMI 35.01 kg/m²   No flowsheet data found. Wt Readings from Last 3 Encounters:   07/30/20 191 lb 6.4 oz (86.8 kg)   07/29/20 170 lb (77.1 kg)   07/23/20 186 lb (84.4 kg)   Body mass index is 35.01 kg/m². GENERAL - Alert, oriented, pleasant, in no apparent distress. EYES: No jaundice, no conjunctival pallor. SKIN: It is warm & dry. No rashes. No Echhymosis   HEENT - No clinically significant abnormalities seen. Neck - Supple. No jugular venous distention noted. No carotid bruits. Cardiovascular - Normal S1 and S2 without obvious murmur or gallop. Extremities - No cyanosis, clubbing, or significant edema. Pulmonary - No respiratory distress. No wheezes or rales. Abdomen - No masses, tenderness, or organomegaly. Musculoskeletal - No significant edema. No joint deformities. No muscle wasting. Neurologic - Cranial nerves II through XII are grossly intact. There were no gross focal neurologic abnormalities.    Lab Review         Lab Results   Component Value Date    CKTOTAL 29 05/11/2017    TROPONINT <0.010 03/07/2020   BNP:         Lab Results   Component Value Date    BNP 18 04/22/2014   PT/INR:         Lab Results   Component Value Date    INR 1.06 05/23/2019           Lab Results   Component Value Date    LABA1C 5.6 03/07/2020    LABA1C 5.4 11/21/2014           Lab Results   Component Value Date    WBC 7.6 03/06/2020    HCT 38.6 03/06/2020    MCV 91.3 03/06/2020     03/06/2020           Lab Results   Component Value Date    CHOL 157 03/07/2020    TRIG 102 03/07/2020    HDL 38 (L) 03/07/2020    LDLCALC 92 11/26/2012    LDLDIRECT 105 (H) 03/07/2020 reviewed and patient was given dietary advice. NCEP- ATP III guidelines reviewed with patient. - Changes in medicines made:  No

## 2020-08-03 ENCOUNTER — HOSPITAL ENCOUNTER (OUTPATIENT)
Dept: CARDIAC CATH/INVASIVE PROCEDURES | Age: 60
Discharge: HOME | End: 2020-08-03
Attending: INTERNAL MEDICINE | Admitting: INTERNAL MEDICINE
Payer: COMMERCIAL

## 2020-08-03 VITALS
WEIGHT: 191 LBS | RESPIRATION RATE: 16 BRPM | SYSTOLIC BLOOD PRESSURE: 123 MMHG | DIASTOLIC BLOOD PRESSURE: 94 MMHG | TEMPERATURE: 98.1 F | HEART RATE: 71 BPM | HEIGHT: 62 IN | OXYGEN SATURATION: 97 % | BODY MASS INDEX: 35.15 KG/M2

## 2020-08-03 PROCEDURE — 6360000004 HC RX CONTRAST MEDICATION

## 2020-08-03 PROCEDURE — 93458 L HRT ARTERY/VENTRICLE ANGIO: CPT | Performed by: INTERNAL MEDICINE

## 2020-08-03 PROCEDURE — 6360000002 HC RX W HCPCS

## 2020-08-03 PROCEDURE — C1894 INTRO/SHEATH, NON-LASER: HCPCS

## 2020-08-03 PROCEDURE — 2709999900 HC NON-CHARGEABLE SUPPLY

## 2020-08-03 PROCEDURE — 93458 L HRT ARTERY/VENTRICLE ANGIO: CPT

## 2020-08-03 PROCEDURE — 2500000003 HC RX 250 WO HCPCS

## 2020-08-03 RX ORDER — DIPHENHYDRAMINE HCL 25 MG
25 TABLET ORAL ONCE
Status: DISCONTINUED | OUTPATIENT
Start: 2020-08-03 | End: 2020-08-03 | Stop reason: HOSPADM

## 2020-08-03 RX ORDER — SODIUM CHLORIDE 9 MG/ML
INJECTION, SOLUTION INTRAVENOUS CONTINUOUS
Status: DISCONTINUED | OUTPATIENT
Start: 2020-08-03 | End: 2020-08-03 | Stop reason: HOSPADM

## 2020-08-03 RX ORDER — DIAZEPAM 5 MG/1
5 TABLET ORAL ONCE
Status: DISCONTINUED | OUTPATIENT
Start: 2020-08-03 | End: 2020-08-03 | Stop reason: HOSPADM

## 2020-08-03 NOTE — PROGRESS NOTES
The patient was given discharge instructions. All questions reviewed and answered. The patient changed into personal clothing. The IV was removed and dressing applied. The patient was then transported via wheelchair to the main entrance for  by family.

## 2020-09-15 ENCOUNTER — TELEPHONE (OUTPATIENT)
Dept: CARDIOLOGY CLINIC | Age: 60
End: 2020-09-15

## 2020-09-15 ENCOUNTER — OFFICE VISIT (OUTPATIENT)
Dept: CARDIOLOGY CLINIC | Age: 60
End: 2020-09-15
Payer: COMMERCIAL

## 2020-09-15 VITALS
HEIGHT: 62 IN | HEART RATE: 92 BPM | WEIGHT: 196.4 LBS | BODY MASS INDEX: 36.14 KG/M2 | DIASTOLIC BLOOD PRESSURE: 70 MMHG | SYSTOLIC BLOOD PRESSURE: 110 MMHG

## 2020-09-15 PROCEDURE — 3017F COLORECTAL CA SCREEN DOC REV: CPT | Performed by: INTERNAL MEDICINE

## 2020-09-15 PROCEDURE — G8427 DOCREV CUR MEDS BY ELIG CLIN: HCPCS | Performed by: INTERNAL MEDICINE

## 2020-09-15 PROCEDURE — 99214 OFFICE O/P EST MOD 30 MIN: CPT | Performed by: INTERNAL MEDICINE

## 2020-09-15 PROCEDURE — G8417 CALC BMI ABV UP PARAM F/U: HCPCS | Performed by: INTERNAL MEDICINE

## 2020-09-15 PROCEDURE — 1036F TOBACCO NON-USER: CPT | Performed by: INTERNAL MEDICINE

## 2020-09-15 RX ORDER — OXYCODONE HYDROCHLORIDE AND ACETAMINOPHEN 5; 325 MG/1; MG/1
TABLET ORAL PRN
COMMUNITY
Start: 2020-08-06 | End: 2020-10-07 | Stop reason: ALTCHOICE

## 2020-09-15 RX ORDER — RANOLAZINE 1000 MG/1
1000 TABLET, EXTENDED RELEASE ORAL 2 TIMES DAILY
Qty: 180 TABLET | Refills: 3 | Status: SHIPPED | OUTPATIENT
Start: 2020-09-15 | End: 2021-09-22 | Stop reason: SDUPTHER

## 2020-09-15 RX ORDER — CLOPIDOGREL BISULFATE 75 MG/1
75 TABLET ORAL DAILY
Qty: 30 TABLET | Refills: 3 | Status: SHIPPED | OUTPATIENT
Start: 2020-09-15 | End: 2021-02-24

## 2020-09-15 RX ORDER — TRAMADOL HYDROCHLORIDE 50 MG/1
TABLET ORAL PRN
COMMUNITY
Start: 2020-08-19 | End: 2021-01-22

## 2020-09-15 NOTE — LETTER
Alexandria Tinajero Medico  1960  [id number]    Have you had any Chest Pain - Yes  If Yes DO EKG - How does it feel - Sharp Pain   How long does the pain last - minutes   How long have you been having the pain - Months   Did you take a Nitro   And did it relieve the pain - Yes    Have you had any Shortness of Breath - Yes  If Yes - When on exertion    Have you had any dizziness - Yes, ongoing, unchanged  If Yes DO ORTHOSTATIC BP - when do you feel dizzy sitting   How long does it last minutes    Have you had any palpitations - Yes, ongoing, occasional  If Yes DO EKG - Do you feel your heart racing  How long does it last - minutes     Is the patient on any of the following medications - NONE  If Yes DO EKG    Do you have any edema - swelling in legs to feet   If Yes - CHECK TO SEE IF THE EDEMA IS PITTING  How long have they been having edema - Months  If Yes - Have they worn compression stockings No    Check Venous \"LEG PROBLEM Questionnaire\"    Do you have a surgery or procedure scheduled in the near future - No  If Yes- DO EKG    Ask patient if they want to sign up for [facility] if they are not already signed up    Check to see if we have an E-MAIL on file for the patient    Check medication list thoroughly!!!  BE SURE TO ASK PATIENT IF THEY NEED MEDICATION REFILLS
Initial (On Arrival)

## 2020-09-15 NOTE — PROGRESS NOTES
CARDIOLOGY NOTE      9/15/2020    RE: Srinath Olivobailee  (1960)                               TO:  Dr. Prateek Horn, APRN - NP            Iam Wright is a 63 y.o. female who was seen today for management of  cad                                    HPI:                   The patient does not have cardiac complaints  Has cher LE swelling  Patient also seen  for    - Coronary artery disease, does not have chest pain. Patient is  compliant with prescribed medicines. - Hyperlipidimea, importance of hyperlipidimea discussed with pt.    - Hypertension,is  well controlled, pt is  compliant with medicines        Srinath Woo has the following history recorded in care path:  Patient Active Problem List    Diagnosis Date Noted    Angina pectoris [facility])      Priority: High    Syncope and collapse      Priority: High    Dizziness 05/26/2017     Priority: Low    Leg pain, bilateral 04/19/2017     Priority: Low    Post PTCA 02/16/2017     Priority: Low    Precordial pain 01/24/2017     Priority: Low    S/P Nissen fundoplication (without gastrostomy tube) procedure 11/05/2015     Priority: Low    Gastroesophageal reflux disease with hiatal hernia 02/21/2014     Priority: Low    Palpitations      Priority: Low    Tobacco use      Priority: Low    COPD (chronic obstructive pulmonary disease) ([facility] [address] 10/03/2012     Priority: Low    Essential hypertension 10/03/2012     Priority: Low    Chest pain 11/16/2011     Priority: Low    Current tobacco use 11/16/2011     Priority: Low    Chronic kidney disease (CKD) stage G3a/A1, moderately decreased glomerular filtration rate (GFR) between 45-59 mL/min/1.73 square meter and albuminuria creatinine ratio less than 30 mg/g ([facility]) 07/18/2019    Hypotension 07/18/2019    Closed fracture of upper end of left fibula     Ankle syndesmosis disruption, left, subsequent encounter 05/23/2019    Closed trimalleolar fracture of left ankle 05/23/2019    Closed left ankle fracture, initial encounter 05/23/2019    HTN (hypertension) 09/11/2017    ASCVD (arteriosclerotic cardiovascular disease) 09/11/2017    GERD (gastroesophageal reflux disease) 09/11/2017     Current Outpatient Medications   Medication Sig Dispense Refill    ranolazine (RANEXA) 1000 MG extended release tablet Take 1 tablet by mouth 2 times daily Do not crush or break. 180 tablet 3    clopidogrel (PLAVIX) 75 MG tablet Take 1 tablet by mouth daily 30 tablet 3    albuterol sulfate  (90 Base) MCG/ACT inhaler INHALE TWO (2) PUFFS INTO LUNGS EVERY 6 HOURS AS NEEDED FOR SHORTNESS OF BREATH *HOLD SCRIPT* 18 g 10    Cholecalciferol (VITAMIN D3) 50 MCG (2000 UT) CAPS Take by mouth      metoprolol tartrate (LOPRESSOR) 25 MG tablet Take 12.5 mg by mouth 2 times daily      cyclobenzaprine (FLEXERIL) 5 MG tablet Take 5 mg by mouth 2 times daily as needed for Muscle spasms      acetaminophen (TYLENOL 8 HOUR ARTHRITIS PAIN) 650 MG extended release tablet Take 650 mg by mouth 2 times daily as needed for Pain      fluticasone (FLONASE) 50 MCG/ACT nasal spray 2 sprays by Each Nostril route daily      Lactobacillus (ACIDOPHILUS PO) Take 1 capsule by mouth daily      triamcinolone (KENALOG) 0.1 % cream Apply topically 2 times daily Apply topically 2 times daily.       nitroGLYCERIN (NITROSTAT) 0.4 MG SL tablet Place 1 tablet under the tongue every 5 minutes as needed for Chest pain 25 tablet 3    cetirizine (ZYRTEC) 10 MG tablet Take 10 mg by mouth daily      sertraline (ZOLOFT) 100 MG tablet Take 100 mg by mouth daily       ondansetron (ZOFRAN-ODT) 4 MG disintegrating tablet ondansetron 4 mg disintegrating tablet      glycopyrrolate-formoterol (BEVESPI AEROSPHERE) 9-4.8 MCG/ACT AERO Inhale 2 puffs into the lungs 2 times daily 3 Inhaler 1    atorvastatin (LIPITOR) 40 MG tablet Take 40 mg by mouth daily      dicyclomine (BENTYL) 20 MG tablet Take 20 mg by mouth 3 times daily as needed      donepezil (ARICEPT) 10 MG tablet TAKE 1 TABLET BY MOUTH EVERYDAY AT daily  3    pantoprazole sodium (PROTONIX) 40 MG PACK packet Take 40 mg by mouth daily       melatonin 3 MG TABS tablet Take 5 mg by mouth nightly as needed       memantine (NAMENDA) 5 MG tablet Take 10 mg by mouth 2 times daily       hydrOXYzine (ATARAX) 25 MG tablet Take 50 mg by mouth every 8 hours as needed for Itching       aspirin EC 81 MG EC tablet Take 1 tablet by mouth daily. (Patient taking differently: Take 81 mg by mouth every morning ) 30 tablet 6    oxyCODONE-acetaminophen (PERCOCET) 5-325 MG per tablet as needed.  traMADol (ULTRAM) 50 MG tablet as needed. No current facility-administered medications for this visit. Allergies: Adhesive tape; Aspirin; and Pcn [penicillins]  Past Medical History:   Diagnosis Date    Anxiety     Arthritis     \"Both Legs\"    Asthma     Back problem     \"Curved Spine\"    Bronchitis Last Episode In 2015    CAD (coronary artery disease)     Sees Dr. Marychuy Barth; 8-16-16 (noted on 10-4-2012 that patient does not have CAD s/p cardiac catheterization).  Cancer ([address]     skin ca on skin    Chest pain     in ER with this dx 7/2015- admitted- stress test done as outpt 8/7/2015(see report)    Chipped tooth     Upper And Lower    Chronic back pain     COPD (chronic obstructive pulmonary disease) ([facility] [address] 06/01/2017    Sees Dr. Mejia Minor    Cough     Occ. Nonproductive Cough    Emphysema     GERD (gastroesophageal reflux disease)     H/O 24 hour EKG monitoring     3/14/2013-Signif for runs of sinus tachycardia, fastest recorded at 132 bpm lasting almost 38 mins. Dr Garcia Solid. -report in epic    H/O cardiac catheterization     10/4/2012- No CAD.  False positive stress test.Dr Valdez-report [facility];    [facility] H/O cardiac catheterization     H/O Doppler ultrasound 04/13/2017    LE doppler - normal study    H/O Doppler ultrasound 05/26/2017    carotid - normal study    H/O exercise stress test 02/28/2017    normal study    Hiatal hernia     History of cardiac cath 08/03/2020    PATENT CX STENT, Normal EF,  NORMAL LAD AND RCA    History of exercise stress test 02/28/2017    treadmill    History of nuclear stress test 8/7/15    EF 70%. WNL    History of nuclear stress test 07/17/2020     scan shows small size, moderate intensity, reversible perfusion defect in anterior wall. normal LVEF .    [place] (hard of hearing)     Bilateral Ears    Hx of cardiovascular stress test 9/2013 9/13 EF70% Normal.10/12- LVSF normal. EF 61%. Normal perfusion althought pt complained of chest pain with Lexiscan. report in [facility]; 11/11, possible apical ischemia but abnormality secondary to technical artifact needs to be considered, regional wall motion abnormality with low normal LVSF by pierson scan. 8/10, 12/09    Hx of Doppler ultrasound 2/2011 2/2011-Carotid no significant obstructive lesions noted in the extracranial carotid system. Antegrade flow noted inthe vertebral arteries.  Hx of Doppler ultrasound 1/2012 1/2012-peripheral - both abis and duplex studies of both lower extremities do not  reveal any significant peripheral vascular disease at this time.  Hx of Doppler ultrasound 1/22/2016    Arterial: Peripheral vascular noninvasive arterial studies do not reveal any significant atherosclerotic disease.  Hx of echocardiogram 6/2013 6/13- Mild to mod MR/TR. 10/12-LVSF normal. EF 60%. Mild mitral and tricuspid insuff. reprot in epic; 8/6/10, normal dimension of the LV, normal global and regional LVSF with an EF of 60%, no significant valvuopathy is seen.  12/05    Hypertension     \"on medication since age 25's\" follow with Dr Knight Button Irregular heart beat     \"have irreg heart beat\" dont know what you call it\"    Migraines Last Migraine In 2014    Palpitations     Panic attacks     Pneumonia Last Episode In 2014    Prolonged emergence from general anesthesia     Restless leg     S/P cardiac cath 14    S/P PTCA (percutaneous transluminal coronary angioplasty) 2017    2017, CX stented    Shortness of breath     Teeth missing     Upper And Lower    Tobacco use     Unspecified sleep apnea     \"had sleep study 2-3 yrs ago\" have cpap and try to use it\"     Past Surgical History:   Procedure Laterality Date    ANKLE FRACTURE SURGERY Left 2019    LEFT ANKLE OPEN REDUCTION INTERNAL FIXATION SYNDESMOSIS performed by Barrett Hoyt [facility] at [facility]      COLONOSCOPY  2017    colon polyp, diverticulosis, hemorrhoids    DENTAL SURGERY      Teeth Extracted In Past    ENDOSCOPY, COLON, DIAGNOSTIC  10-16-15    ENDOSCOPY, COLON, DIAGNOSTIC  2019    Hiatal hernia, savary dil #17 used    GASTRIC FUNDOPLICATION      Robotic laparoscopic nissen with mesh    KIDNEY SURGERY Left 2-15    \"At [facility] Had Left Kidney Mass Removed\" Benign    TONSILLECTOMY      TUBAL LIGATION      UPPER GASTROINTESTINAL ENDOSCOPY N/A 2019    EGD DILATION SAVARY #17MM performed by Charisma Murdock MD at [facility] 12      As reviewed   Family History   Problem Relation Age of Onset    Heart Disease Mother     Heart Disease Father     High Blood Pressure Father     Cancer Sister     Early Death Sister 43    Asthma Brother      Social History     Tobacco Use    Smoking status: Former Smoker     Packs/day: 0.25     Years: 44.00     Pack years: 11.00     Types: Cigarettes     Start date: 10/21/1971     Last attempt to quit: 2015     Years since quittin.0    Smokeless tobacco: Never Used   Substance Use Topics    Alcohol use:  Yes     Alcohol/week: 2.0 standard drinks     Types: 2 Shots of liquor per week     Comment: occassionally      Review of Systems:    Constitutional: Negative for diaphoresis and fatigue  Psychological:Negative for anxiety or depression  HENT: Negative for headaches, nasal congestion, sinus pain or vertigo  Eyes: Negative for visual disturbance. Endocrine: Negative for polydipsia/polyuria  Respiratory: Negative for shortness of breath  Cardiovascular: Negative for chest pain, dyspnea on exertion, claudication, edema, irregular heartbeat, murmur, palpitations or shortness of breath  Gastrointestinal: Negative for abdominal pain or heartburn  Genito-Urinary: Negative for urinary frequency/urgency  Musculoskeletal: Negative for muscle pain, muscular weakness, negative for pain in arm and leg or swelling in foot and leg  Neurological: Negative for dizziness, headaches, memory loss, numbness/tingling, visual changes, syncope  Dermatological: Negative for rash    Objective:    Vitals:    09/15/20 1009   BP: 110/70   Site: Right Upper Arm   Position: Sitting   Cuff Size: Large Adult   Pulse: 92   Weight: 196 lb 6.4 oz (89.1 kg)   Height: 5' 2\" (1.575 m)     /70 (Site: Right Upper Arm, Position: Sitting, Cuff Size: Large Adult)   Pulse 92   Ht 5' 2\" (1.575 m)   Wt 196 lb 6.4 oz (89.1 kg)   BMI 35.92 kg/m²     No flowsheet data found. Wt Readings from Last 3 Encounters:   09/15/20 196 lb 6.4 oz (89.1 kg)   08/03/20 191 lb (86.6 kg)   07/30/20 191 lb 6.4 oz (86.8 kg)     Body mass index is 35.92 kg/m². GENERAL - Alert, oriented, pleasant, in no apparent distress. EYES: No jaundice, no conjunctival pallor. SKIN: It is warm & dry. No rashes. No Echhymosis    HEENT - No clinically significant abnormalities seen. Neck - Supple. No jugular venous distention noted. No carotid bruits. Cardiovascular - Normal S1 and S2 without obvious murmur or gallop. Extremities - No cyanosis, clubbing, or significant edema. Pulmonary - No respiratory distress. No wheezes or rales. Abdomen - No masses, tenderness, or organomegaly. Musculoskeletal - No significant edema. No joint deformities. No muscle wasting. Neurologic - Cranial nerves II through XII are grossly intact. There were no gross focal neurologic abnormalities.     Lab Review   Lab Results   Component Value Date    CKTOTAL 29 05/11/2017    TROPONINT <0.010 03/07/2020     BNP:    Lab Results   Component Value Date    BNP 18 04/22/2014     PT/INR:    Lab Results   Component Value Date    INR 0.90 07/30/2020     Lab Results   Component Value Date    LABA1C 5.6 03/07/2020    LABA1C 5.4 11/21/2014     Lab Results   Component Value Date    WBC 9.7 07/30/2020    HCT 36.9 (L) 07/30/2020    MCV 87.6 07/30/2020     07/30/2020     Lab Results   Component Value Date    CHOL 157 03/07/2020    TRIG 102 03/07/2020    HDL 38 (L) 03/07/2020    LDLCALC 92 11/26/2012    LDLDIRECT 105 (H) 03/07/2020     Lab Results   Component Value Date    ALT <5 (L) 03/06/2020    AST 14 (L) 03/06/2020     BMP:    Lab Results   Component Value Date     07/30/2020    K 4.9 07/30/2020     07/30/2020    CO2 30 07/30/2020    BUN 10 07/30/2020    CREATININE 1.0 07/30/2020     CMP:   Lab Results   Component Value Date     07/30/2020    K 4.9 07/30/2020     07/30/2020    CO2 30 07/30/2020    BUN 10 07/30/2020    PROT 7.7 03/06/2020    PROT 7.0 11/26/2012     TSH:    Lab Results   Component Value Date    TSHHS 0.548 02/13/2018       QUALITY MEASURES REVIEWED:      Impression:    1. Gastroesophageal reflux disease with hiatal hernia    2. Chest pain, unspecified type    3.  Current tobacco use       Patient Active Problem List   Diagnosis Code    Chest pain R07.9    Current tobacco use Z72.0    COPD (chronic obstructive pulmonary disease) ([facility] J44.9    Essential hypertension I10    Palpitations R00.2    Tobacco use Z72.0    Gastroesophageal reflux disease with hiatal hernia K21.9, K44.9    S/P Nissen fundoplication (without gastrostomy tube) procedure Z98.890    Precordial pain R07.2    Post PTCA Z98.61    Leg pain, bilateral M79.604, M79.605    Syncope and collapse R55    Dizziness R42    Angina pectoris ([facility]) I20.9    HTN (hypertension) I10    ASCVD (arteriosclerotic cardiovascular disease) I25.10    GERD (gastroesophageal reflux disease) K21.9    Ankle syndesmosis disruption, left, subsequent encounter S93.432D    Closed trimalleolar fracture of left ankle S82.852A    Closed left ankle fracture, initial encounter S82.892A    Closed fracture of upper end of left fibula S82.832A    Chronic kidney disease (CKD) stage G3a/A1, moderately decreased glomerular filtration rate (GFR) between 45-59 mL/min/1.73 square meter and albuminuria creatinine ratio less than 30 mg/g ([facility]) N18.3    Hypotension I95.9       Assessment & Plan:               -     CORONARY ARTERY DISEASE:  asymptomatic     All available  tests in chart reviewed. Management discussed . Testing ordered  no                                 -  Hypertension: Patients blood pressure is normal. Patient is advised about low sodium diet. Present medical regimen will not be changed. -  LIPID MANAGEMENT:  Importance of lipid levels discussed with patient   and patient was given dietary advice. NCEP- ATP III guidelines reviewed with patient. -   Changes  in medicines made:  No                         - LE swelling   Comp socks               Claudene Maywood MD    [facility]

## 2020-09-15 NOTE — TELEPHONE ENCOUNTER
Per Dr. Yanelis Arnold, pt Venous is not stat and can be completed when availability.  9/24/2020, first avail day

## 2020-09-24 ENCOUNTER — PROCEDURE VISIT (OUTPATIENT)
Dept: CARDIOLOGY CLINIC | Age: 60
End: 2020-09-24
Payer: COMMERCIAL

## 2020-09-24 PROCEDURE — 93970 EXTREMITY STUDY: CPT | Performed by: INTERNAL MEDICINE

## 2020-09-28 ENCOUNTER — TELEPHONE (OUTPATIENT)
Dept: CARDIOLOGY CLINIC | Age: 60
End: 2020-09-28

## 2020-09-28 NOTE — TELEPHONE ENCOUNTER
Advised patient of results. Patient states her legs are still painful and swollen. Scheduled f/u OV with Courtney to discuss. Patient voiced understanding. No evidence of DVT or SVT in the bilateral common femoral vein, femoral vein, popliteal vein, greater saphenous vein or small saphenous vein. No evidence of significant venous insufficiency noted in the bilateral lower extremities .

## 2020-09-29 ENCOUNTER — TELEPHONE (OUTPATIENT)
Dept: CARDIOLOGY CLINIC | Age: 60
End: 2020-09-29

## 2020-09-29 NOTE — TELEPHONE ENCOUNTER
April from [facility] calling to get more information on compression stockings for patient. Answered questions. Will send prescription and pharmacy information where patient can obtain. April voiced understanding.

## 2020-10-07 ENCOUNTER — OFFICE VISIT (OUTPATIENT)
Dept: CARDIOLOGY CLINIC | Age: 60
End: 2020-10-07
Payer: COMMERCIAL

## 2020-10-07 VITALS
WEIGHT: 200.4 LBS | BODY MASS INDEX: 36.65 KG/M2 | HEART RATE: 72 BPM | SYSTOLIC BLOOD PRESSURE: 114 MMHG | TEMPERATURE: 97.3 F | RESPIRATION RATE: 20 BRPM | DIASTOLIC BLOOD PRESSURE: 74 MMHG

## 2020-10-07 PROCEDURE — G8427 DOCREV CUR MEDS BY ELIG CLIN: HCPCS | Performed by: INTERNAL MEDICINE

## 2020-10-07 PROCEDURE — G8417 CALC BMI ABV UP PARAM F/U: HCPCS | Performed by: INTERNAL MEDICINE

## 2020-10-07 PROCEDURE — 99214 OFFICE O/P EST MOD 30 MIN: CPT | Performed by: INTERNAL MEDICINE

## 2020-10-07 PROCEDURE — 3017F COLORECTAL CA SCREEN DOC REV: CPT | Performed by: INTERNAL MEDICINE

## 2020-10-07 PROCEDURE — G8484 FLU IMMUNIZE NO ADMIN: HCPCS | Performed by: INTERNAL MEDICINE

## 2020-10-07 PROCEDURE — 1036F TOBACCO NON-USER: CPT | Performed by: INTERNAL MEDICINE

## 2020-10-07 NOTE — PROGRESS NOTES
CARDIOLOGY NOTE      10/7/2020    RE: Lisha Benjamin  (1960)                               TO:  Dr. Bernie Avila, APRN - NP            Marlen Browne is a 63 y.o. female who was seen today for management of  cad                               Here post US     HPI:                   The patient does not have cardiac complaints  Has cher LE swelling and pain  Patient also seen  for    - Coronary artery disease, does not have chest pain. Patient is  compliant with prescribed medicines. - Hypertension,is  well controlled, pt is  compliant with medicines  - Hyperlipidimea, importance of hyperlipidimea discussed with pt.        Lisha Benjamin has the following history recorded in care path:  Patient Active Problem List    Diagnosis Date Noted    Angina pectoris [facility])      Priority: High    Syncope and collapse      Priority: High    Dizziness 05/26/2017     Priority: Low    Leg pain, bilateral 04/19/2017     Priority: Low    Post PTCA 02/16/2017     Priority: Low    Precordial pain 01/24/2017     Priority: Low    S/P Nissen fundoplication (without gastrostomy tube) procedure 11/05/2015     Priority: Low    Gastroesophageal reflux disease with hiatal hernia 02/21/2014     Priority: Low    Palpitations      Priority: Low    Tobacco use      Priority: Low    COPD (chronic obstructive pulmonary disease) ([facility] [address] 10/03/2012     Priority: Low    Essential hypertension 10/03/2012     Priority: Low    Chest pain 11/16/2011     Priority: Low    Current tobacco use 11/16/2011     Priority: Low    Chronic kidney disease (CKD) stage G3a/A1, moderately decreased glomerular filtration rate (GFR) between 45-59 mL/min/1.73 square meter and albuminuria creatinine ratio less than 30 mg/g 07/18/2019    Hypotension 07/18/2019    Closed fracture of upper end of left fibula     Ankle syndesmosis disruption, left, subsequent encounter 05/23/2019    Closed trimalleolar fracture of left ankle 05/23/2019  Closed left ankle fracture, initial encounter 05/23/2019    HTN (hypertension) 09/11/2017    ASCVD (arteriosclerotic cardiovascular disease) 09/11/2017    GERD (gastroesophageal reflux disease) 09/11/2017     Current Outpatient Medications   Medication Sig Dispense Refill    traMADol (ULTRAM) 50 MG tablet as needed.  ranolazine (RANEXA) 1000 MG extended release tablet Take 1 tablet by mouth 2 times daily Do not crush or break. 180 tablet 3    clopidogrel (PLAVIX) 75 MG tablet Take 1 tablet by mouth daily 30 tablet 3    albuterol sulfate  (90 Base) MCG/ACT inhaler INHALE TWO (2) PUFFS INTO LUNGS EVERY 6 HOURS AS NEEDED FOR SHORTNESS OF BREATH *HOLD SCRIPT* 18 g 10    Cholecalciferol (VITAMIN D3) 50 MCG (2000 UT) CAPS Take by mouth      metoprolol tartrate (LOPRESSOR) 25 MG tablet Take 12.5 mg by mouth 2 times daily      cyclobenzaprine (FLEXERIL) 5 MG tablet Take 5 mg by mouth 2 times daily as needed for Muscle spasms      acetaminophen (TYLENOL 8 HOUR ARTHRITIS PAIN) 650 MG extended release tablet Take 650 mg by mouth 2 times daily as needed for Pain      fluticasone (FLONASE) 50 MCG/ACT nasal spray 2 sprays by Each Nostril route daily      Lactobacillus (ACIDOPHILUS PO) Take 1 capsule by mouth daily      triamcinolone (KENALOG) 0.1 % cream Apply topically 2 times daily Apply topically 2 times daily.       nitroGLYCERIN (NITROSTAT) 0.4 MG SL tablet Place 1 tablet under the tongue every 5 minutes as needed for Chest pain 25 tablet 3    cetirizine (ZYRTEC) 10 MG tablet Take 10 mg by mouth daily      sertraline (ZOLOFT) 100 MG tablet Take 100 mg by mouth daily       ondansetron (ZOFRAN-ODT) 4 MG disintegrating tablet every 8-12 hours as needed       glycopyrrolate-formoterol (BEVESPI AEROSPHERE) 9-4.8 MCG/ACT AERO Inhale 2 puffs into the lungs 2 times daily 3 Inhaler 1    atorvastatin (LIPITOR) 40 MG tablet Take 40 mg by mouth daily      dicyclomine (BENTYL) 20 MG tablet Take 20 mg by mouth 3 times daily as needed      donepezil (ARICEPT) 10 MG tablet TAKE 1 TABLET BY MOUTH EVERYDAY AT daily  3    pantoprazole sodium (PROTONIX) 40 MG PACK packet Take 40 mg by mouth daily       melatonin 5 MG TABS tablet Take 5 mg by mouth nightly as needed       memantine (NAMENDA) 10 MG tablet Take 10 mg by mouth 2 times daily       hydrOXYzine (ATARAX) 25 MG tablet Take 50 mg by mouth every 8 hours as needed for Itching       aspirin EC 81 MG EC tablet Take 1 tablet by mouth daily. (Patient taking differently: Take 81 mg by mouth every morning ) 30 tablet 6    Compression Stockings MISC by Does not apply route 20 - 30 mmh wear daily and take off at night  Thigh High 2 each 0     No current facility-administered medications for this visit. Allergies: Adhesive tape; Aspirin; and Pcn [penicillins]  Past Medical History:   Diagnosis Date    Anxiety     Arthritis     \"Both Legs\"    Asthma     Back problem     \"Curved Spine\"    Bronchitis Last Episode In 2015    CAD (coronary artery disease)     Sees Dr. Isabelle Corea; 8-16-16 (noted on 10-4-2012 that patient does not have CAD s/p cardiac catheterization).  Cancer ([facility] [address]     skin ca on skin    Chest pain     in ER with this dx 7/2015- admitted- stress test done as outpt 8/7/2015(see report)    Chipped tooth     Upper And Lower    Chronic back pain     COPD (chronic obstructive pulmonary disease) ([facility] [address] 06/01/2017    Sees Dr. Pramod Kendall    Cough     Occ. Nonproductive Cough    Emphysema     GERD (gastroesophageal reflux disease)     H/O 24 hour EKG monitoring     3/14/2013-Signif for runs of sinus tachycardia, fastest recorded at 132 bpm lasting almost 38 mins. Dr Irvni Barrios. -report in epic    H/O cardiac catheterization     10/4/2012- No CAD.  False positive stress test.Dr Valdez-report in epic;    Shraddha Lemus H/O cardiac catheterization     H/O Doppler lower venous ultrasound 09/24/2020    No DVT or SVT, No significant venous insufficiency    H/O Doppler ultrasound 04/13/2017    LE doppler - normal study    H/O Doppler ultrasound 05/26/2017    carotid - normal study    H/O exercise stress test 02/28/2017    normal study    Hiatal hernia     History of cardiac cath 08/03/2020    PATENT CX STENT, Normal EF,  NORMAL LAD AND RCA    History of exercise stress test 02/28/2017    treadmill    History of nuclear stress test 8/7/15    EF 70%. WNL    History of nuclear stress test 07/17/2020     scan shows small size, moderate intensity, reversible perfusion defect in anterior wall. normal LVEF .    [place] (hard of hearing)     Bilateral Ears    Hx of cardiovascular stress test 9/2013 9/13 EF70% Normal.10/12- LVSF normal. EF 61%. Normal perfusion althought pt complained of chest pain with Lexiscan. report in [facility]; 11/11, possible apical ischemia but abnormality secondary to technical artifact needs to be considered, regional wall motion abnormality with low normal LVSF by pierson scan. 8/10, 12/09    Hx of Doppler ultrasound 2/2011 2/2011-Carotid no significant obstructive lesions noted in the extracranial carotid system. Antegrade flow noted inthe vertebral arteries.  Hx of Doppler ultrasound 1/2012 1/2012-peripheral - both abis and duplex studies of both lower extremities do not  reveal any significant peripheral vascular disease at this time.  Hx of Doppler ultrasound 1/22/2016    Arterial: Peripheral vascular noninvasive arterial studies do not reveal any significant atherosclerotic disease.  Hx of echocardiogram 6/2013 6/13- Mild to mod MR/TR. 10/12-LVSF normal. EF 60%. Mild mitral and tricuspid insuff. reprot in epic; 8/6/10, normal dimension of the LV, normal global and regional LVSF with an EF of 60%, no significant valvuopathy is seen.  12/05    Hypertension     \"on medication since age 25's\" follow with Dr Loree Jenkins Irregular heart beat     \"have irreg heart beat\" dont know what you call it\"    Migraines Last Migraine In fatigue  Psychological:Negative for anxiety or depression  HENT: Negative for headaches, nasal congestion, sinus pain or vertigo  Eyes: Negative for visual disturbance. Endocrine: Negative for polydipsia/polyuria  Respiratory: Negative for shortness of breath  Cardiovascular: Negative for chest pain, dyspnea on exertion, claudication, edema, irregular heartbeat, murmur, palpitations or shortness of breath  Gastrointestinal: Negative for abdominal pain or heartburn  Genito-Urinary: Negative for urinary frequency/urgency  Musculoskeletal: Negative for muscle pain, muscular weakness, negative for pain in arm and leg or swelling in foot and leg  Neurological: Negative for dizziness, headaches, memory loss, numbness/tingling, visual changes, syncope  Dermatological: Negative for rash    Objective:    Vitals:    10/07/20 1421   BP: 114/74   Pulse: 72   Resp: 20   Temp: 97.3 °F (36.3 °C)   TempSrc: Temporal   Weight: 200 lb 6.4 oz (90.9 kg)     /74   Pulse 72   Temp 97.3 °F (36.3 °C) (Temporal)   Resp 20   Wt 200 lb 6.4 oz (90.9 kg)   BMI 36.65 kg/m²     No flowsheet data found. Wt Readings from Last 3 Encounters:   10/07/20 200 lb 6.4 oz (90.9 kg)   09/15/20 196 lb 6.4 oz (89.1 kg)   08/03/20 191 lb (86.6 kg)     Body mass index is 36.65 kg/m². GENERAL - Alert, oriented, pleasant, in no apparent distress. EYES: No jaundice, no conjunctival pallor. SKIN: It is warm & dry. No rashes. No Echhymosis    HEENT - No clinically significant abnormalities seen. Neck - Supple. No jugular venous distention noted. No carotid bruits. Cardiovascular - Normal S1 and S2 without obvious murmur or gallop. Extremities - No cyanosis, clubbing, or significant edema. Pulmonary - No respiratory distress. No wheezes or rales. Abdomen - No masses, tenderness, or organomegaly. Musculoskeletal - No significant edema. No joint deformities. No muscle wasting.   Neurologic - Cranial nerves II through XII are grossly intact. There were no gross focal neurologic abnormalities. Lab Review   Lab Results   Component Value Date    CKTOTAL 29 05/11/2017    TROPONINT <0.010 03/07/2020     BNP:    Lab Results   Component Value Date    BNP 18 04/22/2014     PT/INR:    Lab Results   Component Value Date    INR 0.90 07/30/2020     Lab Results   Component Value Date    LABA1C 5.6 03/07/2020    LABA1C 5.4 11/21/2014     Lab Results   Component Value Date    WBC 9.7 07/30/2020    HCT 36.9 (L) 07/30/2020    MCV 87.6 07/30/2020     07/30/2020     Lab Results   Component Value Date    CHOL 157 03/07/2020    TRIG 102 03/07/2020    HDL 38 (L) 03/07/2020    LDLCALC 92 11/26/2012    LDLDIRECT 105 (H) 03/07/2020     Lab Results   Component Value Date    ALT <5 (L) 03/06/2020    AST 14 (L) 03/06/2020     BMP:    Lab Results   Component Value Date     07/30/2020    K 4.9 07/30/2020     07/30/2020    CO2 30 07/30/2020    BUN 10 07/30/2020    CREATININE 1.0 07/30/2020     CMP:   Lab Results   Component Value Date     07/30/2020    K 4.9 07/30/2020     07/30/2020    CO2 30 07/30/2020    BUN 10 07/30/2020    PROT 7.7 03/06/2020    PROT 7.0 11/26/2012     TSH:    Lab Results   Component Value Date    TSHHS 0.548 02/13/2018       QUALITY MEASURES REVIEWED:      Impression:    No diagnosis found.    Patient Active Problem List   Diagnosis Code    Chest pain R07.9    Current tobacco use Z72.0    COPD (chronic obstructive pulmonary disease) ([facility]) J44.9    Essential hypertension I10    Palpitations R00.2    Tobacco use Z72.0    Gastroesophageal reflux disease with hiatal hernia K21.9, K44.9    S/P Nissen fundoplication (without gastrostomy tube) procedure Z98.890    Precordial pain R07.2    Post PTCA Z98.61    Leg pain, bilateral M79.604, M79.605    Syncope and collapse R55    Dizziness R42    Angina pectoris ([facility]) I20.9    HTN (hypertension) I10    ASCVD (arteriosclerotic cardiovascular disease) I25.10    GERD (gastroesophageal reflux disease) K21.9    Ankle syndesmosis disruption, left, subsequent encounter S93.432D    Closed trimalleolar fracture of left ankle S82.852A    Closed left ankle fracture, initial encounter S82.892A    Closed fracture of upper end of left fibula S82.832A    Chronic kidney disease (CKD) stage G3a/A1, moderately decreased glomerular filtration rate (GFR) between 45-59 mL/min/1.73 square meter and albuminuria creatinine ratio less than 30 mg/g N18.31    Hypotension I95.9       Assessment & Plan:               -     CORONARY ARTERY DISEASE:  asymptomatic     All available  tests in chart reviewed. Management discussed . Testing ordered  no                                 -  Hypertension: Patients blood pressure is normal. Patient is advised about low sodium diet. Present medical regimen will not be changed. -  LIPID MANAGEMENT:  Importance of lipid levels discussed with patient   and patient was given dietary advice. NCEP- ATP III guidelines reviewed with patient. -   Changes  in medicines made:  No                           - LE swelling comp socks             Dara Cline MD    [facility]

## 2020-10-28 RX ORDER — NITROGLYCERIN 0.4 MG/1
TABLET SUBLINGUAL
Qty: 25 TABLET | Refills: 3 | Status: SHIPPED | OUTPATIENT
Start: 2020-10-28 | End: 2021-02-24

## 2020-11-11 ENCOUNTER — TELEPHONE (OUTPATIENT)
Dept: CARDIOLOGY CLINIC | Age: 60
End: 2020-11-11

## 2020-11-11 NOTE — TELEPHONE ENCOUNTER
Lianet Broach with [employer] called asking for   Last office notes and DX code for compression   stockings

## 2021-02-23 DIAGNOSIS — I25.10 CORONARY ARTERY DISEASE INVOLVING NATIVE CORONARY ARTERY OF NATIVE HEART WITHOUT ANGINA PECTORIS: ICD-10-CM

## 2021-02-23 DIAGNOSIS — I10 ESSENTIAL HYPERTENSION: ICD-10-CM

## 2021-02-24 ENCOUNTER — HOSPITAL ENCOUNTER (OUTPATIENT)
Dept: GENERAL RADIOLOGY | Age: 61
Discharge: HOME | End: 2021-02-24
Payer: COMMERCIAL

## 2021-02-24 ENCOUNTER — HOSPITAL ENCOUNTER (OUTPATIENT)
Age: 61
Discharge: HOME | End: 2021-02-24
Payer: COMMERCIAL

## 2021-02-24 DIAGNOSIS — M25.561 CHRONIC PAIN OF BOTH KNEES: ICD-10-CM

## 2021-02-24 DIAGNOSIS — M25.562 CHRONIC PAIN OF BOTH KNEES: ICD-10-CM

## 2021-02-24 DIAGNOSIS — G89.29 CHRONIC PAIN OF BOTH KNEES: ICD-10-CM

## 2021-02-24 PROCEDURE — 73562 X-RAY EXAM OF KNEE 3: CPT

## 2021-02-24 RX ORDER — CLOPIDOGREL BISULFATE 75 MG/1
75 TABLET ORAL DAILY
Qty: 30 TABLET | Refills: 3 | Status: SHIPPED | OUTPATIENT
Start: 2021-02-24 | End: 2021-07-19

## 2021-02-24 RX ORDER — NITROGLYCERIN 0.4 MG/1
TABLET SUBLINGUAL
Qty: 25 TABLET | Refills: 3 | Status: SHIPPED | OUTPATIENT
Start: 2021-02-24 | End: 2021-07-19

## 2021-04-21 ENCOUNTER — HOSPITAL ENCOUNTER (EMERGENCY)
Age: 61
Discharge: HOME | End: 2021-04-21
Attending: EMERGENCY MEDICINE
Payer: COMMERCIAL

## 2021-04-21 ENCOUNTER — APPOINTMENT (OUTPATIENT)
Dept: GENERAL RADIOLOGY | Age: 61
End: 2021-04-21
Payer: COMMERCIAL

## 2021-04-21 ENCOUNTER — APPOINTMENT (OUTPATIENT)
Dept: CT IMAGING | Age: 61
End: 2021-04-21
Payer: COMMERCIAL

## 2021-04-21 VITALS
OXYGEN SATURATION: 98 % | RESPIRATION RATE: 16 BRPM | DIASTOLIC BLOOD PRESSURE: 87 MMHG | HEART RATE: 96 BPM | SYSTOLIC BLOOD PRESSURE: 149 MMHG | TEMPERATURE: 98.4 F | HEIGHT: 61 IN | BODY MASS INDEX: 34.93 KG/M2 | WEIGHT: 185 LBS

## 2021-04-21 DIAGNOSIS — R07.89 OTHER CHEST PAIN: Primary | ICD-10-CM

## 2021-04-21 DIAGNOSIS — R51.9 NONINTRACTABLE HEADACHE, UNSPECIFIED CHRONICITY PATTERN, UNSPECIFIED HEADACHE TYPE: ICD-10-CM

## 2021-04-21 LAB
ALBUMIN SERPL-MCNC: 3.6 GM/DL (ref 3.4–5)
ALP BLD-CCNC: 95 IU/L (ref 40–129)
ALT SERPL-CCNC: <5 U/L (ref 10–40)
ANION GAP SERPL CALCULATED.3IONS-SCNC: 7 MMOL/L (ref 4–16)
AST SERPL-CCNC: 12 IU/L (ref 15–37)
BASOPHILS ABSOLUTE: 0.1 K/CU MM
BASOPHILS RELATIVE PERCENT: 1.4 % (ref 0–1)
BILIRUB SERPL-MCNC: 0.3 MG/DL (ref 0–1)
BUN BLDV-MCNC: 8 MG/DL (ref 6–23)
CALCIUM SERPL-MCNC: 8.8 MG/DL (ref 8.3–10.6)
CHLORIDE BLD-SCNC: 106 MMOL/L (ref 99–110)
CO2: 28 MMOL/L (ref 21–32)
CREAT SERPL-MCNC: 0.9 MG/DL (ref 0.6–1.1)
DIFFERENTIAL TYPE: ABNORMAL
EOSINOPHILS ABSOLUTE: 0.2 K/CU MM
EOSINOPHILS RELATIVE PERCENT: 2.9 % (ref 0–3)
GFR AFRICAN AMERICAN: >60 ML/MIN/1.73M2
GFR NON-AFRICAN AMERICAN: >60 ML/MIN/1.73M2
GLUCOSE BLD-MCNC: 142 MG/DL (ref 70–99)
HCT VFR BLD CALC: 38.9 % (ref 37–47)
HEMOGLOBIN: 12.6 GM/DL (ref 12.5–16)
IMMATURE NEUTROPHIL %: 0.4 % (ref 0–0.43)
LYMPHOCYTES ABSOLUTE: 1.6 K/CU MM
LYMPHOCYTES RELATIVE PERCENT: 19.9 % (ref 24–44)
MCH RBC QN AUTO: 28.1 PG (ref 27–31)
MCHC RBC AUTO-ENTMCNC: 32.4 % (ref 32–36)
MCV RBC AUTO: 86.8 FL (ref 78–100)
MONOCYTES ABSOLUTE: 0.5 K/CU MM
MONOCYTES RELATIVE PERCENT: 6.6 % (ref 0–4)
NUCLEATED RBC %: 0 %
PDW BLD-RTO: 15.5 % (ref 11.7–14.9)
PLATELET # BLD: 340 K/CU MM (ref 140–440)
PMV BLD AUTO: 9.4 FL (ref 7.5–11.1)
POTASSIUM SERPL-SCNC: 4 MMOL/L (ref 3.5–5.1)
PRO-BNP: 232.8 PG/ML
RBC # BLD: 4.48 M/CU MM (ref 4.2–5.4)
SEGMENTED NEUTROPHILS ABSOLUTE COUNT: 5.5 K/CU MM
SEGMENTED NEUTROPHILS RELATIVE PERCENT: 68.8 % (ref 36–66)
SODIUM BLD-SCNC: 141 MMOL/L (ref 135–145)
TOTAL IMMATURE NEUTOROPHIL: 0.03 K/CU MM
TOTAL NUCLEATED RBC: 0 K/CU MM
TOTAL PROTEIN: 6.7 GM/DL (ref 6.4–8.2)
TROPONIN T: <0.01 NG/ML
WBC # BLD: 8 K/CU MM (ref 4–10.5)

## 2021-04-21 PROCEDURE — 84484 ASSAY OF TROPONIN QUANT: CPT

## 2021-04-21 PROCEDURE — 93005 ELECTROCARDIOGRAM TRACING: CPT | Performed by: EMERGENCY MEDICINE

## 2021-04-21 PROCEDURE — 93005 ELECTROCARDIOGRAM TRACING: CPT | Performed by: PHYSICIAN ASSISTANT

## 2021-04-21 PROCEDURE — 71045 X-RAY EXAM CHEST 1 VIEW: CPT

## 2021-04-21 PROCEDURE — 6370000000 HC RX 637 (ALT 250 FOR IP): Performed by: EMERGENCY MEDICINE

## 2021-04-21 PROCEDURE — 70450 CT HEAD/BRAIN W/O DYE: CPT

## 2021-04-21 PROCEDURE — 85025 COMPLETE CBC W/AUTO DIFF WBC: CPT

## 2021-04-21 PROCEDURE — 83880 ASSAY OF NATRIURETIC PEPTIDE: CPT

## 2021-04-21 PROCEDURE — 80053 COMPREHEN METABOLIC PANEL: CPT

## 2021-04-21 PROCEDURE — 99284 EMERGENCY DEPT VISIT MOD MDM: CPT

## 2021-04-21 RX ORDER — LIDOCAINE 4 G/G
1 PATCH TOPICAL ONCE
Status: DISCONTINUED | OUTPATIENT
Start: 2021-04-21 | End: 2021-04-21 | Stop reason: HOSPADM

## 2021-04-21 ASSESSMENT — PAIN DESCRIPTION - LOCATION
LOCATION_2: CHEST
LOCATION: HEAD

## 2021-04-21 ASSESSMENT — PAIN DESCRIPTION - PAIN TYPE: TYPE_2: ACUTE PAIN

## 2021-04-21 NOTE — ED PROVIDER NOTES
system including errors in grammar, punctuation, and spelling, as well as words and phrases that may be inappropriate. If there are any questions or concerns please feel free to contact the dictating provider for clarification.         Princess Regan MD  04/21/21 [id number]

## 2021-04-21 NOTE — PROGRESS NOTES
(NAMENDA) 10 MG tablet Take 10 mg by mouth 2 times daily    Yes Historical Provider, MD   hydrOXYzine (ATARAX) 25 MG tablet Take 50 mg by mouth every 8 hours as needed for Itching    Yes Historical Provider, MD   aspirin EC 81 MG EC tablet Take 1 tablet by mouth daily. Patient taking differently: Take 81 mg by mouth every morning  2/10/15  Yes Meghan Bey MD   ipratropium-albuterol (DUONEB) 0.5-2.5 (3) MG/3ML SOLN nebulizer solution Inhale 1 vial into the lungs 4 times daily    Historical Provider, MD   nitroGLYCERIN (NITROSTAT) 0.4 MG SL tablet DISSOLVE 1 TABLET UNDER THE TONGUE AS NEEDED FOR CHEST PAIN EVERY 5 MINUTES UP TO 3 TIMES. IF NO RELIEF CALL 911. 2/24/21   Anyi Sanz MD   Compression Stockings MISC by Does not apply route 20 - 30 mmh wear daily and take off at night  Thigh High 10/7/20   Anyi Sanz MD   albuterol sulfate  (90 Base) MCG/ACT inhaler INHALE TWO (2) PUFFS INTO LUNGS EVERY 6 HOURS AS NEEDED FOR SHORTNESS OF BREATH *HOLD SCRIPT* 8/27/20   Justo Vargas MD   acetaminophen (TYLENOL 8 HOUR ARTHRITIS PAIN) 650 MG extended release tablet Take 650 mg by mouth 2 times daily as needed for Pain    Historical Provider, MD   Lactobacillus (ACIDOPHILUS PO) Take 1 capsule by mouth daily    Historical Provider, MD   triamcinolone (KENALOG) 0.1 % cream Apply topically 2 times daily Apply topically 2 times daily. Historical Provider, MD   melatonin 5 MG TABS tablet Take 5 mg by mouth nightly as needed     Historical Provider, MD     Medications removed from list (include reason, ex. noncompliance, medication cost, therapy complete etc.):   Ondansetron therapy complete  Cyclobenzaprine therapy complete  Tramadol [place] last fill date 08/19/2020, flagged for removal.    Comments:  Patient is poor historian for medications. Provided a list verified list against insurance claims. Patient able to inform interviewer that she has taken first dose of medications today.     To my knowledge the above medication history is accurate as of 4/21/2021 2:31 PM.   Yecenia Walsh CPhT   4/21/2021 2:31 PM

## 2021-04-21 NOTE — ED PROVIDER NOTES
EMERGENCY DEPARTMENT ENCOUNTER        PCP: LAVERN Roland - NP    CHIEF COMPLAINT    Chief Complaint   Patient presents with    Chest Pain    Headache         Of note, this patient was also evaluated by the attending physician, Dr. Nevaeh Guerra. HPI      Myah Damian is a 57 y.o. female who presents with chest pain. Onset - insidious, approximately greater than 1-2 months, possibly longer  Location -generalized left chest  Duration -occurring daily, possibly constant, for the past 1-2 months, possibly longer  Character -\"painful\", pain does not migrate (TAD)  Aggravating/Alleviating factors -none known/none known  Activity at onset -does not recall  Associate symptoms -patient states has had a generalized headache for possibly several months. No recent change in nature or severity. No vomiting. Radiation -left shoulder  Severity -moderate    Patient is a poor historian and does not quantify details of her symptoms. She will not commit to duration but does note possibly 1 month or greater. Patient states she has memory problems for which she takes medication. Does not endorse new neuro symptoms to me or new confusion or memory problems. REVIEW OF SYSTEMS    Constitutional:  Denies fever, chills. HENT:  Denies sore throat or ear pain   Cardiovascular:  +Chest Pain,  Denies palpitations,  Denies syncope, no extremity edema. Respiratory:    Denies cough, shortness of breath, or hemoptysis    GI:  Denies abdominal pain, nausea, vomiting, or diarrhea  :  Denies any urinary symptoms  Musculoskeletal:  Denies back pain, no extremity pain, swelling or discoloration. No pale or cold extremity  Skin:  Denies rash  Neurologic: See HPI.   Denies changes in vision,  lightheadedness, dizziness, focal weakness or sensory changes   Endocrine:  Denies polyuria or polydypsia   Lymphatic:  Denies swollen glands     All other review of systems are negative  See HPI and nursing notes for additional information PAST MEDICAL & SURGICAL HISTORY    Past Medical History:   Diagnosis Date    Anxiety     Arthritis     \"Both Legs\"    Asthma     Back problem     \"Curved Spine\"    Bronchitis Last Episode In 2015    CAD (coronary artery disease)     Sees Dr. Néstor Valentine; 8-16-16 (noted on 10-4-2012 that patient does not have CAD s/p cardiac catheterization).  Cancer ([facility] [address]     skin ca on skin    Chest pain     in ER with this dx 7/2015- admitted- stress test done as outpt 8/7/2015(see report)    Chipped tooth     Upper And Lower    Chronic back pain     COPD (chronic obstructive pulmonary disease) ([facility] [address] 06/01/2017    Sees Dr. Vinicius Weems    Cough     Occ. Nonproductive Cough    Emphysema     GERD (gastroesophageal reflux disease)     H/O 24 hour EKG monitoring     3/14/2013-Signif for runs of sinus tachycardia, fastest recorded at 132 bpm lasting almost 38 mins. Dr Mel Rodriguez. -report in [facility]    H/O cardiac catheterization     10/4/2012- No CAD. False positive stress test.Dr Valdez-report in [facility];    Latasha Pickering H/O cardiac catheterization     H/O Doppler lower venous ultrasound 09/24/2020    No DVT or SVT, No significant venous insufficiency    H/O Doppler ultrasound 04/13/2017    LE doppler - normal study    H/O Doppler ultrasound 05/26/2017    carotid - normal study    H/O exercise stress test 02/28/2017    normal study    Hiatal hernia     History of cardiac cath 08/03/2020    PATENT CX STENT, Normal EF,  NORMAL LAD AND RCA    History of exercise stress test 02/28/2017    treadmill    History of nuclear stress test 8/7/15    EF 70%. WNL    History of nuclear stress test 07/17/2020     scan shows small size, moderate intensity, reversible perfusion defect in anterior wall. normal LVEF .    [place] (hard of hearing)     Bilateral Ears    Hx of cardiovascular stress test 9/2013 9/13 EF70% Normal.10/12- LVSF normal. EF 61%. Normal perfusion althought pt complained of chest pain with Lexiscan.  report in epic; 11/11, possible apical ischemia but abnormality secondary to technical artifact needs to be considered, regional wall motion abnormality with low normal LVSF by pierson scan. 8/10, 12/09    Hx of Doppler ultrasound 2/2011 2/2011-Carotid no significant obstructive lesions noted in the extracranial carotid system. Antegrade flow noted inthe vertebral arteries.  Hx of Doppler ultrasound 1/2012 1/2012-peripheral - both abis and duplex studies of both lower extremities do not  reveal any significant peripheral vascular disease at this time.  Hx of Doppler ultrasound 1/22/2016    Arterial: Peripheral vascular noninvasive arterial studies do not reveal any significant atherosclerotic disease.  Hx of echocardiogram 6/2013 6/13- Mild to mod MR/TR. 10/12-LVSF normal. EF 60%. Mild mitral and tricuspid insuff. reprot in epic; 8/6/10, normal dimension of the LV, normal global and regional LVSF with an EF of 60%, no significant valvuopathy is seen.  12/05    Hypertension     \"on medication since age 25's\" follow with Dr Quincy Calvillo Irregular heart beat     \"have irreg heart beat\" dont know what you call it\"    Migraines Last Migraine In 2014    Palpitations     Panic attacks     Pneumonia Last Episode In 2014    Prolonged emergence from general anesthesia     Restless leg     S/P cardiac cath 2/21/14    S/P PTCA (percutaneous transluminal coronary angioplasty) 01/26/2017    2017, CX stented    Shortness of breath     Teeth missing     Upper And Lower    Tobacco use     Unspecified sleep apnea     \"had sleep study 2-3 yrs ago\" have cpap and try to use it\"     Past Surgical History:   Procedure Laterality Date    ANKLE FRACTURE SURGERY Left 5/24/2019    LEFT ANKLE OPEN REDUCTION INTERNAL FIXATION SYNDESMOSIS performed by Mertie Mcburney, MD at [address] [place] [address] Rd  2011    COLONOSCOPY  08/23/2017    colon polyp, diverticulosis, hemorrhoids    DENTAL SURGERY      Teeth Extracted In Past    ENDOSCOPY, COLON, DIAGNOSTIC  10-16-15    ENDOSCOPY, COLON, DIAGNOSTIC  01/28/2019    Hiatal hernia, savary dil #17 used    GASTRIC FUNDOPLICATION  21/16/5060    Robotic laparoscopic nissen with mesh    KIDNEY SURGERY Left 2-15    \"At [facility] Had Left Kidney Mass Removed\" Benign    TONSILLECTOMY  1960's    TUBAL LIGATION  1993    UPPER GASTROINTESTINAL ENDOSCOPY N/A 1/28/2019    EGD DILATION SAVARY #17MM performed by Sharyn Victor MD at [address]    Current Outpatient Rx   Medication Sig Dispense Refill    clopidogrel (PLAVIX) 75 MG tablet TAKE 1 TABLET BY MOUTH DAILY 30 tablet 3    ranolazine (RANEXA) 1000 MG extended release tablet Take 1 tablet by mouth 2 times daily Do not crush or break. 180 tablet 3    Cholecalciferol (VITAMIN D3) 50 MCG (2000 UT) CAPS Take by mouth      metoprolol tartrate (LOPRESSOR) 25 MG tablet Take 12.5 mg by mouth 2 times daily      fluticasone (FLONASE) 50 MCG/ACT nasal spray 2 sprays by Each Nostril route daily      cetirizine (ZYRTEC) 10 MG tablet Take 10 mg by mouth daily      sertraline (ZOLOFT) 100 MG tablet Take 100 mg by mouth daily       glycopyrrolate-formoterol (BEVESPI AEROSPHERE) 9-4.8 MCG/ACT AERO Inhale 2 puffs into the lungs 2 times daily 3 Inhaler 1    atorvastatin (LIPITOR) 40 MG tablet Take 40 mg by mouth daily      dicyclomine (BENTYL) 20 MG tablet Take 20 mg by mouth 3 times daily as needed      donepezil (ARICEPT) 10 MG tablet Take 10 mg by mouth nightly   3    pantoprazole (PROTONIX) 40 MG tablet Take 40 mg by mouth daily       memantine (NAMENDA) 10 MG tablet Take 10 mg by mouth 2 times daily       hydrOXYzine (ATARAX) 25 MG tablet Take 50 mg by mouth every 8 hours as needed for Itching       aspirin EC 81 MG EC tablet Take 1 tablet by mouth daily.  (Patient taking differently: Take 81 mg by mouth every morning ) 30 tablet 6    ipratropium-albuterol (DUONEB) 0.5-2.5 (3) MG/3ML SOLN nebulizer solution Inhale 1 vial occassionally    Drug use: No    Sexual activity: Never   Lifestyle    Physical activity     Days per week: None     Minutes per session: None    Stress: None   Relationships    Social connections     Talks on phone: None     Gets together: None     Attends Mosque service: None     Active member of club or organization: None     Attends meetings of clubs or organizations: None     Relationship status: None    Intimate partner violence     Fear of current or ex partner: None     Emotionally abused: None     Physically abused: None     Forced sexual activity: None   Other Topics Concern    None   Social History Narrative    None     Family History   Problem Relation Age of Onset    Heart Disease Mother     Heart Disease Father     High Blood Pressure Father     Cancer Sister     Early Death Sister 43    Asthma Brother        PHYSICAL EXAM    VITAL SIGNS: /83   Pulse 96   Temp 98.4 °F (36.9 °C)   Resp 16   Ht 5' 1\" (1.549 m)   Wt 185 lb (83.9 kg)   SpO2 98%   BMI 34.96 kg/m²    Constitutional:  Well developed, well nourished, no acute distress   HENT:  Atraumatic, moist mucus membranes  Neck/Lymphatics: supple, no JVD, no swollen nodes  Respiratory:  Nonlabored breathing. Lungs Clear, no retractions. Cardiovascular:    Patient has moderate left chest tenderness to palpation as well as placement of my stethoscope-she states this is her chest pain and states that I am \"pushing on her heart\". RRR, no murmurs/rubs/gallops. No carotid bruits or murmurs heard in carotids. No JVD  Vascular:   Radial and femoral pulses palpable and reveal no discernible inequality    GI:  Soft, nontender, normal bowel sounds  Musculoskeletal:    There is no edema, asymmetry, or calf / thigh tenderness bilaterally. No cyanosis. No cool or pale-appearing limb.   Distal cap refill and pulses intact bilateral upper and lower extremities  Bilateral upper and lower extremity ROM intact without pain or obvious deficit    Integument:  Skin warm and dry, no petechiae   Neurologic:  Alert & oriented, normal speech    -Patient very hard of hearing   - alert & oriented person, place. Appears disoriented to situation and time. , no speech difficulties or slurring.  - No obvious gross motor deficits  - Negative meningeal signs.  - Sensation intact to light touch  - Strength 4/5 in upper and lower extremities bilaterally  - Normal finger to nose test bilaterally  - Rapid alternating movements intact  -Pronator drift difficult to assess as patient slowly drops both arms that she attributes this to pain, however she generally appears weak bilaterally. No unilateral drift.   - Upper and lower extremity DTRs 2+ bilaterally.  -No truncal ataxia  -Negative skew test bilateral eyes      Psych: Pleasant affect, no hallucinations        LABS:  Results for orders placed or performed during the hospital encounter of 04/21/21   CBC Auto Differential   Result Value Ref Range    WBC 8.0 4.0 - 10.5 K/CU MM    RBC 4.48 4.2 - 5.4 M/CU MM    Hemoglobin 12.6 12.5 - 16.0 GM/DL    Hematocrit 38.9 37 - 47 %    MCV 86.8 78 - 100 FL    MCH 28.1 27 - 31 PG    MCHC 32.4 32.0 - 36.0 %    RDW 15.5 (H) 11.7 - 14.9 %    Platelets 713 996 - 682 K/CU MM    MPV 9.4 7.5 - 11.1 FL    Differential Type AUTOMATED DIFFERENTIAL     Segs Relative 68.8 (H) 36 - 66 %    Lymphocytes % 19.9 (L) 24 - 44 %    Monocytes % 6.6 (H) 0 - 4 %    Eosinophils % 2.9 0 - 3 %    Basophils % 1.4 (H) 0 - 1 %    Segs Absolute 5.5 K/CU MM    Lymphocytes Absolute 1.6 K/CU MM    Monocytes Absolute 0.5 K/CU MM    Eosinophils Absolute 0.2 K/CU MM    Basophils Absolute 0.1 K/CU MM    Nucleated RBC % 0.0 %    Total Nucleated RBC 0.0 K/CU MM    Total Immature Neutrophil 0.03 K/CU MM    Immature Neutrophil % 0.4 0 - 0.43 %   Comprehensive Metabolic Panel   Result Value Ref Range    Sodium 141 135 - 145 MMOL/L    Potassium 4.0 3.5 - 5.1 MMOL/L    Chloride 106 99 - 110 mMol/L    CO2 28 21 - 32 MMOL/L    BUN 8 6 - 23 MG/DL    CREATININE 0.9 0.6 - 1.1 MG/DL    Glucose 142 (H) 70 - 99 MG/DL    Calcium 8.8 8.3 - 10.6 MG/DL    Albumin 3.6 3.4 - 5.0 GM/DL    Total Protein 6.7 6.4 - 8.2 GM/DL    Total Bilirubin 0.3 0.0 - 1.0 MG/DL    ALT <5 (L) 10 - 40 U/L    AST 12 (L) 15 - 37 IU/L    Alkaline Phosphatase 95 40 - 129 IU/L    GFR Non-African American >60 >60 mL/min/1.73m2    GFR African American >60 >60 mL/min/1.73m2    Anion Gap 7 4 - 16   Troponin   Result Value Ref Range    Troponin T <0.010 <0.01 NG/ML   Brain Natriuretic Peptide   Result Value Ref Range    Pro-.8 <300 PG/ML   EKG 12 Lead   Result Value Ref Range    Ventricular Rate 66 BPM    Atrial Rate 66 BPM    P-R Interval 150 ms    QRS Duration 76 ms    Q-T Interval 392 ms    QTc Calculation (Bazett) 410 ms    P Axis 67 degrees    R Axis 43 degrees    T Axis 66 degrees    Diagnosis       Normal sinus rhythm  Normal ECG  When compared with ECG of 07-MAR-2020 08:24,  No significant change was found            EKG Interpretation  Please see ED physician's note for EKG interpretation        RADIOLOGY/PROCEDURES    CT HEAD WO CONTRAST   Final Result   No acute intracranial abnormality. XR CHEST PORTABLE   Final Result   No acute cardiopulmonary findings. ED COURSE & MEDICAL DECISION MAKING      Patient presents as above with ongoing chest pain for possibly several months. Patient is a poor historian-likely baseline as patient states she takes \"memory pills\". No acute changes on EKG or on laboratory evaluation today including troponin and BNP. I considered but do not suspect Aortic discection / aneurysm. Pt does not report sudden onset of tearing pain, pain does not migrate, pt denies concurrent neuro symptoms (& pt neurologically intact on exam today), and I do not appreciate inequality of pulses on exam today.   Additionally, CXR is without abnormality suggestive of TAD   I also considered pulmonary embolism as the cause of symptoms today. Using risk stratification tools today (see note above for details), Pt was found low risk for PE. Patient does have history of CAD with previous cardiac catheterization in August 2020. Chart review reveals no occlusion or stent placement at that time. Patient is nontoxic-appearing, well-hydrated, stable vital signs, afebrile, and without focal neuro symptoms. 1540-I discussed patient case with patient's cardiologist, Dr. Emilie Joaquin.  He feels patient is safe for discharge home at this time and does not feel repeat troponin is warranted given duration of symptoms and catheterization results from August 2020. Patient does endorse generalized headache for at least several months. Patient is neurologically intact, however she is generally tired appearing, slow to answer and appears to be slightly confused but states this is not new from her usual \"memory problems\". No focal findings on exam today and supervising physician also conducted neuro exam and no focal findings today. Both myself and supervising physician today feel that patient is at her baseline and that there are no acute changes recently, patient does not endorse subjective acute changes. CT head today is without acute abnormality. Plan for discharge home with outpatient follow-up to both cardiologist within the next day or so and PCP to discuss ongoing, greater than 1 to several months of duration. Strict return precautions discussed with patient at bedside today. Clinical  IMPRESSION    1. Other chest pain    2. Nonintractable headache, unspecified chronicity pattern, unspecified headache type            Comment: Please note this report has been produced using speech recognition software and may contain errors related to that system including errors in grammar, punctuation, and spelling, as well as words and phrases that may be inappropriate.  If there are any questions or concerns please feel free to contact the dictating provider for clarification.        Shantanu Garrett, [place]  04/21/21 0798

## 2021-04-21 NOTE — ED NOTES
Pt complaining of chest pain, midsternal at this time, requesting pain meds.      Kelly Ortez RN  04/21/21 1039

## 2021-04-23 LAB
EKG ATRIAL RATE: 66 BPM
EKG DIAGNOSIS: NORMAL
EKG P AXIS: 67 DEGREES
EKG P-R INTERVAL: 150 MS
EKG Q-T INTERVAL: 392 MS
EKG QRS DURATION: 76 MS
EKG QTC CALCULATION (BAZETT): 410 MS
EKG R AXIS: 43 DEGREES
EKG T AXIS: 66 DEGREES
EKG VENTRICULAR RATE: 66 BPM

## 2021-04-23 PROCEDURE — 93010 ELECTROCARDIOGRAM REPORT: CPT | Performed by: INTERNAL MEDICINE

## 2021-04-24 LAB
EKG ATRIAL RATE: 97 BPM
EKG DIAGNOSIS: NORMAL
EKG P AXIS: 50 DEGREES
EKG P-R INTERVAL: 144 MS
EKG Q-T INTERVAL: 342 MS
EKG QRS DURATION: 76 MS
EKG QTC CALCULATION (BAZETT): 434 MS
EKG R AXIS: 26 DEGREES
EKG T AXIS: 80 DEGREES
EKG VENTRICULAR RATE: 97 BPM

## 2021-04-24 PROCEDURE — 93010 ELECTROCARDIOGRAM REPORT: CPT | Performed by: INTERNAL MEDICINE

## 2021-04-27 ENCOUNTER — HOSPITAL ENCOUNTER (EMERGENCY)
Age: 61
Discharge: HOME | End: 2021-04-27
Payer: COMMERCIAL

## 2021-04-27 ENCOUNTER — APPOINTMENT (OUTPATIENT)
Dept: GENERAL RADIOLOGY | Age: 61
End: 2021-04-27
Payer: COMMERCIAL

## 2021-04-27 VITALS
TEMPERATURE: 97.9 F | RESPIRATION RATE: 18 BRPM | SYSTOLIC BLOOD PRESSURE: 160 MMHG | DIASTOLIC BLOOD PRESSURE: 72 MMHG | OXYGEN SATURATION: 98 % | HEART RATE: 65 BPM | WEIGHT: 185 LBS | BODY MASS INDEX: 34.96 KG/M2

## 2021-04-27 DIAGNOSIS — R07.9 CHEST PAIN, UNSPECIFIED TYPE: Primary | ICD-10-CM

## 2021-04-27 LAB
ALBUMIN SERPL-MCNC: 3.6 GM/DL (ref 3.4–5)
ALP BLD-CCNC: 90 IU/L (ref 40–129)
ALT SERPL-CCNC: <5 U/L (ref 10–40)
ANION GAP SERPL CALCULATED.3IONS-SCNC: 6 MMOL/L (ref 4–16)
AST SERPL-CCNC: 12 IU/L (ref 15–37)
BASOPHILS ABSOLUTE: 0.1 K/CU MM
BASOPHILS RELATIVE PERCENT: 1.4 % (ref 0–1)
BILIRUB SERPL-MCNC: 0.2 MG/DL (ref 0–1)
BUN BLDV-MCNC: 10 MG/DL (ref 6–23)
CALCIUM SERPL-MCNC: 8.3 MG/DL (ref 8.3–10.6)
CHLORIDE BLD-SCNC: 106 MMOL/L (ref 99–110)
CO2: 27 MMOL/L (ref 21–32)
CREAT SERPL-MCNC: 0.9 MG/DL (ref 0.6–1.1)
DIFFERENTIAL TYPE: ABNORMAL
EOSINOPHILS ABSOLUTE: 0.3 K/CU MM
EOSINOPHILS RELATIVE PERCENT: 3.7 % (ref 0–3)
GFR AFRICAN AMERICAN: >60 ML/MIN/1.73M2
GFR NON-AFRICAN AMERICAN: >60 ML/MIN/1.73M2
GLUCOSE BLD-MCNC: 110 MG/DL (ref 70–99)
HCT VFR BLD CALC: 36.6 % (ref 37–47)
HEMOGLOBIN: 11.5 GM/DL (ref 12.5–16)
IMMATURE NEUTROPHIL %: 0.5 % (ref 0–0.43)
LYMPHOCYTES ABSOLUTE: 1.5 K/CU MM
LYMPHOCYTES RELATIVE PERCENT: 19.4 % (ref 24–44)
MCH RBC QN AUTO: 27.6 PG (ref 27–31)
MCHC RBC AUTO-ENTMCNC: 31.4 % (ref 32–36)
MCV RBC AUTO: 87.8 FL (ref 78–100)
MONOCYTES ABSOLUTE: 0.5 K/CU MM
MONOCYTES RELATIVE PERCENT: 6.8 % (ref 0–4)
NUCLEATED RBC %: 0 %
PDW BLD-RTO: 15.5 % (ref 11.7–14.9)
PLATELET # BLD: 324 K/CU MM (ref 140–440)
PMV BLD AUTO: 9.4 FL (ref 7.5–11.1)
POTASSIUM SERPL-SCNC: 4.1 MMOL/L (ref 3.5–5.1)
PRO-BNP: 223.7 PG/ML
RBC # BLD: 4.17 M/CU MM (ref 4.2–5.4)
SEGMENTED NEUTROPHILS ABSOLUTE COUNT: 5.4 K/CU MM
SEGMENTED NEUTROPHILS RELATIVE PERCENT: 68.2 % (ref 36–66)
SODIUM BLD-SCNC: 139 MMOL/L (ref 135–145)
TOTAL IMMATURE NEUTOROPHIL: 0.04 K/CU MM
TOTAL NUCLEATED RBC: 0 K/CU MM
TOTAL PROTEIN: 6.5 GM/DL (ref 6.4–8.2)
TROPONIN T: <0.01 NG/ML
TROPONIN T: <0.01 NG/ML
WBC # BLD: 7.9 K/CU MM (ref 4–10.5)

## 2021-04-27 PROCEDURE — 71045 X-RAY EXAM CHEST 1 VIEW: CPT

## 2021-04-27 PROCEDURE — 85025 COMPLETE CBC W/AUTO DIFF WBC: CPT

## 2021-04-27 PROCEDURE — 99285 EMERGENCY DEPT VISIT HI MDM: CPT

## 2021-04-27 PROCEDURE — 6370000000 HC RX 637 (ALT 250 FOR IP): Performed by: PHYSICIAN ASSISTANT

## 2021-04-27 PROCEDURE — 84484 ASSAY OF TROPONIN QUANT: CPT

## 2021-04-27 PROCEDURE — 80053 COMPREHEN METABOLIC PANEL: CPT

## 2021-04-27 PROCEDURE — 93005 ELECTROCARDIOGRAM TRACING: CPT | Performed by: PHYSICIAN ASSISTANT

## 2021-04-27 PROCEDURE — 83880 ASSAY OF NATRIURETIC PEPTIDE: CPT

## 2021-04-27 RX ORDER — ACETAMINOPHEN 500 MG
1000 TABLET ORAL ONCE
Status: COMPLETED | OUTPATIENT
Start: 2021-04-27 | End: 2021-04-27

## 2021-04-27 RX ADMIN — ACETAMINOPHEN 1000 MG: 500 TABLET ORAL at 16:58

## 2021-04-27 ASSESSMENT — PAIN DESCRIPTION - LOCATION: LOCATION: CHEST

## 2021-04-27 ASSESSMENT — PAIN SCALES - GENERAL: PAINLEVEL_OUTOF10: 6

## 2021-04-27 ASSESSMENT — HEART SCORE: ECG: 1

## 2021-04-27 NOTE — ED PROVIDER NOTES
EMERGENCY DEPARTMENT [address] EMERGENCY DEPARTMENT        TRIAGE CHIEF COMPLAINT:   Chest Pain (2 nitro per home health)      [place]:  Michael Lynn is a 57 y.o. female that presents via EMS from home for chest pain. Onset is acute on chronic. Per EMS, patient has history of developmental delays and is very hard of hearing. She is a difficult historian. She is alert to place and self only, not to time. She states that \"I always have chest pain\" but it was worse today, left-sided. Worse with palpation of the area as well as twisting and moving on the bed. No shortness of breath. States intermittently radiates into the back without tearing or ripping sensation. States she does see cardiology, Dr. Samara Sprague. Endorses that she was seen earlier in the ER this week for similar complaints and was discharged home. She denies any fever chills, cough, hemoptysis or pain with deep inspiration. Patient cannot tell me what medication she is on or if she is on any anticoagulation therapy. No other abdominal urinary complaints. No fever or chills. Per EMS, patient did take a full dose aspirin as well as 2 nitro at home without improvement of chest pain. Denying any dizziness lightheadedness or near syncope. On chart review, past medical history includes coronary artery disease, emphysema, GERD, asthma, GERD, chronic kidney disease.     ROS:  General:  No fevers, no chills   Cardiovascular:  See HPI    Respiratory:  See HPI    Gastrointestinal:  No pain, no nausea, no vomiting, no diarrhea  Musculoskeletal:  No muscle pain, no joint pain  Skin:  No rash, no pruritis, no easy bruising  Neurologic:  No speech problems, no headache, no extremity numbness, no extremity tingling, no extremity weakness  Psychiatric:  No anxiety  Genitourinary:  No dysuria, no hematuria  Extremities:  No edema    Past Medical History:   Diagnosis Date    Anxiety     Arthritis     \"Both Legs\"    Asthma     Back problem     \"Curved Spine\"    Bronchitis Last Episode In 2015    CAD (coronary artery disease)     Sees Dr. Isa Valladares; 8-16-16 (noted on 10-4-2012 that patient does not have CAD s/p cardiac catheterization).  Cancer ([facility] [address]     skin ca on skin    Chest pain     in ER with this dx 7/2015- admitted- stress test done as outpt 8/7/2015(see report)    Chipped tooth     Upper And Lower    Chronic back pain     COPD (chronic obstructive pulmonary disease) ([facility] [address] 06/01/2017    Sees Dr. Clary Crawley    Cough     Occ. Nonproductive Cough    Emphysema     GERD (gastroesophageal reflux disease)     H/O 24 hour EKG monitoring     3/14/2013-Signif for runs of sinus tachycardia, fastest recorded at 132 bpm lasting almost 38 mins. Dr Naila Jacob. -report in [facility]    H/O cardiac catheterization     10/4/2012- No CAD. False positive stress test.Dr Valdez-report in [facility];    Bria Bell H/O cardiac catheterization     H/O Doppler lower venous ultrasound 09/24/2020    No DVT or SVT, No significant venous insufficiency    H/O Doppler ultrasound 04/13/2017    LE doppler - normal study    H/O Doppler ultrasound 05/26/2017    carotid - normal study    H/O exercise stress test 02/28/2017    normal study    Hiatal hernia     History of cardiac cath 08/03/2020    PATENT CX STENT, Normal EF,  NORMAL LAD AND RCA    History of exercise stress test 02/28/2017    treadmill    History of nuclear stress test 8/7/15    EF 70%. WNL    History of nuclear stress test 07/17/2020     scan shows small size, moderate intensity, reversible perfusion defect in anterior wall. normal LVEF .    [place] (hard of hearing)     Bilateral Ears    Hx of cardiovascular stress test 9/2013 9/13 EF70% Normal.10/12- LVSF normal. EF 61%. Normal perfusion althought pt complained of chest pain with Lexiscan.  report in [facility]; 11/11, possible apical ischemia but abnormality secondary to technical artifact needs to be considered, regional wall motion abnormality with low normal LVSF by pierson scan. 8/10, 12/09    Hx of Doppler ultrasound 2/2011 2/2011-Carotid no significant obstructive lesions noted in the extracranial carotid system. Antegrade flow noted inthe vertebral arteries.  Hx of Doppler ultrasound 1/2012 1/2012-peripheral - both abis and duplex studies of both lower extremities do not  reveal any significant peripheral vascular disease at this time.  Hx of Doppler ultrasound 1/22/2016    Arterial: Peripheral vascular noninvasive arterial studies do not reveal any significant atherosclerotic disease.  Hx of echocardiogram 6/2013 6/13- Mild to mod MR/TR. 10/12-LVSF normal. EF 60%. Mild mitral and tricuspid insuff. reprot in epic; 8/6/10, normal dimension of the LV, normal global and regional LVSF with an EF of 60%, no significant valvuopathy is seen.  12/05    Hypertension     \"on medication since age 25's\" follow with Dr Zaynab Coffey Irregular heart beat     \"have irreg heart beat\" dont know what you call it\"    Migraines Last Migraine In 2014    Palpitations     Panic attacks     Pneumonia Last Episode In 2014    Prolonged emergence from general anesthesia     Restless leg     S/P cardiac cath 2/21/14    S/P PTCA (percutaneous transluminal coronary angioplasty) 01/26/2017    2017, CX stented    Shortness of breath     Teeth missing     Upper And Lower    Tobacco use     Unspecified sleep apnea     \"had sleep study 2-3 yrs ago\" have cpap and try to use it\"     Past Surgical History:   Procedure Laterality Date    ANKLE FRACTURE SURGERY Left 5/24/2019    LEFT ANKLE OPEN REDUCTION INTERNAL FIXATION SYNDESMOSIS performed by Gianna Parker MD at [address]  2011    COLONOSCOPY  08/23/2017    colon polyp, diverticulosis, hemorrhoids    DENTAL SURGERY      Teeth Extracted In Past    ENDOSCOPY, COLON, DIAGNOSTIC  10-16-15    ENDOSCOPY, COLON, DIAGNOSTIC  01/28/2019    Hiatal hernia, savary dil #17 used  GASTRIC FUNDOPLICATION      Robotic laparoscopic nissen with mesh    KIDNEY SURGERY Left 2-15    \"At [facility] Had Left Kidney Mass Removed\" Benign    TONSILLECTOMY  's    TUBAL LIGATION      UPPER GASTROINTESTINAL ENDOSCOPY N/A 2019    EGD DILATION SAVARY #17MM performed by Ele iPna MD at [facility] 12     Family History   Problem Relation Age of Onset    Heart Disease Mother     Heart Disease Father     High Blood Pressure Father     Cancer Sister     Early Death Sister 43    Asthma Brother      Social History     Socioeconomic History    Marital status:      Spouse name: Not on file    Number of children: 1    Years of education: 15    Highest education level: Not on file   Occupational History    Not on file   Social Needs    Financial resource strain: Not on file    Food insecurity     Worry: Not on file     Inability: Not on file    Transportation needs     Medical: Not on file     Non-medical: Not on file   Tobacco Use    Smoking status: Former Smoker     Packs/day: 0.25     Years: 44.00     Pack years: 11.00     Types: Cigarettes     Start date: 10/21/1971     Quit date: 2015     Years since quittin.6    Smokeless tobacco: Never Used   Substance and Sexual Activity    Alcohol use:  Yes     Alcohol/week: 2.0 standard drinks     Types: 2 Shots of liquor per week     Comment: occassionally    Drug use: No    Sexual activity: Never   Lifestyle    Physical activity     Days per week: Not on file     Minutes per session: Not on file    Stress: Not on file   Relationships    Social connections     Talks on phone: Not on file     Gets together: Not on file     Attends Evangelical service: Not on file     Active member of club or organization: Not on file     Attends meetings of clubs or organizations: Not on file     Relationship status: Not on file    Intimate partner violence     Fear of current or ex partner: Not on file     Emotionally abused: Alert & oriented to self and place, not to time. Very slow hesitant speech but no slurred speech. No facial droop. No focal deficits noted. Cranial nerves II through XII grossly intact.   Spontaneously moving all extremities   psychiatric:  Affect appropriate      I have reviewed and interpreted all of the currently available lab results from this visit (if applicable):  Results for orders placed or performed during the hospital encounter of 04/27/21   CBC Auto Differential   Result Value Ref Range    WBC 7.9 4.0 - 10.5 K/CU MM    RBC 4.17 (L) 4.2 - 5.4 M/CU MM    Hemoglobin 11.5 (L) 12.5 - 16.0 GM/DL    Hematocrit 36.6 (L) 37 - 47 %    MCV 87.8 78 - 100 FL    MCH 27.6 27 - 31 PG    MCHC 31.4 (L) 32.0 - 36.0 %    RDW 15.5 (H) 11.7 - 14.9 %    Platelets 857 227 - 228 K/CU MM    MPV 9.4 7.5 - 11.1 FL    Differential Type AUTOMATED DIFFERENTIAL     Segs Relative 68.2 (H) 36 - 66 %    Lymphocytes % 19.4 (L) 24 - 44 %    Monocytes % 6.8 (H) 0 - 4 %    Eosinophils % 3.7 (H) 0 - 3 %    Basophils % 1.4 (H) 0 - 1 %    Segs Absolute 5.4 K/CU MM    Lymphocytes Absolute 1.5 K/CU MM    Monocytes Absolute 0.5 K/CU MM    Eosinophils Absolute 0.3 K/CU MM    Basophils Absolute 0.1 K/CU MM    Nucleated RBC % 0.0 %    Total Nucleated RBC 0.0 K/CU MM    Total Immature Neutrophil 0.04 K/CU MM    Immature Neutrophil % 0.5 (H) 0 - 0.43 %   Comprehensive Metabolic Panel   Result Value Ref Range    Sodium 139 135 - 145 MMOL/L    Potassium 4.1 3.5 - 5.1 MMOL/L    Chloride 106 99 - 110 mMol/L    CO2 27 21 - 32 MMOL/L    BUN 10 6 - 23 MG/DL    CREATININE 0.9 0.6 - 1.1 MG/DL    Glucose 110 (H) 70 - 99 MG/DL    Calcium 8.3 8.3 - 10.6 MG/DL    Albumin 3.6 3.4 - 5.0 GM/DL    Total Protein 6.5 6.4 - 8.2 GM/DL    Total Bilirubin 0.2 0.0 - 1.0 MG/DL    ALT <5 (L) 10 - 40 U/L    AST 12 (L) 15 - 37 IU/L    Alkaline Phosphatase 90 40 - 129 IU/L    GFR Non-African American >60 >60 mL/min/1.73m2    GFR African American >60 >60 mL/min/1.73m2    Anion Gap 6 4 - 16   Troponin   Result Value Ref Range    Troponin T <0.010 <0.01 NG/ML   Brain Natriuretic Peptide   Result Value Ref Range    Pro-.7 <300 PG/ML   Troponin   Result Value Ref Range    Troponin T <0.010 <0.01 NG/ML   EKG 12 Lead   Result Value Ref Range    Ventricular Rate 79 BPM    Atrial Rate 79 BPM    P-R Interval 158 ms    QRS Duration 76 ms    Q-T Interval 364 ms    QTc Calculation (Bazett) 417 ms    P Axis 27 degrees    R Axis 14 degrees    T Axis 42 degrees    Diagnosis       Normal sinus rhythm  Low voltage QRS  Borderline ECG  When compared with ECG of 21-APR-2021 14:47,  No significant change was found          Radiographs (if obtained):  [] The following radiograph was interpreted by myself in the absence of a radiologist:   [] Radiologist's Report Reviewed:  XR CHEST PORTABLE   Final Result   No evidence of acute process. XR CHEST PORTABLE (Final result)  Result time 04/27/21 14:26:29  Final result by Shona Mendez MD (04/27/21 14:26:29)                Impression:    No evidence of acute process.              Narrative:    EXAMINATION:   ONE XRAY VIEW OF THE CHEST     4/27/2021 1:42 pm     COMPARISON:   04/21/2021     HISTORY:   ORDERING SYSTEM PROVIDED HISTORY: chest pain   TECHNOLOGIST PROVIDED HISTORY:   Reason for exam:->chest pain   Reason for Exam: chest pain   Acuity: Acute   Type of Exam: Initial   Additional signs and symptoms: na   Relevant Medical/Surgical History: asthma, hypertension     FINDINGS:   Normal heart size and pulmonary vasculature.  No focal consolidations,   pleural effusions, or pneumothorax.                       EKG Interpretation  Please see ED physician's note - Dr. Debora Aguirre - for EKG interpretation        Chart review shows recent radiographs:  Ct Head Wo Contrast    Result Date: 4/21/2021  EXAMINATION: CT OF THE HEAD WITHOUT CONTRAST  4/21/2021 1:58 pm TECHNIQUE: CT of the head was performed without the administration of intravenous contrast. Dose modulation, iterative reconstruction, and/or weight based adjustment of the mA/kV was utilized to reduce the radiation dose to as low as reasonably achievable. COMPARISON: 05/23/2019 HISTORY: ORDERING SYSTEM PROVIDED HISTORY: confusion of unknown duration, Headache for > 1 month TECHNOLOGIST PROVIDED HISTORY: Has a \"code stroke\" or \"stroke alert\" been called? ->No Reason for exam:->confusion of unknown duration, Headache for > 1 month Decision Support Exception->Emergency Medical Condition (MA) Reason for Exam: confusion of unknown duration, Headache for > 1 month Acuity: Acute Type of Exam: Initial FINDINGS: BRAIN/VENTRICLES: There is no acute intracranial hemorrhage, mass effect or midline shift. No abnormal extra-axial fluid collection. The gray-white differentiation is maintained without evidence of an acute infarct. There is no evidence of hydrocephalus. ORBITS: The visualized portion of the orbits demonstrate no acute abnormality. SINUSES: Paranasal sinuses are well aerated. There is no mastoid effusion. There stable changes of prior canal wall up right mastoidectomy with cochlear implant in place. SOFT TISSUES/SKULL:  No acute abnormality of the visualized skull or soft tissues. No acute intracranial abnormality. Xr Chest Portable    Result Date: 4/21/2021  EXAMINATION: ONE XRAY VIEW OF THE CHEST 4/21/2021 1:07 pm COMPARISON: 03/06/2020 HISTORY: ORDERING SYSTEM PROVIDED HISTORY: chest pain TECHNOLOGIST PROVIDED HISTORY: Reason for exam:->chest pain Reason for Exam: chest pain Acuity: Unknown Type of Exam: Unknown Relevant Medical/Surgical History: asthma   cad   dopd FINDINGS: Patient is rotated. No focal consolidation, pleural effusion or pneumothorax. The cardiomediastinal silhouette is stable. No overt pulmonary edema. The osseous structures are stable. No acute cardiopulmonary findings.        ED COURSE & MEDICAL DECISION MAKING       Vital signs and nursing notes reviewed during ED course. I have independently evaluated this patient . Supervising physician - Dr. Victoria Young - was present in ED and available for consultation throughout entirety of patient's care. All pertinent Lab data and radiographic results reviewed with patient at bedside. The patient and/or the family were informed of the results of any tests/labs/imaging, the treatment plan, and time was allotted to answer questions. Clinical Impression:  1. Chest pain, unspecified type        Patient presents with chest pain. On exam, well-appearing 58-year-old female, she is a poor historian. Vitals are stable, she is not hypoxic tachycardic or tachypneic. States chest pain has been ongoing constant for the last several days. Did receive full dose aspirin and nitro prior to ED arrival.  On exam, reproducible tenderness of left anterior chest wall pain to palpation without palpable chest wall deformities. Lungs with clear equal air exchange. Abdomen soft nontender. No asymmetry or edema in the lower legs. Patient is given Tylenol for pain, placed on to telemetry and continuous pulse ox monitoring. On chart review, patient did have a cardiac catheterization in August 2020 which showed a patent circumflex stents with normal LAD/RCA and normal EF. Labs today are otherwise reassuring. Normal troponin and BNP. No acute ischemic changes no EKG. Chest x-ray also was unremarkable for acute cardiopulmonary process. On reassessment, patient is resting comfortably, feeling improved with pain. She is a poor historian, plan to consult with her cardiologist however chest pain does appear atypical and likely musculoskeletal in nature given its constant ongoing nature and reproducibility on exam with palpation. I did consult with Dr. Cheyenne Schwartz - cardiolgy - and discussed patient's history, ED presentation/course including any pertinent laboratory findings and imaging study findings.  He/she recommends 3-hour delta troponin and if patient continues to remain chest pain-free and improved with a normal troponin, can be discharged home with outpatient follow-up. This plan was discussed with patient who verbalized understanding agreement. 3-hour delta troponin is normal/unchanged. At this time, low clinical suspicion for ACS/MI, dissection, pneumothorax/hemothorax. Patient will be discharged in stable condition with outpatient follow-up with cardiology as she been in benefit from repeat stress test.  She is continue her home medications including aspirin as directed. She is given a low threshold to return back to the ED with any new or worsening symptoms. Discussed that there is a 1% miss rate for acute coronary events despite 2 negative troponins with patient. Patient agrees to follow up with her primary care physician or cardiologist within the next 72 hours. Concerning signs and symptoms warranting a return visit to the Emergency Department were explained in detail. All questions and concerns were addressed to the patient's satisfaction. Patient understood and agreed with plan    Attending physician - Dr. Marbella Strickland - was consulted on this case, but did not independently evaluate the patient       Diagnosis and plan discussed in detail with patient who understands and agrees. Patient agrees to return emergency department if symptoms worsen or any new symptoms develop. Disposition referral (if applicable):   MD Krys Shelley [address] [facility]  [phone]    Schedule an appointment as soon as possible for a visit in 1 day      [facility] Emergency Department  [address] [phone]  Go to   As needed, If symptoms worsen      Disposition medications (if applicable):  Discharge Medication List as of 4/27/2021  6:36 PM            (Please note that portions of this note may have been completed with a voice recognition program. Efforts were made to edit the dictations but occasionally words are mis-transcribed.)          Ashvin Thayer PA-C  04/27/21 1925

## 2021-04-27 NOTE — ED NOTES
Pt is in bed with no distress or needs voiced at this time.  Pt has eyes closed with RR unlabored      Magali Back RN  04/27/21 0875

## 2021-04-27 NOTE — ED TRIAGE NOTES
Pt presents to ED by EMS from home for chronic chest pain, pt states she always has chest pain. Home health gave the pt 2 nitro without relief. Pt has development delays and is hard of hearing.  RR unlabored on room air

## 2021-04-27 NOTE — ED PROVIDER NOTES
EKG is interpreted by me. EKG shows sinus rhythm at 79 bpm, Axis is nondeviated, no remarkable ST elevations or depressions, T waves are unremarkable, WA interval 158, QRS duration of 76, QTC of 4017. Final impression, nonspecific EKG.     Bessie Lefort  4/27/2021  5:36 PM       Bessie Lefort, MD  04/27/21 [id number]

## 2021-04-27 NOTE — ED NOTES
Discussed discharge instructions with patient. All questions answered. Patient verbalized understanding.           Horace Seip, RN  04/27/21 [id number]

## 2021-04-28 LAB
EKG ATRIAL RATE: 79 BPM
EKG DIAGNOSIS: NORMAL
EKG P AXIS: 27 DEGREES
EKG P-R INTERVAL: 158 MS
EKG Q-T INTERVAL: 364 MS
EKG QRS DURATION: 76 MS
EKG QTC CALCULATION (BAZETT): 417 MS
EKG R AXIS: 14 DEGREES
EKG T AXIS: 42 DEGREES
EKG VENTRICULAR RATE: 79 BPM

## 2021-04-28 PROCEDURE — 93010 ELECTROCARDIOGRAM REPORT: CPT | Performed by: INTERNAL MEDICINE

## 2021-05-06 ENCOUNTER — OFFICE VISIT (OUTPATIENT)
Dept: CARDIOLOGY CLINIC | Age: 61
End: 2021-05-06
Payer: COMMERCIAL

## 2021-05-06 VITALS
WEIGHT: 190.6 LBS | DIASTOLIC BLOOD PRESSURE: 88 MMHG | SYSTOLIC BLOOD PRESSURE: 138 MMHG | HEART RATE: 72 BPM | BODY MASS INDEX: 35.98 KG/M2 | HEIGHT: 61 IN

## 2021-05-06 DIAGNOSIS — R07.2 PRECORDIAL CHEST PAIN: Primary | ICD-10-CM

## 2021-05-06 PROCEDURE — G8427 DOCREV CUR MEDS BY ELIG CLIN: HCPCS | Performed by: INTERNAL MEDICINE

## 2021-05-06 PROCEDURE — 99213 OFFICE O/P EST LOW 20 MIN: CPT | Performed by: INTERNAL MEDICINE

## 2021-05-06 PROCEDURE — 1036F TOBACCO NON-USER: CPT | Performed by: INTERNAL MEDICINE

## 2021-05-06 PROCEDURE — 1111F DSCHRG MED/CURRENT MED MERGE: CPT | Performed by: INTERNAL MEDICINE

## 2021-05-06 PROCEDURE — 3017F COLORECTAL CA SCREEN DOC REV: CPT | Performed by: INTERNAL MEDICINE

## 2021-05-06 PROCEDURE — G8417 CALC BMI ABV UP PARAM F/U: HCPCS | Performed by: INTERNAL MEDICINE

## 2021-05-06 NOTE — PROGRESS NOTES
Sebastián Land  is a  Established patient  ,57 y.o.   female here for evaluation of the following chief complaint(s):    Here for fu on hosp visit        SUBJECTIVE/OBJECTIVE:  HPI : h/o  Cad, htn, hyperlipidimew now here  Has been having recurrent episodes of mid sternal CP with mild SOB with dizziness , has been ED many times    Review of Systems    CP    Vitals:    05/06/21 1305   BP: 138/88   Pulse: 72   Weight: 190 lb 9.6 oz (86.5 kg)   Height: 5' 1\" (1.549 m)     /88   Pulse 72   Ht 5' 1\" (1.549 m)   Wt 190 lb 9.6 oz (86.5 kg)   BMI 36.01 kg/m²   No flowsheet data found. Wt Readings from Last 3 Encounters:   05/06/21 190 lb 9.6 oz (86.5 kg)   04/27/21 185 lb (83.9 kg)   04/21/21 185 lb (83.9 kg)     Body mass index is 36.01 kg/m². Physical Exam     Neck: JVD  no    Lungs : clear    Cardio : Si and S2 audilble      Ext: edema  no    All pertinent data reviewed  y    Meds : reviewed   y      Tests ordered    lexiscan    ASSESSMENT/PLAN:               -     CORONARY ARTERY DISEASE:  symptomatic     All available  tests in chart reviewed. Management discussed . Testing ordered  yes, lexiscan            Had LHC 8.20  Was OK                     -  Hypertension: Patients blood pressure is normal. Patient is advised about low sodium diet. Present medical regimen will not be changed. -  LIPID MANAGEMENT:  Importance of lipid levels discussed with patient   and patient was given dietary advice. NCEP- ATP III guidelines reviewed with patient. -   Changes  in medicines made: No        - pt is unable to walk on treadmill, has back issues, will need lexiscan           An electronic signature was used to authenticate this note.     --Milton Javed MD

## 2021-05-06 NOTE — PATIENT INSTRUCTIONS
Please hold on to these instructions the  will call you within 1-9 business days when we receive authorization from your insurance. Nuclear Stress Test    WHAT TO EXPECT:   ? You will need to confirm the test or it could be cancelled. ? This test will take approximately 2 hours: 1 hour in the AM &    1 hour in the PM. You will be given a time by the Technologist after the first part is completed to come back. ? You will be given a medication, through an IV in the hand, this will safely simulate exercise. This IV is also needed to inject the radioactive isotope unless you are able toe walk the treadmill. ? You will receive an injection in the AM & PM before the pictures. ? Using a special camera, you will have one set of pictures of your heart taken in the AM and a set of pictures in the PM.     PREPARATION FOR TEST:  ? Eat a light breakfast such as water or juice and toast.  ? If you are DIABETIC: Eat a normal breakfast with NO CAFFEINE and take your insulin as normal.   ? AVOID ALL FOODS & DRINKS containing CAFFEINE 12 HOURS PRIOR TO THE TEST: Including coffee, Tea, Natanale and other soft drinks even those labeled  caffeine free or decaffeinated.  ? HOLD THESE MEDICATIONS Persantine & Theophylline (Theodur)  24 hours prior & bring your inhaler with you.    The physician will specify if the following Beta blockers need to be held for 24 hours prior to test: Toprol XL (metoprolol ER)

## 2021-05-20 ENCOUNTER — TELEPHONE (OUTPATIENT)
Dept: CARDIOLOGY CLINIC | Age: 61
End: 2021-05-20

## 2021-05-20 NOTE — TELEPHONE ENCOUNTER
Pt called and states she was to get a return call  about her testing being changed. No one called her back. Per Deatra Hamman test was cx, due be changed to a Lexscan, has to be pre [facility] through [facility]. She will call pt to schedule once approved.

## 2021-06-14 ENCOUNTER — TELEPHONE (OUTPATIENT)
Dept: CARDIOLOGY CLINIC | Age: 61
End: 2021-06-14

## 2021-06-16 ENCOUNTER — HOSPITAL ENCOUNTER (INPATIENT)
Age: 61
LOS: 12 days | Discharge: NURSING, REHAB OR LONG-TERM CARE | DRG: 243 | End: 2021-06-28
Attending: EMERGENCY MEDICINE
Payer: COMMERCIAL

## 2021-06-16 ENCOUNTER — APPOINTMENT (OUTPATIENT)
Dept: CT IMAGING | Age: 61
DRG: 243 | End: 2021-06-16
Payer: COMMERCIAL

## 2021-06-16 ENCOUNTER — APPOINTMENT (OUTPATIENT)
Dept: GENERAL RADIOLOGY | Age: 61
DRG: 243 | End: 2021-06-16
Payer: COMMERCIAL

## 2021-06-16 DIAGNOSIS — R06.89 DYSPNEA AND RESPIRATORY ABNORMALITIES: ICD-10-CM

## 2021-06-16 DIAGNOSIS — R47.81 SLURRED SPEECH: ICD-10-CM

## 2021-06-16 DIAGNOSIS — R06.00 DYSPNEA AND RESPIRATORY ABNORMALITIES: ICD-10-CM

## 2021-06-16 DIAGNOSIS — R20.2 PARESTHESIA: ICD-10-CM

## 2021-06-16 DIAGNOSIS — E04.1 THYROID NODULE: ICD-10-CM

## 2021-06-16 DIAGNOSIS — E83.42 HYPOMAGNESEMIA: ICD-10-CM

## 2021-06-16 DIAGNOSIS — R07.9 CHEST PAIN, UNSPECIFIED TYPE: Primary | ICD-10-CM

## 2021-06-16 LAB
ALBUMIN SERPL-MCNC: 4.3 GM/DL (ref 3.4–5)
ALP BLD-CCNC: 100 IU/L (ref 40–129)
ALT SERPL-CCNC: 11 U/L (ref 10–40)
ANION GAP SERPL CALCULATED.3IONS-SCNC: 12 MMOL/L (ref 4–16)
APTT: 26 SECONDS (ref 25.1–37.1)
AST SERPL-CCNC: 22 IU/L (ref 15–37)
BACTERIA: NEGATIVE /HPF
BASOPHILS ABSOLUTE: 0.1 K/CU MM
BASOPHILS RELATIVE PERCENT: 1.2 % (ref 0–1)
BILIRUB SERPL-MCNC: 0.3 MG/DL (ref 0–1)
BILIRUBIN URINE: NEGATIVE MG/DL
BLOOD, URINE: NEGATIVE
BUN BLDV-MCNC: 10 MG/DL (ref 6–23)
CALCIUM SERPL-MCNC: 9.2 MG/DL (ref 8.3–10.6)
CHLORIDE BLD-SCNC: 104 MMOL/L (ref 99–110)
CLARITY: CLEAR
CO2: 25 MMOL/L (ref 21–32)
COLOR: YELLOW
CREAT SERPL-MCNC: 0.8 MG/DL (ref 0.6–1.1)
D DIMER: <200 NG/ML(DDU)
DIFFERENTIAL TYPE: ABNORMAL
EOSINOPHILS ABSOLUTE: 0.2 K/CU MM
EOSINOPHILS RELATIVE PERCENT: 2.8 % (ref 0–3)
GFR AFRICAN AMERICAN: >60 ML/MIN/1.73M2
GFR NON-AFRICAN AMERICAN: >60 ML/MIN/1.73M2
GLUCOSE BLD-MCNC: 127 MG/DL (ref 70–99)
GLUCOSE, URINE: NEGATIVE MG/DL
HCT VFR BLD CALC: 37.5 % (ref 37–47)
HEMOGLOBIN: 12.1 GM/DL (ref 12.5–16)
IMMATURE NEUTROPHIL %: 0.5 % (ref 0–0.43)
INR BLD: 0.84 INDEX
KETONES, URINE: NEGATIVE MG/DL
LEUKOCYTE ESTERASE, URINE: NEGATIVE
LYMPHOCYTES ABSOLUTE: 1.8 K/CU MM
LYMPHOCYTES RELATIVE PERCENT: 21.7 % (ref 24–44)
MAGNESIUM: 1.6 MG/DL (ref 1.8–2.4)
MCH RBC QN AUTO: 28.6 PG (ref 27–31)
MCHC RBC AUTO-ENTMCNC: 32.3 % (ref 32–36)
MCV RBC AUTO: 88.7 FL (ref 78–100)
MONOCYTES ABSOLUTE: 0.5 K/CU MM
MONOCYTES RELATIVE PERCENT: 6.6 % (ref 0–4)
NITRITE URINE, QUANTITATIVE: NEGATIVE
NUCLEATED RBC %: 0 %
PDW BLD-RTO: 14.9 % (ref 11.7–14.9)
PH, URINE: 7 (ref 5–8)
PLATELET # BLD: 322 K/CU MM (ref 140–440)
PMV BLD AUTO: 9.4 FL (ref 7.5–11.1)
POTASSIUM SERPL-SCNC: 4.3 MMOL/L (ref 3.5–5.1)
PRO-BNP: 97.19 PG/ML
PROTEIN UA: NEGATIVE MG/DL
PROTHROMBIN TIME: 10.1 SECONDS (ref 11.7–14.5)
RBC # BLD: 4.23 M/CU MM (ref 4.2–5.4)
RBC URINE: <1 /HPF (ref 0–6)
SEGMENTED NEUTROPHILS ABSOLUTE COUNT: 5.5 K/CU MM
SEGMENTED NEUTROPHILS RELATIVE PERCENT: 67.2 % (ref 36–66)
SODIUM BLD-SCNC: 141 MMOL/L (ref 135–145)
SPECIFIC GRAVITY UA: 1.05 (ref 1–1.03)
SQUAMOUS EPITHELIAL: 1 /HPF
TOTAL IMMATURE NEUTOROPHIL: 0.04 K/CU MM
TOTAL NUCLEATED RBC: 0 K/CU MM
TOTAL PROTEIN: 7.2 GM/DL (ref 6.4–8.2)
TRICHOMONAS: ABNORMAL /HPF
TROPONIN T: <0.01 NG/ML
TROPONIN T: <0.01 NG/ML
UROBILINOGEN, URINE: NEGATIVE MG/DL (ref 0.2–1)
WBC # BLD: 8.2 K/CU MM (ref 4–10.5)
WBC UA: 1 /HPF (ref 0–5)

## 2021-06-16 PROCEDURE — 6360000004 HC RX CONTRAST MEDICATION: Performed by: EMERGENCY MEDICINE

## 2021-06-16 PROCEDURE — 83880 ASSAY OF NATRIURETIC PEPTIDE: CPT

## 2021-06-16 PROCEDURE — 84484 ASSAY OF TROPONIN QUANT: CPT

## 2021-06-16 PROCEDURE — 96374 THER/PROPH/DIAG INJ IV PUSH: CPT

## 2021-06-16 PROCEDURE — 6360000002 HC RX W HCPCS: Performed by: PHYSICIAN ASSISTANT

## 2021-06-16 PROCEDURE — 96375 TX/PRO/DX INJ NEW DRUG ADDON: CPT

## 2021-06-16 PROCEDURE — 83735 ASSAY OF MAGNESIUM: CPT

## 2021-06-16 PROCEDURE — 1200000000 HC SEMI PRIVATE

## 2021-06-16 PROCEDURE — 93005 ELECTROCARDIOGRAM TRACING: CPT | Performed by: EMERGENCY MEDICINE

## 2021-06-16 PROCEDURE — 2580000003 HC RX 258: Performed by: EMERGENCY MEDICINE

## 2021-06-16 PROCEDURE — 36415 COLL VENOUS BLD VENIPUNCTURE: CPT

## 2021-06-16 PROCEDURE — 71275 CT ANGIOGRAPHY CHEST: CPT

## 2021-06-16 PROCEDURE — 96361 HYDRATE IV INFUSION ADD-ON: CPT

## 2021-06-16 PROCEDURE — 99285 EMERGENCY DEPT VISIT HI MDM: CPT

## 2021-06-16 PROCEDURE — 94640 AIRWAY INHALATION TREATMENT: CPT

## 2021-06-16 PROCEDURE — 85610 PROTHROMBIN TIME: CPT

## 2021-06-16 PROCEDURE — 70496 CT ANGIOGRAPHY HEAD: CPT

## 2021-06-16 PROCEDURE — 71045 X-RAY EXAM CHEST 1 VIEW: CPT

## 2021-06-16 PROCEDURE — 81001 URINALYSIS AUTO W/SCOPE: CPT

## 2021-06-16 PROCEDURE — 85025 COMPLETE CBC W/AUTO DIFF WBC: CPT

## 2021-06-16 PROCEDURE — 80053 COMPREHEN METABOLIC PANEL: CPT

## 2021-06-16 PROCEDURE — 70450 CT HEAD/BRAIN W/O DYE: CPT

## 2021-06-16 PROCEDURE — 6360000002 HC RX W HCPCS: Performed by: EMERGENCY MEDICINE

## 2021-06-16 PROCEDURE — 85730 THROMBOPLASTIN TIME PARTIAL: CPT

## 2021-06-16 PROCEDURE — 6370000000 HC RX 637 (ALT 250 FOR IP): Performed by: HOSPITALIST

## 2021-06-16 PROCEDURE — 85379 FIBRIN DEGRADATION QUANT: CPT

## 2021-06-16 RX ORDER — SERTRALINE HYDROCHLORIDE 100 MG/1
100 TABLET, FILM COATED ORAL DAILY
Status: DISCONTINUED | OUTPATIENT
Start: 2021-06-16 | End: 2021-06-28 | Stop reason: HOSPADM

## 2021-06-16 RX ORDER — ACETAMINOPHEN 325 MG/1
650 TABLET ORAL EVERY 6 HOURS PRN
Status: DISCONTINUED | OUTPATIENT
Start: 2021-06-16 | End: 2021-06-28 | Stop reason: HOSPADM

## 2021-06-16 RX ORDER — CLOPIDOGREL BISULFATE 75 MG/1
75 TABLET ORAL DAILY
Status: DISCONTINUED | OUTPATIENT
Start: 2021-06-16 | End: 2021-06-28 | Stop reason: HOSPADM

## 2021-06-16 RX ORDER — ACETAMINOPHEN 650 MG/1
650 SUPPOSITORY RECTAL EVERY 6 HOURS PRN
Status: DISCONTINUED | OUTPATIENT
Start: 2021-06-16 | End: 2021-06-28 | Stop reason: HOSPADM

## 2021-06-16 RX ORDER — CHOLECALCIFEROL (VITAMIN D3) 125 MCG
5 CAPSULE ORAL DAILY
Status: DISCONTINUED | OUTPATIENT
Start: 2021-06-16 | End: 2021-06-28 | Stop reason: HOSPADM

## 2021-06-16 RX ORDER — SODIUM CHLORIDE 0.9 % (FLUSH) 0.9 %
5-40 SYRINGE (ML) INJECTION PRN
Status: DISCONTINUED | OUTPATIENT
Start: 2021-06-16 | End: 2021-06-28 | Stop reason: HOSPADM

## 2021-06-16 RX ORDER — PANTOPRAZOLE SODIUM 40 MG/1
40 TABLET, DELAYED RELEASE ORAL DAILY
Status: DISCONTINUED | OUTPATIENT
Start: 2021-06-16 | End: 2021-06-18

## 2021-06-16 RX ORDER — SODIUM CHLORIDE 0.9 % (FLUSH) 0.9 %
5-40 SYRINGE (ML) INJECTION EVERY 12 HOURS SCHEDULED
Status: DISCONTINUED | OUTPATIENT
Start: 2021-06-16 | End: 2021-06-28 | Stop reason: HOSPADM

## 2021-06-16 RX ORDER — SODIUM CHLORIDE 9 MG/ML
25 INJECTION, SOLUTION INTRAVENOUS PRN
Status: DISCONTINUED | OUTPATIENT
Start: 2021-06-16 | End: 2021-06-28 | Stop reason: HOSPADM

## 2021-06-16 RX ORDER — ONDANSETRON 4 MG/1
4 TABLET, ORALLY DISINTEGRATING ORAL EVERY 8 HOURS PRN
Status: DISCONTINUED | OUTPATIENT
Start: 2021-06-16 | End: 2021-06-28 | Stop reason: HOSPADM

## 2021-06-16 RX ORDER — DONEPEZIL HYDROCHLORIDE 10 MG/1
10 TABLET, FILM COATED ORAL NIGHTLY
Status: DISCONTINUED | OUTPATIENT
Start: 2021-06-16 | End: 2021-06-28 | Stop reason: HOSPADM

## 2021-06-16 RX ORDER — POLYETHYLENE GLYCOL 3350 17 G/17G
17 POWDER, FOR SOLUTION ORAL DAILY PRN
Status: DISCONTINUED | OUTPATIENT
Start: 2021-06-16 | End: 2021-06-28 | Stop reason: HOSPADM

## 2021-06-16 RX ORDER — RANOLAZINE 500 MG/1
1000 TABLET, EXTENDED RELEASE ORAL 2 TIMES DAILY
Status: DISCONTINUED | OUTPATIENT
Start: 2021-06-16 | End: 2021-06-28 | Stop reason: HOSPADM

## 2021-06-16 RX ORDER — ATORVASTATIN CALCIUM 40 MG/1
40 TABLET, FILM COATED ORAL DAILY
Status: DISCONTINUED | OUTPATIENT
Start: 2021-06-16 | End: 2021-06-28 | Stop reason: HOSPADM

## 2021-06-16 RX ORDER — MAGNESIUM SULFATE 1 G/100ML
1000 INJECTION INTRAVENOUS ONCE
Status: COMPLETED | OUTPATIENT
Start: 2021-06-16 | End: 2021-06-16

## 2021-06-16 RX ORDER — IPRATROPIUM BROMIDE AND ALBUTEROL SULFATE 2.5; .5 MG/3ML; MG/3ML
1 SOLUTION RESPIRATORY (INHALATION) 4 TIMES DAILY
Status: DISCONTINUED | OUTPATIENT
Start: 2021-06-16 | End: 2021-06-22

## 2021-06-16 RX ORDER — ONDANSETRON 2 MG/ML
4 INJECTION INTRAMUSCULAR; INTRAVENOUS EVERY 6 HOURS PRN
Status: DISCONTINUED | OUTPATIENT
Start: 2021-06-16 | End: 2021-06-28 | Stop reason: HOSPADM

## 2021-06-16 RX ORDER — HYDROXYZINE HYDROCHLORIDE 25 MG/1
50 TABLET, FILM COATED ORAL EVERY 8 HOURS PRN
Status: DISCONTINUED | OUTPATIENT
Start: 2021-06-16 | End: 2021-06-28 | Stop reason: HOSPADM

## 2021-06-16 RX ORDER — MEMANTINE HYDROCHLORIDE 10 MG/1
10 TABLET ORAL 2 TIMES DAILY
Status: DISCONTINUED | OUTPATIENT
Start: 2021-06-16 | End: 2021-06-28 | Stop reason: HOSPADM

## 2021-06-16 RX ORDER — NITROGLYCERIN 0.4 MG/1
0.4 TABLET SUBLINGUAL EVERY 5 MIN PRN
Status: DISCONTINUED | OUTPATIENT
Start: 2021-06-16 | End: 2021-06-28 | Stop reason: HOSPADM

## 2021-06-16 RX ORDER — 0.9 % SODIUM CHLORIDE 0.9 %
1000 INTRAVENOUS SOLUTION INTRAVENOUS ONCE
Status: COMPLETED | OUTPATIENT
Start: 2021-06-16 | End: 2021-06-16

## 2021-06-16 RX ORDER — MORPHINE SULFATE 4 MG/ML
4 INJECTION, SOLUTION INTRAMUSCULAR; INTRAVENOUS EVERY 30 MIN PRN
Status: DISCONTINUED | OUTPATIENT
Start: 2021-06-16 | End: 2021-06-16 | Stop reason: HOSPADM

## 2021-06-16 RX ORDER — ASPIRIN 81 MG/1
81 TABLET ORAL DAILY
Status: DISCONTINUED | OUTPATIENT
Start: 2021-06-16 | End: 2021-06-28 | Stop reason: HOSPADM

## 2021-06-16 RX ORDER — ONDANSETRON 2 MG/ML
4 INJECTION INTRAMUSCULAR; INTRAVENOUS EVERY 30 MIN PRN
Status: DISCONTINUED | OUTPATIENT
Start: 2021-06-16 | End: 2021-06-16 | Stop reason: SDUPTHER

## 2021-06-16 RX ADMIN — ONDANSETRON 4 MG: 2 INJECTION INTRAMUSCULAR; INTRAVENOUS at 14:10

## 2021-06-16 RX ADMIN — IOPAMIDOL 80 ML: 755 INJECTION, SOLUTION INTRAVENOUS at 13:53

## 2021-06-16 RX ADMIN — MORPHINE SULFATE 4 MG: 4 INJECTION, SOLUTION INTRAMUSCULAR; INTRAVENOUS at 19:40

## 2021-06-16 RX ADMIN — ONDANSETRON 4 MG: 2 INJECTION INTRAMUSCULAR; INTRAVENOUS at 19:40

## 2021-06-16 RX ADMIN — IPRATROPIUM BROMIDE AND ALBUTEROL SULFATE 3 ML: .5; 3 SOLUTION RESPIRATORY (INHALATION) at 22:37

## 2021-06-16 RX ADMIN — MAGNESIUM SULFATE HEPTAHYDRATE 1000 MG: 1 INJECTION, SOLUTION INTRAVENOUS at 16:35

## 2021-06-16 RX ADMIN — SODIUM CHLORIDE 1000 ML: 9 INJECTION, SOLUTION INTRAVENOUS at 14:09

## 2021-06-16 RX ADMIN — MORPHINE SULFATE 4 MG: 4 INJECTION, SOLUTION INTRAMUSCULAR; INTRAVENOUS at 14:10

## 2021-06-16 ASSESSMENT — PAIN DESCRIPTION - PAIN TYPE: TYPE: ACUTE PAIN

## 2021-06-16 ASSESSMENT — ENCOUNTER SYMPTOMS
ALLERGIC/IMMUNOLOGIC NEGATIVE: 1
EYES NEGATIVE: 1
SHORTNESS OF BREATH: 1
GASTROINTESTINAL NEGATIVE: 1

## 2021-06-16 ASSESSMENT — PAIN SCALES - GENERAL
PAINLEVEL_OUTOF10: 10

## 2021-06-16 NOTE — ED NOTES
Report called to Tana Israel floor nurse, all questions answered, await room to be cleaned.       Ivory Guajardo RN  06/16/21 [id number]

## 2021-06-16 NOTE — ED PROVIDER NOTES
[facility]      TRIAGE CHIEF COMPLAINT:   Chest Pain (ongoing several weeks. states insurance wouldn't pay for testing with her heart doctor so she came here)      Hughes:  Chelle Larson is a 57 y.o. female that presents with complaint of chest pain shortness of breath. Patient states she went to her cardiologist today and they told her to come to the ER to get a stress test because her insurance not cover outpatient. She has been having chest pain shortness of breath for a while. Patient is a very poor historian also complains of dizziness and paresthesias to her left face and left arm. She also has slurred speech unclear when her last known well was friend present states she has had that for a while as well. Patient does not know. Denies abdominal pain nausea vomiting diarrhea swelling. No other questions or concerns no history of DVT PE or AAA. REVIEW OF SYSTEMS:  At least 10 systems reviewed and otherwise acutely negative except as in the [address] Review of Systems   Constitutional: Positive for activity change and fatigue. HENT: Negative. Eyes: Negative. Negative for visual disturbance. Respiratory: Positive for shortness of breath. Cardiovascular: Positive for chest pain. Gastrointestinal: Negative. Endocrine: Negative. Genitourinary: Negative. Musculoskeletal: Negative. Skin: Negative. Allergic/Immunologic: Negative. Neurological: Positive for dizziness, speech difficulty, weakness and numbness. Hematological: Negative. Psychiatric/Behavioral: Negative. All other systems reviewed and are negative. Past Medical History:   Diagnosis Date    Anxiety     Arthritis     \"Both Legs\"    Asthma     Back problem     \"Curved Spine\"    Bronchitis Last Episode In 2015    CAD (coronary artery disease)     Sees Dr. Simran Mayes; 8-16-16 (noted on 10-4-2012 that patient does not have CAD s/p cardiac catheterization).     Cancer ([facility] [address] skin ca on skin    Chest pain     in ER with this dx 7/2015- admitted- stress test done as outpt 8/7/2015(see report)    Chipped tooth     Upper And Lower    Chronic back pain     COPD (chronic obstructive pulmonary disease) ([address] 06/01/2017    Sees Dr. Gray Los    Cough     Occ. Nonproductive Cough    Emphysema     GERD (gastroesophageal reflux disease)     H/O 24 hour EKG monitoring     3/14/2013-Signif for runs of sinus tachycardia, fastest recorded at 132 bpm lasting almost 38 mins. Dr Doni Salgado. -report in [facility]    H/O cardiac catheterization     10/4/2012- No CAD. False positive stress test.Dr Valdez-report in [facility];    [facility] H/O cardiac catheterization     H/O Doppler lower venous ultrasound 09/24/2020    No DVT or SVT, No significant venous insufficiency    H/O Doppler ultrasound 04/13/2017    LE doppler - normal study    H/O Doppler ultrasound 05/26/2017    carotid - normal study    H/O exercise stress test 02/28/2017    normal study    Hiatal hernia     History of cardiac cath 08/03/2020    PATENT CX STENT, Normal EF,  NORMAL LAD AND RCA    History of exercise stress test 02/28/2017    treadmill    History of nuclear stress test 8/7/15    EF 70%. WNL    History of nuclear stress test 07/17/2020     scan shows small size, moderate intensity, reversible perfusion defect in anterior wall. normal LVEF .    [place] (hard of hearing)     Bilateral Ears    Hx of cardiovascular stress test 9/2013 9/13 EF70% Normal.10/12- LVSF normal. EF 61%. Normal perfusion althought pt complained of chest pain with Lexiscan. report in [facility]; 11/11, possible apical ischemia but abnormality secondary to technical artifact needs to be considered, regional wall motion abnormality with low normal LVSF by pierson scan. 8/10, 12/09    Hx of Doppler ultrasound 2/2011 2/2011-Carotid no significant obstructive lesions noted in the extracranial carotid system. Antegrade flow noted inthe vertebral arteries.      Hx of Doppler ultrasound 1/2012 1/2012-peripheral - both abis and duplex studies of both lower extremities do not  reveal any significant peripheral vascular disease at this time.  Hx of Doppler ultrasound 1/22/2016    Arterial: Peripheral vascular noninvasive arterial studies do not reveal any significant atherosclerotic disease.  Hx of echocardiogram 6/2013 6/13- Mild to mod MR/TR. 10/12-LVSF normal. EF 60%. Mild mitral and tricuspid insuff. reprot in epic; 8/6/10, normal dimension of the LV, normal global and regional LVSF with an EF of 60%, no significant valvuopathy is seen.  12/05    Hypertension     \"on medication since age 25's\" follow with Dr Cheryl Elliott Irregular heart beat     \"have irreg heart beat\" dont know what you call it\"    Migraines Last Migraine In 2014    Palpitations     Panic attacks     Pneumonia Last Episode In 2014    Prolonged emergence from general anesthesia     Restless leg     S/P cardiac cath 2/21/14    S/P PTCA (percutaneous transluminal coronary angioplasty) 01/26/2017    2017, CX stented    Shortness of breath     Teeth missing     Upper And Lower    Tobacco use     Unspecified sleep apnea     \"had sleep study 2-3 yrs ago\" have cpap and try to use it\"     Past Surgical History:   Procedure Laterality Date    ANKLE FRACTURE SURGERY Left 5/24/2019    LEFT ANKLE OPEN REDUCTION INTERNAL FIXATION SYNDESMOSIS performed by Irvin Arzola MD at [facility]  2011    COLONOSCOPY  08/23/2017    colon polyp, diverticulosis, hemorrhoids    DENTAL SURGERY      Teeth Extracted In Past    ENDOSCOPY, COLON, DIAGNOSTIC  10-16-15    ENDOSCOPY, COLON, DIAGNOSTIC  01/28/2019    Hiatal hernia, savary dil #17 used    GASTRIC FUNDOPLICATION  61/30/6617    Robotic laparoscopic nissen with mesh    KIDNEY SURGERY Left 2-15    \"At [facility] Had Left Kidney Mass Removed\" Benign    TONSILLECTOMY  1960's    TUBAL LIGATION  1993    UPPER GASTROINTESTINAL ENDOSCOPY N/A 1/28/2019 EGD DILATION SAVARY #17MM performed by Taryn Marcelino MD at [place] [address]     Family History   Problem Relation Age of Onset    Heart Disease Mother     Heart Disease Father     High Blood Pressure Father     Cancer Sister     Early Death Sister 43    Asthma Brother      Social History     Socioeconomic History    Marital status:      Spouse name: Not on file    Number of children: 1    Years of education: 15    Highest education level: Not on file   Occupational History    Not on file   Tobacco Use    Smoking status: Former Smoker     Packs/day: 0.25     Years: 44.00     Pack years: 11.00     Types: Cigarettes     Start date: 10/21/1971     Quit date: 2015     Years since quittin.8    Smokeless tobacco: Never Used   Vaping Use    Vaping Use: Never used   Substance and Sexual Activity    Alcohol use: Yes     Alcohol/week: 2.0 standard drinks     Types: 2 Shots of liquor per week     Comment: occassionally    Drug use: No    Sexual activity: Never   Other Topics Concern    Not on file   Social History Narrative    Not on file     Social Determinants of Health     Financial Resource Strain:     Difficulty of Paying Living Expenses:    Food Insecurity:     Worried About Running Out of Food in the Last Year:     [address] in the Last Year:    Transportation Needs:     Lack of Transportation (Medical):      Lack of Transportation (Non-Medical):    Physical Activity:     Days of Exercise per Week:     Minutes of Exercise per Session:    Stress:     Feeling of Stress :    Social Connections:     Frequency of Communication with Friends and Family:     Frequency of Social Gatherings with Friends and Family:     Attends Yazdanism Services:     Active Member of Clubs or Organizations:     Attends Club or Organization Meetings:     Marital Status:    Intimate Partner Violence:     Fear of Current or Ex-Partner:     Emotionally Abused:     Physically Abused:     Sexually Abused:      Current Facility-Administered Medications   Medication Dose Route Frequency Provider Last Rate Last Admin    ketorolac (TORADOL) injection 15 mg  15 mg Intravenous Q6H PRN Monae Jacques APRSVITLANA - CNP   15 mg at 06/17/21 0051    aspirin EC tablet 81 mg  81 mg Oral Daily Wilberto Shah MD        atorvastatin (LIPITOR) tablet 40 mg  40 mg Oral Daily Wilberto Shah MD        clopidogrel (PLAVIX) tablet 75 mg  75 mg Oral Daily Wilberto Shah MD        donepezil (ARICEPT) tablet 10 mg  10 mg Oral Nightly Wilberto Shah MD        hydrOXYzine (ATARAX) tablet 50 mg  50 mg Oral Q8H PRN Wilberto Shah MD        ipratropium-albuterol (DUONEB) nebulizer solution 3 mL  1 vial Inhalation 4x Daily Wilberto Shah MD   3 mL at 06/16/21 2237    melatonin tablet 5 mg  5 mg Oral Daily Wilberto Shah MD        [facility]) tablet 10 mg  10 mg Oral BID Wilberto Shah MD        nitroGLYCERIN (NITROSTAT) SL tablet 0.4 mg  0.4 mg Sublingual Q5 Min PRN Wilberto Shah MD        pantoprazole (PROTONIX) tablet 40 mg  40 mg Oral Daily Wilberto Shah MD        ranolazine (RANEXA) extended release tablet 1,000 mg  1,000 mg Oral BID Wilberto Shah MD        sertraline (ZOLOFT) tablet 100 mg  100 mg Oral Daily Wilberto Shah MD        sodium chloride flush 0.9 % injection 5-40 mL  5-40 mL Intravenous 2 times per day Wilberto Shah MD        sodium chloride flush 0.9 % injection 5-40 mL  5-40 mL Intravenous PRN Wilberto Shah MD        0.9 % sodium chloride infusion  25 mL Intravenous PRN Wilberto Shah MD        ondansetron (ZOFRAN-ODT) disintegrating tablet 4 mg  4 mg Oral Q8H PRN Wilberto Shah MD        Or    ondansetron (ZOFRAN) injection 4 mg  4 mg Intravenous Q6H PRN Wilberto Shah MD        acetaminophen (TYLENOL) tablet 650 mg  650 mg Oral Q6H PRN Wilberto Shah MD        Or    acetaminophen (TYLENOL) suppository 650 mg 650 mg Rectal Q6H PRN Tilmon Records, MD        polyethylene glycol (GLYCOLAX) packet 17 g  17 g Oral Daily PRN Tilmon Records, MD        enoxaparin (LOVENOX) injection 40 mg  40 mg Subcutaneous QPM Tilmon Records, MD          Allergies   Allergen Reactions    Adhesive Tape Rash    Aspirin Nausea And Vomiting     Chewable sts not coated.     Pcn [Penicillins] Nausea And Vomiting and Rash     Current Facility-Administered Medications   Medication Dose Route Frequency Provider Last Rate Last Admin    ketorolac (TORADOL) injection 15 mg  15 mg Intravenous Q6H PRN Monae LAVERN Jacques - CNP   15 mg at 06/17/21 0051    aspirin EC tablet 81 mg  81 mg Oral Daily Tilmon Records, MD        atorvastatin (LIPITOR) tablet 40 mg  40 mg Oral Daily Tilmon Records, MD        clopidogrel (PLAVIX) tablet 75 mg  75 mg Oral Daily Tilmon Records, MD        donepezil (ARICEPT) tablet 10 mg  10 mg Oral Nightly Tilmon Records, MD        hydrOXYzine (ATARAX) tablet 50 mg  50 mg Oral Q8H PRN Tilmon Records, MD        ipratropium-albuterol (DUONEB) nebulizer solution 3 mL  1 vial Inhalation 4x Daily Tilmon Records, MD   3 mL at 06/16/21 2237    melatonin tablet 5 mg  5 mg Oral Daily Tilmon Records, MD        [facility]) tablet 10 mg  10 mg Oral BID Tilmon Records, MD        nitroGLYCERIN (NITROSTAT) SL tablet 0.4 mg  0.4 mg Sublingual Q5 Min PRN Tilmon Records, MD        pantoprazole (PROTONIX) tablet 40 mg  40 mg Oral Daily Tilmon Records, MD        ranolazine (RANEXA) extended release tablet 1,000 mg  1,000 mg Oral BID Tilmon Records, MD        sertraline (ZOLOFT) tablet 100 mg  100 mg Oral Daily Tilmon Records, MD        sodium chloride flush 0.9 % injection 5-40 mL  5-40 mL Intravenous 2 times per day Tilmon Records, MD        sodium chloride flush 0.9 % injection 5-40 mL  5-40 mL Intravenous PRN Tilmon Records, MD        0.9 % sodium chloride infusion  25 mL Intravenous PRN Mauricio Calles MD        ondansetron (ZOFRAN-ODT) disintegrating tablet 4 mg  4 mg Oral Q8H PRN Mauricio Calles MD        Or    ondansetron [facility]) injection 4 mg  4 mg Intravenous Q6H PRN Mauricio Calles MD        acetaminophen (TYLENOL) tablet 650 mg  650 mg Oral Q6H PRN MD Phil Farmer acetaminophen (TYLENOL) suppository 650 mg  650 mg Rectal Q6H PRN Mauricio Calles MD        polyethylene glycol (GLYCOLAX) packet 17 g  17 g Oral Daily PRN Mauricio Calles MD        enoxaparin (LOVENOX) injection 40 mg  40 mg Subcutaneous QPM Mauricio Calles MD           Nursing Notes Reviewed    VITAL SIGNS:  ED Triage Vitals   Enc Vitals Group      BP       Pulse       Resp       Temp       Temp src       SpO2       Weight       Height       Head Circumference       Peak Flow       Pain Score       Pain Loc       Pain Edu? Excl. in [address] PHYSICAL EXAM:  Physical Exam  Vitals reviewed. Constitutional:       General: She is not in acute distress. Appearance: Normal appearance. She is well-developed and well-groomed. She is not ill-appearing, toxic-appearing or diaphoretic. HENT:      Head: Normocephalic and atraumatic. Right Ear: External ear normal.      Left Ear: External ear normal.      Nose: No congestion or rhinorrhea. Eyes:      General: No scleral icterus. Right eye: No discharge. Left eye: No discharge. Extraocular Movements: Extraocular movements intact. Conjunctiva/sclera: Conjunctivae normal.      Pupils: Pupils are equal, round, and reactive to light. Neck:      Vascular: No JVD. Trachea: Phonation normal.   Cardiovascular:      Rate and Rhythm: Normal rate and regular rhythm. Pulses: Normal pulses. Heart sounds: Normal heart sounds. No murmur heard. No friction rub. No gallop. Pulmonary:      Effort: Pulmonary effort is normal. No respiratory distress.       Breath sounds: Normal breath sounds. No wheezing, rhonchi or rales. Abdominal:      General: Bowel sounds are normal. There is no distension. Palpations: Abdomen is soft. There is no mass. Tenderness: There is no abdominal tenderness. There is no guarding or rebound. Negative signs include Martin's sign, Rovsing's sign and McBurney's sign. Hernia: No hernia is present. Musculoskeletal:         General: No swelling, tenderness, deformity or signs of injury. Normal range of motion. Cervical back: Full passive range of motion without pain and normal range of motion. No edema, erythema, signs of trauma, rigidity, torticollis or crepitus. No pain with movement. Normal range of motion. Right lower leg: No edema. Left lower leg: No edema. Skin:     General: Skin is warm. Coloration: Skin is not jaundiced or pale. Findings: No bruising, erythema, lesion or rash. Neurological:      Mental Status: She is alert and oriented to person, place, and time. GCS: GCS eye subscore is 4. GCS verbal subscore is 5. GCS motor subscore is 6. Cranial Nerves: Cranial nerve deficit, dysarthria and facial asymmetry present. Sensory: Sensory deficit present. Motor: Weakness present. No tremor, atrophy or seizure activity. Comments: Slight facial droop on the left and slurred speech paresthesias on the left left-sided weakness   Psychiatric:         Mood and Affect: Mood normal.         Behavior: Behavior normal. Behavior is cooperative. Thought Content:  Thought content normal.         Judgment: Judgment normal.           I have reviewed andinterpreted all of the currently available lab results from this visit (if applicable):    Results for orders placed or performed during the hospital encounter of 06/16/21   CBC with Auto Diff   Result Value Ref Range    WBC 8.2 4.0 - 10.5 K/CU MM    RBC 4.23 4.2 - 5.4 M/CU MM    Hemoglobin 12.1 (L) 12.5 - 16.0 GM/DL    Hematocrit 37.5 37 - 47 %    MCV 88.7 78 - 100 FL    MCH 28.6 27 - 31 PG    MCHC 32.3 32.0 - 36.0 %    RDW 14.9 11.7 - 14.9 %    Platelets 911 456 - 433 K/CU MM    MPV 9.4 7.5 - 11.1 FL    Differential Type AUTOMATED DIFFERENTIAL     Segs Relative 67.2 (H) 36 - 66 %    Lymphocytes % 21.7 (L) 24 - 44 %    Monocytes % 6.6 (H) 0 - 4 %    Eosinophils % 2.8 0 - 3 %    Basophils % 1.2 (H) 0 - 1 %    Segs Absolute 5.5 K/CU MM    Lymphocytes Absolute 1.8 K/CU MM    Monocytes Absolute 0.5 K/CU MM    Eosinophils Absolute 0.2 K/CU MM    Basophils Absolute 0.1 K/CU MM    Nucleated RBC % 0.0 %    Total Nucleated RBC 0.0 K/CU MM    Total Immature Neutrophil 0.04 K/CU MM    Immature Neutrophil % 0.5 (H) 0 - 0.43 %   CMP   Result Value Ref Range    Sodium 141 135 - 145 MMOL/L    Potassium 4.3 3.5 - 5.1 MMOL/L    Chloride 104 99 - 110 mMol/L    CO2 25 21 - 32 MMOL/L    BUN 10 6 - 23 MG/DL    CREATININE 0.8 0.6 - 1.1 MG/DL    Glucose 127 (H) 70 - 99 MG/DL    Calcium 9.2 8.3 - 10.6 MG/DL    Albumin 4.3 3.4 - 5.0 GM/DL    Total Protein 7.2 6.4 - 8.2 GM/DL    Total Bilirubin 0.3 0.0 - 1.0 MG/DL    ALT 11 10 - 40 U/L    AST 22 15 - 37 IU/L    Alkaline Phosphatase 100 40 - 129 IU/L    GFR Non-African American >60 >60 mL/min/1.73m2    GFR African American >60 >60 mL/min/1.73m2    Anion Gap 12 4 - 16   Troponin   Result Value Ref Range    Troponin T <0.010 <0.01 NG/ML   Brain Natriuretic Peptide   Result Value Ref Range    Pro-BNP 97.19 <300 PG/ML   D-dimer, Quantitative   Result Value Ref Range    D-Dimer, Quant <200 <230 NG/mL(DDU)   Protime/INR & PTT   Result Value Ref Range    Protime 10.1 (L) 11.7 - 14.5 SECONDS    INR 0.84 INDEX    aPTT 26.0 25.1 - 37.1 SECONDS   Magnesium   Result Value Ref Range    Magnesium 1.6 (L) 1.8 - 2.4 mg/dl   Urinalysis   Result Value Ref Range    Color, UA YELLOW YELLOW    Clarity, UA CLEAR CLEAR    Glucose, Urine NEGATIVE NEGATIVE MG/DL    Bilirubin Urine NEGATIVE NEGATIVE MG/DL    Ketones, Urine NEGATIVE NEGATIVE MG/DL    Specific Guion, UA 1.053 (H) 1.001 - 1.035    Blood, Urine NEGATIVE NEGATIVE    pH, Urine 7.0 5.0 - 8.0    Protein, UA NEGATIVE NEGATIVE MG/DL    Urobilinogen, Urine NEGATIVE 0.2 - 1.0 MG/DL    Nitrite Urine, Quantitative NEGATIVE NEGATIVE    Leukocyte Esterase, Urine NEGATIVE NEGATIVE    RBC, UA <1 0 - 6 /HPF    WBC, UA 1 0 - 5 /HPF    Bacteria, UA NEGATIVE NEGATIVE /HPF    Squam Epithel, UA 1 /HPF    Trichomonas, UA NONE SEEN NONE SEEN /HPF   Troponin   Result Value Ref Range    Troponin T <0.010 <0.01 NG/ML   EKG 12 Lead   Result Value Ref Range    Ventricular Rate 100 BPM    Atrial Rate 100 BPM    P-R Interval 138 ms    QRS Duration 76 ms    Q-T Interval 332 ms    QTc Calculation (Bazett) 428 ms    P Axis 46 degrees    R Axis 18 degrees    T Axis 44 degrees    Diagnosis       Normal sinus rhythm  Low voltage QRS  Nonspecific ST abnormality  Abnormal ECG  When compared with ECG of 27-APR-2021 13:44,  No significant change was found     EKG 12 lead   Result Value Ref Range    Ventricular Rate 54 BPM    Atrial Rate 54 BPM    P-R Interval 182 ms    QRS Duration 78 ms    Q-T Interval 414 ms    QTc Calculation (Bazett) 392 ms    P Axis 71 degrees    R Axis 18 degrees    T Axis 10 degrees    Diagnosis       Sinus bradycardia with sinus arrhythmia  Low voltage QRS  Borderline ECG  When compared with ECG of 16-JUN-2021 11:23,  Vent. rate has decreased BY  46 BPM  Nonspecific T wave abnormality now evident in Inferior leads          Radiographs (if obtained):  [] The following radiograph was interpreted by myself in the absence of a radiologist:  [x] Radiologist's Report Reviewed:    CXR, CT Brain, CTA head/neck    EKG (if obtained): (All EKG's are interpreted by myself in the absence of a cardiologist)    12 lead EKG per my interpretation:  Normal Sinus Rhythm 100  Axis is   Normal  QTc is  428  There is no specific T wave changes appreciated. There is no specific ST wave changes appreciated.     Prior EKG to compare with was not available NIH Stroke Scale  NIH Stroke Scale Assessed: Yes  Interval: Baseline  Level of Consciousness (1a. ): Alert  LOC Questions (1b. ): Answers both correctly  LOC Commands (1c. ): Performs both tasks correctly  Best Gaze (2. ): Normal  Visual (3. ): No visual loss  Facial Palsy (4. ): (!) Minor paralysis  Motor Arm, Left (5a. ): No drift  Motor Arm, Right (5b. ): No drift  Motor Leg, Left (6a. ): No drift  Motor Leg, Right (6b. ): No drift  Limb Ataxia (7. ): Absent  Sensory (8. ): (!) Mild to Moderate  Best Language (9. ): No aphasia  Dysarthria (10. ): Mild to moderate, slurs some words  Extinction and Inattention (11): No abnormality  Total: 3      MDM:    Patient here with complaint of chest pain shortness of breath paresthesias slurred speech. Again she has had this chest pain shortness of breath for a while. Saw her cardiologist today at the office and was told to come to the ER to get a stress test because her insurance did not cover an outpatient. She has had this for a while. She apparently is here frequently per nursing staff. On arrival she is in no distress vital signs are stable I did notice some slurred speech and some left-sided facial droop and paresthesias and weakness. Friend in the room states she has had this for a while patient is unclear I did not call stroke alert due to unknown last known well I do not know if this is chronic or not. She otherwise is in no distress perform cardiopulmonary work-up performed imaging of head and neck, stroke work-up. Patient seen with PA please see her note. Patient will likely need admission for stress test, cardio, neuro evaluation otherwise stable. Please see PA's note. Patient had a negative CT chest except for thyroid nodule and does have some abnormality seen on her CTA but no large vessel occlusion. Some congenital abnormalities likely.   Due to symptoms will admit for stress test cardiac evaluation stroke evaluation otherwise stable admitted. CLINICAL IMPRESSION:  Final diagnoses:   Chest pain, unspecified type   Dyspnea and respiratory abnormalities   Paresthesia   Slurred speech   Hypomagnesemia   Thyroid nodule       (Please note that portions of this note may have been completed with a voice recognition program. Efforts were made to edit the dictations but occasionally words aremis-transcribed.)    DISPOSITION REFERRAL (if applicable):  No follow-up provider specified.     DISPOSITION MEDICATIONS (if applicable):  Current Discharge Medication List             Maxwell Gee, [facility],   06/17/21 0862

## 2021-06-16 NOTE — ED PROVIDER NOTES
As physician-in-triage, I performed a medical screening history and physical exam on this patient. HISTORY OF PRESENT ILLNESS  Mildred Alcala is a 57 y.o. female presents to the emergency department with intermittent chest pain. States it started again last night at around 3 AM.  States it radiates to her left arm. States she gets diaphoretic. States she supposed to have stress test at [facility] but is awaiting this to be cleared by her insurance. Was told by [facility] yesterday go to the ER if she had pain. Smith Magana PHYSICAL EXAM  BP (!) 120/104   Pulse 113   Temp 97.7 °F (36.5 °C) (Oral)   Resp 16   Ht 5' 1\" (1.549 m)   Wt 190 lb (86.2 kg)   SpO2 95%   BMI 35.90 kg/m²     On exam, the patient appears in no acute distress. Speech is clear. Breathing is unlabored. Moves all extremities    Comment: Please note this report has been produced using speech recognition software and may contain errors related to that system including errors in grammar, punctuation, and spelling, as well as words and phrases that may be inappropriate. If there are any questions or concerns please feel free to contact the dictating provider for clarification.        Clarisa Schmitt MD  06/16/21 0379

## 2021-06-16 NOTE — ED NOTES
[id number] paged hospitalist     Cynthia Funez  06/16/21 [phone] hospitalist returned call      Cynthia Funez  06/16/21 1614

## 2021-06-16 NOTE — ED PROVIDER NOTES
EMERGENCY DEPARTMENT ENCOUNTER        PCP: LAVERN Garcia NP    CHIEF COMPLAINT    Chief Complaint   Patient presents with    Chest Pain     ongoing several weeks. states insurance wouldn't pay for testing with her heart doctor so she came here       HPI    Atilio Mayes is a 57 y.o. female with past medical history of CAD, hypertension who presents with chest pain. Symptoms began around 3 AM last evening. States that pain is now radiating into her left arm with associated diaphoresis. States that she is scheduled to have a stress test at the [facility] for further evaluation of her intermittent chest pain. She has having intermittent symptoms over the past several months. Patient also with onset of slurred speech, left-sided paresthesias with unknown last known well. Friend present in exam room, states that speech is slurred, however unsure of when this was last normal.  Patient states that she is currently living at home on her own. REVIEW OF SYSTEMS    Constitutional:  Denies fever, chills. HENT:  Denies sore throat or ear pain   Cardiovascular:  See HPI. Denies palpitations,  Denies syncope   Respiratory:    See HPI.   Denies shortness of breath, or hemoptysis    GI:  Denies abdominal pain, nausea, vomiting, or diarrhea  :  Denies any urinary symptoms   Musculoskeletal:   Denies back pain  Skin:  Denies rash  Neurologic:  Denies lightheadedness, dizziness, headache, focal weakness or sensory changes   Endocrine:  Denies polyuria or polydypsia   Lymphatic:  Denies swollen glands     All other review of systems are negative  See HPI and nursing notes for additional information     PAST MEDICAL & SURGICAL HISTORY    Past Medical History:   Diagnosis Date    Anxiety     Arthritis     \"Both Legs\"    Asthma     Back problem     \"Curved Spine\"    Bronchitis Last Episode In 2015    CAD (coronary artery disease)     Sees Dr. Belinda Sheehan; 8-16-16 (noted on 10-4-2012 that patient does not have CAD s/p cardiac catheterization).  Cancer ([facility] [address]     skin ca on skin    Chest pain     in ER with this dx 7/2015- admitted- stress test done as outpt 8/7/2015(see report)    Chipped tooth     Upper And Lower    Chronic back pain     COPD (chronic obstructive pulmonary disease) ([facility] [address] 06/01/2017    Sees Dr. Erich Bermudez    Cough     Occ. Nonproductive Cough    Emphysema     GERD (gastroesophageal reflux disease)     H/O 24 hour EKG monitoring     3/14/2013-Signif for runs of sinus tachycardia, fastest recorded at 132 bpm lasting almost 38 mins. Dr Ana Farias. -report in [facility]    H/O cardiac catheterization     10/4/2012- No CAD. False positive stress test.Dr Valdez-report in [facility];    [facility] H/O cardiac catheterization     H/O Doppler lower venous ultrasound 09/24/2020    No DVT or SVT, No significant venous insufficiency    H/O Doppler ultrasound 04/13/2017    LE doppler - normal study    H/O Doppler ultrasound 05/26/2017    carotid - normal study    H/O exercise stress test 02/28/2017    normal study    Hiatal hernia     History of cardiac cath 08/03/2020    PATENT CX STENT, Normal EF,  NORMAL LAD AND RCA    History of exercise stress test 02/28/2017    treadmill    History of nuclear stress test 8/7/15    EF 70%. WNL    History of nuclear stress test 07/17/2020     scan shows small size, moderate intensity, reversible perfusion defect in anterior wall. normal LVEF .    False Pass (hard of hearing)     Bilateral Ears    Hx of cardiovascular stress test 9/2013 9/13 EF70% Normal.10/12- LVSF normal. EF 61%. Normal perfusion althought pt complained of chest pain with Lexiscan. report in epic; 11/11, possible apical ischemia but abnormality secondary to technical artifact needs to be considered, regional wall motion abnormality with low normal LVSF by pierson scan. 8/10, 12/09    Hx of Doppler ultrasound 2/2011 2/2011-Carotid no significant obstructive lesions noted in the extracranial carotid system. Antegrade flow noted inthe vertebral arteries.  Hx of Doppler ultrasound 1/2012 1/2012-peripheral - both abis and duplex studies of both lower extremities do not  reveal any significant peripheral vascular disease at this time.  Hx of Doppler ultrasound 1/22/2016    Arterial: Peripheral vascular noninvasive arterial studies do not reveal any significant atherosclerotic disease.  Hx of echocardiogram 6/2013 6/13- Mild to mod MR/TR. 10/12-LVSF normal. EF 60%. Mild mitral and tricuspid insuff. reprot in epic; 8/6/10, normal dimension of the LV, normal global and regional LVSF with an EF of 60%, no significant valvuopathy is seen.  12/05    Hypertension     \"on medication since age 25's\" follow with Dr Nohemi Gamble Irregular heart beat     \"have irreg heart beat\" dont know what you call it\"    Migraines Last Migraine In 2014    Palpitations     Panic attacks     Pneumonia Last Episode In 2014    Prolonged emergence from general anesthesia     Restless leg     S/P cardiac cath 2/21/14    S/P PTCA (percutaneous transluminal coronary angioplasty) 01/26/2017    2017, CX stented    Shortness of breath     Teeth missing     Upper And Lower    Tobacco use     Unspecified sleep apnea     \"had sleep study 2-3 yrs ago\" have cpap and try to use it\"     Past Surgical History:   Procedure Laterality Date    ANKLE FRACTURE SURGERY Left 5/24/2019    LEFT ANKLE OPEN REDUCTION INTERNAL FIXATION SYNDESMOSIS performed by De Singleton MD at [address] [place] [address]  2011    COLONOSCOPY  08/23/2017    colon polyp, diverticulosis, hemorrhoids    DENTAL SURGERY      Teeth Extracted In Past    ENDOSCOPY, COLON, DIAGNOSTIC  10-16-15    ENDOSCOPY, COLON, DIAGNOSTIC  01/28/2019    Hiatal hernia, savary dil #17 used    GASTRIC FUNDOPLICATION  01/48/9689    Robotic laparoscopic nissen with mesh    KIDNEY SURGERY Left 2-15    \"At [facility] Had Left Kidney Mass Removed\" Benign    TONSILLECTOMY  1960's    TUBAL LIGATION  1993    UPPER GASTROINTESTINAL ENDOSCOPY N/A 1/28/2019    EGD DILATION SAVARY #17MM performed by Zoey Saxena MD at [address]    Current Outpatient Rx   Medication Sig Dispense Refill    ACETAMINOPHEN EXTRA STRENGTH 500 MG tablet TAKE 2 TABLETS BY MOUTH EVERY 6 HOURS AS NEEDED FOR PAIN      Cholecalciferol (VITAMIN D3) 50 MCG (2000 UT) TABS TAKE (1) TABLET BY MOUTH ONCE DAILY      ipratropium-albuterol (DUONEB) 0.5-2.5 (3) MG/3ML SOLN nebulizer solution Inhale 1 vial into the lungs 4 times daily      clopidogrel (PLAVIX) 75 MG tablet TAKE 1 TABLET BY MOUTH DAILY 30 tablet 3    nitroGLYCERIN (NITROSTAT) 0.4 MG SL tablet DISSOLVE 1 TABLET UNDER THE TONGUE AS NEEDED FOR CHEST PAIN EVERY 5 MINUTES UP TO 3 TIMES. IF NO RELIEF CALL 911. 25 tablet 3    Compression Stockings MISC by Does not apply route 20 - 30 mmh wear daily and take off at night  Thigh High 2 each 0    ranolazine (RANEXA) 1000 MG extended release tablet Take 1 tablet by mouth 2 times daily Do not crush or break. 180 tablet 3    albuterol sulfate  (90 Base) MCG/ACT inhaler INHALE TWO (2) PUFFS INTO LUNGS EVERY 6 HOURS AS NEEDED FOR SHORTNESS OF BREATH *HOLD SCRIPT* 18 g 10    metoprolol tartrate (LOPRESSOR) 25 MG tablet Take 12.5 mg by mouth 2 times daily      fluticasone (FLONASE) 50 MCG/ACT nasal spray 2 sprays by Each Nostril route daily      Lactobacillus (ACIDOPHILUS PO) Take 1 capsule by mouth daily      triamcinolone (KENALOG) 0.1 % cream Apply topically 2 times daily Apply topically 2 times daily.       cetirizine (ZYRTEC) 10 MG tablet Take 10 mg by mouth daily      sertraline (ZOLOFT) 100 MG tablet Take 100 mg by mouth daily       glycopyrrolate-formoterol (BEVESPI AEROSPHERE) 9-4.8 MCG/ACT AERO Inhale 2 puffs into the lungs 2 times daily 3 Inhaler 1    atorvastatin (LIPITOR) 40 MG tablet Take 40 mg by mouth daily      dicyclomine (BENTYL) 20 MG tablet Take 20 mg by mouth 3 times daily as needed      donepezil (ARICEPT) 10 MG tablet Take 10 mg by mouth nightly   3    pantoprazole (PROTONIX) 40 MG tablet Take 40 mg by mouth daily       melatonin 5 MG TABS tablet Take 5 mg by mouth nightly as needed       memantine (NAMENDA) 10 MG tablet Take 10 mg by mouth 2 times daily       hydrOXYzine (ATARAX) 25 MG tablet Take 50 mg by mouth every 8 hours as needed for Itching       aspirin EC 81 MG EC tablet Take 1 tablet by mouth daily. (Patient taking differently: Take 81 mg by mouth every morning ) 30 tablet 6       ALLERGIES    Allergies   Allergen Reactions    Adhesive Tape Rash    Aspirin Nausea And Vomiting     Chewable sts not coated.  Pcn [Penicillins] Nausea And Vomiting and Rash       SOCIAL & FAMILY HISTORY    Social History     Socioeconomic History    Marital status:      Spouse name: None    Number of children: 1    Years of education: 12    Highest education level: None   Occupational History    None   Tobacco Use    Smoking status: Former Smoker     Packs/day: 0.25     Years: 44.00     Pack years: 11.00     Types: Cigarettes     Start date: 10/21/1971     Quit date: 2015     Years since quittin.8    Smokeless tobacco: Never Used   Vaping Use    Vaping Use: Never used   Substance and Sexual Activity    Alcohol use: Yes     Alcohol/week: 2.0 standard drinks     Types: 2 Shots of liquor per week     Comment: occassionally    Drug use: No    Sexual activity: Never   Other Topics Concern    None   Social History Narrative    None     Social Determinants of Health     Financial Resource Strain:     Difficulty of Paying Living Expenses:    Food Insecurity:     Worried About Running Out of Food in the Last Year:     Ran Out of Food in the Last Year:    Transportation Needs:     Lack of Transportation (Medical):      Lack of Transportation (Non-Medical):    Physical Activity:     Days of Exercise per Week:     Minutes of Exercise per Session: Stress:     Feeling of Stress :    Social Connections:     Frequency of Communication with Friends and Family:     Frequency of Social Gatherings with Friends and Family:     Attends Church Services:     Active Member of Clubs or Organizations:     Attends Club or Organization Meetings:     Marital Status:    Intimate Partner Violence:     Fear of Current or Ex-Partner:     Emotionally Abused:     Physically Abused:     Sexually Abused:      Family History   Problem Relation Age of Onset    Heart Disease Mother     Heart Disease Father     High Blood Pressure Father     Cancer Sister     Early Death Sister 43    Asthma Brother        PHYSICAL EXAM    VITAL SIGNS: BP (!) 148/87   Pulse 71   Temp 97.7 °F (36.5 °C) (Oral)   Resp 13   Ht 5' 1\" (1.549 m)   Wt 190 lb (86.2 kg)   SpO2 96%   BMI 35.90 kg/m²    Constitutional:  Well developed, well nourished, no acute distress   HENT:  Atraumatic, moist mucus membranes  Neck/Lymphatics: supple, no JVD, no swollen nodes  Respiratory:  Lungs Clear, no retractions   Cardiovascular:  Rate regular, regular Rhythm,  no murmurs/rubs/gallops  No carotid bruits or murmurs heard in carotids. Vascular: Radial pulses 2+ equal bilaterally  GI:  Soft, nontender, normal bowel sounds  Musculoskeletal:   No edema, no deformities  Integument:  Skin warm and dry, no petechiae   Neurologic:  Alert & oriented  Slurred speech, slight flattening of left-sided nasolabial fold. Reports decrease in station to left upper extremity. Otherwise intact strength and sensation. Psych: Pleasant affect, no hallucinations    NIH Stroke Scale  NIH Stroke Scale Assessed: Yes  Interval: Baseline  Level of Consciousness (1a. ): Alert  LOC Questions (1b. ):  Answers both correctly  LOC Commands (1c. ): Performs both tasks correctly  Best Gaze (2. ): Normal  Visual (3. ): No visual loss  Facial Palsy (4. ): (!) Minor paralysis  Motor Arm, Left (5a. ): No drift  Motor Arm, Right (5b. ): No drift  Motor Leg, Left (6a. ): No drift  Motor Leg, Right (6b. ): No drift  Limb Ataxia (7. ): Absent  Sensory (8. ): (!) Mild to Moderate  Best Language (9. ): No aphasia  Dysarthria (10. ): Mild to moderate, slurs some words  Extinction and Inattention (11): No abnormality  Total: 3      EKG Interpretation  Please see ED physician's note for EKG interpretation      RADIOLOGY/PROCEDURES    CXR:   CTA CHEST W CONTRAST   Final Result   1. No evidence of pulmonary embolism to the segmental level or acute   pulmonary abnormality. 2. 2 cm incidental left thyroid nodule. Recommend thyroid US. Reference: J   Am Chaipncito Radiol. 2015 Feb;12(2): 143-50. RECOMD:ITN      RECOMMENDATIONS:   2 cm incidental left thyroid nodule. Recommend thyroid US. Reference: J Am Chapincito Radiol. 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 Otherwise   unremarkable CTA of the head and neck. CT HEAD WO CONTRAST   Final Result   No acute intracranial abnormality. XR CHEST PORTABLE   Final Result   1. No acute radiographic finding to account for patient's chest pain.                LABS:  Results for orders placed or performed during the hospital encounter of 06/16/21   CBC with Auto Diff   Result Value Ref Range    WBC 8.2 4.0 - 10.5 K/CU MM    RBC 4.23 4.2 - 5.4 M/CU MM    Hemoglobin 12.1 (L) 12.5 - 16.0 GM/DL    Hematocrit 37.5 37 - 47 %    MCV 88.7 78 - 100 FL    MCH 28.6 27 - 31 PG    MCHC 32.3 32.0 - 36.0 %    RDW 14.9 11.7 - 14.9 %    Platelets 800 146 - 251 K/CU MM    MPV 9.4 7.5 - 11.1 FL    Differential Type AUTOMATED DIFFERENTIAL     Segs Relative 67.2 (H) 36 - 66 %    Lymphocytes % 21.7 (L) 24 - 44 %    Monocytes % 6.6 (H) 0 - 4 %    Eosinophils % 2.8 0 - 3 %    Basophils % 1.2 (H) 0 - 1 %    Segs Absolute 5.5 K/CU MM    Lymphocytes Absolute 1.8 K/CU SEEN /HPF   EKG 12 Lead   Result Value Ref Range    Ventricular Rate 100 BPM    Atrial Rate 100 BPM    P-R Interval 138 ms    QRS Duration 76 ms    Q-T Interval 332 ms    QTc Calculation (Bazett) 428 ms    P Axis 46 degrees    R Axis 18 degrees    T Axis 44 degrees    Diagnosis       Normal sinus rhythm  Low voltage QRS  Nonspecific ST abnormality  Abnormal ECG  When compared with ECG of 27-APR-2021 13:44,  No significant change was found               ED COURSE & MEDICAL DECISION MAKING     Vital signs and nursing notes reviewed during ED course. Patient care and presentation staffed with supervising MD.  All pertinent Lab data and radiographic results reviewed with patient at bedside. Patient presents as above. She is hypertensive, afebrile, tachycardic, oxygenating well on room air. She is given fluids, morphine, Zofran. Lab work without emergent changes, troponin is negative. Hypomagnesemia noted at 1.6. CTA chest without evidence of PE. Incidental 2 cm left thyroid nodule noted. CT and CTA of head and neck unremarkable. Chest x-ray without acute cardiopulmonary process. EKG without significant change from previous, interpreted by supervising physician. On subsequent evaluation, patient continues to have pain in chest.  Will plan on admission for further evaluation of intermittent episodes of chest pain and neurological deficits noted today. As there was unknown last known well, no stroke alert is initiated. Above labs and imaging discussed with patient and friend who are agreeable with admission. Patient case with hospitalist.      Clinical  IMPRESSION    1. Chest pain, unspecified type    2. Dyspnea and respiratory abnormalities    3. Paresthesia    4.  Slurred speech        Admission    Comment: Please note this report has been produced using speech recognition software and may contain errors related to that system including errors in grammar, punctuation, and spelling, as well as words and phrases that may be inappropriate. If there are any questions or concerns please feel free to contact the dictating provider for clarification.       ALEXIS Barrera  06/16/21 [id number]

## 2021-06-16 NOTE — H&P
History and Physical      Name:  Dalton New /Age/Sex: 1960  (57 y.o. female)   MRN & CSN:  [id number] & [id number] Admission Date/Time: 2021 11:36 AM   Location:  [facility] PCP: LAVERN Cordero NP       Hospital Day: 1    Assessment and Plan:   Dalton New is a 57 y.o.  female  who presents with <principal problem not specified>    1-left-sided chest pain in the setting of history of chronic kidney disease to rule out acute coronary syndrome  Serum troponin negative  EKG normal sinus rhythm with no ST or T  wave changes  Consult cardiologist  Continue on aspirin Plavix and statin  On nitro sublingual as needed for chest pain  Order echocardiogram      2-slurred speech with left facial droop and right upper extremity weakness concerning for possible ischemic stroke  Keep the patient n.p.o.   Swallow evaluation  MRI of the brain  Permissive hypertension the first 24-hour  Not candidate for TPA because unknown last time patient was in her normal state  INH score 3  Consulting neurologist  Continue on aspirin Plavix and statin  Echocardiogram to rule out intracardiac thrombus  Patient also has history of dementia      3-hypomagnesemia on replacement    Diet No diet orders on file   DVT Prophylaxis [x] Lovenox, []  Heparin, [] SCDs, [] Ambulation   GI Prophylaxis [x] PPI,  [] H2 Blocker,  [] Carafate,  [] Diet/Tube Feeds   Code Status Prior   Disposition Patient requires continued admission due to    MDM [] Low, [] Moderate,[]  High  Patient's risk as above due to      History of Present Illness:     Chief Complaint: Left-sided chest pain started yesterday night  Dalton New is a 57 y.o.  female with history of coronary artery disease, patient presented with left-sided chest pain started yesterday night, patient is poor historian, I took the history from the medical chart also, patient stated chest pain started yesterday however she has had recurrent episodes of chest pain since while, described as aching radiating to the left shoulder associated with some shortness of breath, also I noted the patient has slurred speech and left facial droop with some weakness of the right upper extremity, is unclear when her last known well being, a friend of her present states she has had that for a while as well, so the patient is not candidate for TPA, patient has no history of DVT PE or AAA. 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 Alert , not in respiratory distress, not in pain  HEENT: PEERLA, , supple neck,   Chest: air entry equal bilaterally, no wheezing or crepitation  Heart: S1 and S2 heard, no murmur, no gallop or rub, regular rate  Abdomen: soft, ND , Nt, +BS  Extremities: no cyanosis, tenderness or erythema, peripheral pulses audible  Neurology: alert, looks aphasic with slurred speech and left facial droop and right upper extremity weakness. Past Medical History:      Past Medical History:   Diagnosis Date    Anxiety     Arthritis     \"Both Legs\"    Asthma     Back problem     \"Curved Spine\"    Bronchitis Last Episode In 2015    CAD (coronary artery disease)     Sees Dr. Jeanette Martinez; 8-16-16 (noted on 10-4-2012 that patient does not have CAD s/p cardiac catheterization).     Cancer ([address]     skin ca on skin    Chest pain     in ER with this dx 7/2015- admitted- stress test done as outpt 8/7/2015(see report)    Chipped tooth     Upper And Lower    Chronic back pain     [facility] 06/01/2017    Sees Dr. Chey Shipley    Cough     Occ. Nonproductive Cough    Emphysema     GERD (gastroesophageal reflux disease)     H/O 24 hour EKG monitoring     3/14/2013-Signif for runs of sinus tachycardia, fastest recorded at 132 bpm lasting almost 38 mins. Dr Davy Candelaria. -report in [facility]    H/O cardiac catheterization     10/4/2012- No CAD. False positive stress test.Dr Valdez-report in [facility];    Ana Paula Khan H/O cardiac catheterization     H/O Doppler lower venous ultrasound 09/24/2020    No DVT or SVT, No significant venous insufficiency    H/O Doppler ultrasound 04/13/2017    LE doppler - normal study    H/O Doppler ultrasound 05/26/2017    carotid - normal study    H/O exercise stress test 02/28/2017    normal study    Hiatal hernia     History of cardiac cath 08/03/2020    PATENT CX STENT, Normal EF,  NORMAL LAD AND RCA    History of exercise stress test 02/28/2017    treadmill    History of nuclear stress test 8/7/15    EF 70%. WNL    History of nuclear stress test 07/17/2020     scan shows small size, moderate intensity, reversible perfusion defect in anterior wall. normal LVEF .    [place] (hard of hearing)     Bilateral Ears    Hx of cardiovascular stress test 9/2013 9/13 EF70% Normal.10/12- LVSF normal. EF 61%. Normal perfusion althought pt complained of chest pain with Lexiscan. report in [facility]; 11/11, possible apical ischemia but abnormality secondary to technical artifact needs to be considered, regional wall motion abnormality with low normal LVSF by pierson scan. 8/10, 12/09    Hx of Doppler ultrasound 2/2011 2/2011-Carotid no significant obstructive lesions noted in the extracranial carotid system. Antegrade flow noted inthe vertebral arteries.  Hx of Doppler ultrasound 1/2012 1/2012-peripheral - both abis and duplex studies of both lower extremities do not  reveal any significant peripheral vascular disease at this time.      Hx of Doppler ultrasound 1/22/2016    Arterial: Peripheral vascular noninvasive arterial studies do not reveal any significant atherosclerotic disease.  Hx of echocardiogram 6/2013 6/13- Mild to mod MR/TR. 10/12-LVSF normal. EF 60%. Mild mitral and tricuspid insuff. reprot in epic; 8/6/10, normal dimension of the LV, normal global and regional LVSF with an EF of 60%, no significant valvuopathy is seen. 12/05    Hypertension     \"on medication since age 25's\" follow with Dr Juan Maria Irregular heart beat     \"have irreg heart beat\" dont know what you call it\"    Migraines Last Migraine In 2014    Palpitations     Panic attacks     Pneumonia Last Episode In 2014    Prolonged emergence from general anesthesia     Restless leg     S/P cardiac cath 2/21/14    S/P PTCA (percutaneous transluminal coronary angioplasty) 01/26/2017    2017, CX stented    Shortness of breath     Teeth missing     Upper And Lower    Tobacco use     Unspecified sleep apnea     \"had sleep study 2-3 yrs ago\" have cpap and try to use it\"     PSHX:  has a past surgical history that includes Kidney surgery (Left, 2-15); Dental surgery; Tonsillectomy (1960's); Tubal ligation (1993); Gastric fundoplication (66/69/6969); Colonoscopy (2011); Colonoscopy (08/23/2017); Endoscopy, colon, diagnostic (10-16-15); Endoscopy, colon, diagnostic (01/28/2019); Upper gastrointestinal endoscopy (N/A, 1/28/2019); and Ankle fracture surgery (Left, 5/24/2019). Allergies: Allergies   Allergen Reactions    Adhesive Tape Rash    Aspirin Nausea And Vomiting     Chewable sts not coated.  Pcn [Penicillins] Nausea And Vomiting and Rash       FAM HX: family history includes Asthma in her brother; Cancer in her sister; Early Death (age of onset: 43) in her sister; Heart Disease in her father and mother; High Blood Pressure in her father.   Soc HX:   Social History     Socioeconomic History    Marital status:      Spouse name: None    Number of children: 1    Years of education: 15    Highest education level: None   Occupational History    None   Tobacco Use    Smoking status: Former Smoker     Packs/day: 0.25     Years: 44.00     Pack years: 11.00     Types: Cigarettes     Start date: 10/21/1971     Quit date: 2015     Years since quittin.8    Smokeless tobacco: Never Used   Vaping Use    Vaping Use: Never used   Substance and Sexual Activity    Alcohol use: Yes     Alcohol/week: 2.0 standard drinks     Types: 2 Shots of liquor per week     Comment: occassionally    Drug use: No    Sexual activity: Never   Other Topics Concern    None   Social History Narrative    None     Social Determinants of Health     Financial Resource Strain:     Difficulty of Paying Living Expenses:    Food Insecurity:     Worried About Running Out of Food in the Last Year:     Ran Out of Food in the Last Year:    Transportation Needs:     Lack of Transportation (Medical):      Lack of Transportation (Non-Medical):    Physical Activity:     Days of Exercise per Week:     Minutes of Exercise per Session:    Stress:     Feeling of Stress :    Social Connections:     Frequency of Communication with Friends and Family:     Frequency of Social Gatherings with Friends and Family:     Attends Hinduism Services:     Active Member of Clubs or Organizations:     Attends Club or Organization Meetings:     Marital Status:    Intimate Partner Violence:     Fear of Current or Ex-Partner:     Emotionally Abused:     Physically Abused:     Sexually Abused:        Medications:   Medications:    magnesium sulfate  1,000 mg Intravenous Once      Infusions:   PRN Meds: morphine, 4 mg, Q30 Min PRN  ondansetron, 4 mg, Q30 Min PRN          Electronically signed by Wilberto Shah MD on 2021 at 4:23 PM

## 2021-06-17 ENCOUNTER — APPOINTMENT (OUTPATIENT)
Dept: ULTRASOUND IMAGING | Age: 61
DRG: 243 | End: 2021-06-17
Payer: COMMERCIAL

## 2021-06-17 ENCOUNTER — APPOINTMENT (OUTPATIENT)
Dept: MRI IMAGING | Age: 61
DRG: 243 | End: 2021-06-17
Payer: COMMERCIAL

## 2021-06-17 ENCOUNTER — APPOINTMENT (OUTPATIENT)
Dept: NUCLEAR MEDICINE | Age: 61
DRG: 243 | End: 2021-06-17
Payer: COMMERCIAL

## 2021-06-17 ENCOUNTER — APPOINTMENT (OUTPATIENT)
Dept: CT IMAGING | Age: 61
DRG: 243 | End: 2021-06-17
Payer: COMMERCIAL

## 2021-06-17 LAB
ANION GAP SERPL CALCULATED.3IONS-SCNC: 9 MMOL/L (ref 4–16)
BUN BLDV-MCNC: 8 MG/DL (ref 6–23)
CALCIUM SERPL-MCNC: 8.7 MG/DL (ref 8.3–10.6)
CHLORIDE BLD-SCNC: 104 MMOL/L (ref 99–110)
CHOLESTEROL: 181 MG/DL
CO2: 28 MMOL/L (ref 21–32)
CREAT SERPL-MCNC: 0.9 MG/DL (ref 0.6–1.1)
GFR AFRICAN AMERICAN: >60 ML/MIN/1.73M2
GFR NON-AFRICAN AMERICAN: >60 ML/MIN/1.73M2
GLUCOSE BLD-MCNC: 82 MG/DL (ref 70–99)
HCT VFR BLD CALC: 35.7 % (ref 37–47)
HDLC SERPL-MCNC: 60 MG/DL
HEMOGLOBIN: 11.1 GM/DL (ref 12.5–16)
LDL CHOLESTEROL DIRECT: 108 MG/DL
LV EF: 53 %
LV EF: 71 %
LVEF MODALITY: NORMAL
LVEF MODALITY: NORMAL
MAGNESIUM: 2.1 MG/DL (ref 1.8–2.4)
MCH RBC QN AUTO: 27.6 PG (ref 27–31)
MCHC RBC AUTO-ENTMCNC: 31.1 % (ref 32–36)
MCV RBC AUTO: 88.8 FL (ref 78–100)
PDW BLD-RTO: 14.8 % (ref 11.7–14.9)
PLATELET # BLD: 267 K/CU MM (ref 140–440)
PMV BLD AUTO: 9.6 FL (ref 7.5–11.1)
POTASSIUM SERPL-SCNC: 4.6 MMOL/L (ref 3.5–5.1)
RBC # BLD: 4.02 M/CU MM (ref 4.2–5.4)
SODIUM BLD-SCNC: 141 MMOL/L (ref 135–145)
T3 FREE: 2.5 PG/ML (ref 2.3–4.2)
T4 FREE: 1.15 NG/DL (ref 0.9–1.8)
TRIGL SERPL-MCNC: 111 MG/DL
TROPONIN T: <0.01 NG/ML
TSH HIGH SENSITIVITY: 3.8 UIU/ML (ref 0.27–4.2)
WBC # BLD: 6.3 K/CU MM (ref 4–10.5)

## 2021-06-17 PROCEDURE — 80061 LIPID PANEL: CPT

## 2021-06-17 PROCEDURE — 92610 EVALUATE SWALLOWING FUNCTION: CPT

## 2021-06-17 PROCEDURE — 1200000000 HC SEMI PRIVATE

## 2021-06-17 PROCEDURE — 80048 BASIC METABOLIC PNL TOTAL CA: CPT

## 2021-06-17 PROCEDURE — 94640 AIRWAY INHALATION TREATMENT: CPT

## 2021-06-17 PROCEDURE — 6360000002 HC RX W HCPCS: Performed by: NURSE PRACTITIONER

## 2021-06-17 PROCEDURE — 72128 CT CHEST SPINE W/O DYE: CPT

## 2021-06-17 PROCEDURE — 83735 ASSAY OF MAGNESIUM: CPT

## 2021-06-17 PROCEDURE — 84481 FREE ASSAY (FT-3): CPT

## 2021-06-17 PROCEDURE — 6370000000 HC RX 637 (ALT 250 FOR IP): Performed by: HOSPITALIST

## 2021-06-17 PROCEDURE — 72125 CT NECK SPINE W/O DYE: CPT

## 2021-06-17 PROCEDURE — 93306 TTE W/DOPPLER COMPLETE: CPT

## 2021-06-17 PROCEDURE — 85027 COMPLETE CBC AUTOMATED: CPT

## 2021-06-17 PROCEDURE — 84443 ASSAY THYROID STIM HORMONE: CPT

## 2021-06-17 PROCEDURE — 3430000000 HC RX DIAGNOSTIC RADIOPHARMACEUTICAL: Performed by: INTERNAL MEDICINE

## 2021-06-17 PROCEDURE — 78452 HT MUSCLE IMAGE SPECT MULT: CPT

## 2021-06-17 PROCEDURE — 76536 US EXAM OF HEAD AND NECK: CPT

## 2021-06-17 PROCEDURE — 36415 COLL VENOUS BLD VENIPUNCTURE: CPT

## 2021-06-17 PROCEDURE — 93005 ELECTROCARDIOGRAM TRACING: CPT | Performed by: HOSPITALIST

## 2021-06-17 PROCEDURE — 83721 ASSAY OF BLOOD LIPOPROTEIN: CPT

## 2021-06-17 PROCEDURE — 6360000002 HC RX W HCPCS: Performed by: INTERNAL MEDICINE

## 2021-06-17 PROCEDURE — 86800 THYROGLOBULIN ANTIBODY: CPT

## 2021-06-17 PROCEDURE — 84439 ASSAY OF FREE THYROXINE: CPT

## 2021-06-17 PROCEDURE — A9500 TC99M SESTAMIBI: HCPCS | Performed by: INTERNAL MEDICINE

## 2021-06-17 PROCEDURE — 99255 IP/OBS CONSLTJ NEW/EST HI 80: CPT | Performed by: PSYCHIATRY & NEUROLOGY

## 2021-06-17 PROCEDURE — 93017 CV STRESS TEST TRACING ONLY: CPT

## 2021-06-17 PROCEDURE — 99254 IP/OBS CNSLTJ NEW/EST MOD 60: CPT | Performed by: INTERNAL MEDICINE

## 2021-06-17 PROCEDURE — 6360000002 HC RX W HCPCS: Performed by: HOSPITALIST

## 2021-06-17 PROCEDURE — 2580000003 HC RX 258: Performed by: HOSPITALIST

## 2021-06-17 PROCEDURE — 84484 ASSAY OF TROPONIN QUANT: CPT

## 2021-06-17 PROCEDURE — 86376 MICROSOMAL ANTIBODY EACH: CPT

## 2021-06-17 PROCEDURE — 94761 N-INVAS EAR/PLS OXIMETRY MLT: CPT

## 2021-06-17 RX ORDER — KETOROLAC TROMETHAMINE 30 MG/ML
15 INJECTION, SOLUTION INTRAMUSCULAR; INTRAVENOUS EVERY 6 HOURS PRN
Status: DISCONTINUED | OUTPATIENT
Start: 2021-06-17 | End: 2021-06-18

## 2021-06-17 RX ORDER — MAGNESIUM SULFATE 1 G/100ML
1000 INJECTION INTRAVENOUS PRN
Status: DISCONTINUED | OUTPATIENT
Start: 2021-06-17 | End: 2021-06-28 | Stop reason: HOSPADM

## 2021-06-17 RX ADMIN — DONEPEZIL HYDROCHLORIDE 10 MG: 10 TABLET, FILM COATED ORAL at 20:36

## 2021-06-17 RX ADMIN — SODIUM CHLORIDE, PRESERVATIVE FREE 10 ML: 5 INJECTION INTRAVENOUS at 14:42

## 2021-06-17 RX ADMIN — REGADENOSON 0.4 MG: 0.08 INJECTION, SOLUTION INTRAVENOUS at 12:30

## 2021-06-17 RX ADMIN — ASPIRIN 81 MG: 81 TABLET, COATED ORAL at 14:38

## 2021-06-17 RX ADMIN — Medication 5 MG: at 14:40

## 2021-06-17 RX ADMIN — KETOROLAC TROMETHAMINE 15 MG: 30 INJECTION, SOLUTION INTRAMUSCULAR; INTRAVENOUS at 14:55

## 2021-06-17 RX ADMIN — MEMANTINE 10 MG: 10 TABLET ORAL at 14:40

## 2021-06-17 RX ADMIN — CLOPIDOGREL BISULFATE 75 MG: 75 TABLET ORAL at 14:40

## 2021-06-17 RX ADMIN — RANOLAZINE 1000 MG: 500 TABLET, FILM COATED, EXTENDED RELEASE ORAL at 20:35

## 2021-06-17 RX ADMIN — RANOLAZINE 1000 MG: 500 TABLET, FILM COATED, EXTENDED RELEASE ORAL at 14:39

## 2021-06-17 RX ADMIN — KETOROLAC TROMETHAMINE 15 MG: 30 INJECTION, SOLUTION INTRAMUSCULAR; INTRAVENOUS at 00:51

## 2021-06-17 RX ADMIN — ENOXAPARIN SODIUM 40 MG: 40 INJECTION SUBCUTANEOUS at 18:05

## 2021-06-17 RX ADMIN — KIT FOR THE PREPARATION OF TECHNETIUM TC99M SESTAMIBI 30 MILLICURIE: 1 INJECTION, POWDER, LYOPHILIZED, FOR SOLUTION PARENTERAL at 11:00

## 2021-06-17 RX ADMIN — SERTRALINE HYDROCHLORIDE 100 MG: 100 TABLET ORAL at 14:39

## 2021-06-17 RX ADMIN — MEMANTINE 10 MG: 10 TABLET ORAL at 20:35

## 2021-06-17 RX ADMIN — ATORVASTATIN CALCIUM 40 MG: 40 TABLET, FILM COATED ORAL at 14:40

## 2021-06-17 RX ADMIN — PANTOPRAZOLE SODIUM 40 MG: 40 TABLET, DELAYED RELEASE ORAL at 14:39

## 2021-06-17 RX ADMIN — KIT FOR THE PREPARATION OF TECHNETIUM TC99M SESTAMIBI 10 MILLICURIE: 1 INJECTION, POWDER, LYOPHILIZED, FOR SOLUTION PARENTERAL at 09:30

## 2021-06-17 RX ADMIN — SODIUM CHLORIDE, PRESERVATIVE FREE 10 ML: 5 INJECTION INTRAVENOUS at 20:37

## 2021-06-17 RX ADMIN — IPRATROPIUM BROMIDE AND ALBUTEROL SULFATE 3 ML: .5; 3 SOLUTION RESPIRATORY (INHALATION) at 07:50

## 2021-06-17 ASSESSMENT — PAIN SCALES - GENERAL
PAINLEVEL_OUTOF10: 10
PAINLEVEL_OUTOF10: 0
PAINLEVEL_OUTOF10: 10
PAINLEVEL_OUTOF10: 4

## 2021-06-17 ASSESSMENT — PAIN DESCRIPTION - PROGRESSION
CLINICAL_PROGRESSION: NOT CHANGED
CLINICAL_PROGRESSION: NOT CHANGED

## 2021-06-17 ASSESSMENT — PAIN DESCRIPTION - LOCATION
LOCATION: CHEST
LOCATION: CHEST

## 2021-06-17 ASSESSMENT — PAIN DESCRIPTION - DESCRIPTORS
DESCRIPTORS: SHARP
DESCRIPTORS: ACHING

## 2021-06-17 ASSESSMENT — PAIN DESCRIPTION - ORIENTATION
ORIENTATION: RIGHT
ORIENTATION: RIGHT

## 2021-06-17 ASSESSMENT — PAIN DESCRIPTION - ONSET
ONSET: ON-GOING
ONSET: ON-GOING

## 2021-06-17 ASSESSMENT — PAIN DESCRIPTION - FREQUENCY
FREQUENCY: INTERMITTENT
FREQUENCY: CONTINUOUS

## 2021-06-17 ASSESSMENT — PAIN DESCRIPTION - PAIN TYPE
TYPE: ACUTE PAIN
TYPE: ACUTE PAIN

## 2021-06-17 ASSESSMENT — PAIN DESCRIPTION - DIRECTION: RADIATING_TOWARDS: RIGHT ARM

## 2021-06-17 NOTE — CONSULTS
INPATIENT CARDIOLOGY CONSULT NOTE       Reason for consultation: chest pain     Referring physician:  Lola Bello MD     Primary care physician: LAVERN Ruelas NP      Dear Lola Bello MD Thank you for the consult    Chief Complaint   Patient presents with    Chest Pain     ongoing several weeks. states insurance wouldn't pay for testing with her heart doctor so she came here       History of present illness:Jojo is a 57 y. o.year old who  presents with  Chief Complaint   Patient presents with    Chest Pain     ongoing several weeks. states insurance wouldn't pay for testing with her heart doctor so she came here     Patient is a 21-year-old female with prior medical history significant for coronary disease s/p PCI to circumflex, history of hypertension, hyperlipidemia presents to the hospital with chief complaint of chest pain. According patient she has been experiencing retrosternal chest pain radiating to left shoulder for the past 2 days. Not associated with shortness of breath or palpitations. Pain has been intermittent in nature. Currently resolved. Upon arrival to the hospital there was a question about facial drooping and speech slurring. A CT scan was performed which ruled out acute stroke.   EKG shows sinus rhythm without ischemic changes  Cardiac troponin negative         [facility] 8/3/2020 personally reviewed      Procedure Summary   PATENT CX STENT   NORMAL LAD AND RCA   NORMAL EF           Past medical history:    has a past medical history of Anxiety, Arthritis, Asthma, Back problem, Bronchitis, CAD (coronary artery disease), Cancer ([address] Chest pain, Chipped tooth, Chronic back pain, COPD (chronic obstructive pulmonary disease) (HCC), Cough, Emphysema, GERD (gastroesophageal reflux disease), H/O 24 hour EKG monitoring, H/O cardiac catheterization, H/O cardiac catheterization, H/O Doppler lower venous ultrasound, H/O Doppler ultrasound, H/O Doppler ultrasound, H/O exercise stress test, Hiatal hernia, History of cardiac cath, History of exercise stress test, History of nuclear stress test, History of nuclear stress test, [place] (hard of hearing), Hx of cardiovascular stress test, Hx of Doppler ultrasound, Hx of Doppler ultrasound, Hx of Doppler ultrasound, Hx of echocardiogram, Hypertension, Irregular heart beat, Migraines, Palpitations, Panic attacks, Pneumonia, Prolonged emergence from general anesthesia, Restless leg, S/P cardiac cath, S/P PTCA (percutaneous transluminal coronary angioplasty), Shortness of breath, Teeth missing, Tobacco use, and Unspecified sleep apnea. Past surgical history:   has a past surgical history that includes Kidney surgery (Left, 2-15); Dental surgery; Tonsillectomy (1960's); Tubal ligation (1993); Gastric fundoplication (38/70/7095); Colonoscopy (2011); Colonoscopy (08/23/2017); Endoscopy, colon, diagnostic (10-16-15); Endoscopy, colon, diagnostic (01/28/2019); Upper gastrointestinal endoscopy (N/A, 1/28/2019); and Ankle fracture surgery (Left, 5/24/2019). Social History:   reports that she quit smoking about 5 years ago. Her smoking use included cigarettes. She started smoking about 49 years ago. She has a 11.00 pack-year smoking history. She has never used smokeless tobacco. She reports current alcohol use of about 2.0 standard drinks of alcohol per week. She reports that she does not use drugs. Family history:   no family history of CAD, STROKE of DM    Allergies   Allergen Reactions    Adhesive Tape Rash    Aspirin Nausea And Vomiting     Chewable sts not coated.     Pcn [Penicillins] Nausea And Vomiting and Rash       ketorolac (TORADOL) injection 15 mg, Q6H PRN  magnesium sulfate 1000 mg in dextrose 5% 100 mL IVPB, PRN  aspirin EC tablet 81 mg, Daily  atorvastatin (LIPITOR) tablet 40 mg, Daily  clopidogrel (PLAVIX) tablet 75 mg, Daily  donepezil (ARICEPT) tablet 10 mg, Nightly  hydrOXYzine (ATARAX) tablet 50 mg, Q8H PRN  ipratropium-albuterol (DUONEB) nebulizer solution 3 mL, 4x Daily  melatonin tablet 5 mg, Daily  memantine (NAMENDA) tablet 10 mg, BID  nitroGLYCERIN (NITROSTAT) SL tablet 0.4 mg, Q5 Min PRN  pantoprazole (PROTONIX) tablet 40 mg, Daily  ranolazine (RANEXA) extended release tablet 1,000 mg, BID  sertraline (ZOLOFT) tablet 100 mg, Daily  sodium chloride flush 0.9 % injection 5-40 mL, 2 times per day  sodium chloride flush 0.9 % injection 5-40 mL, PRN  0.9 % sodium chloride infusion, PRN  ondansetron (ZOFRAN-ODT) disintegrating tablet 4 mg, Q8H PRN   Or  ondansetron (ZOFRAN) injection 4 mg, Q6H PRN  acetaminophen (TYLENOL) tablet 650 mg, Q6H PRN   Or  acetaminophen (TYLENOL) suppository 650 mg, Q6H PRN  polyethylene glycol (GLYCOLAX) packet 17 g, Daily PRN  enoxaparin (LOVENOX) injection 40 mg, QPM      Current Facility-Administered Medications   Medication Dose Route Frequency Provider Last Rate Last Admin    ketorolac (TORADOL) injection 15 mg  15 mg Intravenous Q6H PRN Monae Nirmallaтатьяна, APRSVITLANA - CNP   15 mg at 06/17/21 0051    magnesium sulfate 1000 mg in dextrose 5% 100 mL IVPB  1,000 mg Intravenous PRN Parth Royal MD        aspirin EC tablet 81 mg  81 mg Oral Daily Hector Trejo MD        atorvastatin (LIPITOR) tablet 40 mg  40 mg Oral Daily Hector Trejo MD        clopidogrel (PLAVIX) tablet 75 mg  75 mg Oral Daily Hector Trejo MD        donepezil (ARICEPT) tablet 10 mg  10 mg Oral Nightly Hector Trejo MD        hydrOXYzine (ATARAX) tablet 50 mg  50 mg Oral Q8H PRN Hector Trejo MD        ipratropium-albuterol (DUONEB) nebulizer solution 3 mL  1 vial Inhalation 4x Daily Hector Trejo MD   3 mL at 06/17/21 0750    melatonin tablet 5 mg  5 mg Oral Daily Hector Trejo MD        memantine [facility]) tablet 10 mg  10 mg Oral BID Hector Trejo MD        nitroGLYCERIN (NITROSTAT) SL tablet 0.4 mg  0.4 mg Sublingual Q5 Min PRN Hector Trejo MD        pantoprazole (PROTONIX) tablet 40 mg other systems were reviewed and were negative otherwise. Physical Examination:      Vitals:    06/17/21 0309   BP: (!) 105/56   Pulse: 64   Resp: 10   Temp: 98.3 °F (36.8 °C)   SpO2: 93%      Wt Readings from Last 3 Encounters:   06/17/21 192 lb 9.6 oz (87.4 kg)   06/03/21 190 lb (86.2 kg)   05/06/21 190 lb 9.6 oz (86.5 kg)     Body mass index is 36.39 kg/m². General Appearance:  No distress, conversant  Constitutional:  Well developed, Well nourished  HEENT:  Normocephalic, Atraumatic, Oropharynx moist, No oral exudates,   Nose normal. Neck Supple Carotid: no carotid bruit  Eyes:  Conjunctiva normal, No discharge. Respiratory:    Normal breath sounds, No respiratory distress, No wheezing, no use of accessory muscles, diaphragm movement is normal  No chest Tenderness  Cardiovascular: S1-S2 Nomurmurs auscultated. No rubs, thrills or gallops. Normal  rhythm. Pedal pulses are normal. No pedal edema  GI:  Soft Non tender, non distended. :  o CVA tenderness. Musculoskeletal:   No tenderness, No cyanosis, No clubbing. Integument:  Warm, Dry, No erythema, No rash. Lymphatic:  No lymphadenopathy noted. Neurologic:  Alert & oriented x 3  No focal deficits noted. Psychiatric:  Affect normal, Judgment normal, Mood normal.       Lab Review     Recent Labs     06/17/21  0701   WBC 6.3   HGB 11.1*   HCT 35.7*         Recent Labs     06/16/21  1152      K 4.3      CO2 25   BUN 10   CREATININE 0.8     Recent Labs     06/16/21  1152   AST 22   ALT 11   BILITOT 0.3   ALKPHOS 100     No results for input(s): TROPONINI in the last 72 hours. Lab Results   Component Value Date    BNP 18 04/22/2014    BNP 9 12/20/2013    BNP 28 06/10/2013     Lab Results   Component Value Date    INR 0.84 06/16/2021    PROTIME 10.1 (L) 06/16/2021         All labs, images, EKGs were personally reviewed      Assessment: 57 y. o.year old with PMH of  has a past medical history of Anxiety, Arthritis, Asthma, Back problem, Bronchitis, CAD (coronary artery disease), [facility] ([facility] Chest pain, Chipped tooth, Chronic back pain, COPD (chronic obstructive pulmonary disease) ([facility]), Cough, Emphysema, GERD (gastroesophageal reflux disease), H/O 24 hour EKG monitoring, H/O cardiac catheterization, H/O cardiac catheterization, H/O Doppler lower venous ultrasound, H/O Doppler ultrasound, H/O Doppler ultrasound, H/O exercise stress test, Hiatal hernia, History of cardiac cath, History of exercise stress test, History of nuclear stress test, History of nuclear stress test, [place] (hard of hearing), Hx of cardiovascular stress test, Hx of Doppler ultrasound, Hx of Doppler ultrasound, Hx of Doppler ultrasound, Hx of echocardiogram, Hypertension, Irregular heart beat, Migraines, Palpitations, Panic attacks, Pneumonia, Prolonged emergence from general anesthesia, Restless leg, S/P cardiac cath, S/P PTCA (percutaneous transluminal coronary angioplasty), Shortness of breath, Teeth missing, Tobacco use, and Unspecified sleep apnea. Recommendations:      1. Atypical chest pain in the setting of prior history of CAD    Left heart cath in 2018 personally reviewed. Patent stent in circumflex. Patent RCA and LAD without any focal stenosis. Obtain stress test and echocardiogram  Continue with medical therapy including aspirin Plavix statin therapy. Continue Ranexa    2. ?  TIA. Neurology to evaluate patient. Continue with aspirin Plavix  3. Hyperlipidemia: Continue with Lipitor 40 mg daily  4. DVT prophylaxis: On Lovenox.   5. Hypomagnesemia: Recommend magnesium replacement    Thank you for the consult    Dr. Ace Johnson  6/17/2021 1:18 PM

## 2021-06-17 NOTE — CONSULTS
Neurology Service Consult Note  [facility]   Patient Name: Rafat Tovar  : 1960        Subjective:   Reason for consult: FACIAL DROOP  57 y.o. right-handed female with PMH listed below presenting to [facility] with complaints of chest pain, patient was noted to have left facial droop and dysarthria by IM overnight during the admission. Chart review shows patient has had prior cva and she is on ASA and PLAVIX. Patient during my exam cannot provide any PMH and is not sure why anyone would think she would have facial. Droop, its also noted she is on dementia medications prior to arrival. I spoke to IM regarding the patient, Dr. Gertrudis Brooke reported patient complained of neck pain and he believed her BUE  squeeze was weak Patient unfortunately cannot get MRIs due to cochlear implants. Patient denies CP and SOB. Past Medical History:   Diagnosis Date    Anxiety     Arthritis     \"Both Legs\"    Asthma     Back problem     \"Curved Spine\"    Bronchitis Last Episode In     CAD (coronary artery disease)     Sees Dr. Sherrie Horn; 16 (noted on 10-4-2012 that patient does not have CAD s/p cardiac catheterization). Cancer ([address]     skin ca on skin    Chest pain     in ER with this dx 2015- admitted- stress test done as outpt 2015(see report)    Chipped tooth     Upper And Lower    Chronic back pain     COPD (chronic obstructive pulmonary disease) ([facility] 2017    Sees Dr. Chilo Brooke    Cough     Occ. Nonproductive Cough    Emphysema     GERD (gastroesophageal reflux disease)     H/O 24 hour EKG monitoring     3/14/2013-Signif for runs of sinus tachycardia, fastest recorded at 132 bpm lasting almost 38 mins. Dr Sandra Perze. -report in [facility]    H/O cardiac catheterization     10/4/2012- No CAD.  False positive stress test.Dr Valdez-report in [facility];     H/O cardiac catheterization     H/O Doppler lower venous ultrasound 2020    No DVT or SVT, No significant venous insufficiency    H/O Doppler ultrasound 04/13/2017    LE doppler - normal study    H/O Doppler ultrasound 05/26/2017    carotid - normal study    H/O exercise stress test 02/28/2017    normal study    Hiatal hernia     History of cardiac cath 08/03/2020    PATENT CX STENT, Normal EF,  NORMAL LAD AND RCA    History of exercise stress test 02/28/2017    treadmill    History of nuclear stress test 8/7/15    EF 70%. WNL    History of nuclear stress test 07/17/2020     scan shows small size, moderate intensity, reversible perfusion defect in anterior wall. normal LVEF . [place] (hard of hearing)     Bilateral Ears    Hx of cardiovascular stress test 9/2013 9/13 EF70% Normal.10/12- LVSF normal. EF 61%. Normal perfusion althought pt complained of chest pain with Lexiscan. report in [facility]; 11/11, possible apical ischemia but abnormality secondary to technical artifact needs to be considered, regional wall motion abnormality with low normal LVSF by pierson scan. 8/10, 12/09    Hx of Doppler ultrasound 2/2011 2/2011-Carotid no significant obstructive lesions noted in the extracranial carotid system. Antegrade flow noted inthe vertebral arteries. Hx of Doppler ultrasound 1/2012 1/2012-peripheral - both abis and duplex studies of both lower extremities do not  reveal any significant peripheral vascular disease at this time. Hx of Doppler ultrasound 1/22/2016    Arterial: Peripheral vascular noninvasive arterial studies do not reveal any significant atherosclerotic disease. Hx of echocardiogram 6/2013 6/13- Mild to mod MR/TR. 10/12-LVSF normal. EF 60%. Mild mitral and tricuspid insuff. reprot in epic; 8/6/10, normal dimension of the LV, normal global and regional LVSF with an EF of 60%, no significant valvuopathy is seen.  12/05    Hypertension     \"on medication since age 25's\" follow with Dr Cornelius Neves    Irregular heart beat     \"have irreg heart beat\" dont know what you call it\"    Migraines Last Migraine In 2014    Palpitations     Panic attacks Tape Rash    Aspirin Nausea And Vomiting     Chewable sts not coated. Pcn [Penicillins] Nausea And Vomiting and Rash     Social History     Socioeconomic History    Marital status:      Spouse name: Not on file    Number of children: 1    Years of education: 12    Highest education level: Not on file   Occupational History    Not on file   Tobacco Use    Smoking status: Former Smoker     Packs/day: 0.25     Years: 44.00     Pack years: 11.00     Types: Cigarettes     Start date: 10/21/1971     Quit date: 2015     Years since quittin.8    Smokeless tobacco: Never Used   Vaping Use    Vaping Use: Never used   Substance and Sexual Activity    Alcohol use: Yes     Alcohol/week: 2.0 standard drinks     Types: 2 Shots of liquor per week     Comment: occassionally    Drug use: No    Sexual activity: Never   Other Topics Concern    Not on file   Social History Narrative    Not on file     Social Determinants of Health     Financial Resource Strain:     Difficulty of Paying Living Expenses:    Food Insecurity:     Worried About [address] in the Last Year:     Ran Out of Food in the Last Year:    Transportation Needs:     Lack of Transportation (Medical):     Lack of Transportation (Non-Medical):    Physical Activity:     Days of Exercise per Week:     Minutes of Exercise per Session:    Stress:     Feeling of Stress :    Social Connections:     Frequency of Communication with Friends and Family:     Frequency of Social Gatherings with Friends and Family:     Attends Restoration Services:      Active Member of Clubs or Organizations:     Attends Club or Organization Meetings:     Marital Status:    Intimate Partner Violence:     Fear of Current or Ex-Partner:     Emotionally Abused:     Physically Abused:     Sexually Abused:       Family History   Problem Relation Age of Onset    Heart Disease Mother     Heart Disease Father     High Blood Pressure Father     Cancer Sister     Early Death Sister 43 Asthma Brother        Review of Symptoms:    10-point system review completed. All of which are negative except as mentioned above. Physical Exam:       [unfilled]     Gen: A&O x 4, NAD, cooperative  HEENT: NC/AT, EOMI, PERRL, mmm, no carotid bruits, neck supple, no meningeal signs; Fundoscopic: no disc edema appreciated  Heart: RRR, S1S2  Lungs: CTAB  Ext: no edema, no calf tenderness b/l  Psych: normal mood and affect  Skin: no rashes or lesions    NEUROLOGIC EXAM:    Mental Status: A&O to self, location, month and year, NAD, speech clear, language fluent, repetition and naming intact, follows commands appropriately    Cranial Nerve Exam:   CN II-XII: No disc edema appreciated on fundoscopic examination, PERRL, VFF, no nystagmus, no gaze paresis, sensation V1-V3 intact b/l, muscles of facial expression symmetric; hearing intact to conversational tone, palate elevates symmetrically, shoulder elevation symmetric and tongue protrudes midline with movement side to side.     Motor Exam:       Strength 5/5 UE's/LE's b/l  Tone and bulk normal   No pronator drift    Deep Tendon Reflexes: 2/4 biceps, triceps, brachioradialis, patellar, and achilles b/l; flexor plantar responses b/l    Sensation: Intact light touch/pinprick/vibration UE's/LE's b/l    Coordination/Cerebellum:       Tremors--none      Rapidly alternating movements: no dysdiadochokinesia b/l                Heel-to-Shin: no dysmetria b/l      Finger-to-Nose: no dysmetria b/l    Gait and stance:      Gait: deferred      LABS:     Recent Labs     06/16/21  1152 06/17/21  0701   WBC 8.2 6.3    141   K 4.3 4.6    104   CO2 25 28   BUN 10 8   CREATININE 0.8 0.9   GLUCOSE 127* 82   INR 0.84  --    [email or web address]  [email or web address] TSH Results,[unfilled],     Last Liver Function Results:  @LABRCNTIP(ALT:3,AST:3,BILITOTAL:3,BILIDIR:3,ALKPHOS:3)@          IMAGING:    CTA:  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 Otherwise   unremarkable CTA of the head and neck. CT head non acute  CT   :    ASSESSMENT/PLAN:     57year old female who presented with CP, was thought to have left sided facial droop with associated neck pain and BUE weakness acute infarction v. Remote cannot be ruled out as she cannot obtain MRI due to cochlear implants, CT C-spine pending to rule out myelopathy v. Compression fracture. Neuro exam for me was non focal. Continue DAPT. Continue High intensity statin. Have PT/OT/ST evaluation. Further recommendations pending completion of neurodiagnostics. Thank you for allowing us to participate in the care of your patient. If there are any questions regarding evaluation please feel free to contact us. LAVERN Waite - CNP, 6/17/2021    Attending Note:  I have rounded on this patient with Vick Syed CNP on 6/17/21. I have reviewed the chart and we have discussed this case in detail. The patient was seen and examined by myself. Pertinent labs and imaging have been personally reviewed. Our findings and impressions were discussed with the patient. I concur with the Nurse Practioner's assessment and plan.     Paul Chávez DO 6/18/2021 12:53 PM

## 2021-06-17 NOTE — PROGRESS NOTES
Occupational Therapy    Attempted to see Pt in AM. Pt not in room, will attempt to see again later.     Karol Luz OTR/L [id number]  12:32 PM,6/17/2021

## 2021-06-17 NOTE — PROGRESS NOTES
Progress Note  Date:2021       Room:[facility]-  Patient Name:Jojo Steele     YOB: 1960     Age:61 y.o. Pt with some slurred speech. Has neck pain  Subjective    Subjective:  Symptoms:  Stable. Diet:  Poor intake. Pain:  She complains of pain that is mild. Review of Systems  Objective         Vitals Last 24 Hours:  TEMPERATURE:  Temp  Av.1 °F (36.7 °C)  Min: 97.7 °F (36.5 °C)  Max: 98.3 °F (36.8 °C)  RESPIRATIONS RANGE: Resp  Avg: 15.8  Min: 10  Max: 21  PULSE OXIMETRY RANGE: SpO2  Av.3 %  Min: 93 %  Max: 97 %  PULSE RANGE: Pulse  Av.9  Min: 64  Max: 113  BLOOD PRESSURE RANGE: Systolic (31VFO), TWU:615 , Min:105 , DDE:016   ; Diastolic (49ZDB), EFA:57, Min:56, Max:104    I/O (24Hr): No intake or output data in the 24 hours ending 21 0812  Objective:  General Appearance:  Comfortable. Vital signs: (most recent): Blood pressure (!) 143/97, pulse 76, temperature 97.8 °F (36.6 °C), temperature source Oral, resp. rate 14, height 5' 1\" (1.549 m), weight 192 lb 9.6 oz (87.4 kg), SpO2 95 %, not currently breastfeeding. Vital signs are normal.    HEENT: Normal HEENT exam.  (Neck pain on palpation)    Lungs:  Normal effort. Heart: Normal rate. Abdomen: Abdomen is soft. Extremities: Decreased range of motion. (Decreased bilateral hand   Decreased BLE weakness 3/5 reflex equivocal)  Pulses: Distal pulses are intact. Neurological: Patient is alert. Pupils:  Pupils are equal, round, and reactive to light. Skin:  Warm.       Labs/Imaging/Diagnostics    Labs:  CBC:  Recent Labs     21  1152 21  0701   WBC 8.2 6.3   RBC 4.23 4.02*   HGB 12.1* 11.1*   HCT 37.5 35.7*   MCV 88.7 88.8   RDW 14.9 14.8    267     CHEMISTRIES:  Recent Labs     21  1152      K 4.3      CO2 25   BUN 10   CREATININE 0.8   GLUCOSE 127*   MG 1.6*     PT/INR:  Recent Labs     21  1152   PROTIME 10.1*   INR 0.84     APTT:  Recent Labs 06/16/21  1152   APTT 26.0     LIVER PROFILE:  Recent Labs     06/16/21  1152   AST 22   ALT 11   BILITOT 0.3   ALKPHOS 100       Imaging Last 24 Hours:  CT HEAD WO CONTRAST    Result Date: 6/16/2021  EXAMINATION: CT OF THE HEAD WITHOUT CONTRAST  6/16/2021 1:40 pm TECHNIQUE: CT of the head was performed without the administration of intravenous contrast. Dose modulation, iterative reconstruction, and/or weight based adjustment of the mA/kV was utilized to reduce the radiation dose to as low as reasonably achievable. COMPARISON: 04/21/2021 HISTORY: ORDERING SYSTEM PROVIDED HISTORY: dizzy/slurred speech TECHNOLOGIST PROVIDED HISTORY: Has a \"code stroke\" or \"stroke alert\" been called? ->No Reason for exam:->dizzy/slurred speech Decision Support Exception - unselect if not a suspected or confirmed emergency medical condition->Emergency Medical Condition (MA) Reason for Exam: dizzy/slurred speech Acuity: Acute Type of Exam: Initial FINDINGS: Imaging is degraded by streak artifact from subcutaneous device in the right parietal region. BRAIN/VENTRICLES: There is no acute intracranial hemorrhage, mass effect or midline shift. No abnormal extra-axial fluid collection. The gray-white differentiation is maintained without evidence of an acute infarct. There is no evidence of hydrocephalus. ORBITS: The visualized portion of the orbits demonstrate no acute abnormality. SINUSES: Similar partial opacification of the right mastoid air cells. SOFT TISSUES/SKULL:  No acute abnormality of the visualized skull or soft tissues. No acute intracranial abnormality. XR CHEST PORTABLE    Result Date: 6/16/2021  EXAMINATION: ONE XRAY VIEW OF THE CHEST 6/16/2021 11:31 am COMPARISON: April 27, 2020 HISTORY: ORDERING SYSTEM PROVIDED HISTORY: chest pain TECHNOLOGIST PROVIDED HISTORY: Reason for exam:->chest pain Reason for Exam: chest pain FINDINGS: The cardiomediastinal silhouette is within normal range. Lungs are clear.  There is no 2 cm incidental left thyroid nodule. Recommend thyroid US. Reference: J Am [place] Radiol. 2015 Feb;12(2): 143-50. RECOMD:ITN RECOMMENDATIONS: 2 cm incidental left thyroid nodule. Recommend thyroid US. Reference: J Am [place] Radiol. 2015 Feb;12(2): 143-50     CTA HEAD NECK W CONTRAST    Result Date: 6/16/2021  EXAMINATION: CTA OF THE HEAD AND NECK WITH CONTRAST 6/16/2021 1:54 pm TECHNIQUE: CTA of the head and neck was performed with the administration of intravenous contrast. Multiplanar reformatted images are provided for review. MIP images are provided for review. Stenosis of the internal carotid arteries measured using NASCET criteria. Dose modulation, iterative reconstruction, and/or weight based adjustment of the mA/kV was utilized to reduce the radiation dose to as low as reasonably achievable. COMPARISON: None HISTORY: ORDERING SYSTEM PROVIDED HISTORY: dizzy/slurred speech TECHNOLOGIST PROVIDED HISTORY: Reason for exam:->dizzy/slurred speech Decision Support Exception - unselect if not a suspected or confirmed emergency medical condition->Emergency Medical Condition (MA) Reason for Exam: dizzy/slurred speech Acuity: Acute Type of Exam: Initial Relevant Medical/Surgical History: 80ML ISOVUE 370 FINDINGS: CTA NECK: AORTIC ARCH/ARCH VESSELS: There is mild atherosclerotic calcification of the thoracic aortic arch. Incidental note is made bovine origin of the left common carotid artery which is a normal anatomic variant. CAROTID ARTERIES: Common carotid arteries are patent bilaterally without any narrowing or stenosis. The left internal carotid artery is patent without narrowing or stenosis. The right internal carotid artery is patent without narrowing or stenosis. VERTEBRAL ARTERIES: The right vertebral artery is dominant and patent in its visualized course without any narrowing or stenosis. The left vertebral artery is congenitally tiny in caliber and faintly visualized throughout its course.  SOFT TISSUES: No

## 2021-06-17 NOTE — PROGRESS NOTES
Speech Language Pathology  Sumi Valdes  1960  [id number]      Attempted to see Sumi Valdes for a bedside swallowing evaluation 06/17/21. Deferred assessment- nursing reports pt is leaving the floor for a stress test.  ST will re-attempt as schedule allows.     Breana Agrawal [address], CCC-SLP, 6/17/2021

## 2021-06-17 NOTE — PROGRESS NOTES
Pt came for mri but pt has a Cochlear Nucleus Implant  - Called Cochlear Nucleus Rep who stated mr cannot be done. Nurse notified and orders cancelled.

## 2021-06-17 NOTE — PROGRESS NOTES
Speech Language Pathology  Facility/Department: [address] [place] [address]   CLINICAL BEDSIDE SWALLOW EVALUATION    NAME: Twyla Nelson  : 1960  MRN: [id number]    ADMISSION DATE: 2021  ADMITTING DIAGNOSIS: has Chest pain; Current tobacco use; COPD (chronic obstructive pulmonary disease) ([facility] Essential hypertension; Palpitations; Tobacco use; Gastroesophageal reflux disease with hiatal hernia; S/P Nissen fundoplication (without gastrostomy tube) procedure; Precordial pain; Post PTCA; Leg pain, bilateral; Syncope and collapse; Dizziness; Angina pectoris ([facility] HTN (hypertension); ASCVD (arteriosclerotic cardiovascular disease); GERD (gastroesophageal reflux disease); Ankle syndesmosis disruption, left, subsequent encounter; Closed trimalleolar fracture of left ankle; Closed left ankle fracture, initial encounter; Closed fracture of upper end of left fibula; Chronic kidney disease (CKD) stage G3a/A1, moderately decreased glomerular filtration rate (GFR) between 45-59 mL/min/1.73 square meter and albuminuria creatinine ratio less than 30 mg/g ([facility] and Hypotension on their problem list.      Impressions: Twyla Nelson was seen for a bedside swallowing evaluation after being admitted to [facility] with chest pain and slurred speech. Pt was alert throughout assessment. Relevant medical hx includes asthma, anxiety, hiatal hernia, GERD, pneumonia and CAD. Pt denies hx of dysphagia. Pt was positioned upright in bed and presented with a weak vocal quality and strong volitional cough. She refused to participate in oral mechanism examination despite encouragement from clinician. Noted moderate dysarthria characterized by slow rate and imprecise articulation. Pt accepted limited PO trials of soft/regular solids and thin liquids by cup/straw sips were given. Mild oral dysphagia was observed characterized by prolonged mastication and slow oral A-P transit.   Pharyngeal swallow appeared intact characterized by timely swallow initiation and 0 overt s/s of aspiration observed with all PO trials given. Recommend continue regular diet/thin liquids with aspiration precautions. ST will follow. ONSET DATE: this admission     Date of Eval: 6/17/2021  Evaluating Therapist: Daralyn Essex, SLP    Current Diet level:  Current Diet : Regular  Current Liquid Diet : Thin      Primary Complaint  Patient Complaint: \"can't taste anything\"    Pain:  Pain Assessment  Pain Assessment: 0-10  Pain Level: 10  Patient's Stated Pain Goal: No pain  Pain Type: Acute pain  Pain Location: Chest  Pain Orientation: Right  Pain Radiating Towards: right arm  Pain Descriptors: Aching  Pain Frequency: Intermittent  Pain Onset: On-going  Clinical Progression: Not changed    Reason for Referral  Myah Damian was referred for a bedside swallow evaluation to assess the efficiency of her swallow function, identify signs and symptoms of aspiration and make recommendations regarding safe dietary consistencies, effective compensatory strategies, and safe eating environment. Impression  Dysphagia Diagnosis: Mild oral stage dysphagia  Dysphagia Outcome Severity Scale: Level 5: Mild dysphagia- Distant supervision. May need one diet consistency restricted     Treatment Plan  Requires SLP Intervention: Yes  Duration/Frequency of Treatment: 1-2xs weekly/ LOS or until goals are met  D/C Recommendations: To be determined       Recommended Diet and Intervention  Diet Solids Recommendation: Regular  Liquid Consistency Recommendation: Thin  Recommended Form of Meds: PO     Therapeutic Interventions: Diet tolerance monitoring; Therapeutic PO trials with SLP;Patient/Family education    Compensatory Swallowing Strategies  Compensatory Swallowing Strategies: Upright as possible for all oral intake;Small bites/sips;Eat/Feed slowly    Treatment/Goals  Short-term Goals  Timeframe for Short-term Goals: LOS or until goals are met  Goal 1: Pt will tolerate regular diet/thin liquids without clinical evidence of aspiration 100%  Goal 2: Pt/caregivers will demonstrate comprehension of recommendations/POC    General  Chart Reviewed: Yes  Behavior/Cognition: Alert; Requires cueing  Respiratory Status: O2 via nasual cannula  O2 Device: Nasal cannula  Communication Observation: Dysarthria  Follows Directions: Simple  Dentition: Adequate  Patient Positioning: Upright in bed  Baseline Vocal Quality: Normal  Volitional Cough: Strong  Prior Dysphagia History: pt denies  Consistencies Administered: Reg solid; Thin - teaspoon; Thin - cup;Dysphagia Pureed (Dysphagia I)           Vision/Hearing  Vision  Vision: Within Functional Limits  Hearing  Hearing: Within functional limits    Oral Motor Deficits  Oral/Motor  Oral Motor:  (PUNEET)    Oral Phase Dysfunction  Oral Phase  Oral Phase: Exceptions  Oral Phase Dysfunction  Impaired Mastication: Reg Solid  Decreased Anterior to Posterior Transit: Reg solid  Lingual/Palatal Residue: Reg solid     Indicators of Pharyngeal Phase Dysfunction   Pharyngeal Phase  Pharyngeal Phase: WFL    Prognosis  Prognosis  Prognosis for safe diet advancement: good  Barriers to reach goals: severity of dysphagia  Individuals consulted  Consulted and agree with results and recommendations: Patient    Education  Patient Education: recommendations/POC  Patient Education Response: Verbalizes understanding  Safety Devices in place: Yes  Type of devices:  All fall risk precautions in place       Therapy Time  SLP Individual Minutes  Time In: 4914  Time Out: 52 [facility]  Minutes: [address], Breana Sheth 87, [facility]-SLP, 6/17/2021

## 2021-06-17 NOTE — CARE COORDINATION
CM attempt to speak with patient regarding discharge planning. Patient off the floor in [address] and then [address] .

## 2021-06-18 LAB
EKG ATRIAL RATE: 100 BPM
EKG ATRIAL RATE: 54 BPM
EKG DIAGNOSIS: NORMAL
EKG DIAGNOSIS: NORMAL
EKG P AXIS: 46 DEGREES
EKG P AXIS: 71 DEGREES
EKG P-R INTERVAL: 138 MS
EKG P-R INTERVAL: 182 MS
EKG Q-T INTERVAL: 332 MS
EKG Q-T INTERVAL: 414 MS
EKG QRS DURATION: 76 MS
EKG QRS DURATION: 78 MS
EKG QTC CALCULATION (BAZETT): 392 MS
EKG QTC CALCULATION (BAZETT): 428 MS
EKG R AXIS: 18 DEGREES
EKG R AXIS: 18 DEGREES
EKG T AXIS: 10 DEGREES
EKG T AXIS: 44 DEGREES
EKG VENTRICULAR RATE: 100 BPM
EKG VENTRICULAR RATE: 54 BPM

## 2021-06-18 PROCEDURE — 92526 ORAL FUNCTION THERAPY: CPT

## 2021-06-18 PROCEDURE — 97116 GAIT TRAINING THERAPY: CPT

## 2021-06-18 PROCEDURE — 94640 AIRWAY INHALATION TREATMENT: CPT

## 2021-06-18 PROCEDURE — 2580000003 HC RX 258: Performed by: HOSPITALIST

## 2021-06-18 PROCEDURE — 6360000002 HC RX W HCPCS: Performed by: HOSPITALIST

## 2021-06-18 PROCEDURE — 97163 PT EVAL HIGH COMPLEX 45 MIN: CPT

## 2021-06-18 PROCEDURE — APPSS60 APP SPLIT SHARED TIME 46-60 MINUTES: Performed by: NURSE PRACTITIONER

## 2021-06-18 PROCEDURE — 99232 SBSQ HOSP IP/OBS MODERATE 35: CPT | Performed by: INTERNAL MEDICINE

## 2021-06-18 PROCEDURE — 97535 SELF CARE MNGMENT TRAINING: CPT

## 2021-06-18 PROCEDURE — 6370000000 HC RX 637 (ALT 250 FOR IP): Performed by: HOSPITALIST

## 2021-06-18 PROCEDURE — 97167 OT EVAL HIGH COMPLEX 60 MIN: CPT

## 2021-06-18 PROCEDURE — 99233 SBSQ HOSP IP/OBS HIGH 50: CPT | Performed by: NURSE PRACTITIONER

## 2021-06-18 PROCEDURE — 93010 ELECTROCARDIOGRAM REPORT: CPT | Performed by: INTERNAL MEDICINE

## 2021-06-18 PROCEDURE — 1200000000 HC SEMI PRIVATE

## 2021-06-18 PROCEDURE — 94761 N-INVAS EAR/PLS OXIMETRY MLT: CPT

## 2021-06-18 PROCEDURE — 97530 THERAPEUTIC ACTIVITIES: CPT

## 2021-06-18 RX ORDER — PANTOPRAZOLE SODIUM 40 MG/1
40 TABLET, DELAYED RELEASE ORAL
Status: DISCONTINUED | OUTPATIENT
Start: 2021-06-18 | End: 2021-06-24

## 2021-06-18 RX ADMIN — Medication 5 MG: at 20:43

## 2021-06-18 RX ADMIN — ENOXAPARIN SODIUM 40 MG: 40 INJECTION SUBCUTANEOUS at 17:40

## 2021-06-18 RX ADMIN — IPRATROPIUM BROMIDE AND ALBUTEROL SULFATE 3 ML: .5; 3 SOLUTION RESPIRATORY (INHALATION) at 07:34

## 2021-06-18 RX ADMIN — ACETAMINOPHEN 650 MG: 325 TABLET ORAL at 20:43

## 2021-06-18 RX ADMIN — PANTOPRAZOLE SODIUM 40 MG: 40 TABLET, DELAYED RELEASE ORAL at 17:40

## 2021-06-18 RX ADMIN — RANOLAZINE 1000 MG: 500 TABLET, FILM COATED, EXTENDED RELEASE ORAL at 20:43

## 2021-06-18 RX ADMIN — SODIUM CHLORIDE, PRESERVATIVE FREE 10 ML: 5 INJECTION INTRAVENOUS at 15:15

## 2021-06-18 RX ADMIN — MEMANTINE 10 MG: 10 TABLET ORAL at 20:43

## 2021-06-18 RX ADMIN — DONEPEZIL HYDROCHLORIDE 10 MG: 10 TABLET, FILM COATED ORAL at 20:43

## 2021-06-18 RX ADMIN — IPRATROPIUM BROMIDE AND ALBUTEROL SULFATE 3 ML: .5; 3 SOLUTION RESPIRATORY (INHALATION) at 20:15

## 2021-06-18 RX ADMIN — SODIUM CHLORIDE, PRESERVATIVE FREE 10 ML: 5 INJECTION INTRAVENOUS at 20:44

## 2021-06-18 RX ADMIN — IPRATROPIUM BROMIDE AND ALBUTEROL SULFATE 3 ML: .5; 3 SOLUTION RESPIRATORY (INHALATION) at 16:04

## 2021-06-18 ASSESSMENT — PAIN SCALES - GENERAL
PAINLEVEL_OUTOF10: 2
PAINLEVEL_OUTOF10: 0

## 2021-06-18 NOTE — PROGRESS NOTES
Physical Therapy    Facility/Department: [facility] 3E  Initial Assessment    NAME: Sunitha Porras  : 1960  MRN: [id number]    Date of Service: 2021    Discharge Recommendations:  Subacute/Skilled Nursing Facility        Assessment   Assessment: Pt is a 57 y.o. female with medical history, surgical history, co-morbidities, and personal factors including Anxiety, Arthritis, Asthma, Back problem, Bronchitis, CAD (coronary artery disease), Cancer ([address] Chest pain, Chipped tooth, Chronic back pain, COPD (chronic obstructive pulmonary disease) (HCC), Cough, Emphysema, GERD (gastroesophageal reflux disease), H/O 24 hour EKG monitoring, H/O cardiac catheterization, H/O cardiac catheterization, H/O Doppler lower venous ultrasound, H/O Doppler ultrasound, H/O Doppler ultrasound, H/O exercise stress test, Hiatal hernia, History of cardiac cath, History of exercise stress test, History of nuclear stress test, History of nuclear stress test, [place] (hard of hearing), Hx of cardiovascular stress test, Hx of Doppler ultrasound, Hx of Doppler ultrasound, Hx of Doppler ultrasound, Hx of echocardiogram, Hypertension, Irregular heart beat, Migraines, Palpitations, Panic attacks, Pneumonia, Prolonged emergence from general anesthesia, Restless leg, S/P cardiac cath, S/P PTCA (percutaneous transluminal coronary angioplasty), Shortness of breath, Teeth missing, Tobacco use, Unspecified sleep apnea, Kidney surgery (Left, -15); Dental surgery; Tonsillectomy (); Tubal ligation (); Gastric fundoplication (); Colonoscopy (); Colonoscopy (2017); Endoscopy, colon, diagnostic (10-16-15); Endoscopy, colon, diagnostic (2019); Upper gastrointestinal endoscopy (N/A, 2019); and Ankle fracture surgery (Left, 2019) with admission for Chest pain, Dyspnea and respiratory abnormalities, Paresthesia, Slurred speech, Hypomagnesemia, and Thyroid nodule.   Prior to admission, pt was modified independent with functional mobility and ADLs. Examination of body systems reveals decreased strength, decreased balance, decreased aerobic capacity, impaired cognition, and decreased independence with functional mobility. Prognosis: Good  Decision Making: High Complexity  Clinical Presentation: unpredictable characteristics  PT Education: PT Role;Goals;Plan of Care;Equipment; Functional Mobility Training;Transfer Training;Gait Training;General Safety;Orientation  REQUIRES PT FOLLOW UP: Yes  Activity Tolerance  Activity Tolerance: Patient Tolerated treatment well     Restrictions  Restrictions/Precautions  Restrictions/Precautions: General Precautions, Fall Risk  Vision/Hearing  Vision: Within Functional Limits  Hearing: Within functional limits     Subjective  General  Chart Reviewed: Yes  Patient assessed for rehabilitation services?: Yes  Family / Caregiver Present: No  Follows Commands: Within Functional Limits  Pain Screening  Patient Currently in Pain: Denies  Pain Assessment  Pain Assessment: 0-10  Pain Level: 0  Vital Signs  Patient Currently in Pain: Denies       Orientation  Orientation  Overall Orientation Status: Impaired  Orientation Level: Oriented to person;Disoriented to time;Disoriented to situation  Social/Functional History  Social/Functional History  Lives With: Alone  Type of Home: House  Home Layout: One level  Home Access: Level entry  Bathroom Shower/Tub: Tub/Shower unit  Bathroom Toilet: Standard  Bathroom Equipment: Hand-held shower  Home Equipment: Cane, Rolling walker  Receives Help From: Home health (home health nurse once a week)  ADL Assistance: Independent  Homemaking Assistance: Needs assistance  Ambulation Assistance: Independent (mod I with spc)  Transfer Assistance: Independent  Active : No  Cognition   Cognition  Overall Cognitive Status: Exceptions  Arousal/Alertness: Delayed responses to stimuli  Following Commands: Inconsistently follows commands  Attention Span: Difficulty attending to directions  Memory: Decreased long term memory;Decreased short term memory  Safety Judgement: Decreased awareness of need for safety;Decreased awareness of need for assistance  Problem Solving: Decreased awareness of errors  Insights: Decreased awareness of deficits  Initiation: Requires cues for some  Sequencing: Requires cues for some    Objective             Strength RLE  Comment: knee extension: at least 3+/5 observed functionally  Strength LLE  Comment: knee extension: at least 3+/5 observed functionally     Sensation  Overall Sensation Status: WNL  Bed mobility  Supine to Sit: Minimal assistance (with verbal and tactile cues for BUE and BLE placement throughout transfer; with HOB elevated; with increased time for task completion)  Transfers  Sit to Stand: Minimal Assistance (with verbal cues to push through bed/chair and avoid pulling on walker)  Stand to sit: Minimal Assistance (with verbal cues to feel chair against back of legs, reach back, and sit slowly)  Ambulation  Ambulation?: Yes  Ambulation 1  Surface: level tile  Device: Rolling Walker  Assistance: Minimal assistance;Contact guard assistance  Quality of Gait: decreased gait speed, decreased step length bilaterally, unsteady, ataxic  Distance: 20 feet + 20 feet + 10 feet  Comments: with verbal and tactile cues for BLE placement, walker placement, and sequence throughout ambulation; with verbal and tactile cues to maintain upright posture in order to avoid COM shifting outside of SUSY     Balance  Posture: Fair (forward head, rounded shoulders)  Sitting - Static: Good  Sitting - Dynamic: Good  Standing - Static: Fair;-  Standing - Dynamic: Poor;+        Gait Training:  Cues were given for safety, sequence, device management, balance, posture, awareness, path. Therapeutic Activity Training:   Therapeutic activity training was instructed today. Cues were given for safety, sequence, UE/LE placement, awareness, and balance.     Activities performed today included bed mobility training, sup-sit, sit-stand. Plan   Plan  Times per week: 3+  Current Treatment Recommendations: Strengthening, ROM, Balance Training, Functional Mobility Training, Transfer Training, ADL/Self-care Training, Gait Training, Patient/Caregiver Education & Training, IADL Training, Stair training, Pain Management, Neuromuscular Re-education, Home Exercise Program, Positioning, Safety Education & Training, Endurance Training, Equipment Evaluation, Education, & procurement, Cognitive/Perceptual Training, Cognitive Reorientation  Safety Devices  Type of devices: All fall risk precautions in place, Left in chair, Call light within reach, Chair alarm in place, Nurse notified, Gait belt, Patient at risk for falls      AM-PAC Score  AM-PAC Inpatient Mobility Raw Score : 16 (06/18/21 1658)  AM-PAC Inpatient T-Scale Score : 40.78 (06/18/21 1658)  Mobility Inpatient CMS 0-100% Score: 54.16 (06/18/21 1658)  Mobility Inpatient CMS G-Code Modifier : CK (06/18/21 1658)          Goals  Long term goals  Time Frame for Long term goals :  In one week:  Long term goal 1: Pt will complete all bed mobility with SBAx1  Long term goal 2: Pt will complete sit <> stand transfers with SBAx1  Long term goal 3: Pt will ambulate 150 feet with SBAx1 with LRAD  Long term goal 4: Pt will independently complete 3 sets of 10 reps of BLE AROM exercises in available and allowed ROM       Time In: 1150  Time Out: 1230  Total Treatment Time: 40  Timed Code Treatment Minutes: [facility] Colin Chacon PT, DPT  License #: [id number]

## 2021-06-18 NOTE — PLAN OF CARE
Problem: Falls - Risk of:  Goal: Will remain free from falls  Description: Will remain free from falls  Outcome: Ongoing  Goal: Absence of physical injury  Description: Absence of physical injury  Outcome: Ongoing     Problem: Pain:  Description: Pain management should include both nonpharmacologic and pharmacologic interventions.   Goal: Pain level will decrease  Description: Pain level will decrease  Outcome: Ongoing  Goal: Control of acute pain  Description: Control of acute pain  Outcome: Ongoing  Goal: Control of chronic pain  Description: Control of chronic pain  Outcome: Ongoing     Problem: Anxiety/Stress:  Goal: Level of anxiety will decrease  Description: Level of anxiety will decrease  Outcome: Ongoing     Problem: Sleep Pattern Disturbance:  Goal: Appears well-rested  Description: Appears well-rested  Outcome: Ongoing

## 2021-06-18 NOTE — PROGRESS NOTES
[address]  DEPARTMENT OF SPEECH/LANGUAGE PATHOLOGY  DAILY PROGRESS NOTE  Moose Rodriguez  6/18/2021  [id number]  Hypomagnesemia [E83.42]  Thyroid nodule [E04.1]  Paresthesia [R20.2]  Slurred speech [R47.81]  Chest pain [R07.9]  Dyspnea and respiratory abnormalities [R06.00, R06.89]  Chest pain, unspecified type [R07.9]  Allergies   Allergen Reactions    Adhesive Tape Rash    Aspirin Nausea And Vomiting     Chewable sts not coated.  Pcn [Penicillins] Nausea And Vomiting and Rash         Pt was seen this date for dysphagia treatment. IMPRESSION AND RECOMMENDATIONS:   Moose Rodriguez was seen for an ongoing swallowing assessment and diet tolerance monitoring. Pt was alert throughout session. She was positioned upright in bed and accepted PO trials of regular solids and thin liquids by cup/straw sips. Pt presented with facial grimacing with all PO, however denied pain with swallowing. Oral holding, prolonged mastication and slow oral A-P transit was observed with trials of regular solids. Pharyngeal swallow appeared timely with reduced laryngeal elevation. Clear vocal quality and 0 overt s/s of aspiration were observed with all PO trials given. Recommend dysphagia 3 diet/thin liquids with strict aspiration precautions. ST will follow Lucretiaole Rodriguez for diet tolerance monitoring x1-2.     GOALS (current status in bold):  Short-term Goals  Timeframe for Short-term Goals: LOS or until goals are met  Goal 1: Pt will tolerate regular diet/thin liquids without clinical evidence of aspiration 100% Goal not met- downgraded to dysphagia 3   New goal: Pt will tolerate dysphagia 3 diet/thin liquids without clinical evidence of aspiration 100%  Goal 2: Pt/caregivers will demonstrate comprehension of recommendations/POC Goal being met, continue         EDUCATION: recommendations/ POC    PAIN RATING (0-10 Scale): denies   Time in/Time out: SLP Individual Minutes  Time In: 1130  Time Out:

## 2021-06-18 NOTE — CONSULTS
[address] Xander Henderson, 1960, 3007/3007-A, 6/18/2021    Discharge Recommendation: continue to assess pending confirmation of home environment and PLOF. Pt will require 24hr sup/assist at discharge. (if pt is at her functional baseline, rec 24hr sup/assist with no OT. If she is below baseline, rec HHOT vs SNF depending on availability of sup/assist at home)      History:  [place]:  The primary encounter diagnosis was Chest pain, unspecified type. Diagnoses of Dyspnea and respiratory abnormalities, Paresthesia, Slurred speech, Hypomagnesemia, and Thyroid nodule were also pertinent to this visit.     Subjective:  Patient states: Agreeable to therapy  Pain: no c/o  Communication with other providers: PT Scot Avilez, MARGY Jones  Restrictions: General Precautions, Fall Risk    Home Setup/Prior level of function:  Social/Functional History  Lives With: Alone  Type of Home: House  Home Layout: One level  Home Access: Level entry  Bathroom Shower/Tub: Tub/Shower unit  Bathroom Toilet: Standard  Bathroom Equipment: Hand-held shower  Home Equipment: Cane, Rolling walker  Receives Help From: Home health (home health nurse once a week)  ADL Assistance: Independent  Homemaking Assistance: Needs assistance  Ambulation Assistance: Independent (mod I with spc)  Transfer Assistance: Independent  Active : No    Examination:  · Observation: Seated at EOB on arrival with staff present  · [facility]  · Hearing: [place]  · Vitals: Stable vitals throughout session on room air    [address] and functions:  · ROM: shoulders to 100*, distally WFL   · Strength: unable to formally assess d/t poor command-following, grossly impaired   · Sensation: unable to assess  · Tone: Normal    Activities of Daily Living (ADLs):  · Feeding/grooming: setup  · UB bathing: min A  · LB bathing: mod A (pt unable to distal reach in seated or maintain balance in standing without constant B UE support)  · UB dressing: min A (doffed/donned gown in seated with assist for thoroughness and line management)  · LB dressing: max A (pt unable to demo distal reaching to feet this date, required total A to don socks and would require assist threading pants over feet in seated, balance in standing to hike over hips)  · Toileting: mod A (per NA pt able to complete monserrat care in seated, requires assist with clothing management in standing and transfer)    Cognitive and Psychosocial Functioning:  · Overall cognitive status: Pt is A&Ox self, able to state \"hospital\" for place/[place] for [facility] but unable to identify name. Able to report date with use of smart watch. Questionable orientation to events/situation. Poor insight, judgement, problem solving skills, and safety awareness. Poor command-following (~25% with visual cues). · Affect: Normal     Balance:   · Sitting: at EOB with SUP  · Standing: min A with SPC, CGA with FWW    Functional Mobility:  · Bed Mobility: NT, pt upright at beginning and end of session  · Transfers: STS from EOB to [facility] with min A to clear hips    · Ambulation: pt ambulated in greer with min A and FWW, frequent slight LOB requiring close chair follow. See PT note for further gait evaluation. AM-PAC 6 click short form for inpatient daily activity:   How much help from another person does the patient currently need. .. Unable  Dep A Lot  Max A A Lot   Mod A A Little  Min A A Little   CGA  SBA None   Mod I  Indep  Sup   1. Putting on and taking off regular lower body clothing? [] 1    [x] 2   [] 2   [] 3   [] 3   [] 4      2. Bathing (including washing, rinsing, drying)? [] 1   [] 2   [x] 2 [] 3 [] 3 [] 4   3. Toileting, which includes using toilet, bedpan, or urinal? [] 1    [] 2   [x] 2   [] 3   [] 3   [] 4     4. Putting on and taking off regular upper body clothing? [] 1   [] 2   [] 2   [x] 3   [] 3    [] 4      5. Taking care of personal grooming such as brushing teeth?  [] 1   [] 2 [] 2 [] 3    [] 3   [x] 4      6. Eating meals? [] 1   [] 2   [] 2   [] 3   [] 3   [x] 4      Raw Score:  17     [24=0% impaired(CH), 23=1-19%(CI), 20-22=20-39%(CJ), 15-19=40-59%(CK), 10-14=60-79%(CL), 7-9=80-99%(CM), 6=100%(CN)]     Treatment:  Self Care Training:   Cues were given for safety, sequence, UE/LE placement, visual cues, and balance. Activities performed today included dressing, grooming, and functional transfer training as noted above. Safety Measures: Gait belt used, left upright in chair with all needs met, alarm in place, RN aware of status    Assessment:  Pt is a 57 y.o. female who  has a past medical history of Anxiety, Arthritis, Asthma, Back problem, Bronchitis, CAD (coronary artery disease), Cancer ([address] Chest pain, Chipped tooth, Chronic back pain, COPD (chronic obstructive pulmonary disease) (HCC), Cough, Emphysema, GERD (gastroesophageal reflux disease), H/O 24 hour EKG monitoring, H/O cardiac catheterization, H/O cardiac catheterization, H/O Doppler lower venous ultrasound, H/O Doppler ultrasound, H/O Doppler ultrasound, H/O exercise stress test, Hiatal hernia, History of cardiac cath, History of exercise stress test, History of nuclear stress test, History of nuclear stress test, [place] (hard of hearing), Hx of cardiovascular stress test, Hx of Doppler ultrasound, Hx of Doppler ultrasound, Hx of Doppler ultrasound, Hx of echocardiogram, Hypertension, Irregular heart beat, Migraines, Palpitations, Panic attacks, Pneumonia, Prolonged emergence from general anesthesia, Restless leg, S/P cardiac cath, S/P PTCA (percutaneous transluminal coronary angioplasty), Shortness of breath, Teeth missing, Tobacco use, and Unspecified sleep apnea. Presented to [facility] ED 6/16 with c/o CP radiating to L arm, noted with slurred speech and L-sided paresthesia on arrival. Unable to complete MRI d/t cochlear implants. Neurology following, imaging as of 6/18 (-) for acute findings.   Pt is a questionable historian, reports that prior to admission she was ind in ADLs, received assist for IADLS, and completed mobility with SPC and no falls. Pt currently requires up to max A, which is below her reported baseline. Pt will require 24hr sup/assist at discharge, will require confirmation of home environment, assist/sup available at home, and PLOF before recommending d/c location. If pt is at her functional baseline, rec 24hr sup/assist with no OT. If she is below baseline, rec HHOT vs SNF depending on availability of sup/assist at home. Will continue to follow. Complexity: High  Prognosis: Good  Plan: 2x/week      Goals:  1. Pt will complete all aspects of bed mobility for EOB/OOB ADLs ind with HOB flat  2. Pt will complete UB/LB bathing with min A and AE/rest breaks prn  3. Pt will complete all aspects of LB dressing with min A and AE/modified techniques as needed/trained  4. Pt will complete all functional transfers/mobility to and from bed, chair, toilet, shower chair with SBA and LRAD  5. Pt will complete all aspects of toileting task with min A at standard commode  6.  Pt will complete oral hygiene/grooming routine in standing at sink demo G balance and tolerance      Time:   Time in: 1210  Time out: 1239  Timed treatment minutes: 10'  Total time: 34'      Electronically signed by:    KEN De La Paz/L, [address] [facility] [place], [id number]

## 2021-06-18 NOTE — PLAN OF CARE
Problem: Falls - Risk of:  Goal: Will remain free from falls  Description: Will remain free from falls  6/18/2021 0104 by Lesli Kim RN  Outcome: Met This Shift  6/17/2021 1614 by Nicole Pretty RN  Outcome: Met This Shift  Goal: Absence of physical injury  Description: Absence of physical injury  6/18/2021 0104 by Lesli Kim RN  Outcome: Met This Shift  6/17/2021 1614 by Nicole Pretty RN  Outcome: Met This Shift     Problem: Pain:  Goal: Pain level will decrease  Description: Pain level will decrease  6/18/2021 0104 by Lesli Kim RN  Outcome: Met This Shift  6/17/2021 1614 by Nicole Pretty RN  Outcome: Ongoing  Goal: Control of acute pain  Description: Control of acute pain  6/18/2021 0104 by Lesli Kim RN  Outcome: Met This Shift  6/17/2021 1614 by Nicole Pretty RN  Outcome: Ongoing  Goal: Control of chronic pain  Description: Control of chronic pain  6/18/2021 0104 by Lesli Kim RN  Outcome: Met This Shift  6/17/2021 1614 by Nicole Pretty RN  Outcome: Ongoing     Problem: Anxiety/Stress:  Goal: Level of anxiety will decrease  Description: Level of anxiety will decrease  Outcome: Met This Shift     Problem: Sleep Pattern Disturbance:  Goal: Appears well-rested  Description: Appears well-rested  Outcome: Met This Shift

## 2021-06-18 NOTE — PROGRESS NOTES
Progress Note  Date:2021       Room:[facility] [place]-  Patient Name:Jojo Stevenson     YOB: 1960     Age:61 y.o. Has chest pain  Subjective    Subjective:  Symptoms:  Stable. Diet:  Adequate intake. Pain:  She complains of pain that is mild. Review of Systems  Objective         Vitals Last 24 Hours:  TEMPERATURE:  Temp  Av.8 °F (36.6 °C)  Min: 97.2 °F (36.2 °C)  Max: 98.2 °F (36.8 °C)  RESPIRATIONS RANGE: Resp  Av.4  Min: 12  Max: 20  PULSE OXIMETRY RANGE: SpO2  Av.2 %  Min: 92 %  Max: 95 %  PULSE RANGE: Pulse  Av.5  Min: 73  Max: 84  BLOOD PRESSURE RANGE: Systolic (73WJU), XOL:549 , Min:106 , QXP:788   ; Diastolic (03RYD), WXL:19, Min:73, Max:97    I/O (24Hr): No intake or output data in the 24 hours ending 21 0752  Objective:  General Appearance:  Comfortable. Vital signs: (most recent): Blood pressure 124/77, pulse 77, temperature 97.2 °F (36.2 °C), temperature source Oral, resp. rate 20, height 5' 1\" (1.549 m), weight 192 lb 9.6 oz (87.4 kg), SpO2 93 %, not currently breastfeeding. Vital signs are normal.    HEENT: Normal HEENT exam.  (Neck pain on palpation)    Lungs:  Normal effort. Heart: Normal rate. Abdomen: Abdomen is soft. Extremities: Decreased range of motion. (Decreased bilateral hand   Decreased BLE weakness 3/5 reflex equivocal)  Pulses: Distal pulses are intact. Neurological: Patient is alert. Pupils:  Pupils are equal, round, and reactive to light. Skin:  Warm.       Labs/Imaging/Diagnostics    Labs:  CBC:  Recent Labs     21  1152 21  0701   WBC 8.2 6.3   RBC 4.23 4.02*   HGB 12.1* 11.1*   HCT 37.5 35.7*   MCV 88.7 88.8   RDW 14.9 14.8    267     CHEMISTRIES:  Recent Labs     21  1152 21  0701    141   K 4.3 4.6    104   CO2 25 28   BUN 10 8   CREATININE 0.8 0.9   GLUCOSE 127* 82   MG 1.6* 2.1     PT/INR:  Recent Labs     21  1152   PROTIME 10.1*   INR 0.84 APTT:  Recent Labs     06/16/21  1152   APTT 26.0     LIVER PROFILE:  Recent Labs     06/16/21  1152   AST 22   ALT 11   BILITOT 0.3   ALKPHOS 100       Imaging Last 24 Hours:  CT HEAD WO CONTRAST    Result Date: 6/16/2021  EXAMINATION: CT OF THE HEAD WITHOUT CONTRAST  6/16/2021 1:40 pm TECHNIQUE: CT of the head was performed without the administration of intravenous contrast. Dose modulation, iterative reconstruction, and/or weight based adjustment of the mA/kV was utilized to reduce the radiation dose to as low as reasonably achievable. COMPARISON: 04/21/2021 HISTORY: ORDERING SYSTEM PROVIDED HISTORY: dizzy/slurred speech TECHNOLOGIST PROVIDED HISTORY: Has a \"code stroke\" or \"stroke alert\" been called? ->No Reason for exam:->dizzy/slurred speech Decision Support Exception - unselect if not a suspected or confirmed emergency medical condition->Emergency Medical Condition (MA) Reason for Exam: dizzy/slurred speech Acuity: Acute Type of Exam: Initial FINDINGS: Imaging is degraded by streak artifact from subcutaneous device in the right parietal region. BRAIN/VENTRICLES: There is no acute intracranial hemorrhage, mass effect or midline shift. No abnormal extra-axial fluid collection. The gray-white differentiation is maintained without evidence of an acute infarct. There is no evidence of hydrocephalus. ORBITS: The visualized portion of the orbits demonstrate no acute abnormality. SINUSES: Similar partial opacification of the right mastoid air cells. SOFT TISSUES/SKULL:  No acute abnormality of the visualized skull or soft tissues. No acute intracranial abnormality. XR CHEST PORTABLE    Result Date: 6/16/2021  EXAMINATION: ONE XRAY VIEW OF THE CHEST 6/16/2021 11:31 am COMPARISON: April 27, 2020 HISTORY: ORDERING SYSTEM PROVIDED HISTORY: chest pain TECHNOLOGIST PROVIDED HISTORY: Reason for exam:->chest pain Reason for Exam: chest pain FINDINGS: The cardiomediastinal silhouette is within normal range. acute pulmonary abnormality. 2. 2 cm incidental left thyroid nodule. Recommend thyroid US. Reference: J Am [place] Radiol. 2015 Feb;12(2): 143-50. RECOMD:ITN RECOMMENDATIONS: 2 cm incidental left thyroid nodule. Recommend thyroid US. Reference: J Am [place] Radiol. 2015 Feb;12(2): 143-50     CTA HEAD NECK W CONTRAST    Result Date: 6/16/2021  EXAMINATION: CTA OF THE HEAD AND NECK WITH CONTRAST 6/16/2021 1:54 pm TECHNIQUE: CTA of the head and neck was performed with the administration of intravenous contrast. Multiplanar reformatted images are provided for review. MIP images are provided for review. Stenosis of the internal carotid arteries measured using NASCET criteria. Dose modulation, iterative reconstruction, and/or weight based adjustment of the mA/kV was utilized to reduce the radiation dose to as low as reasonably achievable. COMPARISON: None HISTORY: ORDERING SYSTEM PROVIDED HISTORY: dizzy/slurred speech TECHNOLOGIST PROVIDED HISTORY: Reason for exam:->dizzy/slurred speech Decision Support Exception - unselect if not a suspected or confirmed emergency medical condition->Emergency Medical Condition (MA) Reason for Exam: dizzy/slurred speech Acuity: Acute Type of Exam: Initial Relevant Medical/Surgical History: 80ML ISOVUE 370 FINDINGS: CTA NECK: AORTIC ARCH/ARCH VESSELS: There is mild atherosclerotic calcification of the thoracic aortic arch. Incidental note is made bovine origin of the left common carotid artery which is a normal anatomic variant. CAROTID ARTERIES: Common carotid arteries are patent bilaterally without any narrowing or stenosis. The left internal carotid artery is patent without narrowing or stenosis. The right internal carotid artery is patent without narrowing or stenosis. VERTEBRAL ARTERIES: The right vertebral artery is dominant and patent in its visualized course without any narrowing or stenosis.  The left vertebral artery is congenitally tiny in caliber and faintly visualized throughout its course. SOFT TISSUES: No acute abnormality of the soft tissues of the neck is identified. BONES: No acute osseous abnormality. CTA HEAD: ANTERIOR CIRCULATION:  No abnormality of the internal carotid arteries, middle cerebral arteries, or anterior cerebral arteries are identified. POSTERIOR CIRCULATION: There is fetal origin of the left posterior cerebral artery which is a normal anatomic variant. The basilar artery is patent. The right posterior cerebral artery is patent. No obvious aneurysms are identified. OTHER: No dural venous sinus thrombosis on this non-dedicated study. BRAIN: Refer to the CT head of the same date. 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 Otherwise unremarkable CTA of the head and neck. Assessment//Plan           Hospital Problems         Last Modified POA    Chest pain 6/16/2021 Yes        Assessment & Plan  Chest pain   -cardio recs  -trop neg  -CT Pe neg and stress neg  -echo with normal EF  -  Thryoid US  -thyroid US in 1yr  Slurred speech left facial drop and RUE weakness  -not a candidate for TPA  -neuro recs  -CT cervn mild C5-6  CT spine with dextroscoliosis  -unable to get MRI due to cochlear implant  -CTA head and neck neg and has congential small left vertebral artery  -ASA, plavix, statin  Dementia  -namenda, aricept  hypomag  -resolved  DVT prophyalxis  -lovenox      OT pending to check functional capacity. CT imaging cervical and thoracic nonacute.  . Chest pain most likely GI and place on PPI bid  Electronically signed by Yosef Shah MD on 6/17/21 at 8:12 AM EDT

## 2021-06-18 NOTE — PROGRESS NOTES
Cardiology Progress Note       Admit Date:  6/16/2021    Consult reason/ Seen today for: Chest pain    Subjective and  Overnight Events: Denies any chest pain, shortness of breath, dizziness, or palpitation. Assessment / Plan / Recommendation:     1. Atypical chest pain in the setting of prior history of CAD. Left heart cath in 2018 reviewed. Patent stent in circumflex. Patent RCA and LAD without any focal stenosis. Stress test negative for ischemia, echocardiogram within normal limits. Continue with medical therapy including aspirin Plavix statin therapy and Ranexa. 2. ? TIA. Neurology to evaluate patient. Continue with aspirin Plavix  3. Hyperlipidemia: Continue with Lipitor 40 mg daily  4. DVT prophylaxis: On Lovenox. 5. Hypomagnesemia: Recommend magnesium replacement  6. We will sign off, please reconsult for any new cardiac complaints or concerns. History of Presenting Illness:    Chief complain on admission : 57 y. o.year old who is admitted for  Chief Complaint   Patient presents with    Chest Pain     ongoing several weeks.  states insurance wouldn't pay for testing with her heart doctor so she came here        Past medical history:    has a past medical history of Anxiety, Arthritis, Asthma, Back problem, Bronchitis, CAD (coronary artery disease), Cancer ([address] Chest pain, Chipped tooth, Chronic back pain, COPD (chronic obstructive pulmonary disease) ([address] Cough, Emphysema, GERD (gastroesophageal reflux disease), H/O 24 hour EKG monitoring, H/O cardiac catheterization, H/O cardiac catheterization, H/O Doppler lower venous ultrasound, H/O Doppler ultrasound, H/O Doppler ultrasound, H/O exercise stress test, Hiatal hernia, History of cardiac cath, History of exercise stress test, History of nuclear stress test, History of nuclear stress test, [place] (hard of hearing), Hx of cardiovascular stress test, Hx of Doppler ultrasound, Hx of Doppler ultrasound, Hx of Doppler ultrasound, Hx of echocardiogram, Hypertension, Irregular heart beat, Migraines, Palpitations, Panic attacks, Pneumonia, Prolonged emergence from general anesthesia, Restless leg, S/P cardiac cath, S/P PTCA (percutaneous transluminal coronary angioplasty), Shortness of breath, Teeth missing, Tobacco use, and Unspecified sleep apnea. Past surgical history:   has a past surgical history that includes Kidney surgery (Left, 2-15); Dental surgery; Tonsillectomy (1960's); Tubal ligation (1993); Gastric fundoplication (21/02/5745); Colonoscopy (2011); Colonoscopy (08/23/2017); Endoscopy, colon, diagnostic (10-16-15); Endoscopy, colon, diagnostic (01/28/2019); Upper gastrointestinal endoscopy (N/A, 1/28/2019); and Ankle fracture surgery (Left, 5/24/2019). Social History:   reports that she quit smoking about 5 years ago. Her smoking use included cigarettes. She started smoking about 49 years ago. She has a 11.00 pack-year smoking history. She has never used smokeless tobacco. She reports current alcohol use of about 2.0 standard drinks of alcohol per week. She reports that she does not use drugs. Family history:  family history includes Asthma in her brother; Cancer in her sister; Early Death (age of onset: 43) in her sister; Heart Disease in her father and mother; High Blood Pressure in her father. Allergies   Allergen Reactions    Adhesive Tape Rash    Aspirin Nausea And Vomiting     Chewable sts not coated.  Pcn [Penicillins] Nausea And Vomiting and Rash       Review of Systems:  Review of Systems   Cardiovascular: Negative for chest pain, palpitations and leg swelling. Musculoskeletal: Negative. Skin: Negative. Neurological: Negative for dizziness and weakness. All other systems reviewed and are negative.        BP (!) 152/106   Pulse 76   Temp 98.4 °F (36.9 °C) (Oral)   Resp 19   Ht 5' 1\" (1.549 m)   Wt 192 lb 9.6 oz (87.4 kg)   SpO2 95% BMI 36.39 kg/m²   No intake or output data in the 24 hours ending 06/18/21 1106    Physical Exam:  Constitutional:  Well developed, Well nourished, No acute distress  HENT:  Normocephalic, Atraumatic, Bilateral external ears normal,  Nose normal.   Neck- trachea is midline  Eyes:  PERRL, Conjunctiva normal  Respiratory:  Normal breath sounds, No respiratory distress, No wheezing, No chest tenderness. Cardiovascular:  Normal heart rate, Normal rhythm, no murmurs appreciated, No rubs appreciated, No gallops appreciated, JVP not elevated  Abdomen/GI:  Soft, No tenderness  Musculoskeletal:  Intact distal pulses, no edema, No tenderness, No cyanosis, No clubbing. Integument:  Warm, Dry  Lymphatic:  No lymphadenopathy noted.    Neurologic:  Alert & oriented  Psychiatric:  Affect and Mood :pleasant     Medications:    aspirin EC  81 mg Oral Daily    atorvastatin  40 mg Oral Daily    clopidogrel  75 mg Oral Daily    donepezil  10 mg Oral Nightly    ipratropium-albuterol  1 vial Inhalation 4x Daily    melatonin  5 mg Oral Daily    memantine  10 mg Oral BID    pantoprazole  40 mg Oral Daily    ranolazine  1,000 mg Oral BID    sertraline  100 mg Oral Daily    sodium chloride flush  5-40 mL Intravenous 2 times per day    enoxaparin  40 mg Subcutaneous QPM      sodium chloride       ketorolac, magnesium sulfate, hydrOXYzine, nitroGLYCERIN, sodium chloride flush, sodium chloride, ondansetron **OR** ondansetron, acetaminophen **OR** acetaminophen, polyethylene glycol    Lab Data:  CBC:   Recent Labs     06/16/21  1152 06/17/21  0701   WBC 8.2 6.3   HGB 12.1* 11.1*   HCT 37.5 35.7*   MCV 88.7 88.8    267     BMP:   Recent Labs     06/16/21  1152 06/17/21  0701    141   K 4.3 4.6    104   CO2 25 28   BUN 10 8   CREATININE 0.8 0.9     PT/INR:   Recent Labs     06/16/21  1152   PROTIME 10.1*   INR 0.84     BNP:    Recent Labs     06/16/21  1152   PROBNP 97.19     TROPONIN:   Recent Labs 06/16/21  1152 06/16/21 2029 06/17/21  0701   TROPONINT <0.010 <0.010 <0.010        ECHO : 6/17/21  Left ventricular systolic function is normal.   Ejection fraction is visually estimated at 50-55%. Indeterminate diastolic function; E/A flow reversal is noted. No significant valvular disease noted. No evidence of any pericardial effusion. NM Myocardial Spect  6/17/21  Normal tracer uptake in all segments of myocardium on stress and rest    images.    No infarct or ischemia noted.    Normal EF 71 % with normal ventricular contractility. Impression:  Active Problems:    Chest pain  Resolved Problems:    * No resolved hospital problems. *       All labs, medications and tests reviewed by myself, continue all other medications of all above medical condition listed as is except for changes mentioned above. Thank you   Please call with questions. Electronically signed by Dustin Reza. LAVERN Garcia - CNP on 6/18/2021 at 11:06 AM         CARDIOLOGY ATTENDING ADDENDUM    I have seen, spoken to and examined this patient personally, independently of the nurse practitioner. I have reviewed the hospital care given to date and reviewed all pertinent labs and imaging. The plan was developed mutually at the time of the visit with the patient,  NP   and myself. I have spoken with patient, nursing staff and provided written and verbal instructions . The above note has been reviewed and I agree with the assessment, diagnosis, and treatment plan with changes made by me as follows       HPI:  I have reviewed the above HPI  And agree with above   Please review addendum/changes made to note above     Interval history:      Physical Exam:  General:  Awake, alert, NAD  Head:normal  Eye:normal  Neck:  No JVD   Chest:  Clear to auscultation, respiration easy  Cardiovascular:  s1s2  Abdomen:   nontender  Extremities:  0 edema  Pulses; palpable  Neuro: grossly normal      MEDICAL DECISION MAKING;    I agree with the above plan, which was planned by myself and discussed with NP.     Dr. Mindi Diane MD

## 2021-06-18 NOTE — CONSULTS
Dear Zulma Ip am out of town till June 20, 2021, Sunday afternoon.   I will be happy to see her when I get back if still patient is here  Otherwise please give her follow-up appointment with me in the office within a week to 10 days if  Discharge before 6/20/2021 pm     Thanks again for understanding

## 2021-06-19 LAB
ANTITHYROGLOBULIN AB: 1.2 IU/ML (ref 0–4)
ANTITHYROID MICORSOMAL: 1501 IU/ML (ref 0–9)
GLUCOSE BLD-MCNC: 132 MG/DL (ref 70–99)

## 2021-06-19 PROCEDURE — 82962 GLUCOSE BLOOD TEST: CPT

## 2021-06-19 PROCEDURE — 6370000000 HC RX 637 (ALT 250 FOR IP): Performed by: HOSPITALIST

## 2021-06-19 PROCEDURE — 1200000000 HC SEMI PRIVATE

## 2021-06-19 PROCEDURE — 94761 N-INVAS EAR/PLS OXIMETRY MLT: CPT

## 2021-06-19 PROCEDURE — 6360000002 HC RX W HCPCS: Performed by: HOSPITALIST

## 2021-06-19 PROCEDURE — 2580000003 HC RX 258: Performed by: HOSPITALIST

## 2021-06-19 PROCEDURE — 94640 AIRWAY INHALATION TREATMENT: CPT

## 2021-06-19 RX ADMIN — SERTRALINE HYDROCHLORIDE 100 MG: 100 TABLET ORAL at 09:38

## 2021-06-19 RX ADMIN — SODIUM CHLORIDE, PRESERVATIVE FREE 10 ML: 5 INJECTION INTRAVENOUS at 09:39

## 2021-06-19 RX ADMIN — MEMANTINE 10 MG: 10 TABLET ORAL at 21:25

## 2021-06-19 RX ADMIN — ACETAMINOPHEN 650 MG: 325 TABLET ORAL at 09:50

## 2021-06-19 RX ADMIN — ACETAMINOPHEN 650 MG: 325 TABLET ORAL at 21:25

## 2021-06-19 RX ADMIN — CLOPIDOGREL BISULFATE 75 MG: 75 TABLET ORAL at 09:38

## 2021-06-19 RX ADMIN — ENOXAPARIN SODIUM 40 MG: 40 INJECTION SUBCUTANEOUS at 16:28

## 2021-06-19 RX ADMIN — IPRATROPIUM BROMIDE AND ALBUTEROL SULFATE 3 ML: .5; 3 SOLUTION RESPIRATORY (INHALATION) at 21:11

## 2021-06-19 RX ADMIN — ASPIRIN 81 MG: 81 TABLET, COATED ORAL at 09:38

## 2021-06-19 RX ADMIN — IPRATROPIUM BROMIDE AND ALBUTEROL SULFATE 3 ML: .5; 3 SOLUTION RESPIRATORY (INHALATION) at 08:20

## 2021-06-19 RX ADMIN — ONDANSETRON 4 MG: 2 INJECTION INTRAMUSCULAR; INTRAVENOUS at 23:13

## 2021-06-19 RX ADMIN — ATORVASTATIN CALCIUM 40 MG: 40 TABLET, FILM COATED ORAL at 09:38

## 2021-06-19 RX ADMIN — RANOLAZINE 1000 MG: 500 TABLET, FILM COATED, EXTENDED RELEASE ORAL at 09:38

## 2021-06-19 RX ADMIN — RANOLAZINE 1000 MG: 500 TABLET, FILM COATED, EXTENDED RELEASE ORAL at 21:25

## 2021-06-19 RX ADMIN — DONEPEZIL HYDROCHLORIDE 10 MG: 10 TABLET, FILM COATED ORAL at 21:25

## 2021-06-19 RX ADMIN — SODIUM CHLORIDE, PRESERVATIVE FREE 10 ML: 5 INJECTION INTRAVENOUS at 21:26

## 2021-06-19 RX ADMIN — Medication 5 MG: at 21:25

## 2021-06-19 RX ADMIN — IPRATROPIUM BROMIDE AND ALBUTEROL SULFATE 3 ML: .5; 3 SOLUTION RESPIRATORY (INHALATION) at 12:14

## 2021-06-19 RX ADMIN — PANTOPRAZOLE SODIUM 40 MG: 40 TABLET, DELAYED RELEASE ORAL at 06:02

## 2021-06-19 RX ADMIN — IPRATROPIUM BROMIDE AND ALBUTEROL SULFATE 3 ML: .5; 3 SOLUTION RESPIRATORY (INHALATION) at 16:02

## 2021-06-19 RX ADMIN — PANTOPRAZOLE SODIUM 40 MG: 40 TABLET, DELAYED RELEASE ORAL at 16:28

## 2021-06-19 RX ADMIN — MEMANTINE 10 MG: 10 TABLET ORAL at 09:38

## 2021-06-19 ASSESSMENT — PAIN SCALES - GENERAL
PAINLEVEL_OUTOF10: 4
PAINLEVEL_OUTOF10: 5
PAINLEVEL_OUTOF10: 3
PAINLEVEL_OUTOF10: 0

## 2021-06-19 ASSESSMENT — PAIN DESCRIPTION - PAIN TYPE: TYPE: ACUTE PAIN

## 2021-06-19 ASSESSMENT — PAIN DESCRIPTION - ORIENTATION: ORIENTATION: RIGHT

## 2021-06-19 ASSESSMENT — PAIN DESCRIPTION - LOCATION: LOCATION: CHEST;ARM

## 2021-06-19 NOTE — PROGRESS NOTES
Hospitalist Progress Note    Asked to take care of the patient as of 6/19/2021  Patient:  Moose Rodriguez    Unit/Bed:3007/3007-A  YOB: 1960  MRN: [id number]   Acct: [de-identified]   PCP: LAVERN Dozier NP  Date of Admission: 6/16/2021    Assessment and Plan:        1. Atypical chest pain: Has a prior history of CAD, had left heart cath in 2018, Lexiscan stress test normal, echocardiogram within normal limits, cardiology consulted and recommended only medical therapy to continue ASA, P2 Y 12 inhibitor, statin, and Ranexa. She does not have any chest pain currently. Cardiac enzymes with a proBNP normal.  2. Neurological abnormalities: Upon admission the admit her noted left facial droop and weakness on the right upper extremity with unclear reason. There was inability to run MRI of the brain because of cochlear implant. 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 Neurology team been consulted and recommended only DAPT, working with PT/OT/ST, no further neurological recommendations from their standpoint. 3. 2 cm incidental left thyroid nodule: Ultrasound neck thyroid ordered and showed 10 mm solid nodule on the left side only. ,  TSH, free T3 and free T4 normal.  Does not have any thyroid symptoms currently. Recommend a thyroid scan in 1 year. 4. Minimal smooth mid thoracic dextroscoliosis: This is a chronic changes, need to follow-up as an outpatient  5. Small to moderate hiatal hernia: Currently on PPI  6. Mild C5-C6 degenerative disc height loss: Need to follow-up as an outpatient  7. Depression: Continue Zoloft 100 mg daily  8. Hearing impairment bilaterally status post cochlear implant noted  9.  BMI more than 35/morbid obesity: Counseled about lifestyle modification  10. Physical debility and disposition: She is very weak currently she still having imbalance, need to work with PT/OT for possible rehab/SNF. PMH, PSH, SH, FHX reviewed in chart review snap shot in EPIC    CC: Physical debility, atypical chest pain    HPI: Initial admission HPI by admitting physician reviewed as below:  Dalton New is a 57 y.o.  female with history of coronary artery disease, patient presented with left-sided chest pain started yesterday night, patient is poor historian, I took the history from the medical chart also, patient stated chest pain started yesterday however she has had recurrent episodes of chest pain since while, described as aching radiating to the left shoulder associated with some shortness of breath, also I noted the patient has slurred speech and left facial droop with some weakness of the right upper extremity, is unclear when her last known well being, a friend of her present states she has had that for a while as well, so the patient is not candidate for TPA, patient has no history of DVT PE or AAA. 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 ROS (10 point review of systems completed. Pertinent positives noted. Otherwise ROS is negative) :  Off balance, weakness  PMH:  Per HPI and reviewed in the chart  SHX: Reviewed in the chart, also the patient  FHX: Reviewed in the chart, also with the patient  Allergies: Reviewed in the chart  Medications:     sodium chloride        pantoprazole  40 mg Oral BID AC    aspirin EC  81 mg Oral Daily    atorvastatin  40 mg Oral Daily    clopidogrel  75 mg Oral Daily    donepezil  10 mg Oral Nightly    ipratropium-albuterol 06/16/21  1152 06/17/21  0701    141   K 4.3 4.6    104   CO2 25 28   BUN 10 8   CREATININE 0.8 0.9   CALCIUM 9.2 8.7     Recent Labs     06/16/21  1152   AST 22   ALT 11   BILITOT 0.3   ALKPHOS 100     Recent Labs     06/16/21  1152   INR 0.84     No results for input(s): CKTOTAL, TROPONINI in the last 72 hours. Microbiology:    Blood culture #1: No results found for: BC    Blood culture #2:No results found for: Sridharus Pastor    Organism:No results found for: ORG    No results found for: LABGRAM    MRSA culture only:No results found for: [facility]    Urine culture: No results found for: LABURIN    Respiratory culture: No results found for: CULTRESP    Aerobic and Anaerobic :  No results found for: LABAERO  No results found for: LABANAE    Urinalysis:      Lab Results   Component Value Date    NITRU NEGATIVE 06/16/2021    WBCUA 1 06/16/2021    BACTERIA NEGATIVE 06/16/2021    RBCUA <1 06/16/2021    BLOODU NEGATIVE 06/16/2021    SPECGRAV 1.053 06/16/2021       Radiology:  CT THORACIC SPINE WO CONTRAST   Final Result   1. No acute osseous abnormality of the thoracic spine. 2. Minimal smooth midthoracic dextroscoliosis. 3. Small to moderate hiatal hernia. CT CERVICAL SPINE WO CONTRAST   Final Result   1. No acute osseous abnormality of the cervical spine. 2. Mild C5-6 degenerative disc height loss. No significant bony neural   foraminal or spinal canal stenosis. US HEAD NECK SOFT TISSUE THYROID   Preliminary Result   Left thyroid lobe 10 mm category TR4 nodule. Ultrasound follow-up can be   considered in 1 year. RECOMMENDATIONS:   ACR TI-RADS recommendations:      TR4 (4-6 points):  FNA if >= 1.5 cm; follow-up if 1.0-1.4 cm in 1, 2, 3, and   5 years      no more than four nodules should be followed. NM MYOCARDIAL SPECT REST EXERCISE OR RX   Final Result      CTA CHEST W CONTRAST   Final Result   1.  No evidence of pulmonary embolism to the segmental level or acute   pulmonary abnormality. 2. 2 cm incidental left thyroid nodule. Recommend thyroid US. Reference: J   Am [place] Radiol. 2015 Feb;12(2): 143-50. RECOMD:ITN      RECOMMENDATIONS:   2 cm incidental left thyroid nodule. Recommend thyroid US. Reference: J Am [place] Radiol. 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 Otherwise   unremarkable CTA of the head and neck. CT HEAD WO CONTRAST   Final Result   No acute intracranial abnormality. XR CHEST PORTABLE   Final Result   1. No acute radiographic finding to account for patient's chest pain. Echocardiogram complete 2D with doppler with color    Result Date: 6/17/2021  Transthoracic Echocardiography Report (TTE)  Demographics   Patient Name       HEYDI HERNDON     Date of Study       06/17/2021   Date of Birth      1960         Gender              Female   Age                57 year(s)         Race                   Patient Number     [id number]         Room Number         3007   Visit Number       [id number]   Corporate ID       [id number]   Accession Number   [id number]         Tin Rubio RDMS   Ordering Physician Terri King MD                 Physician           MD  Procedure Type of Study   TTE procedure:ECHOCARDIOGRAM COMPLETE 2D W DOPPLER W COLOR. Procedure Date Date: 06/17/2021 Start: 08:56 AM Study Location: Portable Technical Quality: Fair visualization Indications:Chest pain. Patient Status: Routine Height: 61 inches Weight: 190 pounds BSA: 1.85 m2 BMI: 35.9 kg/m2 HR: 76 bpm BP: 105/56 mmHg  Conclusions   Summary  Left ventricular systolic function is normal.  Ejection fraction is visually estimated at 50-55%. Indeterminate diastolic function; E/A flow reversal is noted. No significant valvular disease noted. No evidence of any pericardial effusion. Signature   ------------------------------------------------------------------  Electronically signed by Abdoulaye Mcallister MD (Interpreting  physician) on 06/17/2021 at 10:13 AM  ------------------------------------------------------------------   Findings   Left Ventricle  Left ventricular systolic function is normal.  Ejection fraction is visually estimated at 50-55%. Indeterminate diastolic function; E/A flow reversal is noted. Left Atrium  Essentially normal left atrium. Right Atrium  Essentially normal right atrium. Right Ventricle  Essentially normal right ventricle. Aortic Valve  Trace aortic regurgitation. Mitral Valve  Trace mitral regurgitation. Tricuspid Valve  Trace tricuspid regurgitation; normal RVSP. Pulmonic Valve  The pulmonic valve was not well visualized. Pericardial Effusion  No evidence of any pericardial effusion. Pleural Effusion  No evidence of pleural effusion.   M-Mode/2D Measurements & Calculations   LV Diastolic Dimension:  LV Systolic Dimension:  LA Dimension: 3.2 cmAO Root  3.62 cm                  2.71 cm                 Dimension: 2.7 cmLA Area:  LV FS:25.1 %             LV Volume Diastolic: 67 04.2 cm2  LV PW Diastolic: 1.30 cm ml  LV PW Systolic: 7.88 cm  LV Volume Systolic: 30  Septum Diastolic: 7.99   ml  cm                       LV EDV/LV EDV Index: 67 RV Diastolic Dimension:  Septum Systolic: 8.06 cm JI/58 H0QG ESV/LV ESV   3.31 cm  CO: 3.96 l/min           Index: 30 ml/16 m2  CI: 2.14 l/m*m2          EF Calculated (A4C):    LA/Aorta: 1.19                           55.2 %  LV Area Diastolic: 25    EF Calculated (2D):     LA volume/Index: 39 ml  cm2                      50.5 %                  /32J0  LV Area Systolic: 19.1  cm2                      LV Length: 7.24 cm                            LVOT: 1.9 cm  Doppler Measurements & Calculations   MV Peak E-Wave: 105    AV Peak Velocity: 154 cm/s  LVOT Peak Velocity: 84.2  cm/s                   AV Peak Gradient: 9.49 mmHg cm/s  MV Peak A-Wave: 114    AV Mean Velocity: 102 cm/s  LVOT Mean Velocity: 59.1  cm/s                   AV Mean Gradient: 5 mmHg    cm/s  MV E/A Ratio: 0.92     AV VTI: 30.7 cm             LVOT Peak Gradient: 3  MV Peak Gradient: 4.41 AV Area (Continuity):1.7    mmHgLVOT Mean Gradient: 2  mmHg                   cm2                         mmHg                                                     Estimated RVSP: 22 mmHg  MV P1/2t: 56 msec      LVOT VTI: 18.4 cm           Estimated RAP:3 mmHg  MVA by PHT:3.93 cm2                         Estimated PASP: 8.2 mmHg  MV E' Septal Velocity:                             TR Velocity:114 cm/s  7.68 cm/s                                          TR Gradient:5.2 mmHg  MV E' Lateral  Velocity: 10.6 cm/s  MV E/E' septal: 13.67  MV E/E' lateral: 9.91      CT HEAD WO CONTRAST    Result Date: 6/16/2021  EXAMINATION: CT OF THE HEAD WITHOUT CONTRAST  6/16/2021 1:40 pm TECHNIQUE: CT of the head was performed without the administration of intravenous contrast. Dose modulation, iterative reconstruction, and/or weight based adjustment of the mA/kV was utilized to reduce the radiation dose to as low as reasonably achievable. COMPARISON: 04/21/2021 HISTORY: ORDERING SYSTEM PROVIDED HISTORY: dizzy/slurred speech TECHNOLOGIST PROVIDED HISTORY: Has a \"code stroke\" or \"stroke alert\" been called? ->No Reason for exam:->dizzy/slurred speech Decision Support Exception - unselect if not a suspected or confirmed emergency medical condition->Emergency Medical Condition (MA) Reason for Exam: dizzy/slurred speech Acuity: Acute Type of Exam: Initial FINDINGS: Imaging is degraded by streak artifact from subcutaneous device in the right parietal region. BRAIN/VENTRICLES: There is no acute intracranial hemorrhage, mass effect or midline shift.   No abnormal extra-axial fluid collection. The gray-white differentiation is maintained without evidence of an acute infarct. There is no evidence of hydrocephalus. ORBITS: The visualized portion of the orbits demonstrate no acute abnormality. SINUSES: Similar partial opacification of the right mastoid air cells. SOFT TISSUES/SKULL:  No acute abnormality of the visualized skull or soft tissues. No acute intracranial abnormality. CT CERVICAL SPINE WO CONTRAST    Result Date: 6/17/2021  EXAMINATION: CT OF THE CERVICAL SPINE WITHOUT CONTRAST 6/17/2021 9:18 pm TECHNIQUE: CT of the cervical spine was performed without the administration of intravenous contrast. Multiplanar reformatted images are provided for review. Dose modulation, iterative reconstruction, and/or weight based adjustment of the mA/kV was utilized to reduce the radiation dose to as low as reasonably achievable. COMPARISON: 05/11/2017 HISTORY: ORDERING SYSTEM PROVIDED HISTORY: neck pain and BUE weakness cannot get mRI TECHNOLOGIST PROVIDED HISTORY: Reason for exam:->neck pain and BUE weakness cannot get mRI Reason for Exam: neck pain and BUE weakness cannot get mRI FINDINGS: BONES/ALIGNMENT: There is no acute fracture or suspect osseous lesion. 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 Vertebral body heights are maintained. There is straightening of the cervical lordosis without listhesis. Patient is status post prior canal wall up right mastoidectomy with cochlear implant in place. DEGENERATIVE CHANGES: Mild C5-6 height loss. No significant bony neural foraminal or spinal canal stenosis. SOFT TISSUES: No acute paraspinal soft tissue abnormality. 1. No acute osseous abnormality of the cervical spine. 2. Mild C5-6 degenerative disc height loss. No significant bony neural foraminal or spinal canal stenosis.      CT THORACIC SPINE WO CONTRAST    Result Date: 6/17/2021  EXAMINATION: CT OF THE nodule due to motion artifact. Cervical lymphadenopathy: No abnormal lymph nodes in the imaged portions of the neck. Left thyroid lobe 10 mm category TR4 nodule. Ultrasound follow-up can be considered in 1 year. RECOMMENDATIONS: ACR TI-RADS recommendations: TR4 (4-6 points):  FNA if >= 1.5 cm; follow-up if 1.0-1.4 cm in 1, 2, 3, and 5 years no more than four nodules should be followed. XR CHEST PORTABLE    Result Date: 6/16/2021  EXAMINATION: ONE XRAY VIEW OF THE CHEST 6/16/2021 11:31 am COMPARISON: April 27, 2020 HISTORY: ORDERING SYSTEM PROVIDED HISTORY: chest pain TECHNOLOGIST PROVIDED HISTORY: Reason for exam:->chest pain Reason for Exam: chest pain FINDINGS: The cardiomediastinal silhouette is within normal range. Lungs are clear. There is no focal pulmonary consolidation, pleural effusion, pneumothorax, or evidence of airspace pulmonary edema. Coronary stent     1. No acute radiographic finding to account for patient's chest pain. CTA CHEST W CONTRAST    Result Date: 6/16/2021  EXAMINATION: CTA OF THE CHEST 6/16/2021 1:55 pm TECHNIQUE: CTA of the chest was performed after the administration of intravenous contrast.  Multiplanar reformatted images are provided for review. MIP images are provided for review. Dose modulation, iterative reconstruction, and/or weight based adjustment of the mA/kV was utilized to reduce the radiation dose to as low as reasonably achievable. COMPARISON: 05/11/2017 HISTORY: ORDERING SYSTEM PROVIDED HISTORY: dizzy/chest pain TECHNOLOGIST PROVIDED HISTORY: Reason for exam:->dizzy/chest pain Decision Support Exception - unselect if not a suspected or confirmed emergency medical condition->Emergency Medical Condition (MA) Reason for Exam: dizzy/chest pain Acuity: Acute Type of Exam: Initial Relevant Medical/Surgical History: 80 ML ISOVUE 370 FINDINGS: Pulmonary Arteries: Exam is mildly degraded by timing of the contrast bolus and respiratory motion.   No evidence of intraluminal filling defect to suggest pulmonary embolism to the segmental level. Main pulmonary artery is normal in caliber. Mediastinum: No evidence of mediastinal lymphadenopathy. The heart and pericardium demonstrate no acute abnormality. There is no acute abnormality of the thoracic aorta. Moderate coronary artery calcification. 2 cm left thyroid nodule. Lungs/pleura: The lungs are without acute process. No focal consolidation or pulmonary edema. No evidence of pleural effusion or pneumothorax. Upper Abdomen: Small hiatal hernia. Unchanged nodular thickening of the right adrenal gland, benign. Soft Tissues/Bones: No acute bone or soft tissue abnormality. 1. No evidence of pulmonary embolism to the segmental level or acute pulmonary abnormality. 2. 2 cm incidental left thyroid nodule. Recommend thyroid US. Reference: J Am [place] Radiol. 2015 Feb;12(2): 143-50. RECOMD:ITN RECOMMENDATIONS: 2 cm incidental left thyroid nodule. Recommend thyroid US. Reference: J Am [place] Radiol. 2015 Feb;12(2): 143-50     CTA HEAD NECK W CONTRAST    Result Date: 6/16/2021  EXAMINATION: CTA OF THE HEAD AND NECK WITH CONTRAST 6/16/2021 1:54 pm TECHNIQUE: CTA of the head and neck was performed with the administration of intravenous contrast. Multiplanar reformatted images are provided for review. MIP images are provided for review. Stenosis of the internal carotid arteries measured using NASCET criteria. Dose modulation, iterative reconstruction, and/or weight based adjustment of the mA/kV was utilized to reduce the radiation dose to as low as reasonably achievable.  COMPARISON: None HISTORY: ORDERING SYSTEM PROVIDED HISTORY: dizzy/slurred speech TECHNOLOGIST PROVIDED HISTORY: Reason for exam:->dizzy/slurred speech Decision Support Exception - unselect if not a suspected or confirmed emergency medical condition->Emergency Medical Condition (MA) Reason for Exam: dizzy/slurred speech Acuity: Acute Type of Exam: Initial Relevant Medical/Surgical History: 80ML ISOVUE 56 FINDINGS: CTA NECK: AORTIC ARCH/ARCH VESSELS: There is mild atherosclerotic calcification of the thoracic aortic arch. Incidental note is made bovine origin of the left common carotid artery which is a normal anatomic variant. CAROTID ARTERIES: Common carotid arteries are patent bilaterally without any narrowing or stenosis. The left internal carotid artery is patent without narrowing or stenosis. The right internal carotid artery is patent without narrowing or stenosis. VERTEBRAL ARTERIES: The right vertebral artery is dominant and patent in its visualized course without any narrowing or stenosis. The left vertebral artery is congenitally tiny in caliber and faintly visualized throughout its course. SOFT TISSUES: No acute abnormality of the soft tissues of the neck is identified. BONES: No acute osseous abnormality. CTA HEAD: ANTERIOR CIRCULATION:  No abnormality of the internal carotid arteries, middle cerebral arteries, or anterior cerebral arteries are identified. POSTERIOR CIRCULATION: There is fetal origin of the left posterior cerebral artery which is a normal anatomic variant. The basilar artery is patent. The right posterior cerebral artery is patent. No obvious aneurysms are identified. OTHER: No dural venous sinus thrombosis on this non-dedicated study. BRAIN: Refer to the CT head of the same date. 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 Otherwise unremarkable CTA of the head and neck.      NM MYOCARDIAL SPECT REST EXERCISE OR RX    Result Date: 6/17/2021  Cardiac Perfusion Imaging   Demographics   Patient Name      Maribell [facility]     Date of study        06/17/2021   Date of Birth     1960         Gender               Female   Age               57 year(s)         Race                    Patient Number    [id number]         Room Number Caitlin 63   Visit Number      [id number]          Height               61 inches   Corporate ID      [id number]           Weight               190 pounds   Accession Number  [id number]                                        NM Technologist      Mathieu Hernandez  Physician         MD                 Cardiologist         MD   Conclusions   Summary   Normal tracer uptake in all segments of myocardium on stress and rest  images. No infarct or ischemia noted. Normal EF 71 % with normal ventricular contractility. Recommendation  Normal stress MPI  Medical Management   Signatures   ------------------------------------------------------------------  Electronically signed by Natalie Deleon MD (Interpreting  cardiologist) on 06/17/2021 at 13:08  ------------------------------------------------------------------  Procedure Procedure Type:   Nuclear Stress Test:Pharmacological, Myocardial Perfusion Imaging with  Pharm, NM MYOCARDIAL SPECT REST EXERCISE OR RX  Indications: Chest pain. Risk Factors   The patient risk factors include:prior PCI;obesity, Current - Every day  tobacco use, hypercholesterolemia, hypertension and chronic lung disease. Stress Protocols   Resting ECG  Normal sinus rhythm. Resting HR:69 bpm  Resting BP:147/96 mmHg  Stress Protocol:Pharmacologic - Lexiscan  Peak HR:103 bpm                  HR/BP product:[id number]  Peak BP:158/95 mmHg  Predicted HR: 158 bpm  % of predicted HR: 65   Exercise duration: 01:10 min  Reason for termination:Completed   ECG Findings  Normal sinus rhythm. Symptoms  No symptoms with Lexiscan infusion. Stress Interpretation  Appropriate hemodynamic response to Lexiscan. No  significant ST T wave changes with adenosine. ECG  portion is negative for ischemia by diagnostic  criteria. Procedure Medications   - Lexiscan I.V. bolus (over 15sec.) 0.4 mg admininstered @ 06/17/2021 11:00.   Imaging Protocols   Rest Stress   Isotope:Sestamibi 99mTc          Isotope: Sestamibi 99mTc  Isotope dose:10.2 mCi            Isotope dose:28.3 mCi  Administration route: I.V. Administration route: I.V. Injection Date:06/17/2021 09:40  Injection Date:06/17/2021 11:00  Scan Date:06/17/2021 10:25       Scan Date:06/17/2021 11:45   Technique:        SPECT          Technique:        Gated                                                     SPECT   Procedure Description   Upon patient arrival, the patient is identified using two identifiers and  the physician order is verified. An IV is established and 8-11mCi of 99mTc  Sestamibi is intravenously injected and followed with 10mL 0.9% Normal  Saline flush. A circulation period of 45 minutes occurs prior to resting  SPECT imaging. After imaging is complete the patient is escorted to the  stress lab. The patient is connected to the ECG and blood pressure is  measured. The RN starts the stress portion of the exam and rapidly  intravenously injects Lexiscan (regadenosine) 0.4mg over a period of 10 to15  seconds and follows with 5mL 0.9% Normal Saline flush. Immediately following  the Nuclear Technologist intravenously injects 22-33mCi of 99mTc Sestamibi  and 5mL 0.9% Normal Saline flush. After completion, recovery, and removal of  the IV, the patient rests during the second circulation period of 45  minutes. Final stress SPECT gated imaging is performed. The patient may  return home or to their room after stress imaging. The images are processed  and final charting is completed and sent to the appropriate cardiologist for  interpretation and reporting. Perfusion Interpretation   Normal tracer uptake in all segments of myocardium on stress ans rest  images. No infarct or ischemia noted. Normal EF 71 % with normal ventricular contractility.   Imaging Results    Summed scores     - Summed stress score: 1     - Summed rest score: 0     - Summed difference score:    1

## 2021-06-19 NOTE — PLAN OF CARE
Problem: Falls - Risk of:  Goal: Will remain free from falls  Description: Will remain free from falls  6/19/2021 1241 by Karin Guzmán LPN  Outcome: Ongoing  6/19/2021 0223 by Otha Kayser, RN  Outcome: Met This Shift  Goal: Absence of physical injury  Description: Absence of physical injury  6/19/2021 1241 by Karin Guzmán LPN  Outcome: Ongoing  6/19/2021 0223 by Otha Kayser, RN  Outcome: Met This Shift     Problem: Pain:  Goal: Pain level will decrease  Description: Pain level will decrease  6/19/2021 1241 by Karin Guzmán LPN  Outcome: Ongoing  6/19/2021 0223 by Otha Kayser, RN  Outcome: Met This Shift  Goal: Control of acute pain  Description: Control of acute pain  6/19/2021 1241 by Karin Guzmán LPN  Outcome: Ongoing  6/19/2021 0223 by Otha Kayser, RN  Outcome: Met This Shift  Goal: Control of chronic pain  Description: Control of chronic pain  6/19/2021 1241 by Karin Guzmán LPN  Outcome: Ongoing  6/19/2021 0223 by Otha Kayser, RN  Outcome: Met This Shift     Problem: Anxiety/Stress:  Goal: Level of anxiety will decrease  Description: Level of anxiety will decrease  6/19/2021 1241 by Karin Guzmán LPN  Outcome: Ongoing  6/19/2021 0223 by Otha Kayser, RN  Outcome: Met This Shift     Problem: Sleep Pattern Disturbance:  Goal: Appears well-rested  Description: Appears well-rested  6/19/2021 1241 by Karin Guzmán LPN  Outcome: Ongoing  6/19/2021 0223 by Otha Kayser, RN  Outcome: Met This Shift

## 2021-06-19 NOTE — PROGRESS NOTES
stress test 07/17/2020     scan shows small size, moderate intensity, reversible perfusion defect in anterior wall. normal LVEF .    Belk[place]ski (hard of hearing)     Bilateral Ears    Hx of cardiovascular stress test 9/2013 9/13 EF70% Normal.10/12- LVSF normal. EF 61%. Normal perfusion althought pt complained of chest pain with Lexiscan. report in epic; 11/11, possible apical ischemia but abnormality secondary to technical artifact needs to be considered, regional wall motion abnormality with low normal LVSF by pierson scan. 8/10, 12/09    Hx of Doppler ultrasound 2/2011 2/2011-Carotid no significant obstructive lesions noted in the extracranial carotid system. Antegrade flow noted inthe vertebral arteries.  Hx of Doppler ultrasound 1/2012 1/2012-peripheral - both abis and duplex studies of both lower extremities do not  reveal any significant peripheral vascular disease at this time.  Hx of Doppler ultrasound 1/22/2016    Arterial: Peripheral vascular noninvasive arterial studies do not reveal any significant atherosclerotic disease.  Hx of echocardiogram 6/2013 6/13- Mild to mod MR/TR. 10/12-LVSF normal. EF 60%. Mild mitral and tricuspid insuff. reprot in epic; 8/6/10, normal dimension of the LV, normal global and regional LVSF with an EF of 60%, no significant valvuopathy is seen.  12/05    Hypertension     \"on medication since age 25's\" follow with Dr Venita Bernal Irregular heart beat     \"have irreg heart beat\" dont know what you call it\"    Migraines Last Migraine In 2014    Palpitations     Panic attacks     Pneumonia Last Episode In 2014    Prolonged emergence from general anesthesia     Restless leg     S/P cardiac cath 2/21/14    S/P PTCA (percutaneous transluminal coronary angioplasty) 01/26/2017    2017, CX stented    Shortness of breath     Teeth missing     Upper And Lower    Tobacco use     Unspecified sleep apnea     \"had sleep study 2-3 yrs ago\" have cpap and try to use it\"    :   Past Surgical History:   Procedure Laterality Date    ANKLE FRACTURE SURGERY Left 5/24/2019    LEFT ANKLE OPEN REDUCTION INTERNAL FIXATION SYNDESMOSIS performed by Gianna Parker MD at [place] [id number]  2011    COLONOSCOPY  08/23/2017    colon polyp, diverticulosis, hemorrhoids    DENTAL SURGERY      Teeth Extracted In Past    ENDOSCOPY, COLON, DIAGNOSTIC  10-16-15    ENDOSCOPY, COLON, DIAGNOSTIC  01/28/2019    Hiatal hernia, savary dil #17 used    GASTRIC FUNDOPLICATION  55/35/3341    Robotic laparoscopic nissen with mesh    KIDNEY SURGERY Left 2-15    \"At [facility] Had Left Kidney Mass Removed\" Benign    TONSILLECTOMY  1960's    TUBAL LIGATION  1993    UPPER GASTROINTESTINAL ENDOSCOPY N/A 1/28/2019    EGD DILATION SAVARY #17MM performed by Ashley Hsieh MD at [place] [id number]     Medications:  Scheduled Meds:   pantoprazole  40 mg Oral BID AC    aspirin EC  81 mg Oral Daily    atorvastatin  40 mg Oral Daily    clopidogrel  75 mg Oral Daily    donepezil  10 mg Oral Nightly    ipratropium-albuterol  1 vial Inhalation 4x Daily    melatonin  5 mg Oral Daily    memantine  10 mg Oral BID    ranolazine  1,000 mg Oral BID    sertraline  100 mg Oral Daily    sodium chloride flush  5-40 mL Intravenous 2 times per day    enoxaparin  40 mg Subcutaneous QPM     Continuous Infusions:   sodium chloride       PRN Meds:.magnesium sulfate, hydrOXYzine, nitroGLYCERIN, sodium chloride flush, sodium chloride, ondansetron **OR** ondansetron, acetaminophen **OR** acetaminophen, polyethylene glycol    Allergies   Allergen Reactions    Adhesive Tape Rash    Aspirin Nausea And Vomiting     Chewable sts not coated.     Pcn [Penicillins] Nausea And Vomiting and Rash     Social History     Socioeconomic History    Marital status:      Spouse name: Not on file    Number of children: 1    Years of education: 12    Highest education level: Not on file   Occupational History    Not on file   Tobacco Use    Smoking status: Former Smoker     Packs/day: 0.25     Years: 44.00     Pack years: 11.00     Types: Cigarettes     Start date: 10/21/1971     Quit date: 2015     Years since quittin.8    Smokeless tobacco: Never Used   Vaping Use    Vaping Use: Never used   Substance and Sexual Activity    Alcohol use: Yes     Alcohol/week: 2.0 standard drinks     Types: 2 Shots of liquor per week     Comment: occassionally    Drug use: No    Sexual activity: Never   Other Topics Concern    Not on file   Social History Narrative    Not on file     Social Determinants of Health     Financial Resource Strain:     Difficulty of Paying Living Expenses:    Food Insecurity:     Worried About Running Out of Food in the Last Year:     [address] in the Last Year:    Transportation Needs:     Lack of Transportation (Medical):  Lack of Transportation (Non-Medical):    Physical Activity:     Days of Exercise per Week:     Minutes of Exercise per Session:    Stress:     Feeling of Stress :    Social Connections:     Frequency of Communication with Friends and Family:     Frequency of Social Gatherings with Friends and Family:     Attends Buddhism Services:     Active Member of Clubs or Organizations:     Attends Club or Organization Meetings:     Marital Status:    Intimate Partner Violence:     Fear of Current or Ex-Partner:     Emotionally Abused:     Physically Abused:     Sexually Abused:       Family History   Problem Relation Age of Onset    Heart Disease Mother     Heart Disease Father     High Blood Pressure Father     Cancer Sister     Early Death Sister 43    Asthma Brother        Review of Symptoms:    10-point system review completed. All of which are negative except as mentioned above.     Physical Exam:       [unfilled]     Gen: A&O x 4, NAD, cooperative  HEENT: NC/AT, EOMI, PERRL, mmm, no carotid bruits, neck supple, no meningeal signs; Fundoscopic: no disc edema appreciated  Heart: RRR, S1S2  Lungs: CTAB  Ext: no edema, no calf tenderness b/l  Psych: normal mood and affect  Skin: no rashes or lesions    NEUROLOGIC EXAM:    Mental Status: A&O to self, location, month and year, NAD, speech clear, language fluent, repetition and naming intact, follows commands appropriately    Cranial Nerve Exam:   CN II-XII: No disc edema appreciated on fundoscopic examination, PERRL, VFF, no nystagmus, no gaze paresis, sensation V1-V3 intact b/l, muscles of facial expression symmetric; hearing intact to conversational tone, palate elevates symmetrically, shoulder elevation symmetric and tongue protrudes midline with movement side to side. Motor Exam:       Strength 5/5 UE's/LE's b/l  Tone and bulk normal   No pronator drift    Deep Tendon Reflexes: 2/4 biceps, triceps, brachioradialis, patellar, and achilles b/l; flexor plantar responses b/l    Sensation: Intact light touch/pinprick/vibration UE's/LE's b/l    Coordination/Cerebellum:       Tremors--none      Rapidly alternating movements: no dysdiadochokinesia b/l                Heel-to-Shin: no dysmetria b/l      Finger-to-Nose: no dysmetria b/l    Gait and stance:      Gait: deferred      LABS:     Recent Labs     06/16/21  1152 06/17/21  0701   WBC 8.2 6.3    141   K 4.3 4.6    104   CO2 25 28   BUN 10 8   CREATININE 0.8 0.9   GLUCOSE 127* 82   INR 0.84  --    [email or web address],     Last Liver Function Results:  @[email or web address](ALT:3,AST:3,BILITOTAL:3,BILIDIR:3,ALKPHOS:3)@          IMAGING:    CTA:  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 Otherwise   unremarkable CTA of the head and neck. CT head non acute  CT  T and C spine:  1. No acute osseous abnormality of the thoracic spine. 2. Minimal smooth midthoracic dextroscoliosis. 3. Small to moderate hiatal hernia.      1. No acute osseous abnormality of the cervical spine. 2. Mild C5-6 degenerative disc height loss.  No significant bony neural   foraminal or spinal canal stenosis. :    ASSESSMENT/PLAN:     3 57year old female who presented with CP, was thought to have left sided facial droop with associated neck pain and BUE weakness acute infarction v. Remote cannot be ruled out as she cannot obtain MRI due to cochlear implants, No compression fracture of the C or T spine were found. Neuro exam for me was non focal. Continue DAPT. Continue High intensity statin. Have PT/OT/ST evaluation. No further neurological recommendations. Thank you for allowing us to participate in the care of your patient. If there are any questions regarding evaluation please feel free to contact us.      LAVERN Scanlon - CNP, 6/18/2021

## 2021-06-19 NOTE — PLAN OF CARE
Problem: Falls - Risk of:  Goal: Will remain free from falls  Description: Will remain free from falls  6/19/2021 0223 by Lesli Kim RN  Outcome: Met This Shift  6/18/2021 1506 by Shaneka Kramer RN  Outcome: Ongoing  Goal: Absence of physical injury  Description: Absence of physical injury  6/19/2021 0223 by Lesli Kim RN  Outcome: Met This Shift  6/18/2021 1506 by Shaneka Kramer RN  Outcome: Ongoing     Problem: Pain:  Goal: Pain level will decrease  Description: Pain level will decrease  6/19/2021 0223 by Lesli Kim RN  Outcome: Met This Shift  6/18/2021 1506 by Shaneka Kramer RN  Outcome: Ongoing  Goal: Control of acute pain  Description: Control of acute pain  6/19/2021 0223 by Lesli Kim RN  Outcome: Met This Shift  6/18/2021 1506 by Shaneka Kramer RN  Outcome: Ongoing  Goal: Control of chronic pain  Description: Control of chronic pain  6/19/2021 0223 by Lesli Kim RN  Outcome: Met This Shift  6/18/2021 1506 by Shaneka Kramer RN  Outcome: Ongoing     Problem: Anxiety/Stress:  Goal: Level of anxiety will decrease  Description: Level of anxiety will decrease  6/19/2021 0223 by Lesli Kim RN  Outcome: Met This Shift  6/18/2021 1506 by Shaneka Kramer RN  Outcome: Ongoing     Problem: Sleep Pattern Disturbance:  Goal: Appears well-rested  Description: Appears well-rested  6/19/2021 0223 by Lesli Kim RN  Outcome: Met This Shift  6/18/2021 1506 by Shaneka Kramer RN  Outcome: Ongoing

## 2021-06-20 PROCEDURE — 1200000000 HC SEMI PRIVATE

## 2021-06-20 PROCEDURE — 6360000002 HC RX W HCPCS: Performed by: HOSPITALIST

## 2021-06-20 PROCEDURE — 94640 AIRWAY INHALATION TREATMENT: CPT

## 2021-06-20 PROCEDURE — 36415 COLL VENOUS BLD VENIPUNCTURE: CPT

## 2021-06-20 PROCEDURE — 2580000003 HC RX 258: Performed by: HOSPITALIST

## 2021-06-20 PROCEDURE — 6370000000 HC RX 637 (ALT 250 FOR IP): Performed by: HOSPITALIST

## 2021-06-20 PROCEDURE — 86376 MICROSOMAL ANTIBODY EACH: CPT

## 2021-06-20 PROCEDURE — 94761 N-INVAS EAR/PLS OXIMETRY MLT: CPT

## 2021-06-20 PROCEDURE — 86800 THYROGLOBULIN ANTIBODY: CPT

## 2021-06-20 RX ADMIN — IPRATROPIUM BROMIDE AND ALBUTEROL SULFATE 3 ML: .5; 3 SOLUTION RESPIRATORY (INHALATION) at 16:04

## 2021-06-20 RX ADMIN — PANTOPRAZOLE SODIUM 40 MG: 40 TABLET, DELAYED RELEASE ORAL at 05:18

## 2021-06-20 RX ADMIN — ONDANSETRON 4 MG: 2 INJECTION INTRAMUSCULAR; INTRAVENOUS at 09:07

## 2021-06-20 RX ADMIN — DONEPEZIL HYDROCHLORIDE 10 MG: 10 TABLET, FILM COATED ORAL at 20:33

## 2021-06-20 RX ADMIN — IPRATROPIUM BROMIDE AND ALBUTEROL SULFATE 3 ML: .5; 3 SOLUTION RESPIRATORY (INHALATION) at 07:19

## 2021-06-20 RX ADMIN — PANTOPRAZOLE SODIUM 40 MG: 40 TABLET, DELAYED RELEASE ORAL at 16:35

## 2021-06-20 RX ADMIN — ATORVASTATIN CALCIUM 40 MG: 40 TABLET, FILM COATED ORAL at 09:07

## 2021-06-20 RX ADMIN — ENOXAPARIN SODIUM 40 MG: 40 INJECTION SUBCUTANEOUS at 16:35

## 2021-06-20 RX ADMIN — CLOPIDOGREL BISULFATE 75 MG: 75 TABLET ORAL at 09:07

## 2021-06-20 RX ADMIN — ASPIRIN 81 MG: 81 TABLET, COATED ORAL at 09:07

## 2021-06-20 RX ADMIN — MEMANTINE 10 MG: 10 TABLET ORAL at 09:07

## 2021-06-20 RX ADMIN — MEMANTINE 10 MG: 10 TABLET ORAL at 20:33

## 2021-06-20 RX ADMIN — IPRATROPIUM BROMIDE AND ALBUTEROL SULFATE 3 ML: .5; 3 SOLUTION RESPIRATORY (INHALATION) at 11:13

## 2021-06-20 RX ADMIN — SERTRALINE HYDROCHLORIDE 100 MG: 100 TABLET ORAL at 09:07

## 2021-06-20 RX ADMIN — RANOLAZINE 1000 MG: 500 TABLET, FILM COATED, EXTENDED RELEASE ORAL at 09:07

## 2021-06-20 RX ADMIN — Medication 5 MG: at 20:33

## 2021-06-20 RX ADMIN — IPRATROPIUM BROMIDE AND ALBUTEROL SULFATE 3 ML: .5; 3 SOLUTION RESPIRATORY (INHALATION) at 21:45

## 2021-06-20 RX ADMIN — SODIUM CHLORIDE, PRESERVATIVE FREE 10 ML: 5 INJECTION INTRAVENOUS at 20:33

## 2021-06-20 RX ADMIN — SODIUM CHLORIDE, PRESERVATIVE FREE 10 ML: 5 INJECTION INTRAVENOUS at 09:07

## 2021-06-20 RX ADMIN — RANOLAZINE 1000 MG: 500 TABLET, FILM COATED, EXTENDED RELEASE ORAL at 20:33

## 2021-06-20 ASSESSMENT — PAIN SCALES - GENERAL
PAINLEVEL_OUTOF10: 0
PAINLEVEL_OUTOF10: 0

## 2021-06-20 NOTE — CONSULTS
Endocrinology   Consult Note    Dear Doctor Joyce Cervantes for the Consult     Pt. Was Admitted for : Left-sided chest pain and dizziness  Also had slurred speech with facial droop and right upper extremity weakness  Reason for Consult:  Left sided thyroid nodule       History Obtained From:  Patient/ EMR       HISTORY OF PRESENT ILLNESS:                The patient is a 57 y.o. female with significant past medical history of CAD, hypertension, skin cancer, COPD, chronic back pain came in complaining of chest pain also had slurred speech and left-sided facial droop . CT scan showed patient has left thyroid none it was confirmed on the ultrasound examination of the neck I was  consulted for better evaluation of thyroid function and the thyroid nodule    ROS:   Pt's ROS done in detail. Abnormal ROS are noted in Medical and Surgical History Section below: Other Medical History:        Diagnosis Date    Anxiety     Arthritis     \"Both Legs\"    Asthma     Back problem     \"Curved Spine\"    Bronchitis Last Episode In 2015    CAD (coronary artery disease)     Sees Dr. Salima Luis; 8-16-16 (noted on 10-4-2012 that patient does not have CAD s/p cardiac catheterization).  Cancer ([address]     skin ca on skin    Chest pain     in ER with this dx 7/2015- admitted- stress test done as outpt 8/7/2015(see report)    Chipped tooth     Upper And Lower    Chronic back pain     COPD (chronic obstructive pulmonary disease) ([address] 06/01/2017    Sees Dr. Erich Bermudez    Cough     Occ. Nonproductive Cough    Emphysema     GERD (gastroesophageal reflux disease)     H/O 24 hour EKG monitoring     3/14/2013-Signif for runs of sinus tachycardia, fastest recorded at 132 bpm lasting almost 38 mins. Dr Ana Farias. -report in [facility]    H/O cardiac catheterization     10/4/2012- No CAD.  False positive stress test.Dr Valdez-report in [facility];     H/O cardiac catheterization     H/O Doppler lower venous ultrasound 09/24/2020 No DVT or SVT, No significant venous insufficiency    H/O Doppler ultrasound 04/13/2017    LE doppler - normal study    H/O Doppler ultrasound 05/26/2017    carotid - normal study    H/O exercise stress test 02/28/2017    normal study    Hiatal hernia     History of cardiac cath 08/03/2020    PATENT CX STENT, Normal EF,  NORMAL LAD AND RCA    History of exercise stress test 02/28/2017    treadmill    History of nuclear stress test 8/7/15    EF 70%. WNL    History of nuclear stress test 07/17/2020     scan shows small size, moderate intensity, reversible perfusion defect in anterior wall. normal LVEF .    [place] (hard of hearing)     Bilateral Ears    Hx of cardiovascular stress test 9/2013 9/13 EF70% Normal.10/12- LVSF normal. EF 61%. Normal perfusion althought pt complained of chest pain with Lexiscan. report in [facility]; 11/11, possible apical ischemia but abnormality secondary to technical artifact needs to be considered, regional wall motion abnormality with low normal LVSF by pierson scan. 8/10, 12/09    Hx of Doppler ultrasound 2/2011 2/2011-Carotid no significant obstructive lesions noted in the extracranial carotid system. Antegrade flow noted inthe vertebral arteries.  Hx of Doppler ultrasound 1/2012 1/2012-peripheral - both abis and duplex studies of both lower extremities do not  reveal any significant peripheral vascular disease at this time.  Hx of Doppler ultrasound 1/22/2016    Arterial: Peripheral vascular noninvasive arterial studies do not reveal any significant atherosclerotic disease.  Hx of echocardiogram 6/2013 6/13- Mild to mod MR/TR. 10/12-LVSF normal. EF 60%. Mild mitral and tricuspid insuff. reprot in epic; 8/6/10, normal dimension of the LV, normal global and regional LVSF with an EF of 60%, no significant valvuopathy is seen.  12/05    Hypertension     \"on medication since age 25's\" follow with Dr Eleanora Sandhoff Irregular heart beat     \"have irreg heart beat\" dont know what you call it\"    Migraines Last Migraine In 2014    Palpitations     Panic attacks     Pneumonia Last Episode In 2014    Prolonged emergence from general anesthesia     Restless leg     S/P cardiac cath 2/21/14    S/P PTCA (percutaneous transluminal coronary angioplasty) 01/26/2017    2017, CX stented    Shortness of breath     Teeth missing     Upper And Lower    Tobacco use     Unspecified sleep apnea     \"had sleep study 2-3 yrs ago\" have cpap and try to use it\"     Surgical History:        Procedure Laterality Date    ANKLE FRACTURE SURGERY Left 5/24/2019    LEFT ANKLE OPEN REDUCTION INTERNAL FIXATION SYNDESMOSIS performed by Kal Siegel MD at [address]  2011    COLONOSCOPY  08/23/2017    colon polyp, diverticulosis, hemorrhoids    DENTAL SURGERY      Teeth Extracted In Past    ENDOSCOPY, COLON, DIAGNOSTIC  10-16-15    ENDOSCOPY, COLON, DIAGNOSTIC  01/28/2019    Hiatal hernia, savary dil #17 used    GASTRIC FUNDOPLICATION  66/77/6306    Robotic laparoscopic nissen with mesh    KIDNEY SURGERY Left 2-15    \"At [facility] Had Left Kidney Mass Removed\" Benign    TONSILLECTOMY  1960's    TUBAL LIGATION  1993    UPPER GASTROINTESTINAL ENDOSCOPY N/A 1/28/2019    EGD DILATION SAVARY #17MM performed by Atilio Ott MD at [place] [address]       Allergies:  Adhesive tape, Aspirin, and Pcn [penicillins]    Family History:       Problem Relation Age of Onset    Heart Disease Mother     Heart Disease Father     High Blood Pressure Father     Cancer Sister     Early Death Sister 43    Asthma Brother      REVIEW OF SYSTEMS:  Review of System Done as noted above     PHYSICAL EXAM:      Vitals:    BP (!) 159/102   Pulse 80   Temp 98.6 °F (37 °C) (Oral)   Resp 21   Ht 5' 1\" (1.549 m)   Wt 192 lb 9.6 oz (87.4 kg)   SpO2 93%   BMI 36.39 kg/m²     CONSTITUTIONAL:  awake, alert, cooperative, appears stated age   EYES:  vision intact Fundoscopic Exam not performed   ENT:Normal  NECK: Supple, No JVD. Thyroid Exam: Left sided Thyroid Nodule   LUNGS:  Has Vesicular Breath Sounds,   CARDIOVASCULAR:  Normal apical impulse, regular rate and rhythm, normal S1 and S2, no S3 or S4, and has no  murmur   ABDOMEN:  No scars, normal bowel sounds, soft, non-distended, non-tender, no masses palpated, no hepatolienomegaly  Musculoskeletal: Normal  Extremities: Normal, peripheral pulses normal, , has no edema   NEUROLOGIC:  Awake, alert, oriented to name, place and time. Cranial nerves II-XII are grossly intact. Had slurred speech but improved right facial droop also improved motor is  intact. Sensory is intact. ,  and gait is normal.    DATA:    CBC: No results for input(s): WBC, HGB, PLT in the last 72 hours. CMP:No results for input(s): NA, K, CL, CO2, BUN, CREATININE, CALCIUM, PROT, LABALBU, BILITOT, ALKPHOS, AST, ALT in the last 72 hours.     Invalid input(s): GLU  Lipids:   Lab Results   Component Value Date    CHOL 181 06/17/2021    CHOL 129 08/02/2012    HDL 60 06/17/2021    TRIG 111 06/17/2021     Glucose:   Recent Labs     06/19/21  2339   POCGLU 132*     Hemoglobin A1C:   Lab Results   Component Value Date    LABA1C 5.6 03/07/2020     Free T4:   Lab Results   Component Value Date    T4FREE 1.15 06/17/2021     Free T3:   Lab Results   Component Value Date    FT3 2.5 06/17/2021     TSH High Sensitivity:   Lab Results   Component Value Date    TSHHS 3.800 06/17/2021       Echocardiogram complete 2D with doppler with color    Result Date: 6/17/2021  Transthoracic Echocardiography Report (TTE)  Demographics   Patient Name       [address] [facility]     Date of Study       06/17/2021   Date of Birth      1960         Gender              Female   Age                57 year(s)         Race                   Patient Number     [id number]         Room Number         3007   Visit Number       [id number]   Corporate ID       [id number]   Accession Number   [id number]         Uday Leon Gale Castro RDMS   Ordering Physician Natalie Castillo MD                 Physician           MD  Procedure Type of Study   TTE procedure:ECHOCARDIOGRAM COMPLETE 2D W DOPPLER W COLOR. Procedure Date Date: 06/17/2021 Start: 08:56 AM Study Location: Portable Technical Quality: Fair visualization Indications:Chest pain. Patient Status: Routine Height: 61 inches Weight: 190 pounds BSA: 1.85 m2 BMI: 35.9 kg/m2 HR: 76 bpm BP: 105/56 mmHg  Conclusions   Summary  Left ventricular systolic function is normal.  Ejection fraction is visually estimated at 50-55%. Indeterminate diastolic function; E/A flow reversal is noted. No significant valvular disease noted. No evidence of any pericardial effusion. Signature   ------------------------------------------------------------------  Electronically signed by Andrea Au MD (Interpreting  physician) on 06/17/2021 at 10:13 AM  -     CT HEAD WO CONTRAST    Result Date: 6/16/2021  EXAMINATION: CT OF THE HEAD WITHOUT CONTRAST  6/16/2021 1:40 pm       No acute intracranial abnormality. CT CERVICAL SPINE WO CONTRAST    Result Date: 6/17/2021  EXAMINATION: CT OF THE CERVICAL SPINE WITHOUT CONTRAST 6/17/2021 9:18 pm .    TISSUES: No acute paraspinal soft tissue abnormality. 1. No acute osseous abnormality of the cervical spine. 2. Mild C5-6 degenerative disc height loss. No significant bony neural foraminal or spinal canal stenosis. CT THORACIC SPINE WO CONTRAST    Result Date: 6/17/2021  EXAMINATION: CT OF THE THORACIC SPINE WITHOUT CONTRAST  6/17/2021 9:18 pm:  1. No acute osseous abnormality of the thoracic spine. 2. Minimal smooth midthoracic dextroscoliosis. 3. Small to moderate hiatal hernia.      US HEAD NECK SOFT TISSUE THYROID    Result Date: 6/20/2021  EXAMINATION: THYROID ULTRASOUND 6/17/2021 COMPARISON: 06/16/2021 HISTORY: ORDERING SYSTEM vertebral artery in fetal origin of the left posterior cerebral artery which are normal anatomic variants. Otherwise unremarkable CTA of the head and neck. NM MYOCARDIAL SPECT REST EXERCISE OR RX    Result Date: 6/17/2021  Cardiac Perfusion Imaging   Demographics   Patient Name      [address]     Date of study        06/17/2021   Date of Birth     1960         Gender               Female   Age               57 year(s)         Race                    Patient Number    [id number]         Room Number          3007   Visit Number      [id number]          Height               61 inches   Corporate ID      [id number]           Weight               190 pounds   Accession Number  [id number]                                        NM Technologist      Lisa Ibrahim, Leonel Davila  Physician         MD                 Cardiologist         MD   Conclusions   Summary   Normal tracer uptake in all segments of myocardium on stress and rest  images. No infarct or ischemia noted. Normal EF 71 % with normal ventricular contractility.    Recommendation  Normal stress MPI  Medical Management   Signatures   ------------------------------------------------------------------  Electronically signed by Andrea Au MD (Interpreting  cardiologist) on 06/17/2021 at 13:08  -    Scheduled Medicines   Medications:    pantoprazole  40 mg Oral BID AC    aspirin EC  81 mg Oral Daily    atorvastatin  40 mg Oral Daily    clopidogrel  75 mg Oral Daily    donepezil  10 mg Oral Nightly    ipratropium-albuterol  1 vial Inhalation 4x Daily    melatonin  5 mg Oral Daily    memantine  10 mg Oral BID    ranolazine  1,000 mg Oral BID    sertraline  100 mg Oral Daily    sodium chloride flush  5-40 mL Intravenous 2 times per day    enoxaparin  40 mg Subcutaneous QPM      Infusions:    sodium chloride           IMPRESSION    Patient Active Problem List Diagnosis    Chest pain    Current tobacco use    [facility]ch[facility]) [facility]    Essential hypertension    Palpitations    Tobacco use    Gastroesophageal reflux disease with hiatal hernia    S/P Nissen fundoplication (without gastrostomy tube) procedure    Precordial pain    Post PTCA    Leg pain, bilateral    Syncope and collapse    Dizziness    [address]ina [address]is [address] [address]    HTN (hypertension)    ASCVD (arteriosclerotic cardiovascular disease)    GERD (gastroesophageal reflux disease)    Ankle syndesmosis disruption, left, subsequent encounter    Closed trimalleolar fracture of left ankle    Closed left ankle fracture, initial encounter    Closed fracture of upper end of left fibula    Chronic kidney disease (CKD) stage G3a/A1, moderately decreased glomerular filtration rate (GFR) between 45-59 mL/min/1.73 square meter and albuminuria creatinine ratio less than 30 mg/g ([facility])    Hypotension         RECOMMENDATIONS:      1. Reviewed POC blood glucose . Labs and X ray results   2. Reviewed Home and Current Medicines \  3. Detailed studies of Thyroid nodue        Will follow with you  Again thank you for sharing pt's care with me.      Truly yours,       Delmar Vogt MD

## 2021-06-20 NOTE — RT PROTOCOL NOTE
RT Nebulizer Bronchodilator Protocol Note    There is a bronchodilator order in the chart from a provider indicating to follow the RT Bronchodilator Protocol and there is an Initiate RT Bronchodilator Protocol order as well (see protocol at bottom of note). The findings from the last RT Protocol Assessment were as follows:  Smoking: >15 Pack years  Surgical Status: No surgery  Xray: Clear  Respiratory Pattern: RR 12-20  Mental Status: Confused but follows commands  Breath Sounds: Diminished and/or crackles  Cough: Strong, spontaneous, non-productive  Activity Level: Mostly sedentary, minimal walking  Oxygen Requirement: Room Air - 2LNC/28% or home setting  Indication for Bronchodilator Therapy: Decreased or absent breath sounds  Bronchodilator Assessment Score: 3    Aerosolized bronchodilator medication orders have been revised according to the RT Bronchodilator Protocol. RT Bronchodilator Protocol:    Respiratory Therapist to perform RT Therapy Protocol Assessment then follow the protocol. No Indications  adjust the frequency to every 6 hours PRN wheezing or bronchospasm, if no treatments needed after 48 hours then discontinue using Per Protocol order mode. If indication present, adjust the RT bronchodilator orders based on the Bronchodilator Assessment Score as follows:    0-6  enter or revise RT Bronchodilator order to Albuterol Nebulizer order with frequency of every 2 hours PRN for wheezing or increased work of breathing using Per Protocol order mode. Repeat RT Therapy Protocol Assessment as needed. 7-10 - discontinue any other Inpatient aerosolized bronchodilator medication orders and enter or revise two Albuterol Nebulizer orders, one with BID frequency and one with frequency of every 2 hours PRN wheezing or increased work of breathing using Per Protocol order mode. Repeat RT Therapy Protocol Assessment with second treatment then BID and as needed.       11-13 - discontinue any other Inpatient aerosolized bronchodilator medication orders and enter DuoNeb Nebulizer order with QID frequency and an Albuterol Nebulizer order with frequency of every 2 hours PRN wheezing or increased work of breathing using Per Protocol order mode. Repeat RT Therapy Protocol Assessment with second treatment then QID and as needed. Greater than 13 - discontinue any other Inpatient aerosolized bronchodilator medication orders and enter a DuoNeb Nebulizer order with every 4 hours frequency and an Albuterol Nebulizer order with frequency of every 2 hours PRN wheezing or increased work of breathing using Per Protocol order mode. Repeat RT Therapy Protocol Assessment with second treatment then every 4 hours and as needed. RT to enter RT Home Evaluation for COPD & MDI Assessment order using Per Protocol order mode. Pt takes DuoNeb Nebulizer treatments 4 X daily at home. Will leave pt on her regular medication. Will not put her on RT Protocol.     Electronically signed by Edwina Gallegos RCP on 6/20/2021 at 4:11 PM

## 2021-06-20 NOTE — PLAN OF CARE
Problem: Falls - Risk of:  Goal: Will remain free from falls  Description: Will remain free from falls  6/19/2021 2346 by Refugio Child RN  Outcome: Met This Shift  6/19/2021 1241 by Niecy Ash LPN  Outcome: Ongoing  Goal: Absence of physical injury  Description: Absence of physical injury  6/19/2021 2346 by Refugio Child RN  Outcome: Met This Shift  6/19/2021 1241 by Niecy Ash LPN  Outcome: Ongoing     Problem: Pain:  Goal: Pain level will decrease  Description: Pain level will decrease  6/19/2021 2346 by Refugio Child RN  Outcome: Met This Shift  6/19/2021 1241 by Niecy Ash LPN  Outcome: Ongoing  Goal: Control of acute pain  Description: Control of acute pain  6/19/2021 2346 by Refugio Child RN  Outcome: Met This Shift  6/19/2021 1241 by Niecy Ash LPN  Outcome: Ongoing  Goal: Control of chronic pain  Description: Control of chronic pain  6/19/2021 2346 by Refugio Child RN  Outcome: Met This Shift  6/19/2021 1241 by Niecy Ash LPN  Outcome: Ongoing     Problem: Anxiety/Stress:  Goal: Level of anxiety will decrease  Description: Level of anxiety will decrease  6/19/2021 2346 by Refugio Child RN  Outcome: Met This Shift  6/19/2021 1241 by Niecy Ash LPN  Outcome: Ongoing     Problem: Sleep Pattern Disturbance:  Goal: Appears well-rested  Description: Appears well-rested  6/19/2021 2346 by Refugio Child RN  Outcome: Met This Shift  6/19/2021 1241 by Niecy Ash LPN  Outcome: Ongoing

## 2021-06-20 NOTE — PROGRESS NOTES
Hospitalist Progress Note    Asked to take care of the patient as of 6/19/2021  Patient:  Leslie Tompkins    Unit/Bed:3007/3007-A  YOB: 1960  MRN: [id number]   Acct: [de-identified]   PCP: LAVERN Tadeo NP  Date of Admission: 6/16/2021    Assessment and Plan:        1. Atypical chest pain/resolved: Has a prior history of CAD, had left heart cath in 2018, Lexiscan stress test normal, echocardiogram within normal limits, cardiology consulted and recommended only medical therapy to continue ASA, P2 Y 12 inhibitor, statin, and Ranexa. She does not have any chest pain currently. Cardiac enzymes with a proBNP normal.  2. Neurological abnormalities: Upon admission the admitter noted left facial droop and weakness on the right upper extremity with unclear reason. There was inability to run MRI of the brain because of cochlear implant. 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 Neurology team been consulted and recommended only DAPT, working with PT/OT/ST, no further neurological recommendations from their standpoint. 3. 2 cm incidental left thyroid nodule: Ultrasound neck thyroid ordered and showed 10 mm solid nodule on the left side only. ,  TSH, free T3 and free T4 normal.  Does not have any thyroid symptoms currently. Recommend a thyroid scan in 1 year. 4. Minimal smooth mid thoracic dextroscoliosis: This is a chronic changes, need to follow-up as an outpatient  5. Small to moderate hiatal hernia: Currently on PPI  6. Mild C5-C6 degenerative disc height loss: Need to follow-up as an outpatient  7. Depression: Continue Zoloft 100 mg daily  8. Hearing impairment bilaterally status post cochlear implant noted  9.  BMI more than 35/morbid obesity: Counseled about lifestyle modification  10. Physical debility and disposition: She is very weak currently she still having imbalance, need to work with PT/OT for possible rehab/SNF. Disposition:  6/20/21: PT/OT recs is Subacute/SNF, need CM and  to f/u on. PMH, PSH, SH, FHX reviewed in chart review snap shot in EPIC    CC: Physical debility, atypical chest pain    HPI: Initial admission HPI by admitting physician reviewed as below:  Moose Rodriguez is a 57 y.o.  female with history of coronary artery disease, patient presented with left-sided chest pain started yesterday night, patient is poor historian, I took the history from the medical chart also, patient stated chest pain started yesterday however she has had recurrent episodes of chest pain since while, described as aching radiating to the left shoulder associated with some shortness of breath, also I noted the patient has slurred speech and left facial droop with some weakness of the right upper extremity, is unclear when her last known well being, a friend of her present states she has had that for a while as well, so the patient is not candidate for TPA, patient has no history of DVT PE or AAA. 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 ROS (10 point review of systems completed. Pertinent positives noted. Otherwise ROS is negative) :  Off balance, weakness  PMH:  Per HPI and reviewed in the chart  SHX: Reviewed in the chart, also the patient  FHX: Reviewed in the chart, also with the patient  Allergies: Reviewed in the chart  Medications:     sodium chloride        pantoprazole  40 mg Oral BID AC    aspirin EC  81 mg Oral Daily    atorvastatin  40 mg Oral 61 inches Weight: 190 pounds BSA: 1.85 m2 BMI: 35.9 kg/m2 HR: 76 bpm BP: 105/56 mmHg  Conclusions   Summary  Left ventricular systolic function is normal.  Ejection fraction is visually estimated at 50-55%. Indeterminate diastolic function; E/A flow reversal is noted. No significant valvular disease noted. No evidence of any pericardial effusion. Signature   ------------------------------------------------------------------  Electronically signed by Marbin Jones MD (Interpreting  physician) on 06/17/2021 at 10:13 AM  ------------------------------------------------------------------   Findings   Left Ventricle  Left ventricular systolic function is normal.  Ejection fraction is visually estimated at 50-55%. Indeterminate diastolic function; E/A flow reversal is noted. Left Atrium  Essentially normal left atrium. Right Atrium  Essentially normal right atrium. Right Ventricle  Essentially normal right ventricle. Aortic Valve  Trace aortic regurgitation. Mitral Valve  Trace mitral regurgitation. Tricuspid Valve  Trace tricuspid regurgitation; normal RVSP. Pulmonic Valve  The pulmonic valve was not well visualized. Pericardial Effusion  No evidence of any pericardial effusion. Pleural Effusion  No evidence of pleural effusion.   M-Mode/2D Measurements & Calculations   LV Diastolic Dimension:  LV Systolic Dimension:  LA Dimension: 3.2 cmAO Root  3.62 cm                  2.71 cm                 Dimension: 2.7 cmLA Area:  LV FS:25.1 %             LV Volume Diastolic: 67 53.2 cm2  LV PW Diastolic: 7.10 cm ml  LV PW Systolic: 1.50 cm  LV Volume Systolic: 30  Septum Diastolic: 7.62   ml  cm                       LV EDV/LV EDV Index: 67 RV Diastolic Dimension:  Septum Systolic: 7.03 cm FY/51 B5JK ESV/LV ESV   3.31 cm  CO: 3.96 l/min           Index: 30 ml/16 m2  CI: 2.14 l/m*m2          EF Calculated (A4C):    LA/Aorta: 1.19                           55.2 %  LV Area Diastolic: 25    EF Calculated (2D): LA volume/Index: 39 ml  cm2                      50.5 %                  /19H0  LV Area Systolic: 03.7  cm2                      LV Length: 7.24 cm                            LVOT: 1.9 cm  Doppler Measurements & Calculations   MV Peak E-Wave: 105    AV Peak Velocity: 154 cm/s  LVOT Peak Velocity: 84.2  cm/s                   AV Peak Gradient: 9.49 mmHg cm/s  MV Peak A-Wave: 114    AV Mean Velocity: 102 cm/s  LVOT Mean Velocity: 59.1  cm/s                   AV Mean Gradient: 5 mmHg    cm/s  MV E/A Ratio: 0.92     AV VTI: 30.7 cm             LVOT Peak Gradient: 3  MV Peak Gradient: 4.41 AV Area (Continuity):1.7    mmHgLVOT Mean Gradient: 2  mmHg                   cm2                         mmHg                                                     Estimated RVSP: 22 mmHg  MV P1/2t: 56 msec      LVOT VTI: 18.4 cm           Estimated RAP:3 mmHg  MVA by PHT:3.93 cm2                         Estimated PASP: 8.2 mmHg  MV E' Septal Velocity:                             TR Velocity:114 cm/s  7.68 cm/s                                          TR Gradient:5.2 mmHg  MV E' Lateral  Velocity: 10.6 cm/s  MV E/E' septal: 13.67  MV E/E' lateral: 9.91      CT HEAD WO CONTRAST    Result Date: 6/16/2021  EXAMINATION: CT OF THE HEAD WITHOUT CONTRAST  6/16/2021 1:40 pm TECHNIQUE: CT of the head was performed without the administration of intravenous contrast. Dose modulation, iterative reconstruction, and/or weight based adjustment of the mA/kV was utilized to reduce the radiation dose to as low as reasonably achievable. COMPARISON: 04/21/2021 HISTORY: ORDERING SYSTEM PROVIDED HISTORY: dizzy/slurred speech TECHNOLOGIST PROVIDED HISTORY: Has a \"code stroke\" or \"stroke alert\" been called? ->No Reason for exam:->dizzy/slurred speech Decision Support Exception - unselect if not a suspected or confirmed emergency medical condition->Emergency Medical Condition (MA) Reason for Exam: dizzy/slurred speech Acuity: Acute Type of Exam: Initial FINDINGS: Imaging is degraded by streak artifact from subcutaneous device in the right parietal region. BRAIN/VENTRICLES: There is no acute intracranial hemorrhage, mass effect or midline shift. No abnormal extra-axial fluid collection. The gray-white differentiation is maintained without evidence of an acute infarct. There is no evidence of hydrocephalus. ORBITS: The visualized portion of the orbits demonstrate no acute abnormality. SINUSES: Similar partial opacification of the right mastoid air cells. SOFT TISSUES/SKULL:  No acute abnormality of the visualized skull or soft tissues. No acute intracranial abnormality. CT CERVICAL SPINE WO CONTRAST    Result Date: 6/17/2021  EXAMINATION: CT OF THE CERVICAL SPINE WITHOUT CONTRAST 6/17/2021 9:18 pm TECHNIQUE: CT of the cervical spine was performed without the administration of intravenous contrast. Multiplanar reformatted images are provided for review. Dose modulation, iterative reconstruction, and/or weight based adjustment of the mA/kV was utilized to reduce the radiation dose to as low as reasonably achievable. COMPARISON: 05/11/2017 HISTORY: ORDERING SYSTEM PROVIDED HISTORY: neck pain and BUE weakness cannot get mRI TECHNOLOGIST PROVIDED HISTORY: Reason for exam:->neck pain and BUE weakness cannot get mRI Reason for Exam: neck pain and BUE weakness cannot get mRI FINDINGS: BONES/ALIGNMENT: There is no acute fracture or suspect osseous lesion. 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 Vertebral body heights are maintained. There is straightening of the cervical lordosis without listhesis. Patient is status post prior canal wall up right mastoidectomy with cochlear implant in place. DEGENERATIVE CHANGES: Mild C5-6 height loss. No significant bony neural foraminal or spinal canal stenosis. SOFT TISSUES: No acute paraspinal soft tissue abnormality.      1. No acute osseous abnormality of the cervical spine. 2. Mild C5-6 degenerative disc height loss. No significant bony neural foraminal or spinal canal stenosis. CT THORACIC SPINE WO CONTRAST    Result Date: 6/17/2021  EXAMINATION: CT OF THE THORACIC SPINE WITHOUT CONTRAST  6/17/2021 9:18 pm: TECHNIQUE: CT of the thoracic spine was performed without the administration of intravenous contrast. Multiplanar reformatted images are provided for review. Dose modulation, iterative reconstruction, and/or weight based adjustment of the mA/kV was utilized to reduce the radiation dose to as low as reasonably achievable. COMPARISON: None. HISTORY: ORDERING SYSTEM PROVIDED HISTORY: rule out compression fracture TECHNOLOGIST PROVIDED HISTORY: Reason for exam:->rule out compression fracture Reason for Exam: rule out compression fracture FINDINGS: BONES/ALIGNMENT: There is no acute fracture or suspect osseous lesion. Vertebral body heights are maintained. There is minimal smooth midthoracic dextroscoliosis without spondylolisthesis. DEGENERATIVE CHANGES: No significant bony neural foraminal or spinal canal stenosis. SOFT TISSUES: Minimal atelectasis in the left lung base. Small to moderate hiatal hernia. Prior cholecystectomy. Left renal cyst measures 3.9 cm in diameter. 1. No acute osseous abnormality of the thoracic spine. 2. Minimal smooth midthoracic dextroscoliosis. 3. Small to moderate hiatal hernia. US HEAD NECK SOFT TISSUE THYROID    Result Date: 6/17/2021  EXAMINATION: THYROID ULTRASOUND 6/17/2021 COMPARISON: 06/16/2021 HISTORY: ORDERING SYSTEM PROVIDED HISTORY: thyroid nodule TECHNOLOGIST PROVIDED HISTORY: Reason for exam:->thyroid nodule Reason for Exam: LT thyroid nodule seen on CTA chest Acuity: Acute Type of Exam: Initial FINDINGS: Right thyroid lobe:  3 x 1.7 x 1.3 cm Left thyroid lobe:  3.3 x 1.6 x 1.8 cm Isthmus:  0.5 cm Thyroid Gland:  Thyroid gland demonstrates normal echotexture and vascularity. Nodules:  In the mid left thyroid lobe, there is a 10 mm hypoechoic solid nodule laterally with smooth margins (Category TR4). No additional thyroid nodule is present. The abnormality on recent chest CT may have exaggerated the size of the nodule due to motion artifact. Cervical lymphadenopathy: No abnormal lymph nodes in the imaged portions of the neck. Left thyroid lobe 10 mm category TR4 nodule. Ultrasound follow-up can be considered in 1 year. RECOMMENDATIONS: ACR TI-RADS recommendations: TR4 (4-6 points):  FNA if >= 1.5 cm; follow-up if 1.0-1.4 cm in 1, 2, 3, and 5 years no more than four nodules should be followed. XR CHEST PORTABLE    Result Date: 6/16/2021  EXAMINATION: ONE XRAY VIEW OF THE CHEST 6/16/2021 11:31 am COMPARISON: April 27, 2020 HISTORY: ORDERING SYSTEM PROVIDED HISTORY: chest pain TECHNOLOGIST PROVIDED HISTORY: Reason for exam:->chest pain Reason for Exam: chest pain FINDINGS: The cardiomediastinal silhouette is within normal range. Lungs are clear. There is no focal pulmonary consolidation, pleural effusion, pneumothorax, or evidence of airspace pulmonary edema. Coronary stent     1. No acute radiographic finding to account for patient's chest pain. CTA CHEST W CONTRAST    Result Date: 6/16/2021  EXAMINATION: CTA OF THE CHEST 6/16/2021 1:55 pm TECHNIQUE: CTA of the chest was performed after the administration of intravenous contrast.  Multiplanar reformatted images are provided for review. MIP images are provided for review. Dose modulation, iterative reconstruction, and/or weight based adjustment of the mA/kV was utilized to reduce the radiation dose to as low as reasonably achievable.  COMPARISON: 05/11/2017 HISTORY: ORDERING SYSTEM PROVIDED HISTORY: dizzy/chest pain TECHNOLOGIST PROVIDED HISTORY: Reason for exam:->dizzy/chest pain Decision Support Exception - unselect if not a suspected or confirmed emergency medical condition->Emergency Medical Condition (MA) Reason for Exam: dizzy/chest pain Acuity: Acute Type Exception - unselect if not a suspected or confirmed emergency medical condition->Emergency Medical Condition (MA) Reason for Exam: dizzy/slurred speech Acuity: Acute Type of Exam: Initial Relevant Medical/Surgical History: 80ML ISOVUE 370 FINDINGS: CTA NECK: AORTIC ARCH/ARCH VESSELS: There is mild atherosclerotic calcification of the thoracic aortic arch. Incidental note is made bovine origin of the left common carotid artery which is a normal anatomic variant. CAROTID ARTERIES: Common carotid arteries are patent bilaterally without any narrowing or stenosis. The left internal carotid artery is patent without narrowing or stenosis. The right internal carotid artery is patent without narrowing or stenosis. VERTEBRAL ARTERIES: The right vertebral artery is dominant and patent in its visualized course without any narrowing or stenosis. The left vertebral artery is congenitally tiny in caliber and faintly visualized throughout its course. SOFT TISSUES: No acute abnormality of the soft tissues of the neck is identified. BONES: No acute osseous abnormality. CTA HEAD: ANTERIOR CIRCULATION:  No abnormality of the internal carotid arteries, middle cerebral arteries, or anterior cerebral arteries are identified. POSTERIOR CIRCULATION: There is fetal origin of the left posterior cerebral artery which is a normal anatomic variant. The basilar artery is patent. The right posterior cerebral artery is patent. No obvious aneurysms are identified. OTHER: No dural venous sinus thrombosis on this non-dedicated study. BRAIN: Refer to the CT head of the same date. 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 Otherwise unremarkable CTA of the head and neck.      NM MYOCARDIAL SPECT REST EXERCISE OR RX    Result Date: 6/17/2021  Cardiac Perfusion Imaging   Demographics   Patient Name      HEYDI SÁNCHEZ YANICK     Date of study        06/17/2021   Date of Birth     1960         Gender               Female   Age               57 year(s)         Race                    Patient Number    [id number]         Room Number          3007   Visit Number      [id number]          Height               61 inches   Corporate ID      [id number]           Weight               190 pounds   Accession Number  [id number]                                        NM Technologist      Lisa Ibrahim, Leonel Davila  Physician         MD                 Cardiologist         MD   Conclusions   Summary   Normal tracer uptake in all segments of myocardium on stress and rest  images. No infarct or ischemia noted. Normal EF 71 % with normal ventricular contractility. Recommendation  Normal stress MPI  Medical Management   Signatures   ------------------------------------------------------------------  Electronically signed by Andrea Au MD (Interpreting  cardiologist) on 06/17/2021 at 13:08  ------------------------------------------------------------------  Procedure Procedure Type:   Nuclear Stress Test:Pharmacological, Myocardial Perfusion Imaging with  Pharm, NM MYOCARDIAL SPECT REST EXERCISE OR RX  Indications: Chest pain. Risk Factors   The patient risk factors include:prior PCI;obesity, Current - Every day  tobacco use, hypercholesterolemia, hypertension and chronic lung disease. Stress Protocols   Resting ECG  Normal sinus rhythm. Resting HR:69 bpm  Resting BP:147/96 mmHg  Stress Protocol:Pharmacologic - Lexiscan  Peak HR:103 bpm                  HR/BP product:[id number]  Peak BP:158/95 mmHg  Predicted HR: 158 bpm  % of predicted HR: 65   Exercise duration: 01:10 min  Reason for termination:Completed   ECG Findings  Normal sinus rhythm. Symptoms  No symptoms with Lexiscan infusion. Stress Interpretation  Appropriate hemodynamic response to Lexiscan. No  significant ST T wave changes with adenosine. ECG  portion is negative for ischemia by diagnostic  criteria. Procedure Medications   - Lexiscan I.V. bolus (over 15sec.) 0.4 mg admininstered @ 06/17/2021 11:00. Imaging Protocols   Rest                             Stress   Isotope:Sestamibi 99mTc          Isotope: Sestamibi 99mTc  Isotope dose:10.2 mCi            Isotope dose:28.3 mCi  Administration route: I.V. Administration route: I.V. Injection Date:06/17/2021 09:40  Injection Date:06/17/2021 11:00  Scan Date:06/17/2021 10:25       Scan Date:06/17/2021 11:45   Technique:        SPECT          Technique:        Gated                                                     SPECT   Procedure Description   Upon patient arrival, the patient is identified using two identifiers and  the physician order is verified. An IV is established and 8-11mCi of 99mTc  Sestamibi is intravenously injected and followed with 10mL 0.9% Normal  Saline flush. A circulation period of 45 minutes occurs prior to resting  SPECT imaging. After imaging is complete the patient is escorted to the  stress lab. The patient is connected to the ECG and blood pressure is  measured. The RN starts the stress portion of the exam and rapidly  intravenously injects Lexiscan (regadenosine) 0.4mg over a period of 10 to15  seconds and follows with 5mL 0.9% Normal Saline flush. Immediately following  the Nuclear Technologist intravenously injects 22-33mCi of 99mTc Sestamibi  and 5mL 0.9% Normal Saline flush. After completion, recovery, and removal of  the IV, the patient rests during the second circulation period of 45  minutes. Final stress SPECT gated imaging is performed. The patient may  return home or to their room after stress imaging. The images are processed  and final charting is completed and sent to the appropriate cardiologist for  interpretation and reporting. Perfusion Interpretation   Normal tracer uptake in all segments of myocardium on stress ans rest  images.   No infarct or ischemia noted. Normal EF 71 % with normal ventricular contractility. Imaging Results    Summed scores     - Summed stress score: 1     - Summed rest score: 0     - Summed difference score:    1   Rest ejection  Ejection fraction:71 %  EDV :51 ml  ESV :15 ml  Stroke volume :36 ml  Medical History   Accession#:  [id number]  Admission Data Admission date: 06/16/2021 Admission Time: 11:36 Hospital Status: Inpatient. Discussed plan with patient and nurse. Patient and nurse verbalized understanding and agree. All questions addressed with concerns. Please excuse my TYPOS!     Electronically signed by Franky Webb MD on 6/20/2021 at 7:29 AM

## 2021-06-20 NOTE — PLAN OF CARE
Problem: Falls - Risk of:  Goal: Will remain free from falls  Description: Will remain free from falls  Outcome: Ongoing  Goal: Absence of physical injury  Description: Absence of physical injury  Outcome: Ongoing     Problem: Pain:  Goal: Pain level will decrease  Description: Pain level will decrease  Outcome: Ongoing  Goal: Control of acute pain  Description: Control of acute pain  Outcome: Ongoing  Goal: Control of chronic pain  Description: Control of chronic pain  Outcome: Ongoing     Problem: Anxiety/Stress:  Goal: Level of anxiety will decrease  Description: Level of anxiety will decrease  Outcome: Ongoing     Problem: Sleep Pattern Disturbance:  Goal: Appears well-rested  Description: Appears well-rested  Outcome: Ongoing

## 2021-06-21 LAB
ANION GAP SERPL CALCULATED.3IONS-SCNC: 8 MMOL/L (ref 4–16)
BASOPHILS ABSOLUTE: 0.1 K/CU MM
BASOPHILS RELATIVE PERCENT: 1.1 % (ref 0–1)
BUN BLDV-MCNC: 10 MG/DL (ref 6–23)
CALCIUM SERPL-MCNC: 9.4 MG/DL (ref 8.3–10.6)
CHLORIDE BLD-SCNC: 103 MMOL/L (ref 99–110)
CO2: 31 MMOL/L (ref 21–32)
CREAT SERPL-MCNC: 1 MG/DL (ref 0.6–1.1)
DIFFERENTIAL TYPE: ABNORMAL
EOSINOPHILS ABSOLUTE: 0.3 K/CU MM
EOSINOPHILS RELATIVE PERCENT: 4 % (ref 0–3)
GFR AFRICAN AMERICAN: >60 ML/MIN/1.73M2
GFR NON-AFRICAN AMERICAN: 56 ML/MIN/1.73M2
GLUCOSE BLD-MCNC: 103 MG/DL (ref 70–99)
HCT VFR BLD CALC: 36.8 % (ref 37–47)
HEMOGLOBIN: 12 GM/DL (ref 12.5–16)
IMMATURE NEUTROPHIL %: 0.8 % (ref 0–0.43)
LYMPHOCYTES ABSOLUTE: 1.4 K/CU MM
LYMPHOCYTES RELATIVE PERCENT: 21.1 % (ref 24–44)
MCH RBC QN AUTO: 28.8 PG (ref 27–31)
MCHC RBC AUTO-ENTMCNC: 32.6 % (ref 32–36)
MCV RBC AUTO: 88.5 FL (ref 78–100)
MONOCYTES ABSOLUTE: 0.5 K/CU MM
MONOCYTES RELATIVE PERCENT: 8.4 % (ref 0–4)
NUCLEATED RBC %: 0 %
PDW BLD-RTO: 14.8 % (ref 11.7–14.9)
PLATELET # BLD: 250 K/CU MM (ref 140–440)
PMV BLD AUTO: 9.2 FL (ref 7.5–11.1)
POTASSIUM SERPL-SCNC: 4.8 MMOL/L (ref 3.5–5.1)
RBC # BLD: 4.16 M/CU MM (ref 4.2–5.4)
SEGMENTED NEUTROPHILS ABSOLUTE COUNT: 4.2 K/CU MM
SEGMENTED NEUTROPHILS RELATIVE PERCENT: 64.6 % (ref 36–66)
SODIUM BLD-SCNC: 142 MMOL/L (ref 135–145)
TOTAL IMMATURE NEUTOROPHIL: 0.05 K/CU MM
TOTAL NUCLEATED RBC: 0 K/CU MM
WBC # BLD: 6.5 K/CU MM (ref 4–10.5)

## 2021-06-21 PROCEDURE — 6370000000 HC RX 637 (ALT 250 FOR IP): Performed by: HOSPITALIST

## 2021-06-21 PROCEDURE — 94761 N-INVAS EAR/PLS OXIMETRY MLT: CPT

## 2021-06-21 PROCEDURE — 94640 AIRWAY INHALATION TREATMENT: CPT

## 2021-06-21 PROCEDURE — 1200000000 HC SEMI PRIVATE

## 2021-06-21 PROCEDURE — 36415 COLL VENOUS BLD VENIPUNCTURE: CPT

## 2021-06-21 PROCEDURE — 2580000003 HC RX 258: Performed by: HOSPITALIST

## 2021-06-21 PROCEDURE — 85025 COMPLETE CBC W/AUTO DIFF WBC: CPT

## 2021-06-21 PROCEDURE — 6360000002 HC RX W HCPCS: Performed by: HOSPITALIST

## 2021-06-21 PROCEDURE — 80048 BASIC METABOLIC PNL TOTAL CA: CPT

## 2021-06-21 RX ADMIN — IPRATROPIUM BROMIDE AND ALBUTEROL SULFATE 3 ML: .5; 3 SOLUTION RESPIRATORY (INHALATION) at 08:36

## 2021-06-21 RX ADMIN — ONDANSETRON 4 MG: 4 TABLET, ORALLY DISINTEGRATING ORAL at 18:21

## 2021-06-21 RX ADMIN — MEMANTINE 10 MG: 10 TABLET ORAL at 09:30

## 2021-06-21 RX ADMIN — ASPIRIN 81 MG: 81 TABLET, COATED ORAL at 09:31

## 2021-06-21 RX ADMIN — IPRATROPIUM BROMIDE AND ALBUTEROL SULFATE 3 ML: .5; 3 SOLUTION RESPIRATORY (INHALATION) at 11:39

## 2021-06-21 RX ADMIN — ENOXAPARIN SODIUM 40 MG: 40 INJECTION SUBCUTANEOUS at 16:33

## 2021-06-21 RX ADMIN — RANOLAZINE 1000 MG: 500 TABLET, FILM COATED, EXTENDED RELEASE ORAL at 09:30

## 2021-06-21 RX ADMIN — RANOLAZINE 1000 MG: 500 TABLET, FILM COATED, EXTENDED RELEASE ORAL at 20:36

## 2021-06-21 RX ADMIN — DONEPEZIL HYDROCHLORIDE 10 MG: 10 TABLET, FILM COATED ORAL at 20:36

## 2021-06-21 RX ADMIN — MEMANTINE 10 MG: 10 TABLET ORAL at 20:37

## 2021-06-21 RX ADMIN — IPRATROPIUM BROMIDE AND ALBUTEROL SULFATE 3 ML: .5; 3 SOLUTION RESPIRATORY (INHALATION) at 16:07

## 2021-06-21 RX ADMIN — SODIUM CHLORIDE, PRESERVATIVE FREE 10 ML: 5 INJECTION INTRAVENOUS at 20:36

## 2021-06-21 RX ADMIN — CLOPIDOGREL BISULFATE 75 MG: 75 TABLET ORAL at 09:30

## 2021-06-21 RX ADMIN — SERTRALINE HYDROCHLORIDE 100 MG: 100 TABLET ORAL at 09:31

## 2021-06-21 RX ADMIN — Medication 5 MG: at 20:36

## 2021-06-21 RX ADMIN — SODIUM CHLORIDE, PRESERVATIVE FREE 10 ML: 5 INJECTION INTRAVENOUS at 09:31

## 2021-06-21 RX ADMIN — PANTOPRAZOLE SODIUM 40 MG: 40 TABLET, DELAYED RELEASE ORAL at 05:51

## 2021-06-21 RX ADMIN — ATORVASTATIN CALCIUM 40 MG: 40 TABLET, FILM COATED ORAL at 09:31

## 2021-06-21 RX ADMIN — PANTOPRAZOLE SODIUM 40 MG: 40 TABLET, DELAYED RELEASE ORAL at 16:33

## 2021-06-21 ASSESSMENT — PAIN SCALES - GENERAL
PAINLEVEL_OUTOF10: 0
PAINLEVEL_OUTOF10: 0

## 2021-06-21 NOTE — PROGRESS NOTES
Progress Note      Subjective:   Chief complaint: Atypical chest pain, debility    Interval History: 58 yo female with chronic slurred speech/left facial droop admitted with non cardiac/atypical chest pain. She has significant past medical history of CAD, hypertension, skin cancer, COPD, chronic back pain. Upon work up of CP, she was found to have a CTA scan negative for PE but did show patient has left thyroid nodule. Endocrine consulted. Currently, patient states no complaints, no CP, no N, no V, no SOB    Review of systems:   As above    Past medical history, surgical history, family history and social history reviewed and unchanged compared to H&P earlier this admission. Medications:   Scheduled Meds:   pantoprazole  40 mg Oral BID AC    aspirin EC  81 mg Oral Daily    atorvastatin  40 mg Oral Daily    clopidogrel  75 mg Oral Daily    donepezil  10 mg Oral Nightly    ipratropium-albuterol  1 vial Inhalation 4x Daily    melatonin  5 mg Oral Daily    memantine  10 mg Oral BID    ranolazine  1,000 mg Oral BID    sertraline  100 mg Oral Daily    sodium chloride flush  5-40 mL Intravenous 2 times per day    enoxaparin  40 mg Subcutaneous QPM     Continuous Infusions:   sodium chloride         Objective:     Vital Signs  Temp: 97.8 °F (36.6 °C)  Pulse: 65  Resp: 15  BP: (!) 141/75  SpO2: 95 %  O2 Device: None (Room air)  O2 Flow Rate (L/min): 0 L/min    Vital signs reviewed in electronic charts. Physical exam    Constitutional:  Well developed, well nourished, no acute distress. Respiratory:  No respiratory distress, normal breath sounds, no rales, no wheezing. Cardiovascular:  Normal rate, normal rhythm, no murmurs, no gallops, no rubs. Musculoskeletal:  No edema to lower legs  Integument:  Well hydrated, no rash. Neurologic:  Alert & oriented x 3, Left facial droop and slurred speech;        Results:     Lab Results   Component Value Date    WBC 6.5 06/21/2021    HGB 12.0 (L) 06/21/2021 HCT 36.8 (L) 06/21/2021    MCV 88.5 06/21/2021     06/21/2021       Lab Results   Component Value Date     06/21/2021    K 4.8 06/21/2021     06/21/2021    CO2 31 06/21/2021    BUN 10 06/21/2021    CREATININE 1.0 06/21/2021    GLUCOSE 103 06/21/2021    CALCIUM 9.4 06/21/2021        Assessment and Plan: Active Hospital Problems    Diagnosis Date Noted    Chest pain [R07.9] 11/16/2011       1. Atypical chest pain/resolved:               -Has a prior history of CAD, had left heart cath in 2018, Lexiscan stress test normal, echocardiogram within normal limits,             - cardiology consulted and recommended only medical therapy to continue ASA, P2 Y 12 inhibitor, statin, and Ranexa. 2. Neurological abnormalities: Upon admission the admitter noted left facial droop and weakness on the right upper extremity with unclear reason. There was inability to run MRI of the brain because of cochlear implant. 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 Neurology team been consulted and recommended only DAPT, working with PT/OT/ST, no further neurological recommendations from their standpoint. 3. 2 cm incidental left thyroid nodule: Ultrasound neck thyroid ordered and showed 10 mm solid nodule on the left side only. ,  TSH, free T3 and free T4 normal.  Does not have any thyroid symptoms currently. Recommend a thyroid scan in 1 year. Endocrine consulted and rec pending    Other comorbidity/problems:  4. Minimal smooth mid thoracic dextroscoliosis: This is a chronic changes, need to follow-up as an outpatient  5. Small to moderate hiatal hernia: Currently on PPI  6. Mild C5-C6 degenerative disc height loss: Need to follow-up as an outpatient  7.  Depression: Continue Zoloft 100 mg daily  8. Hearing impairment bilaterally status post cochlear implant noted  9. BMI more than 35/morbid obesity: Counseled about lifestyle modification    10. Physical debility and disposition: PT/OT consulted and patient will benefit from SNF.    consulted and working on referral to [facility]  Disposition:  6/20/21: PT/OT recs is Subacute/SNF, need CM and  to f/u onElectronically signed by Ish Funez DO on 6/21/2021 at 2:38 PM

## 2021-06-21 NOTE — CARE COORDINATION
Notified by physician  that patient has altered mental status but family was in room. CM attempt to speak with family in room with patient regarding discharge plan but no other visitors present at this time. ( PT rec SNF and OT rec SNF vs home with [facility]  ). Patient is also very [place] and gave CM permission to call her son or brother to assist with discharge planning . Patient is not sure what to do but did say she would want [facility] if she went to a SNF. Phoned patient's son and had to leave a VM (patient stated her son works nights and sleeps during the day) . Phoned patient's brother Kalin Owen and had to leave a VM. CM also phoned patient's other brother listed [facility] . No answer at this time. Case Management to follow.

## 2021-06-21 NOTE — PROGRESS NOTES
Spoke with Ms. Keith Beatrizer brother Neena Bass, who is POA, and gave update. He would like a phone call when she is discharged. He wants to be here when transport  picks her up.

## 2021-06-21 NOTE — CARE COORDINATION
Received return call from patient's brother Ann Ashton. He stated that he has been speaking with patient and plan is for patient to go to [facility] . He added that he will let patient know that CM is going to make a referral. He requested to speak with patient's nurse for an update. CM notified patient's nurse Toni Boles and she voiced understanding. CM also received return call from patient's son AKOSUA Homer. He was agreeable to [facility] also. Both Ann Ashton and [facility] would like to be notified when patient is approved for [facility] and on discharge.

## 2021-06-21 NOTE — RT PROTOCOL NOTE
RT Inhaler-Nebulizer Bronchodilator Protocol Note    There is a bronchodilator order in the chart from a provider indicating to follow the RT Bronchodilator Protocol and there is an Initiate RT Bronchodilator Protocol order as well (see protocol at bottom of note). The findings from the last RT Protocol Assessment were as follows:  Smoking: <15 Pack years  Surgical Status: No surgery  Xray: Clear  Respiratory Pattern: RR 12-20  Mental Status: Confused but follows commands  Breath Sounds: Scattered wheeze  Cough: Strong, spontaneous, non-productive  Activity Level: Walking with assistance  Oxygen Requirement: Room Air - 2LNC/28% or home setting  Indication for Bronchodilator Therapy: Decreased or absent breath sounds  Bronchodilator Assessment Score: 3    Aerosolized bronchodilator medication orders have been revised according to the RT Bronchodilator Protocol. RT Inhaler-Nebulizer Bronchodilator Protocol:    Respiratory Therapist to perform RT Therapy Protocol Assessment then follow the protocol. No Indications - adjust the frequency to every 6 hours PRN wheezing or bronchospasm, if no treatments needed after 48 hours then discontinue using Per Protocol order mode. If indication present, adjust the RT bronchodilator orders based on the Bronchodilator Assessment Score as follows:    0-6 - enter or revise RT bronchodilator order to Albuterol Inhaler order with frequency of every 2 hours PRN for wheezing or increased work of breathing using Per Protocol order mode. If Albuterol Inhaler not tolerated or not effective, then discontinue the Albuterol Inhaler order and enter Albuterol Nebulizer order with same frequency and PRN reasons. Repeat RT Therapy Protocol Assessment as needed.     7-10 - discontinue any other Inpatient aerosolized bronchodilator medication orders and enter or revise two Albuterol Inhaler orders, one with BID frequency and one with frequency of every 2 hours PRN wheezing or increased work of breathing using Per Protocol order mode. Repeat RT Therapy Protocol Assessment with second treatment then BID and as needed. If Albuterol Inhaler not tolerated or not effective, then discontinue the Albuterol Inhaler orders and enter two Albuterol Nebulizer orders with same frequencies and PRN reasons. 11-13 - discontinue any other Inpatient aerosolized bronchodilator medication orders and enter DuoNeb Nebulizer orders QID frequency and an Albuterol Nebulizer order every 2 hours PRN wheezing or increased work of breathing using Per Protocol order mode. Repeat RT Therapy Protocol Assessment with second treatment then QID and as needed. Greater than 13 - discontinue any other Inpatient bronchodilator aerosolized medication orders and enter DuoNeb Nebulizer order every 4 hours frequency and Albuterol Nebulizer every 2 hours PRN wheezing or increased work of breathing using Per Protocol order mode. Repeat RT Therapy Protocol Assessment with second treatment then every 4 hours and as needed. RT to enter RT Home Evaluation for COPD & MDI Assessment order using Per Protocol order mode. Electronically signed by Allen Adkins RCP on 6/21/2021 at 4:12 PM  RT Inhaler-Nebulizer Bronchodilator Protocol Note    There is a bronchodilator order in the chart from a provider indicating to follow the RT Bronchodilator Protocol and there is an Initiate RT Bronchodilator Protocol order as well (see protocol at bottom of note).     The findings from the last RT Protocol Assessment were as follows:  Smoking: <15 Pack years  Surgical Status: No surgery  Xray: Clear  Respiratory Pattern: RR 12-20  Mental Status: Confused but follows commands  Breath Sounds: Scattered wheeze  Cough: Strong, spontaneous, non-productive  Activity Level: Walking with assistance  Oxygen Requirement: Room Air - 2LNC/28% or home setting  Indication for Bronchodilator Therapy: Decreased or absent breath sounds  Bronchodilator Assessment Score: 3    Aerosolized bronchodilator medication orders have been revised according to the RT Bronchodilator Protocol. RT Inhaler-Nebulizer Bronchodilator Protocol:    Respiratory Therapist to perform RT Therapy Protocol Assessment then follow the protocol. No Indications - adjust the frequency to every 6 hours PRN wheezing or bronchospasm, if no treatments needed after 48 hours then discontinue using Per Protocol order mode. If indication present, adjust the RT bronchodilator orders based on the Bronchodilator Assessment Score as follows:    0-6 - enter or revise RT bronchodilator order to Albuterol Inhaler order with frequency of every 2 hours PRN for wheezing or increased work of breathing using Per Protocol order mode. If Albuterol Inhaler not tolerated or not effective, then discontinue the Albuterol Inhaler order and enter Albuterol Nebulizer order with same frequency and PRN reasons. Repeat RT Therapy Protocol Assessment as needed. 7-10 - discontinue any other Inpatient aerosolized bronchodilator medication orders and enter or revise two Albuterol Inhaler orders, one with BID frequency and one with frequency of every 2 hours PRN wheezing or increased work of breathing using Per Protocol order mode. Repeat RT Therapy Protocol Assessment with second treatment then BID and as needed. If Albuterol Inhaler not tolerated or not effective, then discontinue the Albuterol Inhaler orders and enter two Albuterol Nebulizer orders with same frequencies and PRN reasons. 11-13 - discontinue any other Inpatient aerosolized bronchodilator medication orders and enter DuoNeb Nebulizer orders QID frequency and an Albuterol Nebulizer order every 2 hours PRN wheezing or increased work of breathing using Per Protocol order mode. Repeat RT Therapy Protocol Assessment with second treatment then QID and as needed.       Greater than 13 - discontinue any other Inpatient bronchodilator aerosolized medication orders and enter DuoNeb Nebulizer order every 4 hours frequency and Albuterol Nebulizer every 2 hours PRN wheezing or increased work of breathing using Per Protocol order mode. Repeat RT Therapy Protocol Assessment with second treatment then every 4 hours and as needed. RT to enter RT Home Evaluation for COPD & MDI Assessment order using Per Protocol order mode.     Keeping home home regimen Q4     Electronically signed by Delores Dennison RCP on 6/21/2021 at 4:12 PM

## 2021-06-21 NOTE — PROGRESS NOTES
Progress Note( Dr. Justina Newman)  6/21/2021  Subjective:   Admit Date: 6/16/2021  PCP: LAVERN Elliott NP    Admitted For :Left-sided chest pain and dizziness  Also had slurred speech with facial droop and right upper extremity weakness    Consulted For: Left sided thyroid nodule     Interval History: Some feels somewhat better    Denies any chest pains,   Denies SOB . Denies nausea or vomiting. No new bowel or bladder symptoms. Intake/Output Summary (Last 24 hours) at 6/21/2021 0717  Last data filed at 6/21/2021 0349  Gross per 24 hour   Intake    Output 350 ml   Net -350 ml       DATA    CBC:   Recent Labs     06/21/21  0605   WBC 6.5   HGB 12.0*       CMP:No results for input(s): NA, K, CL, CO2, BUN, CREATININE, CALCIUM, PROT, LABALBU, BILITOT, ALKPHOS, AST, ALT in the last 72 hours.     Invalid input(s): GLU  Lipids:   Lab Results   Component Value Date    CHOL 181 06/17/2021    CHOL 129 08/02/2012    HDL 60 06/17/2021    TRIG 111 06/17/2021     Glucose:  Recent Labs     06/19/21  2339   POCGLU 132*     [id number]:  Lab Results   Component Value Date    LABA1C 5.6 03/07/2020     High Sensitivity TSH:   Lab Results   Component Value Date    TSHHS 3.800 06/17/2021     Free T3:   Lab Results   Component Value Date    FT3 2.5 06/17/2021     Free T4:  Lab Results   Component Value Date    T4FREE 1.15 06/17/2021       Echocardiogram complete 2D with doppler with color    Result Date: 6/17/2021  Transthoracic Echocardiography Report (TTE)  Demographics   Patient Name       [address] YANICK     Date of Study       06/17/2021   Date of Birth      1960         Gender              Female   Age                57 year(s)         Race                   Patient Number     [id number]         Room Number         3007   Visit Number       [id number]   Corporate ID       [id number]   Accession Number   [id number]         yanick Wilson  Sarina Mckinnon   Ordering Physician Jerry Garcia MD                 Physician           MD  Procedure Type of Study   TTE procedure:ECHOCARDIOGRAM COMPLETE 2D W DOPPLER W COLOR. Procedure Date Date: 06/17/2021 Start: 08:56 AM Study Location: Portable Technical Quality: Fair visualization Indications:Chest pain. Patient Status: Routine Height: 61 inches Weight: 190 pounds BSA: 1.85 m2 BMI: 35.9 kg/m2 HR: 76 bpm BP: 105/56 mmHg  Conclusions   Summary  Left ventricular systolic function is normal.  Ejection fraction is visually estimated at 50-55%. Indeterminate diastolic function; E/A flow reversal is noted. No significant valvular disease noted. No evidence of any pericardial effusion. Signature   ------------------------------------------------------------------  Electronically signed by Abdoulaye Mcallister MD (Interpreting  physician) on 06/17/2021 at 10:13 AM         CT HEAD WO CONTRAST    Result Date: 6/16/2021  EXAMINATION: CT OF THE HEAD WITHOUT CONTRAST  6/16/2021 1:40 pm     No acute intracranial abnormality. CT CERVICAL SPINE WO CONTRAST    Result Date: 6/17/2021  EXAMINATION: CT OF THE CERVICAL SPINE WITHOUT CONTRAST 6/17/2021 9:18 pm T    1. No acute osseous abnormality of the cervical spine. 2. Mild C5-6 degenerative disc height loss. No significant bony neural foraminal or spinal canal stenosis. CT THORACIC SPINE WO CONTRAST    Result Date: 6/17/2021  EXAMINATION: CT OF THE THORACIC SPINE WITHOUT CONTRAST  6/17/2021 9:18 pm:   1. No acute osseous abnormality of the thoracic spine. 2. Minimal smooth midthoracic dextroscoliosis. 3. Small to moderate hiatal hernia.      US HEAD NECK SOFT TISSUE THYROID    Result Date: 6/20/2021  EXAMINATION: THYROID ULTRASOUND 6/17/2021 COMPARISON: 06/16/2021 HISTORY: ORDERING SYSTEM PROVIDED HISTORY: thyroid nodule TECHNOLOGIST PROVIDED HISTORY: Reason for exam:->thyroid nodule Reason for Exam: LT thyroid nodule seen on CTA chest Acuity: Acute Type of Exam: Initial FINDINGS: Right thyroid lobe:  3 x 1.7 x 1.3 cm Left thyroid lobe:  3.3 x 1.6 x 1.8 cm Isthmus:  0.5 cm Thyroid Gland:  Thyroid gland demonstrates normal echotexture and vascularity. Nodules: In the mid left thyroid lobe, there is a 10 mm hypoechoic solid nodule laterally with smooth margins (Category TR4). No additional thyroid nodule is present. The abnormality on recent chest CT may have exaggerated the size of the nodule due to motion artifact. Cervical lymphadenopathy: No abnormal lymph nodes in the imaged portions of the neck. Left thyroid lobe 10 mm category TR4 nodule. Ultrasound follow-up can be considered in 1 year. RECOMMENDATIONS: ACR TI-RADS recommendations: TR4 (4-6 points):  FNA if >= 1.5 cm; follow-up if 1.0-1.4 cm in 1, 2, 3, and 5 years no more than four nodules should be followed. XR CHEST PORTABLE    Result Date: 6/16/2021  EXAMINATION: ONE XRAY VIEW OF THE CHEST 6/16/2021 11:31 am COMPARISON: April 27, 2020 HISTORY: ORDERING SYSTEM PROVIDED HISTORY: chest pain TECHNOLOGIST PROVIDED HISTORY: Reason for exam:->chest pain Reason for Exam: chest pain FINDINGS: The cardiomediastinal silhouette is within normal range. Lungs are clear. There is no focal pulmonary consolidation, pleural effusion, pneumothorax, or evidence of airspace pulmonary edema. Coronary stent     1. No acute radiographic finding to account for patient's chest pain. CTA CHEST W CONTRAST    Result Date: 6/16/2021  EXAMINATION: CTA OF THE CHEST 6/16/2021 1:55 pm TECHNIQUE: CTA of the chest was performed after the administration of intravenous contrast.  Multiplanar reformatted images are provided for review. MIP images are provided for review. Dose modulation, iterative reconstruction, and/or weight based adjustment of the mA/kV was utilized to reduce the radiation dose to as low as reasonably achievable.  COMPARISON: 05/11/2017 HISTORY: ORDERING SYSTEM PROVIDED HISTORY: dizzy/chest pain TECHNOLOGIST PROVIDED HISTORY: Reason for exam:->dizzy/chest pain Decision Support Exception - unselect if not a suspected or confirmed emergency medical condition->Emergency Medical Condition (MA) Reason for Exam: dizzy/chest pain Acuity: Acute Type of Exam: Initial Relevant Medical/Surgical History: 80 ML ISOVUE 370 FINDINGS: Pulmonary Arteries: Exam is mildly degraded by timing of the contrast bolus and respiratory motion. No evidence of intraluminal filling defect to suggest pulmonary embolism to the segmental level. Main pulmonary artery is normal in caliber. Mediastinum: No evidence of mediastinal lymphadenopathy. The heart and pericardium demonstrate no acute abnormality. There is no acute abnormality of the thoracic aorta. Moderate coronary artery calcification. 2 cm left thyroid nodule. Lungs/pleura: The lungs are without acute process. No focal consolidation or pulmonary edema. No evidence of pleural effusion or pneumothorax. Upper Abdomen: Small hiatal hernia. Unchanged nodular thickening of the right adrenal gland, benign. Soft Tissues/Bones: No acute bone or soft tissue abnormality. 1. No evidence of pulmonary embolism to the segmental level or acute pulmonary abnormality. 2. 2 cm incidental left thyroid nodule. Recommend thyroid US. Reference: J Am [place] Radiol. 2015 Feb;12(2): 143-50. RECOMD:ITN RECOMMENDATIONS: 2 cm incidental left thyroid nodule. Recommend thyroid US. Reference: J Am [place] Radiol. 2015 Feb;12(2): 143-50     CTA HEAD NECK W CONTRAST    Result Date: 6/16/2021  EXAMINATION: CTA OF THE HEAD AND NECK WITH CONTRAST 6/16/2021 1:54 pm T  venous sinus thrombosis on this non-dedicated study. BRAIN: Refer to the CT head of the same date. 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 Otherwise unremarkable CTA of the head and neck.      NM cooperative with exam  Neck: no JVD or bruit  Thyroid : Left-sided thyroid nodule  Lungs: Has Vesicular Breath sounds   Heart:  regular rate and rhythm  Abdomen: soft, non-tender; bowel sounds normal; no masses,  no organomegaly  Musculoskeletal: Normal  Extremities: extremities normal, , no edema  Neurologic:  Awake, alert, oriented to name, place and time. Cranial nerves II-XII are grossly intact. Motor is  intact. Sensory is intact. ,  and gait is normal.    Assessment:     Patient Active Problem List:     Chest pain     Current tobacco use     COPD (chronic obstructive pulmonary disease) ([facility])     Essential hypertension     Palpitations     Tobacco use     Gastroesophageal reflux disease with hiatal hernia     S/P Nissen fundoplication (without gastrostomy tube) procedure     Precordial pain     Post PTCA     Leg pain, bilateral     Syncope and collapse     Dizziness     Angina pectoris ([facility])     HTN (hypertension)     ASCVD (arteriosclerotic cardiovascular disease)     GERD (gastroesophageal reflux disease)     Ankle syndesmosis disruption, left, subsequent encounter     Closed trimalleolar fracture of left ankle     Closed left ankle fracture, initial encounter     Closed fracture of upper end of left fibula     Chronic kidney disease (CKD) stage G3a/A1, moderately decreased glomerular filtration rate (GFR) between 45-59 mL/min/1.73 square meter and albuminuria creatinine ratio less than 30 mg/g ([facility])     Hypotension      Plan:     1. Reviewed POC blood glucose . Labs and X ray results   2. Reviewed Current Medicines   3. Review available thyroid function test and also ultrasound of thyroid gland  4. I would prefer to have fine-needle aspiration biopsy of the left thyroid nodule outpatient  5. For now I will sign off the case but will follow him as outpatient    .      Lisa Galan MD, MD

## 2021-06-22 LAB
ANTITHYROGLOBULIN AB: 0.9 IU/ML (ref 0–4)
ANTITHYROID MICORSOMAL: 1673 IU/ML (ref 0–9)

## 2021-06-22 PROCEDURE — 6370000000 HC RX 637 (ALT 250 FOR IP): Performed by: HOSPITALIST

## 2021-06-22 PROCEDURE — 94640 AIRWAY INHALATION TREATMENT: CPT

## 2021-06-22 PROCEDURE — 97530 THERAPEUTIC ACTIVITIES: CPT

## 2021-06-22 PROCEDURE — 94761 N-INVAS EAR/PLS OXIMETRY MLT: CPT

## 2021-06-22 PROCEDURE — 94664 DEMO&/EVAL PT USE INHALER: CPT

## 2021-06-22 PROCEDURE — 6360000002 HC RX W HCPCS: Performed by: HOSPITALIST

## 2021-06-22 PROCEDURE — 2580000003 HC RX 258: Performed by: HOSPITALIST

## 2021-06-22 PROCEDURE — 6370000000 HC RX 637 (ALT 250 FOR IP): Performed by: INTERNAL MEDICINE

## 2021-06-22 PROCEDURE — 1200000000 HC SEMI PRIVATE

## 2021-06-22 PROCEDURE — 97116 GAIT TRAINING THERAPY: CPT

## 2021-06-22 RX ORDER — IPRATROPIUM BROMIDE AND ALBUTEROL SULFATE 2.5; .5 MG/3ML; MG/3ML
1 SOLUTION RESPIRATORY (INHALATION) EVERY 4 HOURS PRN
Status: DISCONTINUED | OUTPATIENT
Start: 2021-06-22 | End: 2021-06-28 | Stop reason: HOSPADM

## 2021-06-22 RX ORDER — ALBUTEROL SULFATE 90 UG/1
2 AEROSOL, METERED RESPIRATORY (INHALATION) 4 TIMES DAILY
Status: DISCONTINUED | OUTPATIENT
Start: 2021-06-22 | End: 2021-06-26

## 2021-06-22 RX ADMIN — DONEPEZIL HYDROCHLORIDE 10 MG: 10 TABLET, FILM COATED ORAL at 21:45

## 2021-06-22 RX ADMIN — ASPIRIN 81 MG: 81 TABLET, COATED ORAL at 09:27

## 2021-06-22 RX ADMIN — SODIUM CHLORIDE, PRESERVATIVE FREE 10 ML: 5 INJECTION INTRAVENOUS at 21:44

## 2021-06-22 RX ADMIN — ATORVASTATIN CALCIUM 40 MG: 40 TABLET, FILM COATED ORAL at 09:27

## 2021-06-22 RX ADMIN — MEMANTINE 10 MG: 10 TABLET ORAL at 21:45

## 2021-06-22 RX ADMIN — Medication 2 PUFF: at 13:05

## 2021-06-22 RX ADMIN — ALBUTEROL SULFATE 2 PUFF: 90 AEROSOL, METERED RESPIRATORY (INHALATION) at 13:04

## 2021-06-22 RX ADMIN — ALBUTEROL SULFATE 2 PUFF: 90 AEROSOL, METERED RESPIRATORY (INHALATION) at 16:09

## 2021-06-22 RX ADMIN — ALBUTEROL SULFATE 2 PUFF: 90 AEROSOL, METERED RESPIRATORY (INHALATION) at 19:17

## 2021-06-22 RX ADMIN — CLOPIDOGREL BISULFATE 75 MG: 75 TABLET ORAL at 09:27

## 2021-06-22 RX ADMIN — SODIUM CHLORIDE, PRESERVATIVE FREE 10 ML: 5 INJECTION INTRAVENOUS at 09:28

## 2021-06-22 RX ADMIN — Medication 5 MG: at 21:45

## 2021-06-22 RX ADMIN — MEMANTINE 10 MG: 10 TABLET ORAL at 09:27

## 2021-06-22 RX ADMIN — HYDROXYZINE HYDROCHLORIDE 50 MG: 25 TABLET, FILM COATED ORAL at 21:45

## 2021-06-22 RX ADMIN — SERTRALINE HYDROCHLORIDE 100 MG: 100 TABLET ORAL at 09:27

## 2021-06-22 RX ADMIN — PANTOPRAZOLE SODIUM 40 MG: 40 TABLET, DELAYED RELEASE ORAL at 06:21

## 2021-06-22 RX ADMIN — ACETAMINOPHEN 650 MG: 325 TABLET ORAL at 21:45

## 2021-06-22 RX ADMIN — Medication 2 PUFF: at 19:18

## 2021-06-22 RX ADMIN — RANOLAZINE 1000 MG: 500 TABLET, FILM COATED, EXTENDED RELEASE ORAL at 09:27

## 2021-06-22 RX ADMIN — ENOXAPARIN SODIUM 40 MG: 40 INJECTION SUBCUTANEOUS at 17:34

## 2021-06-22 RX ADMIN — PANTOPRAZOLE SODIUM 40 MG: 40 TABLET, DELAYED RELEASE ORAL at 17:34

## 2021-06-22 RX ADMIN — RANOLAZINE 1000 MG: 500 TABLET, FILM COATED, EXTENDED RELEASE ORAL at 21:45

## 2021-06-22 RX ADMIN — Medication 2 PUFF: at 16:10

## 2021-06-22 ASSESSMENT — PAIN SCALES - WONG BAKER
WONGBAKER_NUMERICALRESPONSE: 0

## 2021-06-22 ASSESSMENT — PAIN SCALES - GENERAL
PAINLEVEL_OUTOF10: 4
PAINLEVEL_OUTOF10: 0
PAINLEVEL_OUTOF10: 2
PAINLEVEL_OUTOF10: 2

## 2021-06-22 NOTE — RT PROTOCOL NOTE
RT Inhaler-Nebulizer Bronchodilator Protocol Note    There is a bronchodilator order in the chart from a provider indicating to follow the RT Bronchodilator Protocol and there is an Initiate RT Bronchodilator Protocol order as well (see protocol at bottom of note). The findings from the last RT Protocol Assessment were as follows:  Smoking: <15 Pack years  Surgical Status: No surgery  Xray: Clear  Respiratory Pattern: RR 12-20  Mental Status: Confused but follows commands  Breath Sounds: Diminished and/or crackles  Cough: Strong, spontaneous, non-productive  Activity Level: Walking with assistance  Oxygen Requirement: Room Air - 2LNC/28% or home setting  Indication for Bronchodilator Therapy: Decreased or absent breath sounds, On home bronchodilators  Bronchodilator Assessment Score: 2    Aerosolized bronchodilator medication orders have been revised according to the RT Bronchodilator Protocol. RT Inhaler-Nebulizer Bronchodilator Protocol:    Respiratory Therapist to perform RT Therapy Protocol Assessment then follow the protocol. No Indications - adjust the frequency to every 6 hours PRN wheezing or bronchospasm, if no treatments needed after 48 hours then discontinue using Per Protocol order mode. If indication present, adjust the RT bronchodilator orders based on the Bronchodilator Assessment Score as follows:    0-6 - enter or revise RT bronchodilator order to Albuterol Inhaler order with frequency of every 2 hours PRN for wheezing or increased work of breathing using Per Protocol order mode. If Albuterol Inhaler not tolerated or not effective, then discontinue the Albuterol Inhaler order and enter Albuterol Nebulizer order with same frequency and PRN reasons. Repeat RT Therapy Protocol Assessment as needed.     7-10 - discontinue any other Inpatient aerosolized bronchodilator medication orders and enter or revise two Albuterol Inhaler orders, one with BID frequency and one with frequency of every 2 hours PRN wheezing or increased work of breathing using Per Protocol order mode. Repeat RT Therapy Protocol Assessment with second treatment then BID and as needed. If Albuterol Inhaler not tolerated or not effective, then discontinue the Albuterol Inhaler orders and enter two Albuterol Nebulizer orders with same frequencies and PRN reasons. 11-13 - discontinue any other Inpatient aerosolized bronchodilator medication orders and enter DuoNeb Nebulizer orders QID frequency and an Albuterol Nebulizer order every 2 hours PRN wheezing or increased work of breathing using Per Protocol order mode. Repeat RT Therapy Protocol Assessment with second treatment then QID and as needed. Greater than 13 - discontinue any other Inpatient bronchodilator aerosolized medication orders and enter DuoNeb Nebulizer order every 4 hours frequency and Albuterol Nebulizer every 2 hours PRN wheezing or increased work of breathing using Per Protocol order mode. Repeat RT Therapy Protocol Assessment with second treatment then every 4 hours and as needed. RT to enter RT Home Evaluation for COPD & MDI Assessment order using Per Protocol order mode.     Electronically signed by Aneudy Miranda RCP on 6/22/2021 at 9:18 AM

## 2021-06-22 NOTE — PROGRESS NOTES
Physician Progress Note      Romeo Hernandez  CSN #:                  [id number]  :                       1960  ADMIT DATE:       2021 11:36 AM  DISCH DATE:  RESPONDING  PROVIDER #:        TELLO BONNER          QUERY TEXT:    Pt admitted with chest pain. Pt noted to have small hiatal hernia. If   possible, please document in progress notes and discharge summary if you are   evaluating and/or treating any of the following: The medical record reflects the following:  Risk Factors: Age, CAD, hiatal hernia  Clinical Indicators: Dr. Ailyn Lundberg noted in  progress note \"Atypical chest pain   in the setting of prior history of CAD. Left heart cath in 2018 reviewed. Patent stent in circumflex. Patent RCA and LAD without any focal stenosis. Stress test negative for ischemia, echocardiogram within normal limits. Continue with medical therapy including aspirin Plavix statin therapy and   Ranexa. \", Troponin x 3 all <0.010, Chest CTA on  \"No evidence of pulmonary   embolism to the segmental level or acute pulmonary abnormality. \"  Treatment: CARD consult, serial troponins, CTA chest, echo, 81 mg ASA, 75 mg   Plavix, 40 mg PO Protonix, 1000 mg Ranexa PO, stress test.    Thank you,  JESS Mcneill, RN, [facility]  [phone]  Options provided:  -- Chest pain due to GERD  -- Chest pain due to costochondritis  -- Chest pain due to ***  -- Other - I will add my own diagnosis  -- Disagree - Not applicable / Not valid  -- Disagree - Clinically unable to determine / Unknown  -- Refer to Clinical Documentation Reviewer    PROVIDER RESPONSE TEXT:    This patient has chest pain due to GERD.       Query created by: Mehnaz Boswell on 2021 1:36 PM      Electronically signed by:  Lorena Rao 2021 7:30 AM

## 2021-06-22 NOTE — PROGRESS NOTES
Occupational Therapy  Occupational Therapy Treatment Note    Date:  2021   Room:  3007/3007-A    Karly Xavier :   1960   MRN: [id number] Admission Date: 2021     Diagnosis:  The primary encounter diagnosis was Chest pain, unspecified type. Diagnoses of Dyspnea and respiratory abnormalities, Paresthesia, Slurred speech, Hypomagnesemia, and Thyroid nodule were also pertinent to this visit. Restrictions/Precautions:    Restrictions/Precautions  Restrictions/Precautions: General Precautions, Fall Risk                 Communication with other providers:  PT.    Subjective:  Patient states:  \"what do I have to do this for?\"  Pain:   Location, Type, Intensity (0/10 to 10/10):  No c/o    Objective:    Orientation: WFL   Observation: Pt received in chair. Alert and cooperative  Objective Measures:  VSS    Treatment, including education:  Therapeutic Activity Training:   Therapeutic activity training was instructed today. Cues were given for safety, sequence, UE/LE placement, visual cues, and balance. Sit to stand: CGA from chair    Gait: CGA c RW chair to bed 12'    Stand to sit: CGA to EOB    Sit to supine: CGA     Declined all ADL during session. Assessment / Impression:      Patient's tolerance of treatment:  WFL    Significant change in status and impact:  none  Barriers to improvement:  none  Recommendations: SNF    Plan for Next Session:    Cont POC addressing goals:    Goals:  1. Pt will complete all aspects of bed mobility for EOB/OOB ADLs ind with HOB flat  2. Pt will complete UB/LB bathing with min A and AE/rest breaks prn  3. Pt will complete all aspects of LB dressing with min A and AE/modified techniques as needed/trained  4. Pt will complete all functional transfers/mobility to and from bed, chair, toilet, shower chair with SBA and LRAD  5. Pt will complete all aspects of toileting task with min A at standard commode  6.  Pt will complete oral hygiene/grooming routine in standing at sink demo G balance and tolerance       Safety: Left in bed with all needs in reach. Bed alarm broken. Gait belt used for transfer and mobility. Time in:  1300  Time out:  1315  Timed treatment minutes:  15  Total treatment time:  15    Electronically signed by:     410Dane Cardozo, OTR/L, [place]   [id number]   1:33 PM, 6/22/2021      Previously filed values:

## 2021-06-22 NOTE — PROGRESS NOTES
Hospitalist Progress Note       6/22/2021 3:15 PM  Admit Date: 6/16/2021    PCP: LAVERN Jacobson - NP     Assessment and Plan:   1. Atypical left-sided chest pain: Troponin-negative. CTA chest-no PE. Cardiology consult appreciated. Stress test and echo were essentially normal.  Continue aspirin, Plavix, Ranexa and Lipitor. 2.  Slow speech/right upper extremity weakness: It seems chronic. CT head showed no acute findings. CTA head and neck-unremarkable. CT cervical and thoracic spine-no compression fracture. MRI could not be done because of cochlear implant. Neurology consult acknowledged. Continue aspirin, Plavix and Lipitor. Continue PT/OT. 3.  Incidental thyroid nodule: Normal TSH, free T3 and free T4. Endocrinology consult ophthalmology. Will follow up in endocrine clinic. 4.  Hypomagnesemia: Replaced. 5.  Dementia: On Aricept and Namenda. Chronic problems include hypertension, CAD (status post stent), COPD, GERD, restless leg syndrome, obesity and severe hearing loss. DVT prophylaxis: Lovenox. Disposition: Awaiting ECF placement.     Patient Active Problem List:     Chest pain     Current tobacco use     COPD (chronic obstructive pulmonary disease) (HCC)     Essential hypertension     Palpitations     Tobacco use     Gastroesophageal reflux disease with hiatal hernia     S/P Nissen fundoplication (without gastrostomy tube) procedure     Precordial pain     Post PTCA     Leg pain, bilateral     Syncope and collapse     Dizziness     Angina pectoris (HCC)     HTN (hypertension)     ASCVD (arteriosclerotic cardiovascular disease)     GERD (gastroesophageal reflux disease)     Ankle syndesmosis disruption, left, subsequent encounter     Closed trimalleolar fracture of left ankle     Closed left ankle fracture, initial encounter     Closed fracture of upper end of left fibula     Chronic kidney disease (CKD) stage G3a/A1, moderately decreased glomerular filtration rate (GFR) between 45-59 mL/min/1.73 square meter and albuminuria creatinine ratio less than 30 mg/g (HCC)     Hypotension      Subjective:     Chief Complaint   Patient presents with    Chest Pain     ongoing several weeks. states insurance wouldn't pay for testing with her heart doctor so she came here       F/U:  Interval History:   She is still having intermittent chest pain. No new complaints. Objective: Intake/Output Summary (Last 24 hours) at 6/22/2021 1515  Last data filed at 6/22/2021 0928  Gross per 24 hour   Intake 20 ml   Output 250 ml   Net -230 ml      Vitals:   Vitals:    06/22/21 1503   BP: 129/76   Pulse: 82   Resp: 17   Temp: 98.4 °F (36.9 °C)   SpO2: 92%     Physical Exam:  General: Not in acute respiratory distress. Severe hearing loss. Eyes: Anicteric. Extraocular muscles intact. Neurology: Slow speech. She follows command. Strength equal in all extremities. Power is 4/5 in all extremities. Cardiovascular: Regular. No murmur or S3. Respiratory: Good air entry bilaterally. No wheezes or crepitation. Abdomen: Soft, no tenderness, no palpable mass. : No Lopez catheter. Extremities: No pedal edema. The feet are warm.     Electronically signed by Pop Rivera MD on 6/22/2021 at 3:15 PM  [facility]

## 2021-06-22 NOTE — PROGRESS NOTES
Physical Therapy    Physical Therapy Treatment Note  Name: Doug Nathan MRN: [id number] :   1960   Date:  2021   Admission Date: 2021 Room:  [facility]3007-A   Restrictions/Precautions:  Restrictions/Precautions  Restrictions/Precautions: General Precautions, Fall Risk         Subjective:  Patient states:  \"I just finished eating; I would like to walk\"  Pain:   Location, Type, Intensity (0/10 to 10/10): Denies; 0/10    Objective:    Observation:  Pt supine in bed upon entry    Treatment, including education/measures:  Pt agreeable to participating in therapy at this time. Therapeutic Activity Training:   Therapeutic activity training was instructed today. Cues were given for safety, sequence, UE/LE placement, awareness, and balance. Activities performed today included bed mobility training, sup-sit, sit-stand. Pt completed supine to sit with CGAx1 with increased time for task completion and with HOB elevated. Pt completed sit to stand with CGAx1 with verbal cues to push through bed and avoid pulling on walker. Pt completed stand to sit onto chair with minAx1 with verbal cues to feel chair against back of legs, reach back, and sit slowly. Gait Training:  Cues were given for safety, sequence, device management, balance, posture, awareness, path. Pt ambulated 30 feet + 30 feet with assist varying from minAx1 to CGAx1 with a front wheeled walker with a decreased gait speed, a decreased step length bilaterally, and an unsteady gait. Pt provided with verbal and tactile cues for BLE placement, walker placement, and sequence throughout ambulation. Pt provided with verbal and tactile cues to maintain upright posture in order to avoid COM shifting outside of SUSY. Pt provided with verbal cues for directions in order to successfully navigate hallway and return to correct room. Safety  Patient left safely in the chair, with call light/phone in reach with alarm applied.  Gait belt and mask were used for transfers and gait. Assessment / Impression:       Patient's tolerance of treatment:  Good; patient tolerated ambulation in hallway today  Adverse Reaction: none  Significant change in status and impact:  none  Barriers to improvement:  Generalized weakness    Plan for Next Session:    Continue progressing toward goals per plan of care. Progress independence with transfers and ambulation as tolerated and appropriate. Progress ambulation distance as tolerated and appropriate. Time in:  1222  Time out:  1300  Timed treatment minutes:  38  Total treatment time:  45    Previously filed items:  Social/Functional History  Lives With: Alone  Type of Home: House  Home Layout: One level  Home Access: Level entry  Bathroom Shower/Tub: Tub/Shower unit  Bathroom Toilet: Standard  Bathroom Equipment: Hand-held shower  Home Equipment: Cane, Rolling walker  Receives Help From: Home health (home health nurse once a week)  ADL Assistance: Independent  Homemaking Assistance: Needs assistance  Ambulation Assistance: Independent (mod I with spc)  Transfer Assistance: Independent  Active : No     Long term goals  Time Frame for Long term goals :  In one week:  Long term goal 1: Pt will complete all bed mobility with SBAx1  Long term goal 2: Pt will complete sit <> stand transfers with SBAx1  Long term goal 3: Pt will ambulate 150 feet with SBAx1 with LRAD  Long term goal 4: Pt will independently complete 3 sets of 10 reps of BLE AROM exercises in available and allowed ROM    Electronically signed by:      Alexis Moore PT, DPT  License #: [id number]

## 2021-06-22 NOTE — CARE COORDINATION
Spoke with Linda/admissions at [facility] who states patient has been accepted for rehab though pre-cert is not able to be initiated until updated therapy notes are received. White board placed to alert therapy to need for updated notes. PAS done and packet started though will need AVS/RX at discharge. 2:58 PM  Noted therapy updates in. Left message for Linda/admissions at [facility] to request pre-cert initiation. Pt new to [facility] at discharge. Please call report to [phone] and fax orders, AVS, and discharge summaries to [phone].

## 2021-06-23 PROCEDURE — 1200000000 HC SEMI PRIVATE

## 2021-06-23 PROCEDURE — 2580000003 HC RX 258: Performed by: HOSPITALIST

## 2021-06-23 PROCEDURE — 6360000002 HC RX W HCPCS: Performed by: HOSPITALIST

## 2021-06-23 PROCEDURE — 6370000000 HC RX 637 (ALT 250 FOR IP): Performed by: HOSPITALIST

## 2021-06-23 PROCEDURE — 94640 AIRWAY INHALATION TREATMENT: CPT

## 2021-06-23 PROCEDURE — 94761 N-INVAS EAR/PLS OXIMETRY MLT: CPT

## 2021-06-23 RX ORDER — PANTOPRAZOLE SODIUM 40 MG/1
40 TABLET, DELAYED RELEASE ORAL
Qty: 30 TABLET | Refills: 3 | Status: SHIPPED | OUTPATIENT
Start: 2021-06-23

## 2021-06-23 RX ADMIN — Medication 5 MG: at 20:41

## 2021-06-23 RX ADMIN — SODIUM CHLORIDE, PRESERVATIVE FREE 10 ML: 5 INJECTION INTRAVENOUS at 20:42

## 2021-06-23 RX ADMIN — SERTRALINE HYDROCHLORIDE 100 MG: 100 TABLET ORAL at 09:27

## 2021-06-23 RX ADMIN — MEMANTINE 10 MG: 10 TABLET ORAL at 09:27

## 2021-06-23 RX ADMIN — PANTOPRAZOLE SODIUM 40 MG: 40 TABLET, DELAYED RELEASE ORAL at 17:50

## 2021-06-23 RX ADMIN — RANOLAZINE 1000 MG: 500 TABLET, FILM COATED, EXTENDED RELEASE ORAL at 20:40

## 2021-06-23 RX ADMIN — Medication 2 PUFF: at 15:42

## 2021-06-23 RX ADMIN — Medication 2 PUFF: at 21:14

## 2021-06-23 RX ADMIN — ALBUTEROL SULFATE 2 PUFF: 90 AEROSOL, METERED RESPIRATORY (INHALATION) at 11:34

## 2021-06-23 RX ADMIN — RANOLAZINE 1000 MG: 500 TABLET, FILM COATED, EXTENDED RELEASE ORAL at 09:27

## 2021-06-23 RX ADMIN — ALBUTEROL SULFATE 2 PUFF: 90 AEROSOL, METERED RESPIRATORY (INHALATION) at 08:27

## 2021-06-23 RX ADMIN — PANTOPRAZOLE SODIUM 40 MG: 40 TABLET, DELAYED RELEASE ORAL at 06:47

## 2021-06-23 RX ADMIN — SODIUM CHLORIDE, PRESERVATIVE FREE 10 ML: 5 INJECTION INTRAVENOUS at 09:27

## 2021-06-23 RX ADMIN — ASPIRIN 81 MG: 81 TABLET, COATED ORAL at 09:27

## 2021-06-23 RX ADMIN — ENOXAPARIN SODIUM 40 MG: 40 INJECTION SUBCUTANEOUS at 17:49

## 2021-06-23 RX ADMIN — Medication 2 PUFF: at 08:27

## 2021-06-23 RX ADMIN — MEMANTINE 10 MG: 10 TABLET ORAL at 20:41

## 2021-06-23 RX ADMIN — ALBUTEROL SULFATE 2 PUFF: 90 AEROSOL, METERED RESPIRATORY (INHALATION) at 15:42

## 2021-06-23 RX ADMIN — ATORVASTATIN CALCIUM 40 MG: 40 TABLET, FILM COATED ORAL at 09:27

## 2021-06-23 RX ADMIN — CLOPIDOGREL BISULFATE 75 MG: 75 TABLET ORAL at 09:26

## 2021-06-23 RX ADMIN — DONEPEZIL HYDROCHLORIDE 10 MG: 10 TABLET, FILM COATED ORAL at 20:41

## 2021-06-23 RX ADMIN — Medication 2 PUFF: at 11:34

## 2021-06-23 RX ADMIN — ALBUTEROL SULFATE 2 PUFF: 90 AEROSOL, METERED RESPIRATORY (INHALATION) at 21:14

## 2021-06-23 ASSESSMENT — PAIN SCALES - GENERAL
PAINLEVEL_OUTOF10: 0

## 2021-06-23 ASSESSMENT — PAIN SCALES - WONG BAKER: WONGBAKER_NUMERICALRESPONSE: 0

## 2021-06-23 NOTE — PROGRESS NOTES
Hospitalist Progress Note       6/23/2021 1:32 PM  Admit Date: 6/16/2021    PCP: LAVERN Bergeron - NP     Assessment and Plan:   1. Atypical left-sided chest pain: Troponin-negative. CTA chest-no PE. Cardiology consult appreciated. Stress test and echo were essentially normal.  Continue aspirin, Plavix, Ranexa and Lipitor. 2.  Slow speech/right upper extremity weakness: It seems chronic. CT head showed no acute findings. CTA head and neck-unremarkable. CT cervical and thoracic spine-no compression fracture. MRI could not be done because of cochlear implant. Neurology consult acknowledged. Continue aspirin, Plavix and Lipitor. Continue PT/OT. 3.  Incidental thyroid nodule: Normal TSH, free T3 and free T4. Endocrinology consult acknowledged. Will follow up in endocrine clinic. 4.  Hypomagnesemia: Replaced. 5.  Dementia: On Aricept and Namenda. Chronic problems include hypertension, CAD (status post stent), COPD, GERD, restless leg syndrome, obesity and severe hearing loss. DVT prophylaxis: Lovenox. Disposition: Awaiting ECF placement.     Patient Active Problem List:     Chest pain     Current tobacco use     COPD (chronic obstructive pulmonary disease) (HCC)     Essential hypertension     Palpitations     Tobacco use     Gastroesophageal reflux disease with hiatal hernia     S/P Nissen fundoplication (without gastrostomy tube) procedure     Precordial pain     Post PTCA     Leg pain, bilateral     Syncope and collapse     Dizziness     Angina pectoris (HCC)     HTN (hypertension)     ASCVD (arteriosclerotic cardiovascular disease)     GERD (gastroesophageal reflux disease)     Ankle syndesmosis disruption, left, subsequent encounter     Closed trimalleolar fracture of left ankle     Closed left ankle fracture, initial encounter     Closed fracture of upper end of left fibula     Chronic kidney disease (CKD) stage G3a/A1, moderately decreased glomerular filtration rate (GFR) between 45-59 mL/min/1.73 square meter and albuminuria creatinine ratio less than 30 mg/g (HCC)     Hypotension      Subjective:     Chief Complaint   Patient presents with    Chest Pain     ongoing several weeks. states insurance wouldn't pay for testing with her heart doctor so she came here       F/U:  Interval History:   Son at bedside. No complaint this morning. Objective: Intake/Output Summary (Last 24 hours) at 6/23/2021 1332  Last data filed at 6/23/2021 0946  Gross per 24 hour   Intake    Output 400 ml   Net -400 ml      Vitals:   Vitals:    06/23/21 0915   BP: 113/75   Pulse: 66   Resp: 13   Temp: 98.5 °F (36.9 °C)   SpO2: 95%     Physical Exam:  General: Not in acute respiratory distress. Severe hearing loss. Eyes: Anicteric. Extraocular muscles intact. Neurology: Slow speech. She follows command. Strength equal in all extremities. Power is 4/5 in all extremities. Cardiovascular: Regular. No murmur or S3. Respiratory: Good air entry bilaterally. No wheezes or crepitation. Abdomen: Soft, no tenderness, no palpable mass. : No Lopez catheter. Extremities: No pedal edema. The feet are warm.     Electronically signed by Shruthi Mike MD on 6/23/2021 at 1:32 PM  [facility] Hospitalist

## 2021-06-23 NOTE — CARE COORDINATION
Spoke with Linda/anuja at [facility] who states the pre-cert is still pending. CM following for determination.

## 2021-06-23 NOTE — DISCHARGE INSTR - COC
Continuity of Care Form    Patient Name: Effie Reed   :  1960  MRN:  [id number]    Admit date:  2021  Discharge date:  2021      Code Status Order: Full Code   Advance Directives:   Advance Care Flowsheet Documentation       Date/Time Healthcare Directive Type of Healthcare Directive Copy in [address] Agent's Name Healthcare Agent's Phone Number    [phone]  No, patient does not have an advance directive for healthcare treatment -- -- -- -- --            Admitting Physician:  No admitting provider for patient encounter. PCP: LAVERN Calvo NP    Discharging Nurse:  [place] [facility]/Room#: 1537/6767-J  Discharging Unit Phone Number: [phone]    Emergency Contact:   Extended Emergency Contact Information  Primary Emergency Contact: Erik Moreland  Address: [address] Phone: [phone]  Relation: Brother/Sister  Secondary Emergency Contact: Pavel Steven  Address: [address] [place] [address] Phone: [phone]  Relation: Brother/Sister    Past Surgical History:  Past Surgical History:   Procedure Laterality Date    ANKLE FRACTURE SURGERY Left 2019    LEFT ANKLE OPEN REDUCTION INTERNAL FIXATION SYNDESMOSIS performed by Rachele Conway MD at [place] [address]      COLONOSCOPY  2017    colon polyp, diverticulosis, hemorrhoids    DENTAL SURGERY      Teeth Extracted In Past    ENDOSCOPY, COLON, DIAGNOSTIC  10-16-15    ENDOSCOPY, COLON, DIAGNOSTIC  2019    Hiatal hernia, savary dil #17 used    GASTRIC FUNDOPLICATION      Robotic laparoscopic nissen with mesh    KIDNEY SURGERY Left 2-15    \"At [facility] Had Left Kidney Mass Removed\" Benign    TONSILLECTOMY  's    TUBAL LIGATION      UPPER GASTROINTESTINAL ENDOSCOPY N/A 2019    EGD DILATION SAVARY #17MM performed by Nyla Severance, MD at [place] [address]       Immunization History:   Immunization History   Administered Date(s) Administered    Influenza Vaccine, unspecified formulation 10/15/2018    Influenza Virus Vaccine 10/03/2012, 02/21/2014, 10/22/2014, 10/29/2015    Influenza, Intradermal, Preservative free 10/21/2019    Pneumococcal Polysaccharide ([id number]) 10/03/2012       Active Problems:  Patient Active Problem List   Diagnosis Code    Chest pain R07.9    Current tobacco use Z72.0    COPD (chronic obstructive pulmonary disease) ([facility] [address] J44.9    Essential hypertension I10    Palpitations R00.2    Tobacco use Z72.0    Gastroesophageal reflux disease with hiatal hernia K21.9, K44.9    S/P Nissen fundoplication (without gastrostomy tube) procedure Z98.890    Precordial pain R07.2    Post PTCA Z98.61    Leg pain, bilateral M79.604, M79.605    Syncope and collapse R55    Dizziness R42    Angina pectoris ([facility] [address] I20.9    HTN (hypertension) I10    ASCVD (arteriosclerotic cardiovascular disease) I25.10    GERD (gastroesophageal reflux disease) K21.9    Ankle syndesmosis disruption, left, subsequent encounter S93.432D    Closed trimalleolar fracture of left ankle S82.852A    Closed left ankle fracture, initial encounter S82.892A    Closed fracture of upper end of left fibula S82.832A    Chronic kidney disease (CKD) stage G3a/A1, moderately decreased glomerular filtration rate (GFR) between 45-59 mL/min/1.73 square meter and albuminuria creatinine ratio less than 30 mg/g ([facility]) N18.31    Hypotension I95.9       Isolation/Infection:   Isolation            No Isolation          Patient Infection Status       Infection Onset Added Last Indicated Last Indicated By Review Planned Expiration Resolved Resolved By    None active    Resolved    COVID-19 Rule Out 05/22/20 05/22/20 05/22/20 Covid-19 Ambulatory (Ordered)   05/23/20 Rule-Out Test Resulted            Nurse Assessment:  Last Vital Signs: /76   Pulse 66   Temp 97.6 °F (36.4 °C) (Oral)   Resp 15   Ht 5' 1\" (1.549 m)   Wt 189 lb 14.4 oz (86.1 kg)   SpO2 98%   BMI 35.88 kg/m²     Last documented pain score (0-10 scale): Pain Level: 0  Last Weight:   Wt Readings from Last 1 Encounters:   06/22/21 189 lb 14.4 oz (86.1 kg)     Mental Status:  oriented and alert    IV Access:  - None    Nursing Mobility/ADLs:  Walking   Assisted  Transfer  Assisted  Bathing  Assisted  Dressing  Assisted  Toileting  Assisted  Feeding  Assisted  Med Admin  Assisted  Med Delivery   crushed and prefers mixed with applesauce    Wound Care Documentation and Therapy:        Elimination:  Continence:   · Bowel: Yes  · Bladder: Intermittently incontinent  Urinary Catheter: None   Colostomy/Ileostomy/Ileal Conduit: No       Date of Last BM: 6/28/2021    Intake/Output Summary (Last 24 hours) at 6/23/2021 1603  Last data filed at 6/23/2021 0946  Gross per 24 hour   Intake    Output 400 ml   Net -400 ml     I/O last 3 completed shifts:  In: -   Out: 400 [Urine:400]    Safety Concerns: At Risk for Falls    Impairments/Disabilities:      None    Patient's personal belongings (please select all that are sent with patient):  None    RN SIGNATURE:  Electronically signed by Sridhar Tam RN on 6/28/21 at 6:36 PM EDT              PHYSICIAN SECTION    Nutrition Therapy:  Current Nutrition Therapy:   - Oral Diet:  Dysphagia 2 mechanically altered    Routes of Feeding: Oral  Liquids: No Restrictions  Daily Fluid Restriction: no  Last Modified Barium Swallow with Video (Video Swallowing Test): not done    Treatments at the Time of Hospital Discharge:   Respiratory Treatments:   Oxygen Therapy:  is not on home oxygen therapy.   Ventilator:    - No ventilator support    Rehab Therapies: Physical Therapy and Occupational Therapy  Weight Bearing Status/Restrictions: No weight bearing restirctions  Other Medical Equipment (for information only, NOT a DME order):  wheelchair  Other Treatments:     Prognosis: Good    Condition at Discharge: Stable    Rehab Potential (if transferring to Rehab): Good    Recommended Labs or Other Treatments After Discharge: None    Physician Certification: I certify the above information and transfer of Tara Scott  is necessary for the continuing treatment of the diagnosis listed and that she requires [place] for less 30 days.      Update Admission H&P: No change in H&P    PHYSICIAN SIGNATURE: :  Electronically signed by Larry Patrick MD on 6/28/21 at 1:56 PM EDT

## 2021-06-23 NOTE — PROGRESS NOTES
Comprehensive Nutrition Assessment    Type and Reason for Visit:  Initial (length of stay)    Nutrition Recommendations/Plan:   Continue current soft and bite sized diet  Encourage intake   Offer oral nutrition supplement as needed if meal intake poor  Will continue to follow up during stay    Nutrition Assessment:  Admit with chest pain, cardiac testing normal, eval for thyroid nodule. Noted hx dementia. Currently on dysphagia soft and bite sized diet  per speech therapy. Limited po data, some meals 25-75%. Moderate nutrition risk at this time. Malnutrition Assessment:  Malnutrition Status: At risk for malnutrition (Comment)    Context:  Chronic Illness       Estimated Daily Nutrient Needs:  Energy (kcal):  0908-0766; Weight Used for Energy Requirements:  Current     Protein (g):  52-62 (1.1-1.3 g/kg); Weight Used for Protein Requirements:  Ideal        Fluid (ml/day):  1600; Method Used for Fluid Requirements:  1 ml/kcal      Nutrition Related Findings:  resting in bed, unable to clearly respond to questions, missing some teeth      Wounds:  None       Current Nutrition Therapies:    ADULT DIET;  Dysphagia - Soft and Bite Sized    Anthropometric Measures:  · Height: 5' 1\" (154.9 cm)  · Current Body Weight: 189 lb 13.1 oz (86.1 kg)   · Usual Body Weight: 185 lb (83.9 kg) (per hx-MD office notes)     · Ideal Body Weight: 105 lbs; % Ideal Body Weight 180.8 %   · BMI: 35.9  · Adjusted Body Weight:  ; No Adjustment   · BMI Categories: Obese Class 2 (BMI 35.0 -39.9)       Nutrition Diagnosis:   · Predicted inadequate energy intake related to cognitive or neurological impairment as evidenced by other (comment) (inconsistent intake)      Nutrition Interventions:   Food and/or Nutrient Delivery:  Continue Current Diet, Snacks (Comment), Start Oral Nutrition Supplement  Nutrition Education/Counseling:  No recommendation at this time   Coordination of Nutrition Care:  Continue to monitor while inpatient    Goals: Patient will consume at least 75% at meals during stay       Nutrition Monitoring and Evaluation:   Behavioral-Environmental Outcomes:  None Identified   Food/Nutrient Intake Outcomes:  Diet Advancement/Tolerance, Food and Nutrient Intake  Physical Signs/Symptoms Outcomes:  Biochemical Data, Meal Time Behavior, Skin, Weight, Chewing or Swallowing     Discharge Planning:    Continue current diet     Electronically signed by Mukund Vieyra RD, LD on 6/23/21 at 3:15 PM EDT    Contact: [phone]

## 2021-06-24 PROCEDURE — 6360000002 HC RX W HCPCS: Performed by: HOSPITALIST

## 2021-06-24 PROCEDURE — 6370000000 HC RX 637 (ALT 250 FOR IP): Performed by: HOSPITALIST

## 2021-06-24 PROCEDURE — 1200000000 HC SEMI PRIVATE

## 2021-06-24 PROCEDURE — 94640 AIRWAY INHALATION TREATMENT: CPT

## 2021-06-24 PROCEDURE — 94761 N-INVAS EAR/PLS OXIMETRY MLT: CPT

## 2021-06-24 PROCEDURE — 97535 SELF CARE MNGMENT TRAINING: CPT

## 2021-06-24 PROCEDURE — 2580000003 HC RX 258: Performed by: HOSPITALIST

## 2021-06-24 PROCEDURE — 97530 THERAPEUTIC ACTIVITIES: CPT

## 2021-06-24 PROCEDURE — 97116 GAIT TRAINING THERAPY: CPT

## 2021-06-24 RX ADMIN — DONEPEZIL HYDROCHLORIDE 10 MG: 10 TABLET, FILM COATED ORAL at 22:40

## 2021-06-24 RX ADMIN — ASPIRIN 81 MG: 81 TABLET, COATED ORAL at 09:39

## 2021-06-24 RX ADMIN — ENOXAPARIN SODIUM 40 MG: 40 INJECTION SUBCUTANEOUS at 17:50

## 2021-06-24 RX ADMIN — Medication 2 PUFF: at 19:23

## 2021-06-24 RX ADMIN — Medication 30 MG: at 16:23

## 2021-06-24 RX ADMIN — SODIUM CHLORIDE, PRESERVATIVE FREE 10 ML: 5 INJECTION INTRAVENOUS at 22:41

## 2021-06-24 RX ADMIN — SODIUM CHLORIDE, PRESERVATIVE FREE 10 ML: 5 INJECTION INTRAVENOUS at 09:39

## 2021-06-24 RX ADMIN — ATORVASTATIN CALCIUM 40 MG: 40 TABLET, FILM COATED ORAL at 09:39

## 2021-06-24 RX ADMIN — RANOLAZINE 1000 MG: 500 TABLET, FILM COATED, EXTENDED RELEASE ORAL at 09:39

## 2021-06-24 RX ADMIN — ALBUTEROL SULFATE 2 PUFF: 90 AEROSOL, METERED RESPIRATORY (INHALATION) at 15:02

## 2021-06-24 RX ADMIN — ALBUTEROL SULFATE 2 PUFF: 90 AEROSOL, METERED RESPIRATORY (INHALATION) at 19:22

## 2021-06-24 RX ADMIN — MEMANTINE 10 MG: 10 TABLET ORAL at 22:40

## 2021-06-24 RX ADMIN — Medication 5 MG: at 22:40

## 2021-06-24 RX ADMIN — ALBUTEROL SULFATE 2 PUFF: 90 AEROSOL, METERED RESPIRATORY (INHALATION) at 08:25

## 2021-06-24 RX ADMIN — CLOPIDOGREL BISULFATE 75 MG: 75 TABLET ORAL at 09:39

## 2021-06-24 RX ADMIN — SERTRALINE HYDROCHLORIDE 100 MG: 100 TABLET ORAL at 09:39

## 2021-06-24 RX ADMIN — Medication 2 PUFF: at 15:02

## 2021-06-24 RX ADMIN — MEMANTINE 10 MG: 10 TABLET ORAL at 09:39

## 2021-06-24 RX ADMIN — ALBUTEROL SULFATE 2 PUFF: 90 AEROSOL, METERED RESPIRATORY (INHALATION) at 11:45

## 2021-06-24 RX ADMIN — Medication 2 PUFF: at 11:45

## 2021-06-24 RX ADMIN — ACETAMINOPHEN 650 MG: 325 TABLET ORAL at 10:56

## 2021-06-24 RX ADMIN — Medication 2 PUFF: at 08:25

## 2021-06-24 RX ADMIN — RANOLAZINE 1000 MG: 500 TABLET, FILM COATED, EXTENDED RELEASE ORAL at 22:40

## 2021-06-24 ASSESSMENT — PAIN SCALES - GENERAL
PAINLEVEL_OUTOF10: 10
PAINLEVEL_OUTOF10: 0

## 2021-06-24 NOTE — PROGRESS NOTES
Occupational Therapy  . Occupational Therapy Treatment Note      Name: Jo Stiles MRN: [id number] :   1960   Date:  2021   Admission Date: 2021 Room:  3007/3007-A     Primary Problem:  The primary encounter diagnosis was Chest pain, unspecified type. Diagnoses of Dyspnea and respiratory abnormalities, Paresthesia, Slurred speech, Hypomagnesemia, and Thyroid nodule were also pertinent to this visit. Restrictions/Precautions:  Restrictions/Precautions  Restrictions/Precautions: General Precautions, Fall Risk     Communication with other providers:  Per chart review and Nurse Linda, patient is appropriate for therapeutic intervention. Notified nurse of pt's pain level and no recall for recent pain meds. Subjective:  Patient states:  Pt agreeable to Tx session. \"I can't hear very well. \"   Pain: \"close to ten\" /10, in BLE (location, type, intensity)    Objective:    Observation:  Pt received in semi-fowlers in bed, requires loud clear voice in close proximity for communication. Objective Measures:  N/A    Treatment, including education:  Therapeutic Activity Training:   Therapeutic activity training was instructed today. Cues were given for safety, sequence, UE/LE placement, awareness, and balance. Activities performed today included bed mobility training, sup-sit, sit-stand, SPT. Supine to sit: Min A to trunk + cues for sequencing and increased time and effort  Scooting: Intermittent CGA for steadiness while pt followed cues for sequencing to reach EOB  Sit to stand: Mod A c RW  Stand to sit: Min A c RW + min cues for safe body positioning  Functional Mobliity: CGA c RW ~8 ft inside room from bed to chair. Self Care Training:   Cues were given for safety, sequence, UE/LE placement, visual cues, and balance. Activities performed today included hand hygiene, and grooming.   Grooming: Min A for thorough combing of hair as pt required cues for initiation / completion, Sup seated c cues

## 2021-06-24 NOTE — PROGRESS NOTES
Hospitalist Progress Note       6/24/2021 1:45 PM  Admit Date: 6/16/2021    PCP: LAVERN Cordero - NP     Assessment and Plan:   1. Atypical left-sided chest pain: Troponin-negative. CTA chest-no PE. Cardiology consult appreciated. Stress test and echo were essentially normal.  Continue aspirin, Plavix, Ranexa and Lipitor. 2.  Slow speech/right upper extremity weakness: It seems chronic. CT head showed no acute findings. CTA head and neck-unremarkable. CT cervical and thoracic spine-no compression fracture. MRI could not be done because of cochlear implant. Neurology consult acknowledged. Continue aspirin, Plavix and Lipitor. Continue PT/OT. 3.  Incidental thyroid nodule: Normal TSH, free T3 and free T4. Endocrinology consult acknowledged. Will follow up in endocrine clinic. 4.  Hypomagnesemia: Replaced. 5.  Dementia: On Aricept and Namenda. Chronic problems include hypertension, CAD (status post stent), COPD, GERD, restless leg syndrome, obesity and severe hearing loss (cochlear implants). DVT prophylaxis: Lovenox. Disposition: Awaiting ECF placement.     Patient Active Problem List:     Chest pain     Current tobacco use     COPD (chronic obstructive pulmonary disease) (HCC)     Essential hypertension     Palpitations     Tobacco use     Gastroesophageal reflux disease with hiatal hernia     S/P Nissen fundoplication (without gastrostomy tube) procedure     Precordial pain     Post PTCA     Leg pain, bilateral     Syncope and collapse     Dizziness     Angina pectoris (HCC)     HTN (hypertension)     ASCVD (arteriosclerotic cardiovascular disease)     GERD (gastroesophageal reflux disease)     Ankle syndesmosis disruption, left, subsequent encounter     Closed trimalleolar fracture of left ankle     Closed left ankle fracture, initial encounter     Closed fracture of upper end of left fibula     Chronic kidney disease (CKD) stage G3a/A1, moderately decreased glomerular

## 2021-06-24 NOTE — CARE COORDINATION
Followed up with pt to update her pre-cert is still pending for rehab at [place]. 10:17 AM  Left message for Linda/admissions at [place] to check on status of pre-cert.

## 2021-06-25 PROCEDURE — 6360000002 HC RX W HCPCS: Performed by: HOSPITALIST

## 2021-06-25 PROCEDURE — 6370000000 HC RX 637 (ALT 250 FOR IP): Performed by: HOSPITALIST

## 2021-06-25 PROCEDURE — 94640 AIRWAY INHALATION TREATMENT: CPT

## 2021-06-25 PROCEDURE — 1200000000 HC SEMI PRIVATE

## 2021-06-25 PROCEDURE — 2580000003 HC RX 258: Performed by: HOSPITALIST

## 2021-06-25 PROCEDURE — 94761 N-INVAS EAR/PLS OXIMETRY MLT: CPT

## 2021-06-25 RX ADMIN — CLOPIDOGREL BISULFATE 75 MG: 75 TABLET ORAL at 09:51

## 2021-06-25 RX ADMIN — SERTRALINE HYDROCHLORIDE 100 MG: 100 TABLET ORAL at 09:50

## 2021-06-25 RX ADMIN — Medication 2 PUFF: at 16:24

## 2021-06-25 RX ADMIN — MEMANTINE 10 MG: 10 TABLET ORAL at 20:58

## 2021-06-25 RX ADMIN — RANOLAZINE 1000 MG: 500 TABLET, FILM COATED, EXTENDED RELEASE ORAL at 09:51

## 2021-06-25 RX ADMIN — SODIUM CHLORIDE, PRESERVATIVE FREE 10 ML: 5 INJECTION INTRAVENOUS at 09:51

## 2021-06-25 RX ADMIN — Medication 2 PUFF: at 08:00

## 2021-06-25 RX ADMIN — ATORVASTATIN CALCIUM 40 MG: 40 TABLET, FILM COATED ORAL at 09:51

## 2021-06-25 RX ADMIN — Medication 30 MG: at 06:05

## 2021-06-25 RX ADMIN — Medication 30 MG: at 17:55

## 2021-06-25 RX ADMIN — RANOLAZINE 1000 MG: 500 TABLET, FILM COATED, EXTENDED RELEASE ORAL at 20:57

## 2021-06-25 RX ADMIN — ALBUTEROL SULFATE 2 PUFF: 90 AEROSOL, METERED RESPIRATORY (INHALATION) at 08:00

## 2021-06-25 RX ADMIN — ALBUTEROL SULFATE 2 PUFF: 90 AEROSOL, METERED RESPIRATORY (INHALATION) at 11:58

## 2021-06-25 RX ADMIN — ASPIRIN 81 MG: 81 TABLET, COATED ORAL at 09:51

## 2021-06-25 RX ADMIN — ALBUTEROL SULFATE 2 PUFF: 90 AEROSOL, METERED RESPIRATORY (INHALATION) at 16:24

## 2021-06-25 RX ADMIN — MEMANTINE 10 MG: 10 TABLET ORAL at 09:51

## 2021-06-25 RX ADMIN — Medication 5 MG: at 20:58

## 2021-06-25 RX ADMIN — Medication 2 PUFF: at 11:58

## 2021-06-25 RX ADMIN — DONEPEZIL HYDROCHLORIDE 10 MG: 10 TABLET, FILM COATED ORAL at 20:58

## 2021-06-25 RX ADMIN — SODIUM CHLORIDE, PRESERVATIVE FREE 10 ML: 5 INJECTION INTRAVENOUS at 20:58

## 2021-06-25 RX ADMIN — ENOXAPARIN SODIUM 40 MG: 40 INJECTION SUBCUTANEOUS at 17:55

## 2021-06-25 ASSESSMENT — PAIN SCALES - GENERAL
PAINLEVEL_OUTOF10: 0

## 2021-06-25 NOTE — RT PROTOCOL NOTE
RT Inhaler-Nebulizer Bronchodilator Protocol Note    There is a bronchodilator order in the chart from a provider indicating to follow the RT Bronchodilator Protocol and there is an Initiate RT Bronchodilator Protocol order as well (see protocol at bottom of note). The findings from the last RT Protocol Assessment were as follows:  Smoking: <15 Pack years  Surgical Status: No surgery  Xray: Clear  Respiratory Pattern: RR 12-20  Mental Status: Alert and Oriented  Breath Sounds: Diminished and/or crackles  Cough: Strong, spontaneous, non-productive  Activity Level: Walking with assistance  Oxygen Requirement: Room Air - 2LNC/28% or home setting  Indication for Bronchodilator Therapy: On home bronchodilators (x4 daily per patient home regime)- per COPD  Bronchodilator Assessment Score: 2    Aerosolized bronchodilator medication orders have been revised according to the RT Bronchodilator Protocol. RT Inhaler-Nebulizer Bronchodilator Protocol:    Respiratory Therapist to perform RT Therapy Protocol Assessment then follow the protocol. No Indications - adjust the frequency to every 6 hours PRN wheezing or bronchospasm, if no treatments needed after 48 hours then discontinue using Per Protocol order mode. If indication present, adjust the RT bronchodilator orders based on the Bronchodilator Assessment Score as follows:    0-6 - enter or revise RT bronchodilator order to Albuterol Inhaler order with frequency of every 2 hours PRN for wheezing or increased work of breathing using Per Protocol order mode. If Albuterol Inhaler not tolerated or not effective, then discontinue the Albuterol Inhaler order and enter Albuterol Nebulizer order with same frequency and PRN reasons. Repeat RT Therapy Protocol Assessment as needed. - x4 daily per home use for COPD      RT to enter RT Home Evaluation for COPD & MDI Assessment order using Per Protocol order mode.     Electronically signed by Candis Esquivel RCP on 6/25/2021 at 11:10 AM

## 2021-06-25 NOTE — PROGRESS NOTES
filtration rate (GFR) between 45-59 mL/min/1.73 square meter and albuminuria creatinine ratio less than 30 mg/g (HCC)     Hypotension      Subjective:     Chief Complaint   Patient presents with    Chest Pain     ongoing several weeks. states insurance wouldn't pay for testing with her heart doctor so she came here       F/U:  Interval History:   Discussed with the nurse. No reported diarrhea. Objective:     No intake or output data in the 24 hours ending 06/25/21 1043   Vitals:   Vitals:    06/25/21 0944   BP: 111/65   Pulse: 79   Resp: 14   Temp: 98.1 °F (36.7 °C)   SpO2: 99%     Physical Exam:  General: Not in acute respiratory distress. Severe hearing loss. Eyes: Anicteric. Extraocular muscles intact. Neurology: Slow speech. She follows command. Strength equal in all extremities. Power is 4/5 in all extremities. Cardiovascular: Regular. No murmur or S3. Respiratory: Good air entry bilaterally. No wheezes or crepitation. Abdomen: Soft, no tenderness, no palpable mass. : No Lopez catheter. Extremities: No pedal edema. The feet are warm.     Electronically signed by Evelia Hernández MD on 6/25/2021 at 10:43 AM  [facility]

## 2021-06-25 NOTE — PLAN OF CARE
Problem: Falls - Risk of:  Goal: Will remain free from falls  Description: Will remain free from falls  6/25/2021 1058 by Lyric Chavez  Outcome: Ongoing  6/25/2021 0442 by Lita Paris RN  Outcome: Ongoing  Goal: Absence of physical injury  Description: Absence of physical injury  6/25/2021 1058 by Lyric Chavez  Outcome: Ongoing  6/25/2021 0442 by Lita Paris RN  Outcome: Ongoing     Problem: Pain:  Goal: Pain level will decrease  Description: Pain level will decrease  6/25/2021 1058 by Lyric Chavez  Outcome: Ongoing  6/25/2021 0442 by Lita Paris RN  Outcome: Ongoing  Goal: Control of acute pain  Description: Control of acute pain  6/25/2021 1058 by Lyric Chavez  Outcome: Ongoing  6/25/2021 0442 by Lita Paris RN  Outcome: Ongoing  Goal: Control of chronic pain  Description: Control of chronic pain  6/25/2021 1058 by Lyric Chavez  Outcome: Ongoing  6/25/2021 0442 by Lita Paris RN  Outcome: Ongoing     Problem: Anxiety/Stress:  Goal: Level of anxiety will decrease  Description: Level of anxiety will decrease  6/25/2021 1058 by Lyric Chavez  Outcome: Ongoing  6/25/2021 0442 by Lita Paris RN  Outcome: Ongoing     Problem: Sleep Pattern Disturbance:  Goal: Appears well-rested  Description: Appears well-rested  6/25/2021 1058 by Lyric Chavez  Outcome: Ongoing  6/25/2021 0442 by Lita Paris RN  Outcome: Ongoing

## 2021-06-25 NOTE — CARE COORDINATION
Left voicemail for Linda/admissions at [facility] to check on status of pre-cert. 9:40 AM  Spoke with Linda/admissions at [facility] who states pre-cert is still pending.

## 2021-06-26 ENCOUNTER — APPOINTMENT (OUTPATIENT)
Dept: CT IMAGING | Age: 61
DRG: 243 | End: 2021-06-26
Payer: COMMERCIAL

## 2021-06-26 PROCEDURE — 94761 N-INVAS EAR/PLS OXIMETRY MLT: CPT

## 2021-06-26 PROCEDURE — 6370000000 HC RX 637 (ALT 250 FOR IP): Performed by: INTERNAL MEDICINE

## 2021-06-26 PROCEDURE — 6370000000 HC RX 637 (ALT 250 FOR IP): Performed by: HOSPITALIST

## 2021-06-26 PROCEDURE — 94640 AIRWAY INHALATION TREATMENT: CPT

## 2021-06-26 PROCEDURE — 1200000000 HC SEMI PRIVATE

## 2021-06-26 PROCEDURE — 2580000003 HC RX 258: Performed by: HOSPITALIST

## 2021-06-26 PROCEDURE — 6360000002 HC RX W HCPCS: Performed by: HOSPITALIST

## 2021-06-26 PROCEDURE — 87324 CLOSTRIDIUM AG IA: CPT

## 2021-06-26 PROCEDURE — 72131 CT LUMBAR SPINE W/O DYE: CPT

## 2021-06-26 RX ORDER — GABAPENTIN 100 MG/1
100 CAPSULE ORAL 3 TIMES DAILY
Status: DISCONTINUED | OUTPATIENT
Start: 2021-06-26 | End: 2021-06-27

## 2021-06-26 RX ORDER — ALBUTEROL SULFATE 90 UG/1
2 AEROSOL, METERED RESPIRATORY (INHALATION)
Status: DISCONTINUED | OUTPATIENT
Start: 2021-06-26 | End: 2021-06-28 | Stop reason: HOSPADM

## 2021-06-26 RX ADMIN — MEMANTINE 10 MG: 10 TABLET ORAL at 09:09

## 2021-06-26 RX ADMIN — Medication 30 MG: at 06:23

## 2021-06-26 RX ADMIN — MEMANTINE 10 MG: 10 TABLET ORAL at 21:34

## 2021-06-26 RX ADMIN — ALBUTEROL SULFATE 2 PUFF: 90 AEROSOL, METERED RESPIRATORY (INHALATION) at 15:05

## 2021-06-26 RX ADMIN — ATORVASTATIN CALCIUM 40 MG: 40 TABLET, FILM COATED ORAL at 21:34

## 2021-06-26 RX ADMIN — GABAPENTIN 100 MG: 100 CAPSULE ORAL at 18:11

## 2021-06-26 RX ADMIN — ASPIRIN 81 MG: 81 TABLET, COATED ORAL at 09:10

## 2021-06-26 RX ADMIN — ALBUTEROL SULFATE 2 PUFF: 90 AEROSOL, METERED RESPIRATORY (INHALATION) at 11:55

## 2021-06-26 RX ADMIN — Medication 5 MG: at 21:34

## 2021-06-26 RX ADMIN — RANOLAZINE 1000 MG: 500 TABLET, FILM COATED, EXTENDED RELEASE ORAL at 09:10

## 2021-06-26 RX ADMIN — SERTRALINE HYDROCHLORIDE 100 MG: 100 TABLET ORAL at 09:10

## 2021-06-26 RX ADMIN — ENOXAPARIN SODIUM 40 MG: 40 INJECTION SUBCUTANEOUS at 18:11

## 2021-06-26 RX ADMIN — RANOLAZINE 1000 MG: 500 TABLET, FILM COATED, EXTENDED RELEASE ORAL at 21:33

## 2021-06-26 RX ADMIN — Medication 2 PUFF: at 15:06

## 2021-06-26 RX ADMIN — CLOPIDOGREL BISULFATE 75 MG: 75 TABLET ORAL at 09:09

## 2021-06-26 RX ADMIN — DONEPEZIL HYDROCHLORIDE 10 MG: 10 TABLET, FILM COATED ORAL at 21:34

## 2021-06-26 RX ADMIN — SODIUM CHLORIDE, PRESERVATIVE FREE 10 ML: 5 INJECTION INTRAVENOUS at 21:34

## 2021-06-26 RX ADMIN — SODIUM CHLORIDE, PRESERVATIVE FREE 10 ML: 5 INJECTION INTRAVENOUS at 09:10

## 2021-06-26 RX ADMIN — GABAPENTIN 100 MG: 100 CAPSULE ORAL at 21:33

## 2021-06-26 RX ADMIN — Medication 2 PUFF: at 11:56

## 2021-06-26 ASSESSMENT — PAIN DESCRIPTION - FREQUENCY: FREQUENCY: CONTINUOUS

## 2021-06-26 ASSESSMENT — PAIN SCALES - GENERAL
PAINLEVEL_OUTOF10: 0
PAINLEVEL_OUTOF10: 10

## 2021-06-26 ASSESSMENT — PAIN DESCRIPTION - PAIN TYPE: TYPE: ACUTE PAIN

## 2021-06-26 ASSESSMENT — PAIN DESCRIPTION - LOCATION: LOCATION: LEG

## 2021-06-26 ASSESSMENT — PAIN DESCRIPTION - ONSET: ONSET: ON-GOING

## 2021-06-26 ASSESSMENT — PAIN DESCRIPTION - DESCRIPTORS: DESCRIPTORS: ACHING;SHARP;DISCOMFORT

## 2021-06-26 ASSESSMENT — PAIN DESCRIPTION - PROGRESSION: CLINICAL_PROGRESSION: NOT CHANGED

## 2021-06-26 NOTE — PLAN OF CARE
Problem: Falls - Risk of:  Goal: Will remain free from falls  Description: Will remain free from falls  6/25/2021 2318 by Galileo Edmondson RN  Outcome: Ongoing  6/25/2021 1058 by Rambo Geronimo  Outcome: Ongoing  Goal: Absence of physical injury  Description: Absence of physical injury  6/25/2021 2318 by Galileo Edmondson RN  Outcome: Ongoing  6/25/2021 1058 by Rambo Geronimo  Outcome: Ongoing     Problem: Pain:  Goal: Pain level will decrease  Description: Pain level will decrease  6/25/2021 2318 by Galileo Edmondson RN  Outcome: Ongoing  6/25/2021 1058 by Rambo Geronimo  Outcome: Ongoing  Goal: Control of acute pain  Description: Control of acute pain  6/25/2021 2318 by Galileo Edmondson RN  Outcome: Ongoing  6/25/2021 1058 by Rambo Geronimo  Outcome: Ongoing  Goal: Control of chronic pain  Description: Control of chronic pain  6/25/2021 2318 by Galileo Edmondson RN  Outcome: Ongoing  6/25/2021 1058 by Rambo Geronimo  Outcome: Ongoing     Problem: Anxiety/Stress:  Goal: Level of anxiety will decrease  Description: Level of anxiety will decrease  6/25/2021 2318 by Galileo Edmondson RN  Outcome: Ongoing  6/25/2021 1058 by Rambo Geronimo  Outcome: Ongoing     Problem: Sleep Pattern Disturbance:  Goal: Appears well-rested  Description: Appears well-rested  6/25/2021 2318 by Galileo Edmondson RN  Outcome: Ongoing  6/25/2021 1058 by Rambo Geronimo  Outcome: Ongoing     Problem: Skin Integrity:  Goal: Will show no infection signs and symptoms  Description: Will show no infection signs and symptoms  Outcome: Ongoing  Goal: Absence of new skin breakdown  Description: Absence of new skin breakdown  Outcome: Ongoing

## 2021-06-26 NOTE — RT PROTOCOL NOTE
RT Inhaler-Nebulizer Bronchodilator Protocol Note    There is a bronchodilator order in the chart from a provider indicating to follow the RT Bronchodilator Protocol and there is an Initiate RT Bronchodilator Protocol order as well (see protocol at bottom of note). The findings from the last RT Protocol Assessment were as follows:  Smoking: <15 Pack years  Surgical Status: No surgery  Xray: X-ray not available  Respiratory Pattern: RR 12-20  Mental Status: Alert and Oriented  Breath Sounds: Clear  Cough: Strong, spontaneous, non-productive  Activity Level: Walking unassisted  Oxygen Requirement: Room Air - 2LNC/28% or home setting  Indication for Bronchodilator Therapy: On home bronchodilators  Bronchodilator Assessment Score: 1    Aerosolized bronchodilator medication orders have been revised according to the RT Bronchodilator Protocol. RT Inhaler-Nebulizer Bronchodilator Protocol:    Respiratory Therapist to perform RT Therapy Protocol Assessment then follow the protocol. No Indications - adjust the frequency to every 6 hours PRN wheezing or bronchospasm, if no treatments needed after 48 hours then discontinue using Per Protocol order mode. If indication present, adjust the RT bronchodilator orders based on the Bronchodilator Assessment Score as follows:    0-6 - enter or revise RT bronchodilator order to Albuterol Inhaler order with frequency of every 2 hours PRN for wheezing or increased work of breathing using Per Protocol order mode. If Albuterol Inhaler not tolerated or not effective, then discontinue the Albuterol Inhaler order and enter Albuterol Nebulizer order with same frequency and PRN reasons. Repeat RT Therapy Protocol Assessment as needed.     7-10 - discontinue any other Inpatient aerosolized bronchodilator medication orders and enter or revise two Albuterol Inhaler orders, one with BID frequency and one with frequency of every 2 hours PRN wheezing or increased work of breathing using Per Protocol order mode. Repeat RT Therapy Protocol Assessment with second treatment then BID and as needed. If Albuterol Inhaler not tolerated or not effective, then discontinue the Albuterol Inhaler orders and enter two Albuterol Nebulizer orders with same frequencies and PRN reasons. 11-13 - discontinue any other Inpatient aerosolized bronchodilator medication orders and enter DuoNeb Nebulizer orders QID frequency and an Albuterol Nebulizer order every 2 hours PRN wheezing or increased work of breathing using Per Protocol order mode. Repeat RT Therapy Protocol Assessment with second treatment then QID and as needed. Greater than 13 - discontinue any other Inpatient bronchodilator aerosolized medication orders and enter DuoNeb Nebulizer order every 4 hours frequency and Albuterol Nebulizer every 2 hours PRN wheezing or increased work of breathing using Per Protocol order mode. Repeat RT Therapy Protocol Assessment with second treatment then every 4 hours and as needed. RT to enter RT Home Evaluation for COPD & MDI Assessment order using Per Protocol order mode. Patient is ordered to take them qid at home. But patient only takes them PRN and would like to do so here.   She does not want to take them if she dont need too    Electronically signed by Julito Reid RCP on 6/26/2021 at 3:19 PM

## 2021-06-26 NOTE — PROGRESS NOTES
Hospitalist Progress Note      PCP: LAVERN Barba - NP    Date of Admission: 6/16/2021    Hospital Course: 57 y.o female with history of coronary artery disease, patient presented with left-sided chest pain. She also had slurred speech and left facial droop with some weakness of the right upper extremity, is unclear when her last known well being. Patient is not candidate for TPA. 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 Pt was admitted for chest pain and TIA. \"       Subjective:   She complaints of pain in her legs. She has some back pain. No chest pain.     Medications:  Reviewed    Infusion Medications    sodium chloride       Scheduled Medications    lansoprazole  30 mg Oral BID AC    albuterol sulfate HFA  2 puff Inhalation 4x daily    ipratropium  2 puff Inhalation 4x daily    aspirin EC  81 mg Oral Daily    atorvastatin  40 mg Oral Daily    clopidogrel  75 mg Oral Daily    donepezil  10 mg Oral Nightly    melatonin  5 mg Oral Daily    memantine  10 mg Oral BID    ranolazine  1,000 mg Oral BID    sertraline  100 mg Oral Daily    sodium chloride flush  5-40 mL Intravenous 2 times per day    enoxaparin  40 mg Subcutaneous QPM     PRN Meds: loperamide, ipratropium-albuterol, magnesium sulfate, hydrOXYzine, nitroGLYCERIN, sodium chloride flush, sodium chloride, ondansetron **OR** ondansetron, acetaminophen **OR** acetaminophen, polyethylene glycol      Intake/Output Summary (Last 24 hours) at 6/26/2021 1415  Last data filed at 6/25/2021 2022  Gross per 24 hour   Intake 120 ml   Output    Net 120 ml       Physical Exam Performed:    /79   Pulse 56   Temp 98.3 °F (36.8 °C) (Oral)   Resp 14   Ht 5' 1\" (1.549 m)   Wt 189 lb 6.4 oz (85.9 kg)   SpO2 97%   BMI 35.79 kg/m²     General appearance: No apparent distress, appears stated age and cooperative. Obese  HEENT: Pupils equal, round, and reactive to light. Slight slur in speech  Neck: Supple, with full range of motion. No jugular venous distention. Trachea midline. Respiratory:  Normal respiratory effort. Clear to auscultation, bilaterally without Rales/Wheezes/Rhonchi. Cardiovascular: Regular rate and rhythm with normal S1/S2 without murmurs, rubs or gallops. Abdomen: Soft, non-tender, non-distended with normal bowel sounds. Musculoskeletal: No clubbing, cyanosis or edema bilaterally. Full range of motion without deformity. Skin: Skin color, texture, turgor normal.  No rashes or lesions. Neurologic:  Neurovascularly intact without any focal sensory/motor deficits. Cranial nerves: II-XII intact, grossly non-focal.  Psychiatric: Alert and oriented, thought content appropriate, normal insight      Labs:   No results for input(s): WBC, HGB, HCT, PLT in the last 72 hours. No results for input(s): NA, K, CL, CO2, BUN, CREATININE, CALCIUM, PHOS in the last 72 hours. Invalid input(s): MAGNES  No results for input(s): AST, ALT, BILIDIR, BILITOT, ALKPHOS in the last 72 hours. No results for input(s): INR in the last 72 hours. No results for input(s): Blanchard Hussain in the last 72 hours. Urinalysis:      Lab Results   Component Value Date    NITRU NEGATIVE 06/16/2021    WBCUA 1 06/16/2021    BACTERIA NEGATIVE 06/16/2021    RBCUA <1 06/16/2021    BLOODU NEGATIVE 06/16/2021    SPECGRAV 1.053 06/16/2021       Radiology:  CT THORACIC SPINE WO CONTRAST   Final Result   1. No acute osseous abnormality of the thoracic spine. 2. Minimal smooth midthoracic dextroscoliosis. 3. Small to moderate hiatal hernia. CT CERVICAL SPINE WO CONTRAST   Final Result   1. No acute osseous abnormality of the cervical spine. 2. Mild C5-6 degenerative disc height loss. No significant bony neural   foraminal or spinal canal stenosis.          [address] HEAD NECK SOFT TISSUE THYROID   Final Result   Left thyroid lobe 10 mm category TR4 nodule. Ultrasound follow-up can be   considered in 1 year. RECOMMENDATIONS:   ACR TI-RADS recommendations:      TR4 (4-6 points):  FNA if >= 1.5 cm; follow-up if 1.0-1.4 cm in 1, 2, 3, and   5 years      no more than four nodules should be followed. NM MYOCARDIAL SPECT REST EXERCISE OR RX   Final Result      CTA CHEST W CONTRAST   Final Result   1. No evidence of pulmonary embolism to the segmental level or acute   pulmonary abnormality. 2. 2 cm incidental left thyroid nodule. Recommend thyroid US. Reference: J   Am [place] Radiol. 2015 Feb;12(2): 143-50. RECOMD:ITN      RECOMMENDATIONS:   2 cm incidental left thyroid nodule. Recommend thyroid US. Reference: J Am [place] Radiol. 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 Otherwise   unremarkable CTA of the head and neck. CT HEAD WO CONTRAST   Final Result   No acute intracranial abnormality. XR CHEST PORTABLE   Final Result   1. No acute radiographic finding to account for patient's chest pain. Assessment/Plan:    Active Hospital Problems    Diagnosis     Chest pain [R07.9]      Atypical left-sided chest pain:   CTA chest-no PE.    -Troponin-negative x 3.  -Stress test was normal.Echo shows EF 50-55%. -Continue aspirin, Plavix, Ranexa and Lipitor.  -Cardiology consult appreciated. Continue to medical therapy.     Slow speech/right upper extremity weakness:    CT head showed no acute findings. CTA head and neck-unremarkable. CT cervical and thoracic spine-no compression fracture. - MRI could not be done because of cochlear implant.   - Continue aspirin, Plavix and Lipitor.   - Continue PT/OT. -Neurology consult acknowledged. Continue DAPT. No further workup

## 2021-06-27 LAB — CLOSTRIDIUM DIFFICILE, PCR: NORMAL

## 2021-06-27 PROCEDURE — 6370000000 HC RX 637 (ALT 250 FOR IP): Performed by: INTERNAL MEDICINE

## 2021-06-27 PROCEDURE — 2580000003 HC RX 258: Performed by: HOSPITALIST

## 2021-06-27 PROCEDURE — 6370000000 HC RX 637 (ALT 250 FOR IP): Performed by: HOSPITALIST

## 2021-06-27 PROCEDURE — 6360000002 HC RX W HCPCS: Performed by: HOSPITALIST

## 2021-06-27 PROCEDURE — 1200000000 HC SEMI PRIVATE

## 2021-06-27 PROCEDURE — 6370000000 HC RX 637 (ALT 250 FOR IP): Performed by: NURSE PRACTITIONER

## 2021-06-27 PROCEDURE — 94761 N-INVAS EAR/PLS OXIMETRY MLT: CPT

## 2021-06-27 RX ORDER — GABAPENTIN 100 MG/1
200 CAPSULE ORAL 3 TIMES DAILY
Status: DISCONTINUED | OUTPATIENT
Start: 2021-06-27 | End: 2021-06-28 | Stop reason: HOSPADM

## 2021-06-27 RX ADMIN — SODIUM CHLORIDE, PRESERVATIVE FREE 10 ML: 5 INJECTION INTRAVENOUS at 22:23

## 2021-06-27 RX ADMIN — GABAPENTIN 200 MG: 100 CAPSULE ORAL at 22:22

## 2021-06-27 RX ADMIN — DONEPEZIL HYDROCHLORIDE 10 MG: 10 TABLET, FILM COATED ORAL at 22:22

## 2021-06-27 RX ADMIN — MEMANTINE 10 MG: 10 TABLET ORAL at 22:22

## 2021-06-27 RX ADMIN — MEMANTINE 10 MG: 10 TABLET ORAL at 11:41

## 2021-06-27 RX ADMIN — CLOPIDOGREL BISULFATE 75 MG: 75 TABLET ORAL at 11:41

## 2021-06-27 RX ADMIN — Medication 30 MG: at 06:29

## 2021-06-27 RX ADMIN — Medication 4 MG: at 11:42

## 2021-06-27 RX ADMIN — GABAPENTIN 200 MG: 100 CAPSULE ORAL at 15:11

## 2021-06-27 RX ADMIN — RANOLAZINE 1000 MG: 500 TABLET, FILM COATED, EXTENDED RELEASE ORAL at 22:22

## 2021-06-27 RX ADMIN — RANOLAZINE 1000 MG: 500 TABLET, FILM COATED, EXTENDED RELEASE ORAL at 11:41

## 2021-06-27 RX ADMIN — Medication 30 MG: at 19:48

## 2021-06-27 RX ADMIN — SODIUM CHLORIDE, PRESERVATIVE FREE 10 ML: 5 INJECTION INTRAVENOUS at 11:41

## 2021-06-27 RX ADMIN — ENOXAPARIN SODIUM 40 MG: 40 INJECTION SUBCUTANEOUS at 18:15

## 2021-06-27 RX ADMIN — ATORVASTATIN CALCIUM 40 MG: 40 TABLET, FILM COATED ORAL at 22:22

## 2021-06-27 RX ADMIN — Medication 5 MG: at 22:22

## 2021-06-27 RX ADMIN — ASPIRIN 81 MG: 81 TABLET, COATED ORAL at 11:41

## 2021-06-27 RX ADMIN — SERTRALINE HYDROCHLORIDE 100 MG: 100 TABLET ORAL at 11:41

## 2021-06-27 ASSESSMENT — PAIN SCALES - GENERAL: PAINLEVEL_OUTOF10: 0

## 2021-06-27 ASSESSMENT — ENCOUNTER SYMPTOMS
VOMITING: 0
BACK PAIN: 1
SHORTNESS OF BREATH: 0
DIARRHEA: 0
COUGH: 0
NAUSEA: 0
EYE REDNESS: 0

## 2021-06-27 NOTE — PROGRESS NOTES
Progress Note  Date:2021       Room:[facility]3007-  Patient Name:Jojo Roman     YOB: 1960     Age:61 y.o. Subjective    Subjective:  Symptoms:  Stable. No shortness of breath, cough, chest pain or diarrhea. Diet:  Adequate intake. No nausea or vomiting. Activity level: Returning to normal.    Pain:  She complains of pain that is moderate. She reports pain is improving. Pain is partially controlled (Increasing gabapentin). Review of Systems   Constitutional: Negative for activity change, appetite change, diaphoresis and fatigue. HENT: Negative for congestion and postnasal drip. Eyes: Negative for redness and visual disturbance. Respiratory: Negative for cough and shortness of breath. Cardiovascular: Negative for chest pain and palpitations. Gastrointestinal: Negative for diarrhea, nausea and vomiting. Genitourinary: Negative for difficulty urinating and dysuria. Musculoskeletal: Positive for back pain. Negative for joint swelling and neck pain. Leg pain   Neurological: Negative for dizziness and speech difficulty. Psychiatric/Behavioral: Negative for agitation and confusion. Objective         Vitals Last 24 Hours:  TEMPERATURE:  Temp  Av.8 °F (36.6 °C)  Min: 97 °F (36.1 °C)  Max: 98.5 °F (36.9 °C)  RESPIRATIONS RANGE: Resp  Av  Min: 10  Max: 17  PULSE OXIMETRY RANGE: SpO2  Av %  Min: 93 %  Max: 97 %  PULSE RANGE: Pulse  Av.7  Min: 53  Max: 68  BLOOD PRESSURE RANGE: Systolic (58YJD), ROU:029 , Min:114 , HZB:387   ; Diastolic (82TSD), ZQO:11, Min:67, Max:83    I/O (24Hr): Intake/Output Summary (Last 24 hours) at 2021 1003  Last data filed at 2021 2134  Gross per 24 hour   Intake 10 ml   Output    Net 10 ml     Objective:  General Appearance:  Comfortable and in no acute distress. Vital signs: (most recent): Blood pressure 114/74, pulse 53, temperature 97 °F (36.1 °C), temperature source Tympanic, resp.  rate 12, height 5' 1\" (1.549 m), weight 188 lb (85.3 kg), SpO2 93 %, not currently breastfeeding. Vital signs are normal.    Output: Producing urine and producing stool. HEENT: Normal HEENT exam.    Lungs:  Normal effort and normal respiratory rate. She is not in respiratory distress. Heart: Normal rate. S1 normal and S2 normal.  No murmur, gallop or friction rub. Chest: Symmetric chest wall expansion. No chest wall tenderness. Abdomen: Abdomen is soft and flat. Bowel sounds are normal.   There is no abdominal tenderness. Pulses: Distal pulses are intact. Skin:  Warm. Labs/Imaging/Diagnostics    Labs:  CBC:No results for input(s): WBC, RBC, HGB, HCT, MCV, RDW, PLT in the last 72 hours. CHEMISTRIES:No results for input(s): NA, K, CL, CO2, BUN, CREATININE, GLUCOSE, PHOS, MG in the last 72 hours. Invalid input(s): CA  PT/INR:No results for input(s): PROTIME, INR in the last 72 hours. APTT:No results for input(s): APTT in the last 72 hours. LIVER PROFILE:No results for input(s): AST, ALT, BILIDIR, BILITOT, ALKPHOS in the last 72 hours. Imaging Last 24 Hours:  CT LUMBAR SPINE WO CONTRAST    Result Date: 6/26/2021  EXAMINATION: CT OF THE LUMBAR SPINE WITHOUT CONTRAST  6/26/2021 TECHNIQUE: CT of the lumbar spine was performed without the administration of intravenous contrast. Multiplanar reformatted images are provided for review. Dose modulation, iterative reconstruction, and/or weight based adjustment of the mA/kV was utilized to reduce the radiation dose to as low as reasonably achievable. COMPARISON: Lumbar spine radiographs 02/11/2020. CT abdomen and pelvis 05/14/2019. HISTORY: ORDERING SYSTEM PROVIDED HISTORY: NEUROPATHY TECHNOLOGIST PROVIDED HISTORY: Reason for exam:->radiculopathy Reason for Exam: back pain FINDINGS: BONES/ALIGNMENT: There is normal alignment of the spine. The vertebral body heights are maintained. No osseous destructive lesion is seen.  DEGENERATIVE CHANGES: L1-L2: There is no significant disc protrusion, central spinal canal stenosis or neural foraminal narrowing. L2-L3: Moderate degenerative disc disease. No significant central canal or foraminal stenosis. L3-L4: Moderate degenerative disc disease. Broad-based disc bulge with moderate central canal stenosis. No significant foraminal stenosis definitely seen. L4-L5: There is no significant disc protrusion, central spinal canal stenosis or neural foraminal narrowing. L5-S1: There is no significant disc protrusion, central spinal canal stenosis or neural foraminal narrowing. SOFT TISSUES/RETROPERITONEUM: Status post cholecystectomy. 2.2 cm benign right adrenal adenoma unchanged from 2019. Colonic diverticulosis. 1. No acute lumbar spine abnormality. 2. Moderate L3-4 degenerative disc disease with a broad-based disc bulge and moderate central canal stenosis. Assessment//Plan           Hospital Problems         Last Modified POA    Chest pain 6/16/2021 Yes        Assessment & Plan  Atypical left-sided chest pain:   CTA chest-no PE.    -Troponin-negative x 3.  -Stress test was normal.Echo shows EF 50-55%.    -Continue aspirin, Plavix, Ranexa and Lipitor.  -Cardiology consult appreciated.  Continue to medical therapy.     Slow speech/right upper extremity weakness:    CT head showed no acute findings.  CTA head and neck-unremarkable.  CT cervical and thoracic spine-no compression fracture. - MRI could not be done because of cochlear implant.   - Continue aspirin, Plavix and Lipitor.   - Continue PT/OT. -Neurology consult acknowledged.   Continue DAPT. No further workup recommended.     Incidental thyroid nodule  - Normal TSH, free T3 and free T4.  Endocrinology consult acknowledged.  Will follow up in endocrine clinic. Antithyroid Microsomal elevated.     Hypomagnesemia  -Monitor and replace     B/l leg pain-?radiculopathy  -CT lumbar spine - showed moderate L3-L4 degenerative disc disease with broad based bulge and moderate central canal stenosis consistent with lumbar spinal stenosis   -Gabapentin increased to 200 mg po tid   -Consult neurosurgeryy  -CT scan results reviewed with patient and family      Diarrhea  - Negative for cdiff   -Supportive care     Dementia  -Continue Aricept and Namenda.     Chronic problems include hypertension, CAD (status post stent), COPD, GERD, restless leg syndrome, obesity and severe hearing loss (cochlear implants).       DVT Prophylaxis: Lovenox  Diet: ADULT DIET;  Dysphagia - Soft and Bite Sized  Code Status: Full Code     PT/OT Eval Status: ordered     Dispo -Await pre-cert  Electronically signed by LAVERN Pedersen CNP on 6/27/21 at 10:03 AM EDT

## 2021-06-28 VITALS
DIASTOLIC BLOOD PRESSURE: 69 MMHG | RESPIRATION RATE: 19 BRPM | HEART RATE: 67 BPM | BODY MASS INDEX: 35.5 KG/M2 | TEMPERATURE: 97.8 F | SYSTOLIC BLOOD PRESSURE: 121 MMHG | WEIGHT: 188 LBS | HEIGHT: 61 IN | OXYGEN SATURATION: 97 %

## 2021-06-28 LAB
SARS-COV-2, NAAT: NOT DETECTED
SOURCE: NORMAL

## 2021-06-28 PROCEDURE — 6370000000 HC RX 637 (ALT 250 FOR IP): Performed by: HOSPITALIST

## 2021-06-28 PROCEDURE — 97535 SELF CARE MNGMENT TRAINING: CPT

## 2021-06-28 PROCEDURE — 6360000002 HC RX W HCPCS: Performed by: HOSPITALIST

## 2021-06-28 PROCEDURE — 97530 THERAPEUTIC ACTIVITIES: CPT

## 2021-06-28 PROCEDURE — 6370000000 HC RX 637 (ALT 250 FOR IP): Performed by: NURSE PRACTITIONER

## 2021-06-28 PROCEDURE — 94761 N-INVAS EAR/PLS OXIMETRY MLT: CPT

## 2021-06-28 PROCEDURE — 2580000003 HC RX 258: Performed by: HOSPITALIST

## 2021-06-28 PROCEDURE — 99252 IP/OBS CONSLTJ NEW/EST SF 35: CPT | Performed by: PHYSICIAN ASSISTANT

## 2021-06-28 PROCEDURE — 97116 GAIT TRAINING THERAPY: CPT

## 2021-06-28 PROCEDURE — 87635 SARS-COV-2 COVID-19 AMP PRB: CPT

## 2021-06-28 RX ORDER — GABAPENTIN 300 MG/1
300 CAPSULE ORAL 3 TIMES DAILY
Qty: 90 CAPSULE | Refills: 0 | DISCHARGE
Start: 2021-06-28 | End: 2022-10-17

## 2021-06-28 RX ADMIN — SODIUM CHLORIDE, PRESERVATIVE FREE 10 ML: 5 INJECTION INTRAVENOUS at 08:12

## 2021-06-28 RX ADMIN — GABAPENTIN 200 MG: 100 CAPSULE ORAL at 15:20

## 2021-06-28 RX ADMIN — SERTRALINE HYDROCHLORIDE 100 MG: 100 TABLET ORAL at 08:12

## 2021-06-28 RX ADMIN — ASPIRIN 81 MG: 81 TABLET, COATED ORAL at 08:12

## 2021-06-28 RX ADMIN — ENOXAPARIN SODIUM 40 MG: 40 INJECTION SUBCUTANEOUS at 17:23

## 2021-06-28 RX ADMIN — Medication 30 MG: at 06:47

## 2021-06-28 RX ADMIN — GABAPENTIN 200 MG: 100 CAPSULE ORAL at 08:10

## 2021-06-28 RX ADMIN — RANOLAZINE 1000 MG: 500 TABLET, FILM COATED, EXTENDED RELEASE ORAL at 08:11

## 2021-06-28 RX ADMIN — Medication 30 MG: at 17:25

## 2021-06-28 RX ADMIN — CLOPIDOGREL BISULFATE 75 MG: 75 TABLET ORAL at 08:12

## 2021-06-28 RX ADMIN — MEMANTINE 10 MG: 10 TABLET ORAL at 08:12

## 2021-06-28 ASSESSMENT — PAIN SCALES - GENERAL: PAINLEVEL_OUTOF10: 0

## 2021-06-28 ASSESSMENT — ENCOUNTER SYMPTOMS
NAUSEA: 0
BACK PAIN: 1
DIARRHEA: 0
EYE REDNESS: 0
VOMITING: 0
COUGH: 0
SHORTNESS OF BREATH: 0

## 2021-06-28 ASSESSMENT — PAIN SCALES - WONG BAKER: WONGBAKER_NUMERICALRESPONSE: 0

## 2021-06-28 NOTE — PROGRESS NOTES
Occupational Therapy  . Occupational Therapy Treatment Note      Name: Leonor Garrido MRN: [id number] :   1960   Date:  2021   Admission Date: 2021 Room:  3007/3007-A     Primary Problem:  The primary encounter diagnosis was Chest pain, unspecified type. Diagnoses of Dyspnea and respiratory abnormalities, Paresthesia, Slurred speech, Hypomagnesemia, and Thyroid nodule were also pertinent to this visit. Restrictions/Precautions:  Restrictions/Precautions  Restrictions/Precautions: General Precautions, Fall Risk     Communication with other providers:  Per chart review, patient is appropriate for therapeutic intervention. Subjective:  Patient states:  Pt agreeable to OT Tx session. .   Pain: Unrated, reports pain under control c oral meds (location, type, intensity)    Objective:    Observation:  Pt received up in room c RW at end of PT Tx session. Objective Measures:  Due to pt's report of onset of dizziness during toileting, vitals check showed /72 and O2 sat 98%. Treatment, including education:    Self Care Training:   Cues were given for safety, sequence, UE/LE placement, visual cues, and balance. Activities performed today included toileting / grooming  Toilet Transfer: Varied Min A to [facility] c RW + min cues for safe body positioning  Toileting: CGA for clothing mgmt up / down, seated w/o assistance for hygiene. Isolated report of dizziness during toileting, see vitals check above all within functional limits, no outward s/s of loss of balance, increased unsteadiness. Reported resolved by time seated in bedside chair. Grooming: CGA c RW in stance at sink for hand hygiene    All therapeutic intervention performed c emphasis on toileting and grooming, dynamic balance / standing tolerance to inc strength, endurance and act tolerance for inc Indep c ADL tasks, func transfers / mobility.      Safety  Patient safely in bedside chair + alarm placed at end of session, with call light/phone in reach, and nursing aware. Gait belt was used for func transfers / mobility. Assessment / Impression:    Patient's tolerance of treatment: Well  Adverse Reaction: None  Significant change in status and impact:    Barriers to improvement:    Plan for Next Session:    Continue per OT POC per patient's tolerance    Time in:  1327  Time out:  1121  Timed treatment minutes:  16  Total treatment time:  16      Electronically signed by:    RUKHSANA Lee  6/28/2021, 11:04 AM      Goals:  1. Pt will complete all aspects of bed mobility for EOB/OOB ADLs ind with HOB flat  2. Pt will complete UB/LB bathing with min A and AE/rest breaks prn  3. Pt will complete all aspects of LB dressing with min A and AE/modified techniques as needed/trained  4. Pt will complete all functional transfers/mobility to and from bed, chair, toilet, shower chair with SBA and LRAD  5. Pt will complete all aspects of toileting task with min A at standard commode  6.  Pt will complete oral hygiene/grooming routine in standing at sink demo G balance and tolerance

## 2021-06-28 NOTE — PROGRESS NOTES
Called report to [facility]. Went over discharge to nursing home with patient and son. Room packed up with all belongings.   Sabina-MARGY to remove IV

## 2021-06-28 NOTE — PROGRESS NOTES
return to correct room.     Safety  Patient left safely in the bathroom with OT present, with call light/phone in reach with alarm applied. Gait belt and mask were used for transfers and gait.              Assessment / Impression:                  Patient's tolerance of treatment:  Good; patient tolerated increased ambulation distance in hallway today  Adverse Reaction: none  Significant change in status and impact:  none  Barriers to improvement:  Generalized weakness; decreased endurance     Plan for Next Session:    Continue progressing toward goals per plan of care. Progress independence with transfers and ambulation as tolerated and appropriate. Progress ambulation distance as tolerated and appropriate.       Time in:  1025  Time out:  1105  Timed treatment minutes:  40  Total treatment time:  40    Previously filed items:  Social/Functional History  Lives With: Alone  Type of Home: House  Home Layout: One level  Home Access: Level entry  Bathroom Shower/Tub: Tub/Shower unit  Bathroom Toilet: Standard  Bathroom Equipment: Hand-held shower  Home Equipment: Cane, Rolling walker  Receives Help From: Home health (home health nurse once a week)  ADL Assistance: Independent  Homemaking Assistance: Needs assistance  Ambulation Assistance: Independent (mod I with spc)  Transfer Assistance: Independent  Active : No     Long term goals  Time Frame for Long term goals :  In one week:  Long term goal 1: Pt will complete all bed mobility with SBAx1  Long term goal 2: Pt will complete sit <> stand transfers with SBAx1  Long term goal 3: Pt will ambulate 150 feet with SBAx1 with LRAD  Long term goal 4: Pt will independently complete 3 sets of 10 reps of BLE AROM exercises in available and allowed ROM    Electronically signed by:      Neil Bass PT, DPT  License #: [id number]

## 2021-06-28 NOTE — PROGRESS NOTES
Progress Note  Date:2021       Room:300/3007-  Patient Name:Jojo Maguire     YOB: 1960     Age:61 y.o. Subjective    Subjective:  Symptoms:  Stable. No shortness of breath, cough, chest pain or diarrhea. Diet:  Adequate intake. No nausea or vomiting. Activity level: Returning to normal.    Pain:  She complains of pain that is moderate. She reports pain is improving. Pain is partially controlled (Increasing gabapentin). Review of Systems   Constitutional: Negative for activity change, appetite change, diaphoresis and fatigue. HENT: Negative for congestion and postnasal drip. Eyes: Negative for redness and visual disturbance. Respiratory: Negative for cough and shortness of breath. Cardiovascular: Negative for chest pain and palpitations. Gastrointestinal: Negative for diarrhea, nausea and vomiting. Genitourinary: Negative for difficulty urinating and dysuria. Musculoskeletal: Positive for back pain. Negative for joint swelling and neck pain. Leg pain   Neurological: Negative for dizziness and speech difficulty. Psychiatric/Behavioral: Negative for agitation and confusion. Objective         Vitals Last 24 Hours:  TEMPERATURE:  Temp  Av.1 °F (36.7 °C)  Min: 97.6 °F (36.4 °C)  Max: 98.4 °F (36.9 °C)  RESPIRATIONS RANGE: Resp  Av  Min: 15  Max: 20  PULSE OXIMETRY RANGE: SpO2  Av %  Min: 94 %  Max: 96 %  PULSE RANGE: Pulse  Av  Min: 65  Max: 76  BLOOD PRESSURE RANGE: Systolic (68GLN), YNM:976 , Min:78 , OLV:388   ; Diastolic (45ZSN), YYF:43, Min:57, Max:82    I/O (24Hr): Intake/Output Summary (Last 24 hours) at 2021 1220  Last data filed at 2021 5661  Gross per 24 hour   Intake 20 ml   Output    Net 20 ml     Objective:  General Appearance:  Comfortable and in no acute distress. Vital signs: (most recent): Blood pressure 131/82, pulse 75, temperature 98.4 °F (36.9 °C), temperature source Oral, resp.  rate 16, height 5' 1\" (1.549 m), weight 188 lb (85.3 kg), SpO2 96 %, not currently breastfeeding. Vital signs are normal.    Output: Producing urine and producing stool. HEENT: Normal HEENT exam.    Lungs:  Normal effort and normal respiratory rate. She is not in respiratory distress. Heart: Normal rate. S1 normal and S2 normal.  No murmur, gallop or friction rub. Chest: Symmetric chest wall expansion. No chest wall tenderness. Abdomen: Abdomen is soft and flat. Bowel sounds are normal.   There is no abdominal tenderness. Pulses: Distal pulses are intact. Skin:  Warm. Labs/Imaging/Diagnostics    Labs:  CBC:No results for input(s): WBC, RBC, HGB, HCT, MCV, RDW, PLT in the last 72 hours. CHEMISTRIES:No results for input(s): NA, K, CL, CO2, BUN, CREATININE, GLUCOSE, PHOS, MG in the last 72 hours. Invalid input(s): CA  PT/INR:No results for input(s): PROTIME, INR in the last 72 hours. APTT:No results for input(s): APTT in the last 72 hours. LIVER PROFILE:No results for input(s): AST, ALT, BILIDIR, BILITOT, ALKPHOS in the last 72 hours. Imaging Last 24 Hours:  CT LUMBAR SPINE WO CONTRAST    Result Date: 6/26/2021  EXAMINATION: CT OF THE LUMBAR SPINE WITHOUT CONTRAST  6/26/2021 TECHNIQUE: CT of the lumbar spine was performed without the administration of intravenous contrast. Multiplanar reformatted images are provided for review. Dose modulation, iterative reconstruction, and/or weight based adjustment of the mA/kV was utilized to reduce the radiation dose to as low as reasonably achievable. COMPARISON: Lumbar spine radiographs 02/11/2020. CT abdomen and pelvis 05/14/2019. HISTORY: ORDERING SYSTEM PROVIDED HISTORY: NEUROPATHY TECHNOLOGIST PROVIDED HISTORY: Reason for exam:->radiculopathy Reason for Exam: back pain FINDINGS: BONES/ALIGNMENT: There is normal alignment of the spine. The vertebral body heights are maintained. No osseous destructive lesion is seen.  DEGENERATIVE CHANGES: L1-L2: There is no significant disc protrusion, central spinal canal stenosis or neural foraminal narrowing. L2-L3: Moderate degenerative disc disease. No significant central canal or foraminal stenosis. L3-L4: Moderate degenerative disc disease. Broad-based disc bulge with moderate central canal stenosis. No significant foraminal stenosis definitely seen. L4-L5: There is no significant disc protrusion, central spinal canal stenosis or neural foraminal narrowing. L5-S1: There is no significant disc protrusion, central spinal canal stenosis or neural foraminal narrowing. SOFT TISSUES/RETROPERITONEUM: Status post cholecystectomy. 2.2 cm benign right adrenal adenoma unchanged from 2019. Colonic diverticulosis. 1. No acute lumbar spine abnormality. 2. Moderate L3-4 degenerative disc disease with a broad-based disc bulge and moderate central canal stenosis. Assessment//Plan           Hospital Problems         Last Modified POA    Chest pain 6/16/2021 Yes        Assessment & Plan  Atypical left-sided chest pain:   CTA chest-no PE.    -Troponin-negative x 3.  -Stress test was normal.Echo shows EF 50-55%.    -Continue aspirin, Plavix, Ranexa and Lipitor.  -Cardiology consult appreciated.  Continue to medical therapy.     Slow speech/right upper extremity weakness:    CT head showed no acute findings.  CTA head and neck-unremarkable.  CT cervical and thoracic spine-no compression fracture. - MRI could not be done because of cochlear implant.   - Continue aspirin, Plavix and Lipitor.   - Continue PT/OT. -Neurology consult acknowledged.   Continue DAPT. No further workup recommended.     Incidental thyroid nodule  - Normal TSH, free T3 and free T4.  Endocrinology consult acknowledged.  Will follow up in endocrine clinic. Antithyroid Microsomal elevated.     Hypomagnesemia  -Monitor and replace     B/l leg pain-radiculopathy  -CT lumbar spine - showed moderate L3-L4 degenerative disc disease with broad based bulge and moderate central canal stenosis consistent with lumbar spinal stenosis   -Gabapentin increased to 300 mg po tid   -Consult neurosurgery- Plan therapy at SNF and if continued significant pain recommend outpatient CT myelogram      Diarrhea  - Negative for cdiff   -Supportive care     Dementia  -Continue Aricept and Namenda.     Chronic problems include hypertension, CAD (status post stent), COPD, GERD, restless leg syndrome, obesity and severe hearing loss (cochlear implants).       DVT Prophylaxis: Lovenox  Diet: ADULT DIET;  Dysphagia - Soft and Bite Sized  Code Status: Full Code     PT/OT Eval Status: ordered     Dispo -Await pre-cert  Electronically signed by LAVERN Long CNP on 6/27/21 at 10:03 AM EDT

## 2021-06-28 NOTE — PLAN OF CARE
Problem: Falls - Risk of:  Goal: Will remain free from falls  6/28/2021 5119 by Daljit Lozano RN  Outcome: Ongoing  6/27/2021 2357 by Angelia Montalvo RN  Outcome: Ongoing  6/27/2021 2036 by Ignacia Rainey. Gildardo Duval RN  Outcome: Ongoing  Goal: Absence of physical injury  6/28/2021 0864 by Daljit Lozaon, RN  Outcome: Ongoing  6/27/2021 2357 by Angelia Montalvo, RN  Outcome: Ongoing  6/27/2021 2036 by Ignacia Rainey. Gildardo Duval RN  Outcome: Ongoing     Problem: Pain:  Goal: Pain level will decrease  6/28/2021 0833 by Daljit Lozano RN  Outcome: Ongoing  6/27/2021 2357 by Angelia Montalvo RN  Outcome: Ongoing  6/27/2021 2036 by Ignacia Rainey. Gildardo Duval RN  Outcome: Ongoing  Goal: Control of acute pain  6/28/2021 0833 by Daljit Lozano, RN  Outcome: Ongoing  6/27/2021 2357 by Angelia Montalvo, RN  Outcome: Ongoing  6/27/2021 2036 by Ignacia Rainey. Gildardo Duval RN  Outcome: Ongoing  Goal: Control of chronic pain  6/28/2021 0833 by Daljit Lozano RN  Outcome: Ongoing  6/27/2021 2357 by Angelia Montalvo, RN  Outcome: Ongoing  6/27/2021 2036 by Ignacia Raniey. Gildardo Duval RN  Outcome: Ongoing     Problem: Anxiety/Stress:  Goal: Level of anxiety will decrease  6/28/2021 0833 by Daljit Lozano RN  Outcome: Ongoing  6/27/2021 2357 by Angelia Montalvo, RN  Outcome: Ongoing  6/27/2021 2036 by Ignacia Rainey. Gildardo Duval RN  Outcome: Ongoing     Problem: Sleep Pattern Disturbance:  Goal: Appears well-rested  6/28/2021 0833 by Daljit Lozano, MARGY  Outcome: Ongoing  6/27/2021 2357 by Angelia Montalvo RN  Outcome: Ongoing  6/27/2021 2036 by Ignacia Rainey. Gildardo Duval RN  Outcome: Ongoing     Problem: Skin Integrity:  Goal: Will show no infection signs and symptoms  6/28/2021 0833 by Daljit Lozano RN  Outcome: Ongoing  6/27/2021 2357 by Angelia Montalvo RN  Outcome: Ongoing  6/27/2021 2036 by Ignacia Rainey.  Gildardo Duval RN  Outcome: Ongoing  Goal: Absence of new skin breakdown  6/28/2021 0833 by Daljit Lozano RN  Outcome: Ongoing  6/27/2021 2357 by Angelia Montalvo, RN  Outcome: Ongoing  6/27/2021 2036 by Valentino Huston. Saba Goldberg RN  Outcome: Ongoing     Problem: DAILY CARE  Goal: Daily care needs are met  6/28/2021 5122 by Luly Sandhu RN  Outcome: Ongoing  6/27/2021 2357 by Hector Bell RN  Outcome: Ongoing  6/27/2021 2037 by Valentino Huston. Saba Goldberg RN  Outcome: Ongoing     Problem: KNOWLEDGE DEFICIT  Goal: Patient/S.O. demonstrates understanding of disease process, treatment plan, medications, and discharge instructions. 6/28/2021 1815 by Luly Sandhu RN  Outcome: Ongoing  6/27/2021 2357 by Hector Bell RN  Outcome: Ongoing  6/27/2021 2037 by Valentino Goldberg RN  Outcome: Ongoing     Problem: DISCHARGE BARRIERS  Goal: Patient's continuum of care needs are met  6/28/2021 0833 by Luly Sandhu RN  Outcome: Ongoing  6/27/2021 2357 by Hector Bell RN  Outcome: Ongoing  6/27/2021 2037 by Valentino Huston. Saba Goldberg RN  Outcome: Ongoing     Problem: Diarrhea:  Goal: Bowel elimination is within specified parameters  6/28/2021 0833 by Luly Sandhu RN  Outcome: Ongoing  6/27/2021 2357 by Hector Bell RN  Outcome: Ongoing  6/27/2021 2037 by Valentino Huston. Saba Goldberg RN  Outcome: Ongoing  Goal: Establishment of normal bowel function will improve to within specified parameters  6/28/2021 0833 by Luly Sandhu RN  Outcome: Ongoing  6/27/2021 2357 by Hector Bell RN  Outcome: Ongoing  6/27/2021 2037 by Valentino Goldberg RN  Outcome: Ongoing

## 2021-06-28 NOTE — DISCHARGE SUMMARY
Physician Discharge Summary     Patient ID:  Dion Gregory  [id number]  55 y.o.  1960    Admit date: 6/16/2021    Discharge date and time: No discharge date for patient encounter. Admitting Physician:  My Mirza MD    Discharge Physician: LAVERN Long - CNP      Admission Diagnoses: Hypomagnesemia [E83.42]  Thyroid nodule [E04.1]  Paresthesia [R20.2]  Slurred speech [R47.81]  Chest pain [R07.9]  Dyspnea and respiratory abnormalities [R06.00, R06.89]  Chest pain, unspecified type [R07.9]    Discharge Diagnoses: Atypical chest pain, incidental thyroid nodule, lumbar spinal stenosis , TIA    Admission Condition: Stable     Discharged Condition: stable    Indication for Admission: Chest pain with slurred speech     Hospital Course:  Presented to ED with c/o  intermitted chest pain, serum was troponin negative. Was also noted to have intermitted slurred speech with unknown last known well. CT PE neg and stress were neg. Echo showed normal EF. CT cervn mild C5-6CT spine with dextroscoliosis-unable to get MRI due to cochlear implant CTA head and neck neg and has congential small left vertebral artery -ASA, plavix, statin. Chest pain was likely GI and placed on PPI. Hospitalization prolonged due to awaiting pre certification for [facility]     Assessment & Plan  Atypical left-sided chest pain:   CTA chest-no PE.    -Troponin-negative x 3.  -Stress test was normal.Echo shows EF 50-55%.     -Continue aspirin, Plavix, Ranexa and Lipitor.  -Cardiology consult appreciated.  Continue to medical therapy.     Slow speech/right upper extremity weakness:    CT head showed no acute findings.  CTA head and neck-unremarkable.  CT cervical and thoracic spine-no compression fracture.   - MRI could not be done because of cochlear implant.   - Continue aspirin, Plavix and Lipitor.   - Continue PT/OT. -Neurology consult acknowledged.   Continue DAPT. No further workup recommended.     Incidental thyroid Heart: Normal rate. S1 normal and S2 normal.  No murmur, gallop or friction rub. Chest: Symmetric chest wall expansion. No chest wall tenderness. Abdomen: Abdomen is soft and flat. Bowel sounds are normal.   There is no abdominal tenderness. Pulses: Distal pulses are intact. Skin:  Warm. Disposition: [facility]       Medication List      START taking these medications    gabapentin 300 MG capsule  Commonly known as: NEURONTIN  Take 1 capsule by mouth 3 times daily for 30 days. CHANGE how you take these medications    aspirin EC 81 MG EC tablet  Take 1 tablet by mouth daily.   What changed: when to take this     pantoprazole 40 MG tablet  Commonly known as: PROTONIX  Take 1 tablet by mouth 2 times daily (before meals)  What changed: when to take this        CONTINUE taking these medications    Acetaminophen Extra Strength 500 MG tablet  Generic drug: acetaminophen     ACIDOPHILUS PO     albuterol sulfate  (90 Base) MCG/ACT inhaler  INHALE TWO (2) PUFFS INTO LUNGS EVERY 6 HOURS AS NEEDED FOR SHORTNESS OF BREATH *HOLD SCRIPT*     atorvastatin 40 MG tablet  Commonly known as: LIPITOR     cetirizine 10 MG tablet  Commonly known as: ZYRTEC     clopidogrel 75 MG tablet  Commonly known as: PLAVIX  TAKE 1 TABLET BY MOUTH DAILY     Compression Stockings Misc  by Does not apply route 20 - 30 mmh wear daily and take off at night  Thigh High     donepezil 10 MG tablet  Commonly known as: ARICEPT     fluticasone 50 MCG/ACT nasal spray  Commonly known as: FLONASE     glycopyrrolate-formoterol 9-4.8 MCG/ACT Aero  Commonly known as: Bevespi Aerosphere  Inhale 2 puffs into the lungs 2 times daily     hydrOXYzine 25 MG tablet  Commonly known as: ATARAX     ipratropium-albuterol 0.5-2.5 (3) MG/3ML Soln nebulizer solution  Commonly known as: DUONEB     melatonin 5 MG Tabs tablet     memantine 10 MG tablet  Commonly known as: NAMENDA     nitroGLYCERIN 0.4 MG SL tablet  Commonly known as: NITROSTAT  DISSOLVE 1 TABLET UNDER THE TONGUE AS NEEDED FOR CHEST PAIN EVERY 5 MINUTES UP TO 3 TIMES. IF NO RELIEF CALL 911. ranolazine 1000 MG extended release tablet  Commonly known as: RANEXA  Take 1 tablet by mouth 2 times daily Do not crush or break. sertraline 100 MG tablet  Commonly known as: ZOLOFT     triamcinolone 0.1 % cream  Commonly known as: KENALOG     Vitamin D3 50 MCG (2000 UT) Tabs        STOP taking these medications    dicyclomine 20 MG tablet  Commonly known as: BENTYL     metoprolol tartrate 25 MG tablet  Commonly known as: LOPRESSOR           Where to Get Your Medications      These medications were sent to [address] [place] [address] [phone] - F [phone] [address] [address]    Phone: [phone]   · pantoprazole 40 MG tablet     Information about where to get these medications is not yet available    Ask your nurse or doctor about these medications  · gabapentin 300 MG capsule               Follow-up with Cardiology and endocrinology clinics  in a few weeks.    Will follow up in endocrine clinic. Antithyroid Microsomal elevated.       Time Spent on Discharge 40 minutes   Signed:  LAVERN Galloway CNP  6/28/2021  3:54 PM

## 2021-06-28 NOTE — CARE COORDINATION
Spoke with Linda/admissions at [facility] who states pre-cert was received and pt is able to admit today. PS to Yoana NP to update. 10:30 AM  Updated pt the pre-cert for [facility] was received and discharge will be when attending feels she is medically stable. CM following for orders. 2:40 PM  Noted pt on discharge. Spoke with QCT/Tamela with first available ambulette at midnight, spoke with Med [facility]/Jo Ann with first available at 7:45 pm and this was secured/face sheet faxed. Updated pt, her brother/Erik, Si Antes and left message for Linda/admissions at [facility]. Pt new to [facility] at discharge. Please call report to [phone] and fax orders, AVS, and discharge summaries to [phone].

## 2021-06-28 NOTE — PROGRESS NOTES
Comprehensive Nutrition Assessment    Type and Reason for Visit:  Reassess    Nutrition Recommendations/Plan:   Continue current diet-soft and bite-sized   Assist with meals as needed, encourage intake     Nutrition Assessment:  Remains on soft and bite- sized diet consistency. Meal intake varies per available records, %. Up eating lunch on visit, content with meal. Main c/o on visit with leg pain and reports hx weight gain. Small weight change during stay. Will continue to follow as moderate nutrition risk. Malnutrition Assessment:  Malnutrition Status: At risk for malnutrition (Comment)    Context:  Chronic Illness       Estimated Daily Nutrient Needs:  Energy (kcal):  0307-4415; Weight Used for Energy Requirements:  Current     Protein (g):  52-62 (1.1-1.3 g/kg); Weight Used for Protein Requirements:  Ideal        Fluid (ml/day):  1600; Method Used for Fluid Requirements:  1 ml/kcal      Nutrition Related Findings:  sitting up in chair eating lunch, more conversant today, eating meal well      Wounds:  None       Current Nutrition Therapies:    ADULT DIET;  Dysphagia - Soft and Bite Sized    Anthropometric Measures:  · Height: 5' 1\" (154.9 cm)  · Current Body Weight: 188 lb 0.8 oz (85.3 kg)   · Usual Body Weight: 185 lb (83.9 kg) (per hx-MD office notes)     · Ideal Body Weight: 105 lbs; % Ideal Body Weight 180.8 %   · BMI: 35.6  · Adjusted Body Weight:  ; No Adjustment   · BMI Categories: Obese Class 2 (BMI 35.0 -39.9)       Nutrition Diagnosis:   · Predicted inadequate energy intake related to cognitive or neurological impairment as evidenced by other (comment) (inconsistent intake)      Nutrition Interventions:   Food and/or Nutrient Delivery:  Continue Current Diet  Nutrition Education/Counseling:  No recommendation at this time   Coordination of Nutrition Care:  Continue to monitor while inpatient    Goals:  Patient will consume at least 75% at meals during stay       Nutrition Monitoring and Evaluation:   Behavioral-Environmental Outcomes:  None Identified   Food/Nutrient Intake Outcomes:  Diet Advancement/Tolerance, Food and Nutrient Intake  Physical Signs/Symptoms Outcomes:  Biochemical Data, Meal Time Behavior, Skin, Weight     Discharge Planning:    Continue current diet     Electronically signed by Amira Rm RD, LD on 6/28/21 at 12:22 PM EDT    Contact: [phone]

## 2021-06-28 NOTE — CONSULTS
Neurosurgery   Consult Note      Reason for Consult: Lumbar stenosis  Attending Physician: Slim Mesa MD  Date of Admission: 6/16/2021  Subjective:   CHIEF COMPLAINT: Chest pain, back pain    HPI:  57 y.o. 1960  Who presented to the ED 6/16/2021 with her friend with complaints of left-sided chest pain. Patient was also noted to have slurred speech and left-sided facial droop with right extremity weakness. Stroke work-up was performed. No acute changes were seen on his CT scan. Unfortunately an MRI was not able to be obtained due to patient having a cochlear implant. Patient is a poor historian. Per chart review her friend states that patient has actually had the slurred speech and left-sided facial droop for a while. The patient's main concern was her left-sided chest pain. She does have a history of cardiac stents. Cardiology has been following, no acute cardiac pathology noted. Neurology has been following for potential stroke, it was recommended she continue on her dual antiplatelet therapy, aspirin and Plavix. At her stay patient has been complaining of pain throughout her entire spine. CT of the cervical, thoracic, and most recently lumbar spine have been obtained in our service has been asked to comment. CT imaging is nonacute, there is some foraminal narrowing seen in the cervical spine and moderate central canal compression seen in the lumbar spine at L3-4. Patient is being seen on 3 E. today. She is noted to have somewhat dysarthric/slurred speech. She is hard of hearing. She is able to tell me her name, location, current situation. She is able to follow some commands briskly. Other times it is difficult to ascertain if communication is difficult due to her hearing difficulties, versus her not understanding completely. While performing my exam she does not seem to put a lot of effort into what she is doing.   When I asked her to raise her arms or her legs she states, \"I cannot\". When I assist her she says that she has significant pain all throughout her back, not in 1 specific location. She tells me that she is able to feel me touch her arms and legs. She also tells me that she can walk to the bathroom with a walker. Patient is on aspirin and Plavix. PMHx positive for skin cancer, CAD, migraines, hard of hearing, arthritis, bronchitis, emphysema, COPD, hypertension, hiatal hernia, sleep apnea. Past surgical history positive for left ankle fracture surgery, gastric fundoplication, tubal ligation, left kidney surgery, right cochlear implant. Past Medical and Surgical History:       Diagnosis Date    Anxiety     Arthritis     \"Both Legs\"    Asthma     Back problem     \"Curved Spine\"    Bronchitis Last Episode In 2015    CAD (coronary artery disease)     Sees Dr. Carli Rose; 8-16-16 (noted on 10-4-2012 that patient does not have CAD s/p cardiac catheterization).  Cancer ([address]     skin ca on skin    Chest pain     in ER with this dx 7/2015- admitted- stress test done as outpt 8/7/2015(see report)    Chipped tooth     Upper And Lower    Chronic back pain     COPD (chronic obstructive pulmonary disease) ([address] 06/01/2017    Sees Dr. Tiff Martinez    Cough     Occ. Nonproductive Cough    Emphysema     GERD (gastroesophageal reflux disease)     H/O 24 hour EKG monitoring     3/14/2013-Signif for runs of sinus tachycardia, fastest recorded at 132 bpm lasting almost 38 mins. Dr Jayant Schmitt. -report in epic    H/O cardiac catheterization     10/4/2012- No CAD.  False positive stress test.Dr Valdez-report [facility];    [facility] H/O cardiac catheterization     H/O Doppler lower venous ultrasound 09/24/2020    No DVT or SVT, No significant venous insufficiency    H/O Doppler ultrasound 04/13/2017    LE doppler - normal study    H/O Doppler ultrasound 05/26/2017    carotid - normal study    H/O exercise stress test 02/28/2017    normal study    Hiatal hernia     transluminal coronary angioplasty) 01/26/2017    2017, CX stented    Shortness of breath     Teeth missing     Upper And Lower    Tobacco use     Unspecified sleep apnea     \"had sleep study 2-3 yrs ago\" have cpap and try to use it\"         Procedure Laterality Date    ANKLE FRACTURE SURGERY Left 5/24/2019    LEFT ANKLE OPEN REDUCTION INTERNAL FIXATION SYNDESMOSIS performed by Sh[facility]ank Ibrahim [facility] at [facility]  2011    COLONOSCOPY  08/23/2017    colon polyp, diverticulosis, hemorrhoids    DENTAL SURGERY      Teeth Extracted In Past    ENDOSCOPY, COLON, DIAGNOSTIC  10-16-15    ENDOSCOPY, COLON, DIAGNOSTIC  01/28/2019    Hiatal hernia, savary dil #17 used    GASTRIC FUNDOPLICATION  28/61/2254    Robotic laparoscopic nissen with mesh    KIDNEY SURGERY Left 2-15    \"At [facility] Had Left Kidney Mass Removed\" Benign    TONSILLECTOMY  1960's    TUBAL LIGATION  1993    UPPER GASTROINTESTINAL ENDOSCOPY N/A 1/28/2019    EGD DILATION SAVARY #17MM performed by Miguel Ruelas MD at [place] [address] History:    TOBACCO:   reports that she quit smoking about 5 years ago. Her smoking use included cigarettes. She started smoking about 49 years ago. She has a 11.00 pack-year smoking history. She has never used smokeless tobacco.  ETOH:   reports current alcohol use of about 2.0 standard drinks of alcohol per week. There is no history of illicit drug use or other significant epidemiologic exposures.     Family History:       Problem Relation Age of Onset    Heart Disease Mother     Heart Disease Father     High Blood Pressure Father     Cancer Sister     Early Death Sister 43    Asthma Brother        Current Medications:    Current Facility-Administered Medications: gabapentin (NEURONTIN) capsule 200 mg, 200 mg, Oral, TID  albuterol sulfate  (90 Base) MCG/ACT inhaler 2 puff, 2 puff, Inhalation, Q2H PRN  loperamide (IMODIUM) 1 MG/5ML solution 4 mg, 4 mg, Oral, 4x Daily PRN  lansoprazole (PREVACID) oral suspension 30 mg, 30 mg, Oral, BID AC  ipratropium-albuterol (DUONEB) nebulizer solution 1 ampule, 1 ampule, Inhalation, Q4H PRN  magnesium sulfate 1000 mg in dextrose 5% 100 mL IVPB, 1,000 mg, Intravenous, PRN  aspirin EC tablet 81 mg, 81 mg, Oral, Daily  atorvastatin (LIPITOR) tablet 40 mg, 40 mg, Oral, Daily  clopidogrel (PLAVIX) tablet 75 mg, 75 mg, Oral, Daily  donepezil (ARICEPT) tablet 10 mg, 10 mg, Oral, Nightly  hydrOXYzine (ATARAX) tablet 50 mg, 50 mg, Oral, Q8H PRN  melatonin tablet 5 mg, 5 mg, Oral, Daily  memantine (NAMENDA) tablet 10 mg, 10 mg, Oral, BID  nitroGLYCERIN (NITROSTAT) SL tablet 0.4 mg, 0.4 mg, Sublingual, Q5 Min PRN  ranolazine (RANEXA) extended release tablet 1,000 mg, 1,000 mg, Oral, BID  sertraline (ZOLOFT) tablet 100 mg, 100 mg, Oral, Daily  sodium chloride flush 0.9 % injection 5-40 mL, 5-40 mL, Intravenous, 2 times per day  sodium chloride flush 0.9 % injection 5-40 mL, 5-40 mL, Intravenous, PRN  0.9 % sodium chloride infusion, 25 mL, Intravenous, PRN  ondansetron (ZOFRAN-ODT) disintegrating tablet 4 mg, 4 mg, Oral, Q8H PRN **OR** ondansetron (ZOFRAN) injection 4 mg, 4 mg, Intravenous, Q6H PRN  acetaminophen (TYLENOL) tablet 650 mg, 650 mg, Oral, Q6H PRN **OR** acetaminophen (TYLENOL) suppository 650 mg, 650 mg, Rectal, Q6H PRN  polyethylene glycol (GLYCOLAX) packet 17 g, 17 g, Oral, Daily PRN  enoxaparin (LOVENOX) injection 40 mg, 40 mg, Subcutaneous, QPM    Allergies   Allergen Reactions    Adhesive Tape Rash    Aspirin Nausea And Vomiting     Chewable sts not coated.     Pcn [Penicillins] Nausea And Vomiting and Rash        REVIEW OF SYSTEMS:    CONSTITUTIONAL:  negative for fevers, chills, diaphoresis, activity change, appetite change  EYES:  negative for blurred vision, eye discharge  HEENT: Positive for hearing loss, negative for tinnitus  MUSCULOSKELETAL:  negative for  pain, joint swelling, decreased range of motion and muscle weakness  NEUROLOGICAL: Positive for slurred speech, negative for headaches, unilateral weakness  PSYCHIATRIC/BEHAVIORAL: As noted for confusion, negative for hallucinations, behavioral problems, and agitation. Objective:   PHYSICAL EXAM:      VITALS:  /82   Pulse 75   Temp 98.4 °F (36.9 °C) (Oral)   Resp 16   Ht 5' 1\" (1.549 m)   Wt 188 lb (85.3 kg)   SpO2 96%   BMI 35.52 kg/m²      24HR INTAKE/OUTPUT:      Intake/Output Summary (Last 24 hours) at 6/28/2021 1029  Last data filed at 6/28/2021 1326  Gross per 24 hour   Intake 20 ml   Output    Net 20 ml     CONSTITUTIONAL:  Awake, alert, cooperative, no apparent distress, and appears stated age  [de-identified]: NCAT, PERRL, EOMI. Sclera white, conjunctive full, tongue protrudes midline  PSYCHIATRIC: Oriented to person place and time. No obvious depression or anxiety. MUSCULOSKELETAL: No obvious misalignment or effusion of the joints. No clubbing, cyanosis of the digits. SKIN:  normal skin color, texture, turgor and no redness, warmth, or swelling. NEUROLOGIC: Alert and oriented x3, face symmetrical, no obvious droop, speech dysarthric, patient intermittently cooperating and following commands, difficult to assess drift, sensation intact to light touch and pinprick sensation. Upper extremity strength: Patient able to lift bilateral arms against gravity with a lot of prompting.  strength equal, 3/5 bilaterally. Sensation intact in bilateral upper extremities  Lower extremity strength: Patient states she is unable to lift her bilateral legs off of the bed. When I assist the patient lift her legs off of the bed she is able to hold it gravity. Bilateral anterior tibialis 4/5, bilateral gastrocnemius 4/5. Sensation intact bilaterally. DATA:    Old records have been reviewed    CBC:  No results for input(s): WBC, RBC, HGB, HCT, PLT, MCV, MCH, MCHC, RDW, NRBC, SEGSPCT, BANDSPCT in the last 72 hours.    BMP:  No results for input(s): NA, K, CL, CO2, BUN, CREATININE, due to her cochlear implant. Stenosis at L3-4 typically does not have a radiculopathy that extends all the way to the toes like the patient is voicing. Patient's exam is very limited. Patient is hard of hearing, despite this at times she does not seem willing to participate in the exam.  When she is asked to move her legs in the bed she states, \"I cannot\". I did speak with the patient's nurse who stated that patient is able to ambulate with a walker without difficulty, she did take the patient to the bathroom this morning and she  did well. I did see therapy notes from 6/24/2021 that echo the same thing. I did explain to the patient that at this time we would want patient to have conservative treatment via therapy. She is scheduled to discharge to a SNF, she understands that she can obtain therapy at the facility for her low back and legs. Should she continue to have significant pain in her legs, consider outpatient CT myelogram of the lumbar spine. Electronically signed by Bella Edward PA-C on 6/28/2021 at 10:29 AM    Planned formed and discussed in collaboration with Dr. Sue Oscar. Chris    Time spent with patient in consultation, education, and collaboration with medical time is >50% of total time spent on case, including time spent in chart review and dictation. Total time spent: 40 minutes    Thank you for the opportunity to participate in the care of your patient.

## 2021-06-28 NOTE — PLAN OF CARE
Problem: Falls - Risk of:  Goal: Will remain free from falls  6/28/2021 1609 by Lon Welch RN  Outcome: Ongoing  6/28/2021 0833 by Lon Welch RN  Outcome: Ongoing  Goal: Absence of physical injury  6/28/2021 1609 by Lon Welch RN  Outcome: Ongoing  6/28/2021 0833 by Lon Welch RN  Outcome: Ongoing     Problem: Pain:  Goal: Pain level will decrease  6/28/2021 1609 by Lon Welch RN  Outcome: Ongoing  6/28/2021 0833 by Lon Welch RN  Outcome: Ongoing  Goal: Control of acute pain  6/28/2021 1609 by Lon Welch RN  Outcome: Ongoing  6/28/2021 0833 by Lon Welch RN  Outcome: Ongoing  Goal: Control of chronic pain  6/28/2021 1609 by Lon Welch RN  Outcome: Ongoing  6/28/2021 0833 by Lon Welch RN  Outcome: Ongoing     Problem: Anxiety/Stress:  Goal: Level of anxiety will decrease  6/28/2021 1609 by Lon Welch RN  Outcome: Ongoing  6/28/2021 0833 by Lon Welch RN  Outcome: Ongoing     Problem: Sleep Pattern Disturbance:  Goal: Appears well-rested  6/28/2021 1609 by Lon Welch RN  Outcome: Ongoing  6/28/2021 0833 by Lon Welch RN  Outcome: Ongoing     Problem: Skin Integrity:  Goal: Will show no infection signs and symptoms  6/28/2021 1609 by Lon Welch RN  Outcome: Ongoing  6/28/2021 0833 by Lon Welch RN  Outcome: Ongoing  Goal: Absence of new skin breakdown  6/28/2021 1609 by Lon Welch RN  Outcome: Ongoing  6/28/2021 0833 by Lon Welch RN  Outcome: Ongoing     Problem: DAILY CARE  Goal: Daily care needs are met  6/28/2021 1609 by Lon Welch RN  Outcome: Ongoing  6/28/2021 0833 by Lon Welch RN  Outcome: Ongoing     Problem: KNOWLEDGE DEFICIT  Goal: Patient/S.O. demonstrates understanding of disease process, treatment plan, medications, and discharge instructions.   6/28/2021 1609 by Lon Welch RN  Outcome: Ongoing  6/28/2021 0833 by Lon Welch RN  Outcome: Ongoing     Problem: DISCHARGE BARRIERS  Goal: Patient's continuum of care needs are met  6/28/2021 1609 by Fracisco Butler RN  Outcome: Ongoing  6/28/2021 5517 by Fracisco Butler RN  Outcome: Ongoing     Problem: Diarrhea:  Goal: Bowel elimination is within specified parameters  6/28/2021 1609 by Fracisco Butler RN  Outcome: Ongoing  6/28/2021 0833 by Fracisco Butler RN  Outcome: Ongoing  Goal: Establishment of normal bowel function will improve to within specified parameters  6/28/2021 1609 by Fracisco Butler RN  Outcome: Ongoing  6/28/2021 0833 by Fracisco Butler RN  Outcome: Ongoing

## 2021-06-28 NOTE — PROGRESS NOTES
Progress Note  Date:2021       Room:[facility]7-  Patient Name:Jojo Duron     YOB: 1960     Age:61 y.o. Subjective    Subjective:  Symptoms:  Stable. No shortness of breath, cough, chest pain or diarrhea. Diet:  Adequate intake. No nausea or vomiting. Activity level: Returning to normal.    Pain:  She complains of pain that is moderate. She reports pain is improving. Pain is partially controlled (Increasing gabapentin). Review of Systems   Constitutional: Negative for activity change, appetite change, diaphoresis and fatigue. HENT: Negative for congestion and postnasal drip. Eyes: Negative for redness and visual disturbance. Respiratory: Negative for cough and shortness of breath. Cardiovascular: Negative for chest pain and palpitations. Gastrointestinal: Negative for diarrhea, nausea and vomiting. Genitourinary: Negative for difficulty urinating and dysuria. Musculoskeletal: Positive for back pain. Negative for joint swelling and neck pain. Leg pain   Neurological: Negative for dizziness and speech difficulty. Psychiatric/Behavioral: Negative for agitation and confusion. Objective         Vitals Last 24 Hours:  TEMPERATURE:  Temp  Av.1 °F (36.7 °C)  Min: 97.6 °F (36.4 °C)  Max: 98.4 °F (36.9 °C)  RESPIRATIONS RANGE: Resp  Av.5  Min: 13  Max: 20  PULSE OXIMETRY RANGE: SpO2  Av %  Min: 94 %  Max: 96 %  PULSE RANGE: Pulse  Av.5  Min: 65  Max: 79  BLOOD PRESSURE RANGE: Systolic (09EAL), RZT:724 , Min:78 , XUC:335   ; Diastolic (79GYV), JHE:31, Min:51, Max:84    I/O (24Hr): Intake/Output Summary (Last 24 hours) at 2021 0840  Last data filed at 2021 1007  Gross per 24 hour   Intake 20 ml   Output    Net 20 ml     Objective:  General Appearance:  Comfortable and in no acute distress. Vital signs: (most recent): Blood pressure 131/82, pulse 75, temperature 98.4 °F (36.9 °C), temperature source Oral, resp.  rate 16, height 5' 1\" (1.549 m), weight 188 lb (85.3 kg), SpO2 96 %, not currently breastfeeding. Vital signs are normal.    Output: Producing urine and producing stool. HEENT: Normal HEENT exam.    Lungs:  Normal effort and normal respiratory rate. She is not in respiratory distress. Heart: Normal rate. S1 normal and S2 normal.  No murmur, gallop or friction rub. Chest: Symmetric chest wall expansion. No chest wall tenderness. Abdomen: Abdomen is soft and flat. Bowel sounds are normal.   There is no abdominal tenderness. Pulses: Distal pulses are intact. Skin:  Warm. Labs/Imaging/Diagnostics    Labs:  CBC:No results for input(s): WBC, RBC, HGB, HCT, MCV, RDW, PLT in the last 72 hours. CHEMISTRIES:No results for input(s): NA, K, CL, CO2, BUN, CREATININE, GLUCOSE, PHOS, MG in the last 72 hours. Invalid input(s): CA  PT/INR:No results for input(s): PROTIME, INR in the last 72 hours. APTT:No results for input(s): APTT in the last 72 hours. LIVER PROFILE:No results for input(s): AST, ALT, BILIDIR, BILITOT, ALKPHOS in the last 72 hours. Imaging Last 24 Hours:  CT LUMBAR SPINE WO CONTRAST    Result Date: 6/26/2021  EXAMINATION: CT OF THE LUMBAR SPINE WITHOUT CONTRAST  6/26/2021 TECHNIQUE: CT of the lumbar spine was performed without the administration of intravenous contrast. Multiplanar reformatted images are provided for review. Dose modulation, iterative reconstruction, and/or weight based adjustment of the mA/kV was utilized to reduce the radiation dose to as low as reasonably achievable. COMPARISON: Lumbar spine radiographs 02/11/2020. CT abdomen and pelvis 05/14/2019. HISTORY: ORDERING SYSTEM PROVIDED HISTORY: NEUROPATHY TECHNOLOGIST PROVIDED HISTORY: Reason for exam:->radiculopathy Reason for Exam: back pain FINDINGS: BONES/ALIGNMENT: There is normal alignment of the spine. The vertebral body heights are maintained. No osseous destructive lesion is seen.  DEGENERATIVE CHANGES: L1-L2: There is no significant disc protrusion, central spinal canal stenosis or neural foraminal narrowing. L2-L3: Moderate degenerative disc disease. No significant central canal or foraminal stenosis. L3-L4: Moderate degenerative disc disease. Broad-based disc bulge with moderate central canal stenosis. No significant foraminal stenosis definitely seen. L4-L5: There is no significant disc protrusion, central spinal canal stenosis or neural foraminal narrowing. L5-S1: There is no significant disc protrusion, central spinal canal stenosis or neural foraminal narrowing. SOFT TISSUES/RETROPERITONEUM: Status post cholecystectomy. 2.2 cm benign right adrenal adenoma unchanged from 2019. Colonic diverticulosis. 1. No acute lumbar spine abnormality. 2. Moderate L3-4 degenerative disc disease with a broad-based disc bulge and moderate central canal stenosis. Assessment//Plan           Hospital Problems         Last Modified POA    Chest pain 6/16/2021 Yes        Assessment & Plan  Atypical left-sided chest pain:   CTA chest-no PE.    -Troponin-negative x 3.  -Stress test was normal.Echo shows EF 50-55%.    -Continue aspirin, Plavix, Ranexa and Lipitor.  -Cardiology consult appreciated.  Continue to medical therapy.     Slow speech/right upper extremity weakness:    CT head showed no acute findings.  CTA head and neck-unremarkable.  CT cervical and thoracic spine-no compression fracture. - MRI could not be done because of cochlear implant.   - Continue aspirin, Plavix and Lipitor.   - Continue PT/OT. -Neurology consult acknowledged.   Continue DAPT. No further workup recommended.     Incidental thyroid nodule  - Normal TSH, free T3 and free T4.  Endocrinology consult acknowledged.  Will follow up in endocrine clinic. Antithyroid Microsomal elevated.     Hypomagnesemia  -Monitor and replace     B/l leg pain-radiculopathy  -CT lumbar spine - showed moderate L3-L4 degenerative disc disease with broad based bulge and moderate central canal stenosis consistent with lumbar spinal stenosis   -Gabapentin increased to 300 mg po tid   -Consult neurosurgery- Plan therapy at SNF and if continued significant pain recommend outpatient CT myelogram   -CT scan results reviewed with patient and family      Diarrhea  - Negative for cdiff   -Supportive care     Dementia  -Continue Aricept and Namenda.     Chronic problems include hypertension, CAD (status post stent), COPD, GERD, restless leg syndrome, obesity and severe hearing loss (cochlear implants).       DVT Prophylaxis: Lovenox  Diet: ADULT DIET;  Dysphagia - Soft and Bite Sized  Code Status: Full Code     PT/OT Eval Status: ordered     Dispo -Await pre-cert  Electronically signed by LAVERN Florez CNP on 6/27/21 at 10:03 AM EDT

## 2021-06-30 ENCOUNTER — HOSPITAL ENCOUNTER (OUTPATIENT)
Age: 61
Setting detail: SPECIMEN
Discharge: HOME | End: 2021-06-30
Payer: COMMERCIAL

## 2021-06-30 LAB
ALBUMIN SERPL-MCNC: 3.5 GM/DL (ref 3.4–5)
ALP BLD-CCNC: 112 IU/L (ref 40–128)
ALT SERPL-CCNC: 17 U/L (ref 10–40)
ANION GAP SERPL CALCULATED.3IONS-SCNC: 10 MMOL/L (ref 4–16)
AST SERPL-CCNC: 30 IU/L (ref 15–37)
BASOPHILS ABSOLUTE: 0.1 K/CU MM
BASOPHILS RELATIVE PERCENT: 1 % (ref 0–1)
BILIRUB SERPL-MCNC: 0.3 MG/DL (ref 0–1)
BUN BLDV-MCNC: 12 MG/DL (ref 6–23)
CALCIUM SERPL-MCNC: 8.9 MG/DL (ref 8.3–10.6)
CHLORIDE BLD-SCNC: 104 MMOL/L (ref 99–110)
CO2: 28 MMOL/L (ref 21–32)
CREAT SERPL-MCNC: 0.9 MG/DL (ref 0.6–1.1)
DIFFERENTIAL TYPE: ABNORMAL
EOSINOPHILS ABSOLUTE: 0.2 K/CU MM
EOSINOPHILS RELATIVE PERCENT: 3.4 % (ref 0–3)
GFR AFRICAN AMERICAN: >60 ML/MIN/1.73M2
GFR NON-AFRICAN AMERICAN: >60 ML/MIN/1.73M2
GLUCOSE BLD-MCNC: 86 MG/DL (ref 70–99)
HCT VFR BLD CALC: 35 % (ref 37–47)
HEMOGLOBIN: 10.8 GM/DL (ref 12.5–16)
IMMATURE NEUTROPHIL %: 0.6 % (ref 0–0.43)
LYMPHOCYTES ABSOLUTE: 1.3 K/CU MM
LYMPHOCYTES RELATIVE PERCENT: 18.8 % (ref 24–44)
MCH RBC QN AUTO: 27.7 PG (ref 27–31)
MCHC RBC AUTO-ENTMCNC: 30.9 % (ref 32–36)
MCV RBC AUTO: 89.7 FL (ref 78–100)
MONOCYTES ABSOLUTE: 0.6 K/CU MM
MONOCYTES RELATIVE PERCENT: 9.1 % (ref 0–4)
NUCLEATED RBC %: 0 %
PDW BLD-RTO: 14.8 % (ref 11.7–14.9)
PLATELET # BLD: 305 K/CU MM (ref 140–440)
PMV BLD AUTO: 10.1 FL (ref 7.5–11.1)
POTASSIUM SERPL-SCNC: 3.9 MMOL/L (ref 3.5–5.1)
RBC # BLD: 3.9 M/CU MM (ref 4.2–5.4)
SEGMENTED NEUTROPHILS ABSOLUTE COUNT: 4.8 K/CU MM
SEGMENTED NEUTROPHILS RELATIVE PERCENT: 67.1 % (ref 36–66)
SODIUM BLD-SCNC: 142 MMOL/L (ref 135–145)
TOTAL IMMATURE NEUTOROPHIL: 0.04 K/CU MM
TOTAL NUCLEATED RBC: 0 K/CU MM
TOTAL PROTEIN: 6.2 GM/DL (ref 6.4–8.2)
WBC # BLD: 7.1 K/CU MM (ref 4–10.5)

## 2021-06-30 PROCEDURE — 36415 COLL VENOUS BLD VENIPUNCTURE: CPT

## 2021-06-30 PROCEDURE — 85025 COMPLETE CBC W/AUTO DIFF WBC: CPT

## 2021-06-30 PROCEDURE — 80053 COMPREHEN METABOLIC PANEL: CPT

## 2021-07-08 ENCOUNTER — HOSPITAL ENCOUNTER (OUTPATIENT)
Age: 61
Setting detail: SPECIMEN
Discharge: HOME | End: 2021-07-08
Payer: COMMERCIAL

## 2021-07-08 LAB
ALBUMIN SERPL-MCNC: 3.9 GM/DL (ref 3.4–5)
ALP BLD-CCNC: 94 IU/L (ref 40–128)
ALT SERPL-CCNC: 20 U/L (ref 10–40)
ANION GAP SERPL CALCULATED.3IONS-SCNC: 22 MMOL/L (ref 4–16)
AST SERPL-CCNC: 27 IU/L (ref 15–37)
BILIRUB SERPL-MCNC: 0.2 MG/DL (ref 0–1)
BUN BLDV-MCNC: 11 MG/DL (ref 6–23)
CALCIUM SERPL-MCNC: 9.3 MG/DL (ref 8.3–10.6)
CHLORIDE BLD-SCNC: 103 MMOL/L (ref 99–110)
CO2: 17 MMOL/L (ref 21–32)
CREAT SERPL-MCNC: 0.9 MG/DL (ref 0.6–1.1)
GFR AFRICAN AMERICAN: >60 ML/MIN/1.73M2
GFR NON-AFRICAN AMERICAN: >60 ML/MIN/1.73M2
GLUCOSE BLD-MCNC: 90 MG/DL (ref 70–99)
HCT VFR BLD CALC: 36.8 % (ref 37–47)
HEMOGLOBIN: 11.3 GM/DL (ref 12.5–16)
MCH RBC QN AUTO: 28.6 PG (ref 27–31)
MCHC RBC AUTO-ENTMCNC: 30.7 % (ref 32–36)
MCV RBC AUTO: 93.2 FL (ref 78–100)
PDW BLD-RTO: 15.6 % (ref 11.7–14.9)
PLATELET # BLD: 326 K/CU MM (ref 140–440)
PMV BLD AUTO: 9.5 FL (ref 7.5–11.1)
POTASSIUM SERPL-SCNC: 5.6 MMOL/L (ref 3.5–5.1)
RBC # BLD: 3.95 M/CU MM (ref 4.2–5.4)
SODIUM BLD-SCNC: 142 MMOL/L (ref 135–145)
TOTAL PROTEIN: 6.8 GM/DL (ref 6.4–8.2)
WBC # BLD: 8.1 K/CU MM (ref 4–10.5)

## 2021-07-08 PROCEDURE — 80053 COMPREHEN METABOLIC PANEL: CPT

## 2021-07-08 PROCEDURE — 85027 COMPLETE CBC AUTOMATED: CPT

## 2021-07-12 ENCOUNTER — HOSPITAL ENCOUNTER (OUTPATIENT)
Age: 61
Setting detail: SPECIMEN
Discharge: HOME | End: 2021-07-12
Payer: COMMERCIAL

## 2021-07-12 LAB
ALBUMIN SERPL-MCNC: 3.7 GM/DL (ref 3.4–5)
ALP BLD-CCNC: 81 IU/L (ref 40–128)
ALT SERPL-CCNC: 15 U/L (ref 10–40)
ANION GAP SERPL CALCULATED.3IONS-SCNC: 10 MMOL/L (ref 4–16)
AST SERPL-CCNC: 24 IU/L (ref 15–37)
BILIRUB SERPL-MCNC: 0.2 MG/DL (ref 0–1)
BUN BLDV-MCNC: 11 MG/DL (ref 6–23)
CALCIUM SERPL-MCNC: 9 MG/DL (ref 8.3–10.6)
CHLORIDE BLD-SCNC: 103 MMOL/L (ref 99–110)
CO2: 26 MMOL/L (ref 21–32)
CREAT SERPL-MCNC: 0.8 MG/DL (ref 0.6–1.1)
GFR AFRICAN AMERICAN: >60 ML/MIN/1.73M2
GFR NON-AFRICAN AMERICAN: >60 ML/MIN/1.73M2
GLUCOSE BLD-MCNC: 69 MG/DL (ref 70–99)
HCT VFR BLD CALC: 34.5 % (ref 37–47)
HEMOGLOBIN: 11 GM/DL (ref 12.5–16)
MCH RBC QN AUTO: 29.3 PG (ref 27–31)
MCHC RBC AUTO-ENTMCNC: 31.9 % (ref 32–36)
MCV RBC AUTO: 91.8 FL (ref 78–100)
PDW BLD-RTO: 15.5 % (ref 11.7–14.9)
PLATELET # BLD: 279 K/CU MM (ref 140–440)
PMV BLD AUTO: 9.5 FL (ref 7.5–11.1)
POTASSIUM SERPL-SCNC: 4.3 MMOL/L (ref 3.5–5.1)
RBC # BLD: 3.76 M/CU MM (ref 4.2–5.4)
SODIUM BLD-SCNC: 139 MMOL/L (ref 135–145)
TOTAL PROTEIN: 6.5 GM/DL (ref 6.4–8.2)
WBC # BLD: 6.3 K/CU MM (ref 4–10.5)

## 2021-07-12 PROCEDURE — 80053 COMPREHEN METABOLIC PANEL: CPT

## 2021-07-12 PROCEDURE — 85027 COMPLETE CBC AUTOMATED: CPT

## 2021-07-12 PROCEDURE — 36415 COLL VENOUS BLD VENIPUNCTURE: CPT

## 2021-07-14 ENCOUNTER — HOSPITAL ENCOUNTER (OUTPATIENT)
Age: 61
Setting detail: SPECIMEN
Discharge: HOME | End: 2021-07-14
Payer: COMMERCIAL

## 2021-07-14 LAB
ALBUMIN SERPL-MCNC: 3.7 GM/DL (ref 3.4–5)
ALP BLD-CCNC: 79 IU/L (ref 40–128)
ALT SERPL-CCNC: 12 U/L (ref 10–40)
ANION GAP SERPL CALCULATED.3IONS-SCNC: 12 MMOL/L (ref 4–16)
AST SERPL-CCNC: 20 IU/L (ref 15–37)
BILIRUB SERPL-MCNC: 0.2 MG/DL (ref 0–1)
BUN BLDV-MCNC: 12 MG/DL (ref 6–23)
CALCIUM SERPL-MCNC: 9.2 MG/DL (ref 8.3–10.6)
CHLORIDE BLD-SCNC: 105 MMOL/L (ref 99–110)
CO2: 24 MMOL/L (ref 21–32)
CREAT SERPL-MCNC: 0.9 MG/DL (ref 0.6–1.1)
GFR AFRICAN AMERICAN: >60 ML/MIN/1.73M2
GFR NON-AFRICAN AMERICAN: >60 ML/MIN/1.73M2
GLUCOSE BLD-MCNC: 78 MG/DL (ref 70–99)
HCT VFR BLD CALC: 34.5 % (ref 37–47)
HEMOGLOBIN: 10.4 GM/DL (ref 12.5–16)
MCH RBC QN AUTO: 27.9 PG (ref 27–31)
MCHC RBC AUTO-ENTMCNC: 30.1 % (ref 32–36)
MCV RBC AUTO: 92.5 FL (ref 78–100)
PDW BLD-RTO: 15.7 % (ref 11.7–14.9)
PLATELET # BLD: 289 K/CU MM (ref 140–440)
PMV BLD AUTO: 9.4 FL (ref 7.5–11.1)
POTASSIUM SERPL-SCNC: 4.5 MMOL/L (ref 3.5–5.1)
RBC # BLD: 3.73 M/CU MM (ref 4.2–5.4)
SODIUM BLD-SCNC: 141 MMOL/L (ref 135–145)
TOTAL PROTEIN: 6.3 GM/DL (ref 6.4–8.2)
WBC # BLD: 6.8 K/CU MM (ref 4–10.5)

## 2021-07-14 PROCEDURE — 36415 COLL VENOUS BLD VENIPUNCTURE: CPT

## 2021-07-14 PROCEDURE — 80053 COMPREHEN METABOLIC PANEL: CPT

## 2021-07-14 PROCEDURE — 85027 COMPLETE CBC AUTOMATED: CPT

## 2021-07-16 DIAGNOSIS — I10 ESSENTIAL HYPERTENSION: ICD-10-CM

## 2021-07-16 DIAGNOSIS — I25.10 CORONARY ARTERY DISEASE INVOLVING NATIVE CORONARY ARTERY OF NATIVE HEART WITHOUT ANGINA PECTORIS: ICD-10-CM

## 2021-07-19 RX ORDER — NITROGLYCERIN 0.4 MG/1
TABLET SUBLINGUAL
Qty: 25 TABLET | Refills: 1 | Status: SHIPPED | OUTPATIENT
Start: 2021-07-19 | End: 2021-10-05

## 2021-07-19 RX ORDER — CLOPIDOGREL BISULFATE 75 MG/1
75 TABLET ORAL DAILY
Qty: 90 TABLET | Refills: 1 | Status: SHIPPED | OUTPATIENT
Start: 2021-07-19 | End: 2022-02-14

## 2021-07-21 ENCOUNTER — HOSPITAL ENCOUNTER (OUTPATIENT)
Age: 61
Setting detail: SPECIMEN
Discharge: HOME | End: 2021-07-21
Payer: COMMERCIAL

## 2021-07-21 LAB
ALBUMIN SERPL-MCNC: 4.1 GM/DL (ref 3.4–5)
ALP BLD-CCNC: 87 IU/L (ref 40–129)
ALT SERPL-CCNC: 11 U/L (ref 10–40)
ANION GAP SERPL CALCULATED.3IONS-SCNC: 9 MMOL/L (ref 4–16)
AST SERPL-CCNC: 18 IU/L (ref 15–37)
BILIRUB SERPL-MCNC: 0.3 MG/DL (ref 0–1)
BUN BLDV-MCNC: 16 MG/DL (ref 6–23)
CALCIUM SERPL-MCNC: 8.7 MG/DL (ref 8.3–10.6)
CHLORIDE BLD-SCNC: 104 MMOL/L (ref 99–110)
CO2: 29 MMOL/L (ref 21–32)
CREAT SERPL-MCNC: 1 MG/DL (ref 0.6–1.1)
GFR AFRICAN AMERICAN: >60 ML/MIN/1.73M2
GFR NON-AFRICAN AMERICAN: 56 ML/MIN/1.73M2
GLUCOSE BLD-MCNC: 81 MG/DL (ref 70–99)
HCT VFR BLD CALC: 34 % (ref 37–47)
HEMOGLOBIN: 10.7 GM/DL (ref 12.5–16)
MCH RBC QN AUTO: 28.9 PG (ref 27–31)
MCHC RBC AUTO-ENTMCNC: 31.5 % (ref 32–36)
MCV RBC AUTO: 91.9 FL (ref 78–100)
PDW BLD-RTO: 15.9 % (ref 11.7–14.9)
PLATELET # BLD: 275 K/CU MM (ref 140–440)
PMV BLD AUTO: 9.4 FL (ref 7.5–11.1)
POTASSIUM SERPL-SCNC: 4.4 MMOL/L (ref 3.5–5.1)
RBC # BLD: 3.7 M/CU MM (ref 4.2–5.4)
SODIUM BLD-SCNC: 142 MMOL/L (ref 135–145)
TOTAL PROTEIN: 6.3 GM/DL (ref 6.4–8.2)
WBC # BLD: 7.6 K/CU MM (ref 4–10.5)

## 2021-07-21 PROCEDURE — 36415 COLL VENOUS BLD VENIPUNCTURE: CPT

## 2021-07-21 PROCEDURE — 85027 COMPLETE CBC AUTOMATED: CPT

## 2021-07-21 PROCEDURE — 80053 COMPREHEN METABOLIC PANEL: CPT

## 2021-09-20 ENCOUNTER — HOSPITAL ENCOUNTER (OUTPATIENT)
Dept: SPEECH THERAPY | Age: 61
Setting detail: THERAPIES SERIES
Discharge: HOME | End: 2021-09-20
Payer: COMMERCIAL

## 2021-09-20 ENCOUNTER — HOSPITAL ENCOUNTER (OUTPATIENT)
Dept: GENERAL RADIOLOGY | Age: 61
Discharge: HOME | End: 2021-09-20
Payer: COMMERCIAL

## 2021-09-20 DIAGNOSIS — R13.10 PROBLEMS WITH SWALLOWING AND MASTICATION: ICD-10-CM

## 2021-09-20 PROCEDURE — 74230 X-RAY XM SWLNG FUNCJ C+: CPT

## 2021-09-20 PROCEDURE — 92611 MOTION FLUOROSCOPY/SWALLOW: CPT

## 2021-09-20 NOTE — PROCEDURES
INSTRUMENTAL SWALLOW REPORT  MODIFIED BARIUM SWALLOW    NAME: Lior Rojas   : 1960  MRN: [id number]       Date of Eval: 2021     Ordering Physician: Adria PUCKETT  Radiologist: available upon request     Referring Diagnosis(es): Referring Diagnosis: R13.10     Impressions: Lior Rojas was seen for an OP modified barium swallow study. Medical hx includes CAD, COPD, asthma, GERD, hiatal hernia, esophageal dysmotility. No known hx of oropharyngeal dysphagia. Pt reports difficulty chewing regular solids and foods sticking in throat. Pt was alert, requiring cues/encouragement for participation throughout. Pt was evaluated seated upright in chair, filmed in lateral view. Pt accepted PO trials of thin liquids via tsp/cup/straw sips, puree consistency, regular solids and barium pills. Oral stage was [place] with intact labial seal/oral manipulation/AP transit/swallow initiation. Noted oral holding with all consistencies, suspect behavioral. Pt unable to complete AP transit to swallow barium pill, leading to expectoration. Pharyngeal [facility] [place] with intact BOT retraction/hyolaryngeal elevation/pharyngeal stripping wave/epiglottic inversion/laryngeal vestibule closure/UES opening. Esophageal screen was unremarkable. No laryngeal penetration or aspiration noted across trials. Recommend continue regular diet/thin liquids. Results/recommendations d/w pt. No further SLP needs identified.      Past Medical History:  has a past medical history of Anxiety, Arthritis, Asthma, Back problem, Bronchitis, CAD (coronary artery disease), Cancer ([address] Chest pain, Chipped tooth, Chronic back pain, COPD (chronic obstructive pulmonary disease) (HCC), Cough, Emphysema, GERD (gastroesophageal reflux disease), H/O 24 hour EKG monitoring, H/O cardiac catheterization, H/O cardiac catheterization, H/O Doppler lower venous ultrasound, H/O Doppler ultrasound, H/O Doppler ultrasound, H/O exercise stress test, Hiatal hernia, History of cardiac cath, History of exercise stress test, History of nuclear stress test, History of nuclear stress test, [place] (hard of hearing), Hx of cardiovascular stress test, Hx of Doppler ultrasound, Hx of Doppler ultrasound, Hx of Doppler ultrasound, Hx of echocardiogram, Hypertension, Irregular heart beat, Migraines, Palpitations, Panic attacks, Pneumonia, Prolonged emergence from general anesthesia, Restless leg, S/P cardiac cath, S/P PTCA (percutaneous transluminal coronary angioplasty), Shortness of breath, Teeth missing, Tobacco use, and Unspecified sleep apnea. Past Surgical History:  has a past surgical history that includes Kidney surgery (Left, 2-15); Dental surgery; Tonsillectomy (1960's); Tubal ligation (1993); Gastric fundoplication (34/31/3393); Colonoscopy (2011); Colonoscopy (08/23/2017); Endoscopy, colon, diagnostic (10-16-15); Endoscopy, colon, diagnostic (01/28/2019); Upper gastrointestinal endoscopy (N/A, 1/28/2019); and Ankle fracture surgery (Left, 5/24/2019). Current Diet Solid Consistency: Regular  Current Diet Liquid Consistency: Thin       Type of Study: Initial MBS       Recent CXR/CT of Chest: see chart    Patient Complaints/Reason for Referral:  David Garcia was referred for a MBS to assess the efficiency of his/her swallow function, assess for aspiration, and to make recommendations regarding safe dietary consistencies, effective compensatory strategies, and safe eating environment. Patient complaints: difficulty chewing, foods get stuck      Behavior/Cognition/Vision/Hearing:  Behavior/Cognition: Alert; Requires cueing;Distractible  Vision: Within Functional Limits  Hearing: Exceptions to [facility]  Hearing Exceptions: Hard of hearing/hearing concerns (cochlear implants)      Treatment Dx and ICD 10: R13.10   Patient Position: Lateral and Patient Degrees: 90      Consistencies Administered: Reg solid; Dysphagia Pureed (Dysphagia I); Thin straw; Thin teaspoon; Thin cup;Barium Pill      Dysphagia Outcome Severity Scale: Level 6: Within functional limits/Modified independence  Penetration-Aspiration Scale (PAS): 1 - Material does not enter the airway    Recommended Diet:  Solid consistency: Regular  Liquid consistency: Thin       Medication administration: PO    Safe Swallow Protocol:     Compensatory Swallowing Strategies: Upright as possible for all oral intake;Remain upright for 30-45 minutes after meals; Alternate solids and liquids;Eat/Feed slowly; Small bites/sips        Recommendations/Treatment  Requires SLP Intervention: No        D/C Recommendations: To be determined     Education: Images and recommendations were reviewed with David Garcia following this exam.   Patient Education: results/recommendations  Patient Education Response: Needs reinforcement    Duration/Frequency of Treatment  Duration/Frequency of Treatment: N/A  Safety Devices  Safety Devices in place: Yes  Type of devices:  All fall risk precautions in place      Goals:    Long Term:   Timeframe for Long-term Goals: N/A  Timeframe for short-term Goals: N/A    Oral Preparation / Oral Phase  Oral Phase: Impaired        Pharyngeal Phase  Pharyngeal Phase: WFL      Esophageal Phase  Esophageal Screen: WFL        Pain   0; denies    Therapy Time:   Individual Concurrent Group Co-treatment   Time In 0930         Time Out 1000         Minutes [address] SLP student, 9/20/2021, 10:33 AM

## 2021-09-22 DIAGNOSIS — Z72.0 CURRENT TOBACCO USE: ICD-10-CM

## 2021-09-22 DIAGNOSIS — K21.9 GASTROESOPHAGEAL REFLUX DISEASE WITH HIATAL HERNIA: Chronic | ICD-10-CM

## 2021-09-22 DIAGNOSIS — I10 ESSENTIAL HYPERTENSION: ICD-10-CM

## 2021-09-22 DIAGNOSIS — K44.9 GASTROESOPHAGEAL REFLUX DISEASE WITH HIATAL HERNIA: Chronic | ICD-10-CM

## 2021-09-22 DIAGNOSIS — R07.9 CHEST PAIN, UNSPECIFIED TYPE: ICD-10-CM

## 2021-09-22 DIAGNOSIS — I25.10 CORONARY ARTERY DISEASE INVOLVING NATIVE CORONARY ARTERY OF NATIVE HEART WITHOUT ANGINA PECTORIS: ICD-10-CM

## 2021-09-22 RX ORDER — NITROGLYCERIN 0.4 MG/1
TABLET SUBLINGUAL
Qty: 25 TABLET | Refills: 1 | OUTPATIENT
Start: 2021-09-22

## 2021-09-22 RX ORDER — RANOLAZINE 1000 MG/1
1000 TABLET, EXTENDED RELEASE ORAL 2 TIMES DAILY
Qty: 180 TABLET | Refills: 3 | Status: SHIPPED | OUTPATIENT
Start: 2021-09-22 | End: 2022-08-31

## 2021-09-22 RX ORDER — RANOLAZINE 1000 MG/1
1000 TABLET, EXTENDED RELEASE ORAL 2 TIMES DAILY
Qty: 180 TABLET | Refills: 3 | OUTPATIENT
Start: 2021-09-22

## 2021-10-04 DIAGNOSIS — I10 ESSENTIAL HYPERTENSION: ICD-10-CM

## 2021-10-04 DIAGNOSIS — I25.10 CORONARY ARTERY DISEASE INVOLVING NATIVE CORONARY ARTERY OF NATIVE HEART WITHOUT ANGINA PECTORIS: ICD-10-CM

## 2021-10-05 RX ORDER — NITROGLYCERIN 0.4 MG/1
TABLET SUBLINGUAL
Qty: 25 TABLET | Refills: 11 | Status: SHIPPED | OUTPATIENT
Start: 2021-10-05 | End: 2022-08-31

## 2021-10-14 ENCOUNTER — OFFICE VISIT (OUTPATIENT)
Dept: CARDIOLOGY CLINIC | Age: 61
End: 2021-10-14
Payer: COMMERCIAL

## 2021-10-14 VITALS
DIASTOLIC BLOOD PRESSURE: 72 MMHG | SYSTOLIC BLOOD PRESSURE: 112 MMHG | BODY MASS INDEX: 37.12 KG/M2 | WEIGHT: 196.6 LBS | HEIGHT: 61 IN | HEART RATE: 86 BPM

## 2021-10-14 DIAGNOSIS — I10 ESSENTIAL HYPERTENSION: ICD-10-CM

## 2021-10-14 DIAGNOSIS — E78.49 OTHER HYPERLIPIDEMIA: ICD-10-CM

## 2021-10-14 DIAGNOSIS — I25.10 ASCVD (ARTERIOSCLEROTIC CARDIOVASCULAR DISEASE): Primary | ICD-10-CM

## 2021-10-14 PROCEDURE — G8417 CALC BMI ABV UP PARAM F/U: HCPCS | Performed by: NURSE PRACTITIONER

## 2021-10-14 PROCEDURE — G8484 FLU IMMUNIZE NO ADMIN: HCPCS | Performed by: NURSE PRACTITIONER

## 2021-10-14 PROCEDURE — 99214 OFFICE O/P EST MOD 30 MIN: CPT | Performed by: NURSE PRACTITIONER

## 2021-10-14 PROCEDURE — 3017F COLORECTAL CA SCREEN DOC REV: CPT | Performed by: NURSE PRACTITIONER

## 2021-10-14 PROCEDURE — 1036F TOBACCO NON-USER: CPT | Performed by: NURSE PRACTITIONER

## 2021-10-14 PROCEDURE — 93000 ELECTROCARDIOGRAM COMPLETE: CPT | Performed by: NURSE PRACTITIONER

## 2021-10-14 PROCEDURE — G8427 DOCREV CUR MEDS BY ELIG CLIN: HCPCS | Performed by: NURSE PRACTITIONER

## 2021-10-14 ASSESSMENT — ENCOUNTER SYMPTOMS: ORTHOPNEA: 0

## 2021-10-14 NOTE — PROGRESS NOTES
10/14/2021  Primary cardiologist: Dr. Bruna Blancas  is an established 57 y.o.  female here for follow-up on coronary artery disease, hypertension, hyperlipidemia      SUBJECTIVE/OBJECTIVE:    HPI : Jaja Vallejo is a 68-year-old female patient with a history of coronary artery disease s/p PCI to Cx, hypertension, hyperlipidemia, TIA, COPD  She was seen in the emergency department in June 2021 with complaints of chest pain. Cardiac troponins were negative. Stress test was negative for ischemia and echocardiogram within normal limits. She was noted to have slurred speech - CT of head no acute findings     Jaja Vallejo reports she is feeling fair. She is walking with walker for stability and balance. She has had a few incidence of chest pain since discharge. Left sided with radiation to the left arm- lasting for few minutes- goes away on own. She has used ntg - has had relief off and on with use of NTG. She notes dyspnea with exertion. Walking through her apt causes shortness of breath. Review of Systems   Constitutional: Positive for malaise/fatigue. Cardiovascular: Positive for chest pain and dyspnea on exertion. Negative for orthopnea, paroxysmal nocturnal dyspnea and syncope. Musculoskeletal: Positive for muscle weakness. Neurological: Positive for dizziness. Vitals:    10/14/21 1322   BP: 112/72   Pulse: 86   Weight: 196 lb 9.6 oz (89.2 kg)   Height: 5' 1\" (1.549 m)     No flowsheet data found. Wt Readings from Last 3 Encounters:   10/14/21 196 lb 9.6 oz (89.2 kg)   10/07/21 188 lb (85.3 kg)   06/27/21 188 lb (85.3 kg)     Body mass index is 37.15 kg/m². Physical Exam  Constitutional:       Appearance: She is obese. Cardiovascular:      Rate and Rhythm: Normal rate and regular rhythm. Pulses: Normal pulses. Heart sounds: Normal heart sounds. Pulmonary:      Breath sounds: Normal breath sounds. Musculoskeletal:      Right upper arm: Edema present. Right lower leg: No edema. mouth nightly   3    memantine (NAMENDA) 10 MG tablet Take 10 mg by mouth 2 times daily       hydrOXYzine (ATARAX) 25 MG tablet Take 50 mg by mouth every 8 hours as needed for Itching       aspirin EC 81 MG EC tablet Take 1 tablet by mouth daily. (Patient taking differently: Take 81 mg by mouth every morning ) 30 tablet 6    Lactobacillus (ACIDOPHILUS PO) Take 1 capsule by mouth daily (Patient not taking: Reported on 10/14/2021)      triamcinolone (KENALOG) 0.1 % cream Apply topically 2 times daily Apply topically 2 times daily. (Patient not taking: Reported on 10/14/2021)      sertraline (ZOLOFT) 100 MG tablet Take 100 mg by mouth daily  (Patient not taking: Reported on 10/14/2021)      melatonin 5 MG TABS tablet Take 5 mg by mouth nightly as needed  (Patient not taking: Reported on 10/14/2021)       No current facility-administered medications for this visit. All pertinent data reviewed and discussed with patient       ASSESSMENT/PLAN:    Coronary artery disease  Chest pain atypical: reproducible to touch  Stress test normal perfusion and EF   Continue asa plavix and ranexa     Hypertension  Blood pressure is stable       Hyperlipidemia  Results for Zenaida Lung (MRN [id number])    Ref. Range 6/17/2021 07:01   Cholesterol Latest Ref Range: <200 MG/   HDL Cholesterol Latest Ref Range: >40 MG/DL 60   LDL Direct Latest Ref Range: <100 MG/ (H)   Triglycerides Latest Ref Range: <150 MG/     Near goal - continue with atorvastatin     H/o TIA-on asa plavix     Medications reviewed and confirmed with patient     Tests ordered:  None       Follow-up  9 months      Signed:  LAVERN Rogers CNP, 10/14/2021, 1:41 PM    An electronic signature was used to authenticate this note.

## 2021-10-14 NOTE — LETTER
Mathieu HERNDON Sonda Fresh  1960  [id number]    Have you had any Chest Pain that is not new? - Yes  If Yes DO EKG - How does it feel - Heaviness   How long does the pain last -  minutes    How long have you been having the pain - Months   Did you take a Nitro   And did it relieve the pain - Yes     DO EKG IF: Patient has a Heart Rate above 100 or below 40     CAD (Coronary Artery Disease) patient should have one on file every 6 months        Have you had any Shortness of Breath - Yes  If Yes - When at rest and on exertion    Have you had any dizziness - No       Sitting wait 5 minutes do supine (laying down) wait 5 minutes then do standing - log each in \"vitals\" area in Epic   Be sure to ask what symptoms they are having if they get dizzy while completing ortho stats such as: room spinning, nausea, etc.    Have you had any palpitations that are not new? - No      Is the patient on any of the following medications - No  If Yes DO EKG - Needs done every 6 months    Do you have any edema - swelling in legs    If Yes - CHECK TO SEE IF THE EDEMA IS PITTING  How long have they been having edema - Months  If Yes - Have they worn compression stockings Yes      Vein \"LEG PROBLEM Questionnaire\"  1. Do you have prominent leg veins? No   2. Do you have any skin discoloration? No  3. Do you have any healed or active sores? No  4. Do you have swelling of the legs? Yes  5. Do you have a family history of varicose veins? No  6. Does your profession involve pro-longed        standing or heavy lifting? No  7. Have you been fighting overweight problems? No  8. Do you have restless legs? Yes  9. Do you have any night time cramps? Yes  10.  Do you have any of the following in your legs:        Aching     When did you have your last labs drawn 07/21/2021  Where did you have them done   What doctor ordered Nader Lundy    If we do not have these labs you are retrieve these labs for these providers!     Do you have a surgery or procedure scheduled in the near future - No       Ask patient if they want to sign up for MyChart if they are not already signed up     Check to see if we have an E-MAIL on file for the patient     Check medication list thoroughly!!! AND RECONCILE OUTSIDE MEDICATIONS  If dose has changed change the entire order not just the MG  BE SURE TO ASK PATIENT IF THEY NEED MEDICATION REFILLS     At check out add to every patient's \"wrap up\" the following dot phrase AFTERHOURSEDUCATION and ensure we explain this to our patients

## 2021-10-14 NOTE — PATIENT INSTRUCTIONS
**It is YOUR responsibilty to bring medication bottles and/or updated medication list to [address] [place] [address]. This will allow us to better serve you and all your healthcare needs**  Please be informed that if you contact our office outside of normal business hours the physician on call cannot help with any scheduling or rescheduling issues, procedure instruction questions or any type of medication issue. We advise you for any urgent/emergency that you go to the nearest emergency room!     PLEASE CALL OUR OFFICE DURING NORMAL BUSINESS HOURS    Monday - Friday   8 am to 5 pm    [place]: [phone]    [place]:  [phone]

## 2021-11-24 ENCOUNTER — HOSPITAL ENCOUNTER (OUTPATIENT)
Age: 61
Discharge: HOME | End: 2021-11-24
Payer: COMMERCIAL

## 2021-11-24 DIAGNOSIS — N18.31 CKD STAGE G3A/A1, GFR 45-59 AND ALBUMIN CREATININE RATIO <30 MG/G (HCC): ICD-10-CM

## 2021-11-24 LAB
ALBUMIN SERPL-MCNC: 4.4 GM/DL (ref 3.4–5)
ALP BLD-CCNC: 118 IU/L (ref 40–128)
ALT SERPL-CCNC: <5 U/L (ref 10–40)
ANION GAP SERPL CALCULATED.3IONS-SCNC: 11 MMOL/L (ref 4–16)
AST SERPL-CCNC: 12 IU/L (ref 15–37)
BACTERIA: ABNORMAL /HPF
BILIRUB SERPL-MCNC: 0.5 MG/DL (ref 0–1)
BILIRUBIN URINE: NEGATIVE MG/DL
BLOOD, URINE: NEGATIVE
BUN BLDV-MCNC: 10 MG/DL (ref 6–23)
CALCIUM SERPL-MCNC: 9.3 MG/DL (ref 8.3–10.6)
CHLORIDE BLD-SCNC: 104 MMOL/L (ref 99–110)
CLARITY: ABNORMAL
CO2: 28 MMOL/L (ref 21–32)
COLOR: YELLOW
CREAT SERPL-MCNC: 0.9 MG/DL (ref 0.6–1.1)
GFR AFRICAN AMERICAN: >60 ML/MIN/1.73M2
GFR NON-AFRICAN AMERICAN: >60 ML/MIN/1.73M2
GLUCOSE BLD-MCNC: 93 MG/DL (ref 70–99)
GLUCOSE, URINE: NEGATIVE MG/DL
KETONES, URINE: NEGATIVE MG/DL
LEUKOCYTE ESTERASE, URINE: ABNORMAL
MAGNESIUM: 1.9 MG/DL (ref 1.8–2.4)
MUCUS: ABNORMAL HPF
NITRITE URINE, QUANTITATIVE: NEGATIVE
PH, URINE: 5 (ref 5–8)
PHOSPHORUS: 3.1 MG/DL (ref 2.5–4.9)
POTASSIUM SERPL-SCNC: 4.8 MMOL/L (ref 3.5–5.1)
PROTEIN UA: NEGATIVE MG/DL
RBC URINE: 3 /HPF (ref 0–6)
SODIUM BLD-SCNC: 143 MMOL/L (ref 135–145)
SPECIFIC GRAVITY UA: 1.02 (ref 1–1.03)
SQUAMOUS EPITHELIAL: 12 /HPF
TOTAL PROTEIN: 7.6 GM/DL (ref 6.4–8.2)
TRANSITIONAL EPITHELIAL: <1 /HPF
TRICHOMONAS: ABNORMAL /HPF
UROBILINOGEN, URINE: NEGATIVE MG/DL (ref 0.2–1)
WBC CLUMP: ABNORMAL /HPF
WBC UA: 5 /HPF (ref 0–5)
YEAST: ABNORMAL /HPF

## 2021-11-24 PROCEDURE — 80053 COMPREHEN METABOLIC PANEL: CPT

## 2021-11-24 PROCEDURE — 81001 URINALYSIS AUTO W/SCOPE: CPT

## 2021-11-24 PROCEDURE — 36415 COLL VENOUS BLD VENIPUNCTURE: CPT

## 2021-11-24 PROCEDURE — 83735 ASSAY OF MAGNESIUM: CPT

## 2021-11-24 PROCEDURE — 84100 ASSAY OF PHOSPHORUS: CPT

## 2022-02-15 RX ORDER — CLOPIDOGREL BISULFATE 75 MG/1
75 TABLET ORAL DAILY
Qty: 90 TABLET | Refills: 1 | Status: SHIPPED | OUTPATIENT
Start: 2022-02-15 | End: 2022-08-12

## 2022-07-07 ENCOUNTER — TELEPHONE (OUTPATIENT)
Dept: CARDIOLOGY CLINIC | Age: 62
End: 2022-07-07

## 2022-07-08 ENCOUNTER — TELEPHONE (OUTPATIENT)
Dept: CARDIOLOGY CLINIC | Age: 62
End: 2022-07-08

## 2022-07-08 NOTE — TELEPHONE ENCOUNTER
Cardiologist: Dr. Mark Nagel  Surgeon: Dr. Micha Leal  Surgery: Colonoscopy  Anesthesia: Propofol  Date: 7/21/2022  FAX# [phone]  # [phone]    Last OV 10/14/2021 w/Cassandra       Coronary artery disease  Chest pain atypical: reproducible to touch  Stress test normal perfusion and EF   Continue asa plavix and ranexa      Hypertension  Blood pressure is stable      Hyperlipidemia      Last EKG- 10/14/2021      NM- 6/17/2021  Normal tracer uptake in all segments of myocardium on stress and rest    images.    No infarct or ischemia noted.    Normal EF 71 % with normal ventricular contractility. Echo- 6/17/2021   Left ventricular systolic function is normal.   Ejection fraction is visually estimated at 50-55%. Indeterminate diastolic function; E/A flow reversal is noted. No significant valvular disease noted.    No evidence of any pericardial effusion      Cath- 8/3/2020   PATENT CX STENT   NORMAL LAD AND RCA   NORMAL EF      Plavix    Aspirin

## 2022-07-13 ENCOUNTER — TELEPHONE (OUTPATIENT)
Dept: CARDIOLOGY CLINIC | Age: 62
End: 2022-07-13

## 2022-07-15 ENCOUNTER — TELEPHONE (OUTPATIENT)
Dept: CARDIOLOGY CLINIC | Age: 62
End: 2022-07-15

## 2022-07-15 NOTE — TELEPHONE ENCOUNTER
[facility] Pretesting called patient is scheduled for back surgery 7/28 Dr Ailyn Lowery 7/28 office to fax request

## 2022-07-18 ENCOUNTER — TELEPHONE (OUTPATIENT)
Dept: CARDIOLOGY CLINIC | Age: 62
End: 2022-07-18

## 2022-07-18 NOTE — TELEPHONE ENCOUNTER
Cardiologist: Dr. Leyda Gonzalez  Surgeon: Dr. Hi Gallardo  Surgery: L2-3, L3-4 Lumbar Decompression  Anesthesia: General  Date: 7/28/2022  FAX# [phone]  # [phone]      Last OV 10/14/2021 w/Cassandra        Coronary artery disease  Chest pain atypical: reproducible to touch  Stress test normal perfusion and EF  Continue asa plavix and ranexa      Hypertension  Blood pressure is stable      Hyperlipidemia        Last EKG- 10/14/2021        NM- 6/17/2021  Normal tracer uptake in all segments of myocardium on stress and rest    images. No infarct or ischemia noted. Normal EF 71 % with normal ventricular contractility. Echo- 6/17/2021   Left ventricular systolic function is normal.   Ejection fraction is visually estimated at 50-55%. Indeterminate diastolic function; E/A flow reversal is noted. No significant valvular disease noted.    No evidence of any pericardial effusion        Cath- 8/3/2020   PATENT CX STENT   NORMAL LAD AND RCA   NORMAL EF        Plavix     Aspirin

## 2022-08-15 RX ORDER — CLOPIDOGREL BISULFATE 75 MG/1
75 TABLET ORAL DAILY
Qty: 30 TABLET | Refills: 5 | Status: SHIPPED | OUTPATIENT
Start: 2022-08-15

## 2022-08-30 DIAGNOSIS — R07.9 CHEST PAIN, UNSPECIFIED TYPE: ICD-10-CM

## 2022-08-30 DIAGNOSIS — I25.10 CORONARY ARTERY DISEASE INVOLVING NATIVE CORONARY ARTERY OF NATIVE HEART WITHOUT ANGINA PECTORIS: ICD-10-CM

## 2022-08-30 DIAGNOSIS — Z72.0 CURRENT TOBACCO USE: ICD-10-CM

## 2022-08-30 DIAGNOSIS — I10 ESSENTIAL HYPERTENSION: ICD-10-CM

## 2022-08-30 DIAGNOSIS — K21.9 GASTROESOPHAGEAL REFLUX DISEASE WITH HIATAL HERNIA: Chronic | ICD-10-CM

## 2022-08-30 DIAGNOSIS — K44.9 GASTROESOPHAGEAL REFLUX DISEASE WITH HIATAL HERNIA: Chronic | ICD-10-CM

## 2022-08-31 RX ORDER — NITROGLYCERIN 0.4 MG/1
0.4 TABLET SUBLINGUAL EVERY 5 MIN PRN
Qty: 25 TABLET | Refills: 10 | Status: SHIPPED | OUTPATIENT
Start: 2022-08-31

## 2022-08-31 RX ORDER — RANOLAZINE 1000 MG/1
1000 TABLET, EXTENDED RELEASE ORAL 2 TIMES DAILY
Qty: 60 TABLET | Refills: 10 | Status: SHIPPED | OUTPATIENT
Start: 2022-08-31

## 2022-09-21 ENCOUNTER — OFFICE VISIT (OUTPATIENT)
Dept: CARDIOLOGY CLINIC | Age: 62
End: 2022-09-21
Payer: COMMERCIAL

## 2022-09-21 VITALS
WEIGHT: 199.6 LBS | HEIGHT: 62 IN | DIASTOLIC BLOOD PRESSURE: 68 MMHG | BODY MASS INDEX: 36.73 KG/M2 | HEART RATE: 70 BPM | SYSTOLIC BLOOD PRESSURE: 122 MMHG

## 2022-09-21 DIAGNOSIS — I25.10 CORONARY ARTERY DISEASE INVOLVING NATIVE CORONARY ARTERY OF NATIVE HEART WITHOUT ANGINA PECTORIS: Primary | ICD-10-CM

## 2022-09-21 PROCEDURE — G8417 CALC BMI ABV UP PARAM F/U: HCPCS | Performed by: INTERNAL MEDICINE

## 2022-09-21 PROCEDURE — 1036F TOBACCO NON-USER: CPT | Performed by: INTERNAL MEDICINE

## 2022-09-21 PROCEDURE — G8427 DOCREV CUR MEDS BY ELIG CLIN: HCPCS | Performed by: INTERNAL MEDICINE

## 2022-09-21 PROCEDURE — 99214 OFFICE O/P EST MOD 30 MIN: CPT | Performed by: INTERNAL MEDICINE

## 2022-09-21 PROCEDURE — 3017F COLORECTAL CA SCREEN DOC REV: CPT | Performed by: INTERNAL MEDICINE

## 2022-09-21 NOTE — PROGRESS NOTES
the tongue every 5 minutes as needed for Chest pain (After 3 doses, if chest pain persists, call 911) 25 tablet 10    clopidogrel (PLAVIX) 75 MG tablet Take 1 tablet by mouth in the morning. 30 tablet 5    STIOLTO RESPIMAT 2.5-2.5 MCG/ACT AERS INHALE TWO (2) PUFFS BY MOUTH DAILY 4 g 10    PROAIR  (90 Base) MCG/ACT inhaler INHALE 2 PUFFS INTO THE LUNGS 4 TIMES DAILY AS NEEDED FOR WHEEZING 8.5 g 3    dicyclomine (BENTYL) 20 MG tablet TAKE 1 TABLET BY MOUTH 3 TIMES DAILY AS NEEDED FOR IRRITABLE BOWEL SYNDROME      metoprolol tartrate (LOPRESSOR) 25 MG tablet Take 12.5 mg by mouth 2 times daily       albuterol (PROVENTIL) (2.5 MG/3ML) 0.083% nebulizer solution Take 2.5 mg by nebulization every 6 hours as needed for Wheezing      gabapentin (NEURONTIN) 300 MG capsule Take 1 capsule by mouth 3 times daily for 30 days.  (Patient taking differently: Take 300 mg by mouth 2 times daily.) 90 capsule 0    pantoprazole (PROTONIX) 40 MG tablet Take 1 tablet by mouth 2 times daily (before meals) (Patient taking differently: Take 40 mg by mouth 2 times daily as needed) 30 tablet 3    ACETAMINOPHEN EXTRA STRENGTH 500 MG tablet TAKE 2 TABLETS BY MOUTH EVERY 6 HOURS AS NEEDED FOR PAIN      Cholecalciferol (VITAMIN D3) 50 MCG (2000 UT) TABS TAKE (1) TABLET BY MOUTH ONCE DAILY      Compression Stockings MISC by Does not apply route 20 - 30 mmh wear daily and take off at night  Thigh High 2 each 0    cetirizine (ZYRTEC) 10 MG tablet Take 10 mg by mouth daily      sertraline (ZOLOFT) 100 MG tablet Take 100 mg by mouth daily       glycopyrrolate-formoterol (BEVESPI AEROSPHERE) 9-4.8 MCG/ACT AERO Inhale 2 puffs into the lungs 2 times daily 3 Inhaler 1    atorvastatin (LIPITOR) 40 MG tablet Take 40 mg by mouth daily      donepezil (ARICEPT) 10 MG tablet Take 10 mg by mouth nightly   3    memantine (NAMENDA) 10 MG tablet Take 10 mg by mouth 2 times daily       hydrOXYzine (ATARAX) 25 MG tablet Take 50 mg by mouth every 8 hours as needed for Itching       aspirin EC 81 MG EC tablet Take 1 tablet by mouth daily. (Patient taking differently: Take 81 mg by mouth every morning) 30 tablet 6    fluticasone (FLONASE) 50 MCG/ACT nasal spray INSTILL 2 SPRAYS IN EACH NOSTRIL EVERY DAY (Patient not taking: Reported on 9/21/2022)      triamcinolone (KENALOG) 0.025 % cream APPLY THIN LAYER TO AFFECTED AREA TOPICALLY TWICE DAILY, STOP USE AFTER 7 DAYS (Patient not taking: Reported on 9/21/2022)       No current facility-administered medications for this visit. Allergies: Adhesive tape, Aspirin, and Pcn [penicillins]  Past Medical History:   Diagnosis Date    Anxiety     Arthritis     \"Both Legs\"    Asthma     Back problem     \"Curved Spine\"    Bronchitis Last Episode In 2015    CAD (coronary artery disease)     Sees Dr. Leonardo Valadez; 8-16-16 (noted on 10-4-2012 that patient does not have CAD s/p cardiac catheterization). Cancer ([address]     skin ca on skin    Chest pain     in ER with this dx 7/2015- admitted- stress test done as outpt 8/7/2015(see report)    Chipped tooth     Upper And Lower    Chronic back pain     COPD (chronic obstructive pulmonary disease) ([facility] 06/01/2017    Sees Dr. Elsa Bernard    Cough     Occ. Nonproductive Cough    Emphysema     GERD (gastroesophageal reflux disease)     H/O 24 hour EKG monitoring     3/14/2013-Signif for runs of sinus tachycardia, fastest recorded at 132 bpm lasting almost 38 mins. Dr Freya Dangelo. -report in [facility]    H/O cardiac catheterization     10/4/2012- No CAD.  False positive stress test.Dr Valdez-report in [facility];     H/O cardiac catheterization     H/O Doppler lower venous ultrasound 09/24/2020    No DVT or SVT, No significant venous insufficiency    H/O Doppler ultrasound 04/13/2017    LE doppler - normal study    H/O Doppler ultrasound 05/26/2017    carotid - normal study    H/O exercise stress test 02/28/2017    normal study    Hiatal hernia     History of cardiac cath 08/03/2020    PATENT CX STENT, Normal EF,  NORMAL LAD AND RCA    History of exercise stress test 02/28/2017    treadmill    History of nuclear stress test 8/7/15    EF 70%. WNL    History of nuclear stress test 07/17/2020     scan shows small size, moderate intensity, reversible perfusion defect in anterior wall. normal LVEF . [place] (hard of hearing)     Bilateral Ears    Hx of cardiovascular stress test 9/2013 9/13 EF70% Normal.10/12- LVSF normal. EF 61%. Normal perfusion althought pt complained of chest pain with Lexiscan. report in [facility]; 11/11, possible apical ischemia but abnormality secondary to technical artifact needs to be considered, regional wall motion abnormality with low normal LVSF by pierson scan. 8/10, 12/09    Hx of Doppler ultrasound 2/2011 2/2011-Carotid no significant obstructive lesions noted in the extracranial carotid system. Antegrade flow noted inthe vertebral arteries. Hx of Doppler ultrasound 1/2012 1/2012-peripheral - both abis and duplex studies of both lower extremities do not  reveal any significant peripheral vascular disease at this time. Hx of Doppler ultrasound 1/22/2016    Arterial: Peripheral vascular noninvasive arterial studies do not reveal any significant atherosclerotic disease. Hx of echocardiogram 6/2013 6/13- Mild to mod MR/TR. 10/12-LVSF normal. EF 60%. Mild mitral and tricuspid insuff. reprot in epic; 8/6/10, normal dimension of the LV, normal global and regional LVSF with an EF of 60%, no significant valvuopathy is seen.  12/05    Hypertension     \"on medication since age 25's\" follow with Dr Peggy Colmenares    Irregular heart beat     \"have irreg heart beat\" dont know what you call it\"    Migraines Last Migraine In 2014    Other hyperlipidemia 10/14/2021    Palpitations     Panic attacks     Pneumonia Last Episode In 2014    Prolonged emergence from general anesthesia     Restless leg     S/P cardiac cath 2/21/14    S/P PTCA (percutaneous transluminal coronary angioplasty) 01/26/2017    2017, CX stented    Shortness of breath     Teeth missing     Upper And Lower    Tobacco use     Unspecified sleep apnea     \"had sleep study 2-3 yrs ago\" have cpap and try to use it\"     Past Surgical History:   Procedure Laterality Date    ANKLE FRACTURE SURGERY Left 2019    LEFT ANKLE OPEN REDUCTION INTERNAL FIXATION SYNDESMOSIS performed by Landon Florence MD at [place] [address]      COLONOSCOPY  2017    colon polyp, diverticulosis, hemorrhoids    DENTAL SURGERY      Teeth Extracted In Past    ENDOSCOPY, COLON, DIAGNOSTIC  10-16-15    ENDOSCOPY, COLON, DIAGNOSTIC  2019    Hiatal hernia, savary dil #17 used    GASTRIC FUNDOPLICATION      Robotic laparoscopic nissen with mesh    KIDNEY SURGERY Left -15    \"At [facility] Had Left Kidney Mass Removed\" Benign    TONSILLECTOMY      TUBAL LIGATION      UPPER GASTROINTESTINAL ENDOSCOPY N/A 2019    EGD Heath Kilpatrick #17MM performed by Chelsey Shultz MD at [place] [address]      As reviewed   Family History   Problem Relation Age of Onset    Heart Disease Mother     Heart Disease Father     High Blood Pressure Father     Cancer Sister     Early Death Sister 43    Asthma Brother      Social History     Tobacco Use    Smoking status: Former     Packs/day: 0.25     Years: 44.00     Pack years: 11.00     Types: Cigarettes     Start date: 10/21/1971     Quit date: 2015     Years since quittin.0    Smokeless tobacco: Never   Substance Use Topics    Alcohol use:  Yes     Alcohol/week: 2.0 standard drinks     Types: 2 Shots of liquor per week     Comment: occassionally        Objective:    Vitals:    22 1649   BP: 122/68   Site: Right Upper Arm   Position: Sitting   Cuff Size: Large Adult   Pulse: 70   Weight: 199 lb 9.6 oz (90.5 kg)   Height: 5' 2\" (1.575 m)     /68 (Site: Right Upper Arm, Position: Sitting, Cuff Size: Large Adult)   Pulse 70   Ht 5' 2\" (1.575 m)   Wt 199 lb 9.6 oz (90.5 kg)   BMI 36.51 kg/m²     No flowsheet data found. Wt Readings from Last 3 Encounters:   09/21/22 199 lb 9.6 oz (90.5 kg)   07/19/22 185 lb (83.9 kg)   04/20/22 185 lb (83.9 kg)     Body mass index is 36.51 kg/m². GENERAL - Alert, oriented, pleasant, in no apparent distress. EYES: No jaundice, no conjunctival pallor. SKIN: It is warm & dry. No rashes. No Echhymosis    HEENT - No clinically significant abnormalities seen. Neck - Supple. No jugular venous distention noted. No carotid bruits. Cardiovascular - Normal S1 and S2 without obvious murmur or gallop. Extremities - No cyanosis, clubbing, or significant edema. Pulmonary - No respiratory distress. No wheezes or rales. Abdomen - No masses, tenderness, or organomegaly. Musculoskeletal - No significant edema. No joint deformities. No muscle wasting. Neurologic - Cranial nerves II through XII are grossly intact. There were no gross focal neurologic abnormalities.     Lab Review   Lab Results   Component Value Date/Time    CKTOTAL 29 05/11/2017 03:22 PM    TROPONINT <0.010 06/17/2021 07:01 AM     BNP:    Lab Results   Component Value Date/Time    BNP 18 04/22/2014 02:30 PM     PT/INR:    Lab Results   Component Value Date    INR 0.84 06/16/2021     Lab Results   Component Value Date    LABA1C 5.6 03/07/2020    LABA1C 5.4 11/21/2014     Lab Results   Component Value Date    WBC 7.6 07/21/2021    HCT 34.0 (L) 07/21/2021    MCV 91.9 07/21/2021     07/21/2021     Lab Results   Component Value Date    CHOL 181 06/17/2021    TRIG 111 06/17/2021    HDL 60 06/17/2021    LDLCALC 92 11/26/2012    LDLDIRECT 108 (H) 06/17/2021     Lab Results   Component Value Date    ALT <5 (L) 11/24/2021    AST 12 (L) 11/24/2021     BMP:    Lab Results   Component Value Date/Time     11/24/2021 12:29 PM    K 4.8 11/24/2021 12:29 PM     11/24/2021 12:29 PM    CO2 28 11/24/2021 12:29 PM    BUN 10 11/24/2021 12:29 PM    CREATININE 0.9 11/24/2021 12:29 PM     CMP:   Lab Results Component Value Date/Time     11/24/2021 12:29 PM    K 4.8 11/24/2021 12:29 PM     11/24/2021 12:29 PM    CO2 28 11/24/2021 12:29 PM    BUN 10 11/24/2021 12:29 PM    PROT 7.6 11/24/2021 12:29 PM    PROT 7.0 11/26/2012 01:02 PM     TSH:    Lab Results   Component Value Date/Time    TSHHS 3.800 06/17/2021 07:01 AM           Assessment & Plan:               -     CORONARY ARTERY DISEASE:  asymptomatic     All available  tests in chart reviewed. Management discussed . Testing ordered  no                 Plavix and Ranexa we will continue to monitor                -  Hypertension: Patients blood pressure is normal. Patient is advised about low sodium diet. Present medical regimen will not be changed. Blood pressure is well controlled patient is on metoprolol                -  LIPID MANAGEMENT:  Importance of lipid levels discussed with patient   and patient was given dietary advice. NCEP- ATP III guidelines reviewed with patient. -   Changes  in medicines made: No                 On Lipitor 40 mg p.o. daily we will check the LDL          Mortality from the morbid obesity is very high: Body mass index is 36.51 kg/m². Jony Bro MD    [facility]    Please note this report has been partially produced using speech recognition software and may contain errors related to that system including errors in grammar, punctuation, and spelling, as well as words and phrases that may be inappropriate. If there are any questions or concerns please feel free to contact the dictating provider for clarification.

## 2022-10-10 ENCOUNTER — HOSPITAL ENCOUNTER (OUTPATIENT)
Age: 62
Discharge: HOME | End: 2022-10-10
Payer: COMMERCIAL

## 2022-10-10 ENCOUNTER — HOSPITAL ENCOUNTER (OUTPATIENT)
Dept: GENERAL RADIOLOGY | Age: 62
Discharge: HOME | End: 2022-10-10
Payer: COMMERCIAL

## 2022-10-10 DIAGNOSIS — J43.2 CENTRILOBULAR EMPHYSEMA (HCC): ICD-10-CM

## 2022-10-10 DIAGNOSIS — I10 HYPERTENSION, UNSPECIFIED TYPE: ICD-10-CM

## 2022-10-10 LAB
ALBUMIN SERPL-MCNC: 4.2 GM/DL (ref 3.4–5)
ALP BLD-CCNC: 119 IU/L (ref 40–128)
ALT SERPL-CCNC: 5 U/L (ref 10–40)
ANION GAP SERPL CALCULATED.3IONS-SCNC: 11 MMOL/L (ref 4–16)
AST SERPL-CCNC: 13 IU/L (ref 15–37)
BILIRUB SERPL-MCNC: 0.5 MG/DL (ref 0–1)
BUN BLDV-MCNC: 10 MG/DL (ref 6–23)
CALCIUM SERPL-MCNC: 9.3 MG/DL (ref 8.3–10.6)
CHLORIDE BLD-SCNC: 102 MMOL/L (ref 99–110)
CO2: 27 MMOL/L (ref 21–32)
CREAT SERPL-MCNC: 0.8 MG/DL (ref 0.6–1.1)
GFR AFRICAN AMERICAN: >60 ML/MIN/1.73M2
GFR NON-AFRICAN AMERICAN: >60 ML/MIN/1.73M2
GLUCOSE BLD-MCNC: 97 MG/DL (ref 70–99)
MAGNESIUM: 1.8 MG/DL (ref 1.8–2.4)
PHOSPHORUS: 2.4 MG/DL (ref 2.5–4.9)
POTASSIUM SERPL-SCNC: 4.4 MMOL/L (ref 3.5–5.1)
SODIUM BLD-SCNC: 140 MMOL/L (ref 135–145)
TOTAL PROTEIN: 6.9 GM/DL (ref 6.4–8.2)

## 2022-10-10 PROCEDURE — 83735 ASSAY OF MAGNESIUM: CPT

## 2022-10-10 PROCEDURE — 84100 ASSAY OF PHOSPHORUS: CPT

## 2022-10-10 PROCEDURE — 36415 COLL VENOUS BLD VENIPUNCTURE: CPT

## 2022-10-10 PROCEDURE — 80053 COMPREHEN METABOLIC PANEL: CPT

## 2022-10-10 PROCEDURE — 71046 X-RAY EXAM CHEST 2 VIEWS: CPT

## 2022-10-11 ENCOUNTER — HOSPITAL ENCOUNTER (OUTPATIENT)
Age: 62
Setting detail: SPECIMEN
Discharge: HOME | End: 2022-10-11
Payer: COMMERCIAL

## 2022-10-11 LAB
BILIRUBIN URINE: NEGATIVE MG/DL
BLOOD, URINE: NEGATIVE
CLARITY: CLEAR
COLOR: YELLOW
GLUCOSE, URINE: NEGATIVE MG/DL
KETONES, URINE: NEGATIVE MG/DL
LEUKOCYTE ESTERASE, URINE: NEGATIVE
NITRITE URINE, QUANTITATIVE: NEGATIVE
PH, URINE: 6 (ref 5–8)
PROTEIN UA: NEGATIVE MG/DL
SPECIFIC GRAVITY UA: 1.02 (ref 1–1.03)
UROBILINOGEN, URINE: 0.2 MG/DL (ref 0.2–1)

## 2022-10-11 PROCEDURE — 81003 URINALYSIS AUTO W/O SCOPE: CPT

## 2023-01-27 RX ORDER — CLOPIDOGREL BISULFATE 75 MG/1
75 TABLET ORAL DAILY
Qty: 30 TABLET | Refills: 10 | Status: SHIPPED | OUTPATIENT
Start: 2023-01-27

## 2023-03-30 PROBLEM — G93.40 ACUTE ENCEPHALOPATHY: Status: ACTIVE | Noted: 2023-03-30

## 2023-03-30 PROBLEM — R41.89 UNRESPONSIVE: Status: ACTIVE | Noted: 2023-03-30

## 2023-04-03 PROBLEM — F33.1 DEPRESSION, MAJOR, RECURRENT, MODERATE (HCC): Status: ACTIVE | Noted: 2023-04-03

## 2023-04-10 PROBLEM — R41.89 UNRESPONSIVE: Status: RESOLVED | Noted: 2023-03-30 | Resolved: 2023-04-10

## 2023-04-10 PROBLEM — G93.40 ACUTE ENCEPHALOPATHY: Status: RESOLVED | Noted: 2023-03-30 | Resolved: 2023-04-10

## 2023-07-26 DIAGNOSIS — Z72.0 CURRENT TOBACCO USE: ICD-10-CM

## 2023-07-26 DIAGNOSIS — K44.9 GASTROESOPHAGEAL REFLUX DISEASE WITH HIATAL HERNIA: Chronic | ICD-10-CM

## 2023-07-26 DIAGNOSIS — I10 ESSENTIAL HYPERTENSION: ICD-10-CM

## 2023-07-26 DIAGNOSIS — R07.9 CHEST PAIN, UNSPECIFIED TYPE: ICD-10-CM

## 2023-07-26 DIAGNOSIS — K21.9 GASTROESOPHAGEAL REFLUX DISEASE WITH HIATAL HERNIA: Chronic | ICD-10-CM

## 2023-07-26 DIAGNOSIS — I25.10 CORONARY ARTERY DISEASE INVOLVING NATIVE CORONARY ARTERY OF NATIVE HEART WITHOUT ANGINA PECTORIS: ICD-10-CM

## 2023-07-27 RX ORDER — NITROGLYCERIN 0.4 MG/1
0.4 TABLET SUBLINGUAL EVERY 5 MIN PRN
Qty: 25 TABLET | Refills: 10 | Status: SHIPPED | OUTPATIENT
Start: 2023-07-27

## 2023-07-27 RX ORDER — RANOLAZINE 1000 MG/1
1000 TABLET, EXTENDED RELEASE ORAL 2 TIMES DAILY
Qty: 60 TABLET | Refills: 10 | Status: SHIPPED | OUTPATIENT
Start: 2023-07-27

## 2023-08-29 ENCOUNTER — HOSPITAL ENCOUNTER (OUTPATIENT)
Age: 63
Discharge: HOME | End: 2023-08-29
Payer: MEDICAID

## 2023-08-29 DIAGNOSIS — I10 HYPERTENSION, UNSPECIFIED TYPE: ICD-10-CM

## 2023-08-29 LAB
ALBUMIN SERPL-MCNC: 4.2 GM/DL (ref 3.4–5)
ALP BLD-CCNC: 141 IU/L (ref 40–128)
ALT SERPL-CCNC: 6 U/L (ref 10–40)
ANION GAP SERPL CALCULATED.3IONS-SCNC: 12 MMOL/L (ref 4–16)
AST SERPL-CCNC: 13 IU/L (ref 15–37)
BILIRUB SERPL-MCNC: 0.5 MG/DL (ref 0–1)
BILIRUBIN URINE: NEGATIVE MG/DL
BLOOD, URINE: NEGATIVE
BUN SERPL-MCNC: 9 MG/DL (ref 6–23)
CALCIUM SERPL-MCNC: 9.2 MG/DL (ref 8.3–10.6)
CHLORIDE BLD-SCNC: 103 MMOL/L (ref 99–110)
CLARITY: CLEAR
CO2: 26 MMOL/L (ref 21–32)
COLOR: YELLOW
COMMENT UA: NORMAL
CREAT SERPL-MCNC: 1 MG/DL (ref 0.6–1.1)
GFR SERPL CREATININE-BSD FRML MDRD: >60 ML/MIN/1.73M2
GLUCOSE SERPL-MCNC: 103 MG/DL (ref 70–99)
GLUCOSE, URINE: NEGATIVE MG/DL
KETONES, URINE: NEGATIVE MG/DL
LEUKOCYTE ESTERASE, URINE: NEGATIVE
MAGNESIUM: 1.7 MG/DL (ref 1.8–2.4)
NITRITE URINE, QUANTITATIVE: NEGATIVE
PH, URINE: 5.5 (ref 5–8)
PHOSPHORUS: 2.9 MG/DL (ref 2.5–4.9)
POTASSIUM SERPL-SCNC: 4.5 MMOL/L (ref 3.5–5.1)
PROTEIN UA: NEGATIVE MG/DL
SODIUM BLD-SCNC: 141 MMOL/L (ref 135–145)
SPECIFIC GRAVITY UA: 1.02 (ref 1–1.03)
TOTAL PROTEIN: 7.3 GM/DL (ref 6.4–8.2)
UROBILINOGEN, URINE: 0.2 MG/DL (ref 0.2–1)

## 2023-08-29 PROCEDURE — 83735 ASSAY OF MAGNESIUM: CPT

## 2023-08-29 PROCEDURE — 36415 COLL VENOUS BLD VENIPUNCTURE: CPT

## 2023-08-29 PROCEDURE — 81003 URINALYSIS AUTO W/O SCOPE: CPT

## 2023-08-29 PROCEDURE — 84100 ASSAY OF PHOSPHORUS: CPT

## 2023-08-29 PROCEDURE — 80053 COMPREHEN METABOLIC PANEL: CPT

## 2023-09-21 ENCOUNTER — OFFICE VISIT (OUTPATIENT)
Dept: CARDIOLOGY CLINIC | Age: 63
End: 2023-09-21
Payer: MEDICAID

## 2023-09-21 VITALS
DIASTOLIC BLOOD PRESSURE: 64 MMHG | SYSTOLIC BLOOD PRESSURE: 128 MMHG | WEIGHT: 202 LBS | HEIGHT: 62 IN | BODY MASS INDEX: 37.17 KG/M2 | OXYGEN SATURATION: 96 % | HEART RATE: 94 BPM

## 2023-09-21 DIAGNOSIS — E78.49 OTHER HYPERLIPIDEMIA: ICD-10-CM

## 2023-09-21 DIAGNOSIS — I10 HYPERTENSION, UNSPECIFIED TYPE: ICD-10-CM

## 2023-09-21 DIAGNOSIS — I25.10 ASCVD (ARTERIOSCLEROTIC CARDIOVASCULAR DISEASE): Primary | ICD-10-CM

## 2023-09-21 PROCEDURE — 3074F SYST BP LT 130 MM HG: CPT | Performed by: NURSE PRACTITIONER

## 2023-09-21 PROCEDURE — 99214 OFFICE O/P EST MOD 30 MIN: CPT | Performed by: NURSE PRACTITIONER

## 2023-09-21 PROCEDURE — 3078F DIAST BP <80 MM HG: CPT | Performed by: NURSE PRACTITIONER

## 2023-09-21 RX ORDER — EZETIMIBE 10 MG/1
10 TABLET ORAL DAILY
Qty: 30 TABLET | Refills: 0 | Status: SHIPPED | OUTPATIENT
Start: 2023-09-21

## 2023-09-21 RX ORDER — EZETIMIBE 10 MG/1
10 TABLET ORAL DAILY
Qty: 90 TABLET | Refills: 1 | Status: SHIPPED | OUTPATIENT
Start: 2023-09-21

## 2023-09-21 ASSESSMENT — ENCOUNTER SYMPTOMS
ORTHOPNEA: 0
SLEEP DISTURBANCES DUE TO BREATHING: 1
SHORTNESS OF BREATH: 0

## 2023-09-21 NOTE — PATIENT INSTRUCTIONS
**It is YOUR responsibilty to bring medication bottles and/or updated medication list to [address] [place] [address]. This will allow us to better serve you and all your healthcare needs**   [facility] Locations - No appointment necessary. Sites open Monday to Friday. Call your preferred location for test preparation, business   hours and other information you need. [employer] accepts 's. [address] [place] [address]. [address] [place], [address] [facility]  Phone: [phone]    Thank you for allowing us to care for you today! We want to ensure we can follow your treatment plan and we strive to give you the best outcomes and experience possible. If you ever have a life threatening emergency and call 911 - for an ambulance (EMS)   Our providers can only care for you at:   [facility] or [facility]. Even if you have someone take you or you drive yourself we can only care for you in a [facility]. Our providers are not setup at the other healthcare locations! Please be informed that if you contact our office outside of normal business hours the physician on call cannot help with any scheduling or rescheduling issues, procedure instruction questions or any type of medication issue. We advise you for any urgent/emergency that you go to the nearest emergency room! PLEASE CALL OUR OFFICE DURING NORMAL BUSINESS HOURS    Monday - Friday   8 am to 5 pm    [place]: [address] [place]: [phone]    [place]:  [phone]  We are committed to providing you the best care possible. If you receive a survey after visiting one of our offices, please take time to share your experience concerning your physician office visit. These surveys are confidential and no health information about you is shared. We are eager to improve for you and we are counting on your feedback to help make that happen.

## 2023-10-20 ENCOUNTER — TELEPHONE (OUTPATIENT)
Dept: CARDIOLOGY CLINIC | Age: 63
End: 2023-10-20

## 2023-10-20 NOTE — TELEPHONE ENCOUNTER
Cardiologist: Dr. Selina Roper  Surgeon: Dr. Richy Collado   Surgery: Lt Knee Medial Meniscectomy   Anesthesia: General  Date: TBD  FAX# [phone]    Ph#     Last OV 9/21/2023 w/M Azeb ULRICH

## 2023-12-26 RX ORDER — CLOPIDOGREL BISULFATE 75 MG/1
75 TABLET ORAL DAILY
Qty: 30 TABLET | Refills: 10 | Status: SHIPPED | OUTPATIENT
Start: 2023-12-26

## 2024-01-03 ENCOUNTER — OFFICE VISIT (OUTPATIENT)
Dept: CARDIOLOGY CLINIC | Age: 64
End: 2024-01-03
Payer: MEDICAID

## 2024-01-03 VITALS
SYSTOLIC BLOOD PRESSURE: 110 MMHG | HEIGHT: 62 IN | OXYGEN SATURATION: 94 % | HEART RATE: 88 BPM | DIASTOLIC BLOOD PRESSURE: 70 MMHG | WEIGHT: 200 LBS | BODY MASS INDEX: 36.8 KG/M2

## 2024-01-03 DIAGNOSIS — I25.10 ASCVD (ARTERIOSCLEROTIC CARDIOVASCULAR DISEASE): Primary | ICD-10-CM

## 2024-01-03 DIAGNOSIS — E78.49 OTHER HYPERLIPIDEMIA: ICD-10-CM

## 2024-01-03 DIAGNOSIS — R06.02 SHORTNESS OF BREATH: ICD-10-CM

## 2024-01-03 DIAGNOSIS — I10 HYPERTENSION, UNSPECIFIED TYPE: ICD-10-CM

## 2024-01-03 PROCEDURE — 99214 OFFICE O/P EST MOD 30 MIN: CPT | Performed by: NURSE PRACTITIONER

## 2024-01-03 PROCEDURE — 3074F SYST BP LT 130 MM HG: CPT | Performed by: NURSE PRACTITIONER

## 2024-01-03 PROCEDURE — 3078F DIAST BP <80 MM HG: CPT | Performed by: NURSE PRACTITIONER

## 2024-01-03 ASSESSMENT — ENCOUNTER SYMPTOMS
ORTHOPNEA: 0
SLEEP DISTURBANCES DUE TO BREATHING: 0
SHORTNESS OF BREATH: 1

## 2024-01-03 NOTE — PROGRESS NOTES
1/3/2024  Primary cardiologist: Dr. Valdez    CC:   Jojo  is an established 63 y.o.  female here for a 12 month follow up on CAD      SUBJECTIVE/OBJECTIVE:  Jojo is a 63 y.o. female with a history of coronary artery disease s/p PCI to Cx, hypertension, hyperlipidemia, TIA, memory impairment, COPD and hypomagnesia     In March 2023 she was in the the hospialt for dizziness - had stoke light symptoms garbled speech and unresponsiveness: she did receive TNK.  Neurology and psych evaluated and thought less likely CVA versus more likely conversion disorder    HPI :   Jojo states that she gets shortness of breath in the cold air and with walking Has had a sharp shooting pain to the right lateral chest radiating to her back- notes it has been ongoing for the past 3 weeks.         Review of Systems   Constitutional: Negative for diaphoresis and malaise/fatigue.   Cardiovascular:  Positive for chest pain. Negative for claudication, dyspnea on exertion, irregular heartbeat, leg swelling, near-syncope, orthopnea, palpitations and paroxysmal nocturnal dyspnea.   Respiratory:  Positive for shortness of breath. Negative for sleep disturbances due to breathing.    Musculoskeletal:  Negative for neck pain.   Neurological:  Negative for dizziness, light-headedness and vertigo.       Vitals:    01/03/24 1152   BP: 110/70   Site: Left Upper Arm   Position: Sitting   Cuff Size: Large Adult   Pulse: 88   SpO2: 94%   Weight: 90.7 kg (200 lb)   Height: 1.575 m (5' 2\")     Body mass index is 36.58 kg/m².     Physical Exam  Vitals reviewed.   Constitutional:       Appearance: She is obese.   HENT:      Mouth/Throat:      Comments: Dental carries   Cardiovascular:      Rate and Rhythm: Normal rate and regular rhythm.      Pulses: Normal pulses.      Heart sounds: No murmur heard.     No gallop.   Pulmonary:      Effort: Pulmonary effort is normal.      Breath sounds: Decreased air movement present. No wheezing or rales.   Musculoskeletal:

## 2024-01-05 ENCOUNTER — HOSPITAL ENCOUNTER (OUTPATIENT)
Age: 64
Discharge: HOME | End: 2024-01-05
Payer: MEDICAID

## 2024-01-05 ENCOUNTER — HOSPITAL ENCOUNTER (OUTPATIENT)
Dept: GENERAL RADIOLOGY | Age: 64
Discharge: HOME | End: 2024-01-05
Payer: MEDICAID

## 2024-01-05 ENCOUNTER — TELEPHONE (OUTPATIENT)
Dept: CARDIOLOGY CLINIC | Age: 64
End: 2024-01-05

## 2024-01-05 DIAGNOSIS — R06.02 SHORTNESS OF BREATH: ICD-10-CM

## 2024-01-05 PROCEDURE — 71046 X-RAY EXAM CHEST 2 VIEWS: CPT

## 2024-01-05 NOTE — TELEPHONE ENCOUNTER
Called patient to provide her with results. Phone rang continuously and never went to a mailbox so I was unable to leave a message for her to call back

## 2024-01-08 NOTE — TELEPHONE ENCOUNTER
Called patient to provide her with results. Patient verbalized understanding and confirmed next appointment.

## 2024-02-28 ENCOUNTER — HOSPITAL ENCOUNTER (OUTPATIENT)
Age: 64
Discharge: HOME | End: 2024-02-28
Payer: MEDICAID

## 2024-02-28 DIAGNOSIS — I10 HYPERTENSION, UNSPECIFIED TYPE: ICD-10-CM

## 2024-02-28 LAB
ALBUMIN SERPL-MCNC: 4.1 GM/DL (ref 3.4–5)
ALP BLD-CCNC: 132 IU/L (ref 40–128)
ALT SERPL-CCNC: 6 U/L (ref 10–40)
ANION GAP SERPL CALCULATED.3IONS-SCNC: 8 MMOL/L (ref 7–16)
AST SERPL-CCNC: 15 IU/L (ref 15–37)
BILIRUB SERPL-MCNC: 0.6 MG/DL (ref 0–1)
BILIRUBIN URINE: NEGATIVE MG/DL
BLOOD, URINE: NEGATIVE
BUN SERPL-MCNC: 9 MG/DL (ref 6–23)
CALCIUM SERPL-MCNC: 8.9 MG/DL (ref 8.3–10.6)
CHLORIDE BLD-SCNC: 105 MMOL/L (ref 99–110)
CLARITY: CLEAR
CO2: 29 MMOL/L (ref 21–32)
COLOR: YELLOW
COMMENT UA: NORMAL
CREAT SERPL-MCNC: 0.8 MG/DL (ref 0.6–1.1)
GFR SERPL CREATININE-BSD FRML MDRD: >60 ML/MIN/1.73M2
GLUCOSE SERPL-MCNC: 89 MG/DL (ref 70–99)
GLUCOSE, URINE: NEGATIVE MG/DL
KETONES, URINE: NEGATIVE MG/DL
LEUKOCYTE ESTERASE, URINE: NEGATIVE
MAGNESIUM: 2 MG/DL (ref 1.8–2.4)
NITRITE URINE, QUANTITATIVE: NEGATIVE
PH, URINE: 7.5 (ref 5–8)
POTASSIUM SERPL-SCNC: 4.3 MMOL/L (ref 3.5–5.1)
PROTEIN UA: NEGATIVE MG/DL
SODIUM BLD-SCNC: 142 MMOL/L (ref 135–145)
SPECIFIC GRAVITY UA: 1.01 (ref 1–1.03)
TOTAL PROTEIN: 7 GM/DL (ref 6.4–8.2)
UROBILINOGEN, URINE: 1 MG/DL (ref 0.2–1)

## 2024-02-28 PROCEDURE — 36415 COLL VENOUS BLD VENIPUNCTURE: CPT

## 2024-02-28 PROCEDURE — 80053 COMPREHEN METABOLIC PANEL: CPT

## 2024-02-28 PROCEDURE — 81003 URINALYSIS AUTO W/O SCOPE: CPT

## 2024-02-28 PROCEDURE — 83735 ASSAY OF MAGNESIUM: CPT

## 2024-03-27 RX ORDER — EZETIMIBE 10 MG/1
10 TABLET ORAL DAILY
Qty: 30 TABLET | Refills: 5 | Status: SHIPPED | OUTPATIENT
Start: 2024-03-27

## 2024-04-22 ENCOUNTER — OFFICE VISIT (OUTPATIENT)
Dept: CARDIOLOGY CLINIC | Age: 64
End: 2024-04-22
Payer: MEDICAID

## 2024-04-22 VITALS
BODY MASS INDEX: 37.54 KG/M2 | WEIGHT: 204 LBS | SYSTOLIC BLOOD PRESSURE: 134 MMHG | HEART RATE: 76 BPM | DIASTOLIC BLOOD PRESSURE: 84 MMHG | HEIGHT: 62 IN

## 2024-04-22 DIAGNOSIS — I25.10 CAD (CORONARY ARTERY DISEASE): ICD-10-CM

## 2024-04-22 DIAGNOSIS — Z01.810 PRE-OPERATIVE CARDIOVASCULAR EXAMINATION: Primary | ICD-10-CM

## 2024-04-22 DIAGNOSIS — Z98.61 POST PTCA: Primary | ICD-10-CM

## 2024-04-22 DIAGNOSIS — R07.9 CHEST PAIN: ICD-10-CM

## 2024-04-22 PROCEDURE — 3079F DIAST BP 80-89 MM HG: CPT | Performed by: INTERNAL MEDICINE

## 2024-04-22 PROCEDURE — 99214 OFFICE O/P EST MOD 30 MIN: CPT | Performed by: INTERNAL MEDICINE

## 2024-04-22 PROCEDURE — 1123F ACP DISCUSS/DSCN MKR DOCD: CPT | Performed by: INTERNAL MEDICINE

## 2024-04-22 PROCEDURE — 3075F SYST BP GE 130 - 139MM HG: CPT | Performed by: INTERNAL MEDICINE

## 2024-04-22 NOTE — PROGRESS NOTES
conjunctival pallor.  SKIN: It is warm & dry. No rashes. No Echhymosis    HEENT - No clinically significant abnormalities seen.  Neck - Supple.  No jugular venous distention noted. No carotid bruits.   Cardiovascular - Normal S1 and S2 without obvious murmur or gallop.    Extremities - No cyanosis, clubbing, or significant edema.    Pulmonary - No respiratory distress.  No wheezes or rales.    Abdomen - No masses, tenderness, or organomegaly.  Musculoskeletal - No significant edema. No joint deformities. No muscle wasting.  Neurologic - Cranial nerves II through XII are grossly intact.  There were no gross focal neurologic abnormalities.    Lab Review   Lab Results   Component Value Date/Time    CKTOTAL 29 05/11/2017 03:22 PM    TROPONINT <0.010 03/30/2023 03:15 PM     BNP:    Lab Results   Component Value Date/Time    BNP 18 04/22/2014 02:30 PM     PT/INR:    Lab Results   Component Value Date    INR 0.94 03/30/2023     Lab Results   Component Value Date    LABA1C 5.4 04/01/2023    LABA1C 5.6 03/07/2020     Lab Results   Component Value Date    WBC 7.5 04/11/2023    HGB 10.3 (L) 04/11/2023    HCT 33.7 (L) 04/11/2023    MCV 92.6 04/11/2023     04/11/2023     Lab Results   Component Value Date    CHOL 169 03/31/2023    TRIG 142 03/31/2023    HDL 40 (L) 03/31/2023    LDLCALC 101 (H) 03/31/2023    LDLDIRECT 108 (H) 06/17/2021     Lab Results   Component Value Date    ALT 6 (L) 02/28/2024    AST 15 02/28/2024     BMP:    Lab Results   Component Value Date/Time     02/28/2024 12:11 PM    K 4.3 02/28/2024 12:11 PM     02/28/2024 12:11 PM    CO2 29 02/28/2024 12:11 PM    BUN 9 02/28/2024 12:11 PM    CREATININE 0.8 02/28/2024 12:11 PM     CMP:   Lab Results   Component Value Date/Time     02/28/2024 12:11 PM    K 4.3 02/28/2024 12:11 PM     02/28/2024 12:11 PM    CO2 29 02/28/2024 12:11 PM    BUN 9 02/28/2024 12:11 PM    PROT 7.0 02/28/2024 12:11 PM    PROT 7.0 11/26/2012 01:02 PM     TSH:

## 2024-04-23 ENCOUNTER — TELEPHONE (OUTPATIENT)
Dept: CARDIOLOGY CLINIC | Age: 64
End: 2024-04-23

## 2024-04-23 NOTE — TELEPHONE ENCOUNTER
[facility]     Dr. Courtney Valdez     LEFT HEART CATHETERIZATION WITH POSSIBLE PERCUTANEOUS CORONARY INTERVENTION        Patient Name: Jojo Stone   : 1960  MRN# [id number]    Date of Procedure: 24 Time: 12pm Arrival Time: 10am    The catheterization and angiogram are usually outpatient procedures, however if stenting is needed you may need to stay overnight. You will need to arrive at the hospital two hours before the procedure.  You will go to registration in the main lobby.  You will need to arrange for someone to drive you home.      HOSPITAL:  [facility] ([facility])      X   If you have received orders for blood work and or a chest x-ray, please have         them done on assigned date at [facility],           [facility], or [facility].     X Please do not have anything by mouth after midnight prior to or 8 hours before   the procedure.    X You may take your medications with a sip of water in the morning of your               procedure or take them with you to the hospital

## 2024-04-23 NOTE — TELEPHONE ENCOUNTER
Patient notified and confirmed [facility] scheduled 5/2/24 @ 12pm w/arrival @10am. Instruction call scheduled 4/29/24.

## 2024-04-29 ENCOUNTER — HOSPITAL ENCOUNTER (OUTPATIENT)
Age: 64
Discharge: HOME | End: 2024-04-29
Payer: MEDICAID

## 2024-04-29 DIAGNOSIS — Z01.810 PRE-OPERATIVE CARDIOVASCULAR EXAMINATION: ICD-10-CM

## 2024-04-29 LAB
ABO/RH: NORMAL
ANION GAP SERPL CALCULATED.3IONS-SCNC: 11 MMOL/L (ref 7–16)
ANTIBODY SCREEN: NEGATIVE
BUN SERPL-MCNC: 14 MG/DL (ref 6–23)
CALCIUM SERPL-MCNC: 8.8 MG/DL (ref 8.3–10.6)
CHLORIDE BLD-SCNC: 106 MMOL/L (ref 99–110)
CO2: 25 MMOL/L (ref 21–32)
COMMENT: NORMAL
CREAT SERPL-MCNC: 0.9 MG/DL (ref 0.6–1.1)
GFR SERPL CREATININE-BSD FRML MDRD: 71 ML/MIN/1.73M2
GLUCOSE SERPL-MCNC: 104 MG/DL (ref 70–99)
HCT VFR BLD CALC: 36.7 % (ref 37–47)
HEMOGLOBIN: 11.3 GM/DL (ref 12.5–16)
MCH RBC QN AUTO: 27.4 PG (ref 27–31)
MCHC RBC AUTO-ENTMCNC: 30.8 % (ref 32–36)
MCV RBC AUTO: 88.9 FL (ref 78–100)
PDW BLD-RTO: 15.9 % (ref 11.7–14.9)
PLATELET # BLD: 303 K/CU MM (ref 140–440)
PMV BLD AUTO: 10 FL (ref 7.5–11.1)
POTASSIUM SERPL-SCNC: 4.3 MMOL/L (ref 3.5–5.1)
RBC # BLD: 4.13 M/CU MM (ref 4.2–5.4)
SODIUM BLD-SCNC: 142 MMOL/L (ref 135–145)
WBC # BLD: 8.8 K/CU MM (ref 4–10.5)

## 2024-04-29 PROCEDURE — 80048 BASIC METABOLIC PNL TOTAL CA: CPT

## 2024-04-29 PROCEDURE — 85027 COMPLETE CBC AUTOMATED: CPT

## 2024-04-29 PROCEDURE — 36415 COLL VENOUS BLD VENIPUNCTURE: CPT

## 2024-04-29 PROCEDURE — 86900 BLOOD TYPING SEROLOGIC ABO: CPT

## 2024-04-29 PROCEDURE — 86901 BLOOD TYPING SEROLOGIC RH(D): CPT

## 2024-04-29 PROCEDURE — 86850 RBC ANTIBODY SCREEN: CPT

## 2024-05-01 NOTE — H&P
CHIEF COMPLAINT   Jojo is a 64 y.o. female who was seen today for management of coronary artery disease                                    HPI:                    Pt has h/o coronary artery disease, hypertension, hyperlipidemia, seen today for follow-up. Pt has cardiac complaints  of CP     Jojo Stone has the following history recorded in care path:       Patient Active Problem List     Diagnosis Date Noted    Angina pectoris ([facility])      Syncope and collapse      Dizziness 05/26/2017    Leg pain, bilateral 04/19/2017    Post PTCA 02/16/2017    Precordial pain 01/24/2017    S/P Nissen fundoplication (without gastrostomy tube) procedure 11/05/2015    Gastroesophageal reflux disease with hiatal hernia 02/21/2014    Palpitations      Tobacco use      COPD (chronic obstructive pulmonary disease) ([facility]) 10/03/2012    Essential hypertension 10/03/2012    Current tobacco use 11/16/2011    Depression, major, recurrent, moderate ([facility]) 04/03/2023    Other hyperlipidemia 10/14/2021    Chronic kidney disease (CKD) stage G3a/A1, moderately decreased glomerular filtration rate (GFR) between 45-59 mL/min/1.73 square meter and albuminuria creatinine ratio less than 30 mg/g ([facility]) 07/18/2019    Hypotension 07/18/2019    Closed fracture of upper end of left fibula      Ankle syndesmosis disruption, left, subsequent encounter 05/23/2019    Closed trimalleolar fracture of left ankle 05/23/2019    Closed left ankle fracture, initial encounter 05/23/2019    HTN (hypertension) 09/11/2017    ASCVD (arteriosclerotic cardiovascular disease) 09/11/2017    GERD (gastroesophageal reflux disease) 09/11/2017      Current Facility-Administered Medications          Current Outpatient Medications   Medication Sig Dispense Refill    ezetimibe (ZETIA) 10 MG tablet Take 1 tablet by mouth daily 30 tablet 5    gabapentin (NEURONTIN) 300 MG capsule Take 1 capsule by mouth in the morning and at bedtime.        pantoprazole (PROTONIX) 40 MG tablet Take 1

## 2024-05-02 ENCOUNTER — HOSPITAL ENCOUNTER (OUTPATIENT)
Age: 64
Setting detail: OUTPATIENT SURGERY
Discharge: HOME | End: 2024-05-02
Attending: INTERNAL MEDICINE | Admitting: INTERNAL MEDICINE
Payer: MEDICAID

## 2024-05-02 VITALS
SYSTOLIC BLOOD PRESSURE: 140 MMHG | DIASTOLIC BLOOD PRESSURE: 78 MMHG | OXYGEN SATURATION: 94 % | HEIGHT: 62 IN | BODY MASS INDEX: 37.54 KG/M2 | RESPIRATION RATE: 18 BRPM | HEART RATE: 90 BPM | TEMPERATURE: 96.8 F | WEIGHT: 204 LBS

## 2024-05-02 DIAGNOSIS — I25.10 CAD (CORONARY ARTERY DISEASE): ICD-10-CM

## 2024-05-02 DIAGNOSIS — R07.9 CHEST PAIN: ICD-10-CM

## 2024-05-02 LAB — ECHO BSA: 2.01 M2

## 2024-05-02 PROCEDURE — 7100000010 HC PHASE II RECOVERY - FIRST 15 MIN: Performed by: INTERNAL MEDICINE

## 2024-05-02 PROCEDURE — 2580000003 HC RX 258: Performed by: INTERNAL MEDICINE

## 2024-05-02 PROCEDURE — C1769 GUIDE WIRE: HCPCS | Performed by: INTERNAL MEDICINE

## 2024-05-02 PROCEDURE — 7100000011 HC PHASE II RECOVERY - ADDTL 15 MIN: Performed by: INTERNAL MEDICINE

## 2024-05-02 PROCEDURE — 6360000004 HC RX CONTRAST MEDICATION: Performed by: INTERNAL MEDICINE

## 2024-05-02 PROCEDURE — 6370000000 HC RX 637 (ALT 250 FOR IP): Performed by: INTERNAL MEDICINE

## 2024-05-02 PROCEDURE — 93458 L HRT ARTERY/VENTRICLE ANGIO: CPT | Performed by: INTERNAL MEDICINE

## 2024-05-02 PROCEDURE — C1894 INTRO/SHEATH, NON-LASER: HCPCS | Performed by: INTERNAL MEDICINE

## 2024-05-02 PROCEDURE — 2709999900 HC NON-CHARGEABLE SUPPLY: Performed by: INTERNAL MEDICINE

## 2024-05-02 PROCEDURE — 93454 CORONARY ARTERY ANGIO S&I: CPT | Performed by: INTERNAL MEDICINE

## 2024-05-02 PROCEDURE — 2500000003 HC RX 250 WO HCPCS: Performed by: INTERNAL MEDICINE

## 2024-05-02 PROCEDURE — 6360000002 HC RX W HCPCS: Performed by: INTERNAL MEDICINE

## 2024-05-02 RX ORDER — ACETAMINOPHEN 325 MG/1
650 TABLET ORAL EVERY 4 HOURS PRN
Status: DISCONTINUED | OUTPATIENT
Start: 2024-05-02 | End: 2024-05-02 | Stop reason: HOSPADM

## 2024-05-02 RX ORDER — MIDAZOLAM HYDROCHLORIDE 1 MG/ML
INJECTION INTRAMUSCULAR; INTRAVENOUS PRN
Status: DISCONTINUED | OUTPATIENT
Start: 2024-05-02 | End: 2024-05-02 | Stop reason: HOSPADM

## 2024-05-02 RX ORDER — SODIUM CHLORIDE 0.9 % (FLUSH) 0.9 %
5-40 SYRINGE (ML) INJECTION PRN
Status: DISCONTINUED | OUTPATIENT
Start: 2024-05-02 | End: 2024-05-02 | Stop reason: HOSPADM

## 2024-05-02 RX ORDER — SODIUM CHLORIDE 0.9 % (FLUSH) 0.9 %
5-40 SYRINGE (ML) INJECTION EVERY 12 HOURS SCHEDULED
Status: DISCONTINUED | OUTPATIENT
Start: 2024-05-02 | End: 2024-05-02 | Stop reason: HOSPADM

## 2024-05-02 RX ORDER — SODIUM CHLORIDE 9 MG/ML
INJECTION, SOLUTION INTRAVENOUS CONTINUOUS
Status: DISCONTINUED | OUTPATIENT
Start: 2024-05-02 | End: 2024-05-02 | Stop reason: HOSPADM

## 2024-05-02 RX ORDER — DIAZEPAM 5 MG/1
5 TABLET ORAL ONCE
Status: COMPLETED | OUTPATIENT
Start: 2024-05-02 | End: 2024-05-02

## 2024-05-02 RX ORDER — DIPHENHYDRAMINE HCL 25 MG
25 TABLET ORAL ONCE
Status: COMPLETED | OUTPATIENT
Start: 2024-05-02 | End: 2024-05-02

## 2024-05-02 RX ORDER — SODIUM CHLORIDE 9 MG/ML
INJECTION, SOLUTION INTRAVENOUS PRN
Status: DISCONTINUED | OUTPATIENT
Start: 2024-05-02 | End: 2024-05-02 | Stop reason: HOSPADM

## 2024-05-02 RX ADMIN — SODIUM CHLORIDE: 900 INJECTION INTRAVENOUS at 10:33

## 2024-05-02 RX ADMIN — DIPHENHYDRAMINE HYDROCHLORIDE 25 MG: 25 TABLET ORAL at 10:34

## 2024-05-02 RX ADMIN — DIAZEPAM 5 MG: 5 TABLET ORAL at 10:34

## 2024-05-02 NOTE — FLOWSHEET NOTE
Purewick system removed. Pt assisted up to side of bed and stood with walker. Pt ambulated with standby assist to bathroom. Right groin remains free of bleeding/hematoma

## 2024-05-02 NOTE — PROGRESS NOTES
Pt ambulated to br and voided returned to room r groin drsg dry and intact no hematoma.  Dc instructions reviewed with pt  pt verbalizes understanding.  Pt changed into clothes  sl dc'd drsg applied

## 2024-05-02 NOTE — PLAN OF CARE
Placed purewick catheter with patients urgency to urinate. Patient assisted with drink at bedside. Right groin remains without bleeding or hematoma at this time. Kusum Dodge RN 5/2/2024

## 2024-05-02 NOTE — DISCHARGE INSTRUCTIONS
Discharge Home Instuctions  Name:Jojo Stone  :1960    Your instructions:    Shower only for the first 5 days, NO TUB BATHS.      Wash procedure site with mild soap, rinse and pat dry.  Do not rub over the procedure site for two (2) days.  Remove dressing in 2 days.    Site observations-Observe the area for bleeding or hematoma (lump under the skin caused by bleeding.)      A.  Painless bruising is normal.  B.  If a firmness/swelling seems to be increasing or there is bright red bleeding from site       (hold pressure over procedure site) and  CALL 911 IMMEDIATELY.    What to do after you leave the hospital:    Recommended activity: no lifting, Driving, or Strenuous exercise for 3 days.  DO NOT lift more than 10lbs.    For your procedure you may have been given a sedative to help you relax. This drug will make you sleepy. It is usually given in a vein (by IV).  Don't do anything for 24 hours that requires attention to detail. It takes time for the medicine effects to completely wear off.  Do not sign any legally binding contracts or documents over the next 24 hours.  For your safety, you should not drive or operate any machinery that could be dangerous until the medicine wears off and you can think clearly and react easily.    Follow-up with physician in 7-10 days.    Take your medication as ordered.      If you have any questions, you may call [phone] or your physicians office

## 2024-05-02 NOTE — FLOWSHEET NOTE
Assisted pt back to room.Discharge instructions reviewed with patient. Voices understanding.  Ambulated without difficulty.

## 2024-05-29 ENCOUNTER — OFFICE VISIT (OUTPATIENT)
Dept: CARDIOLOGY CLINIC | Age: 64
End: 2024-05-29
Payer: MEDICAID

## 2024-05-29 VITALS
HEIGHT: 63 IN | BODY MASS INDEX: 35.79 KG/M2 | WEIGHT: 202 LBS | SYSTOLIC BLOOD PRESSURE: 126 MMHG | HEART RATE: 73 BPM | DIASTOLIC BLOOD PRESSURE: 78 MMHG | OXYGEN SATURATION: 100 %

## 2024-05-29 DIAGNOSIS — Z98.61 POST PTCA: Primary | ICD-10-CM

## 2024-05-29 DIAGNOSIS — R42 DIZZY: ICD-10-CM

## 2024-05-29 PROCEDURE — 1124F ACP DISCUSS-NO DSCNMKR DOCD: CPT | Performed by: INTERNAL MEDICINE

## 2024-05-29 PROCEDURE — 93000 ELECTROCARDIOGRAM COMPLETE: CPT | Performed by: INTERNAL MEDICINE

## 2024-05-29 PROCEDURE — 3078F DIAST BP <80 MM HG: CPT | Performed by: INTERNAL MEDICINE

## 2024-05-29 PROCEDURE — 3074F SYST BP LT 130 MM HG: CPT | Performed by: INTERNAL MEDICINE

## 2024-05-29 PROCEDURE — 99214 OFFICE O/P EST MOD 30 MIN: CPT | Performed by: INTERNAL MEDICINE

## 2024-05-29 NOTE — PROGRESS NOTES
CHIEF COMPLAINT   Jojo is a 64 y.o. female who was seen today for management of coronary artery disease  Here for [facility]                                    HPI:                    Pt has h/o coronary artery disease, hypertension, hyperlipidemia, seen today for follow-up.      Jojo Stone has the following history recorded in care path:       Patient Active Problem List     Diagnosis Date Noted    Angina pectoris ([facility])      Syncope and collapse      Dizziness 05/26/2017    Leg pain, bilateral 04/19/2017    Post PTCA 02/16/2017    Precordial pain 01/24/2017    S/P Nissen fundoplication (without gastrostomy tube) procedure 11/05/2015    Gastroesophageal reflux disease with hiatal hernia 02/21/2014    Palpitations      Tobacco use      COPD (chronic obstructive pulmonary disease) ([facility]) 10/03/2012    Essential hypertension 10/03/2012    Current tobacco use 11/16/2011    Depression, major, recurrent, moderate ([facility]) 04/03/2023    Other hyperlipidemia 10/14/2021    Chronic kidney disease (CKD) stage G3a/A1, moderately decreased glomerular filtration rate (GFR) between 45-59 mL/min/1.73 square meter and albuminuria creatinine ratio less than 30 mg/g ([facility]) 07/18/2019    Hypotension 07/18/2019    Closed fracture of upper end of left fibula      Ankle syndesmosis disruption, left, subsequent encounter 05/23/2019    Closed trimalleolar fracture of left ankle 05/23/2019    Closed left ankle fracture, initial encounter 05/23/2019    HTN (hypertension) 09/11/2017    ASCVD (arteriosclerotic cardiovascular disease) 09/11/2017    GERD (gastroesophageal reflux disease) 09/11/2017      Current Facility-Administered Medications          Current Outpatient Medications   Medication Sig Dispense Refill    ezetimibe (ZETIA) 10 MG tablet Take 1 tablet by mouth daily 30 tablet 5    gabapentin (NEURONTIN) 300 MG capsule Take 1 capsule by mouth in the morning and at bedtime.        pantoprazole (PROTONIX) 40 MG tablet Take 1 tablet by

## 2024-05-29 NOTE — PATIENT INSTRUCTIONS
**It is YOUR responsibilty to bring medication bottles and/or updated medication list to EACH APPOINTMENT. This will allow us to better serve you and all your healthcare needs**  Thank you for allowing us to care for you today!   We want to ensure we can follow your treatment plan and we strive to give you the best outcomes and experience possible.   If you ever have a life threatening emergency and call 911 - for an ambulance (EMS)   Our providers can only care for you at:   [facility] or [facility].   Even if you have someone take you or you drive yourself we can only care for you in a [facility]. Our providers are not setup at the other healthcare locations!   Please be informed that if you contact our office outside of normal business hours the physician on call cannot help with any scheduling or rescheduling issues, procedure instruction questions or any type of medication issue.    We advise you for any urgent/emergency that you go to the nearest emergency room!    PLEASE CALL OUR OFFICE DURING NORMAL BUSINESS HOURS    Monday - Friday   8 am to 5 pm    [place]: [phone]    [place]: [phone]    [place]:  [phone]  We are committed to providing you the best care possible.    If you receive a survey after visiting one of our offices, please take time to share your experience concerning your physician office visit.  These surveys are confidential and no health information about you is shared.    We are eager to improve for you and we are counting on your feedback to help make that happen.

## 2024-05-30 ENCOUNTER — TELEPHONE (OUTPATIENT)
Dept: CARDIOLOGY CLINIC | Age: 64
End: 2024-05-30

## 2024-06-05 ENCOUNTER — HOSPITAL ENCOUNTER (OUTPATIENT)
Dept: CT IMAGING | Age: 64
Discharge: HOME | End: 2024-06-05
Attending: INTERNAL MEDICINE
Payer: MEDICAID

## 2024-06-05 ENCOUNTER — HOSPITAL ENCOUNTER (OUTPATIENT)
Age: 64
Discharge: HOME | End: 2024-06-05
Attending: INTERNAL MEDICINE
Payer: MEDICAID

## 2024-06-05 DIAGNOSIS — N28.89 RENAL MASS: ICD-10-CM

## 2024-06-05 DIAGNOSIS — D35.00 ADRENAL ADENOMA, UNSPECIFIED LATERALITY: ICD-10-CM

## 2024-06-05 DIAGNOSIS — I10 HYPERTENSION, UNSPECIFIED TYPE: ICD-10-CM

## 2024-06-05 PROCEDURE — 74170 CT ABD WO CNTRST FLWD CNTRST: CPT

## 2024-06-05 PROCEDURE — 36415 COLL VENOUS BLD VENIPUNCTURE: CPT

## 2024-06-05 PROCEDURE — 83835 ASSAY OF METANEPHRINES: CPT

## 2024-06-05 PROCEDURE — 2580000003 HC RX 258: Performed by: INTERNAL MEDICINE

## 2024-06-05 PROCEDURE — 6360000004 HC RX CONTRAST MEDICATION: Performed by: INTERNAL MEDICINE

## 2024-06-05 PROCEDURE — 82088 ASSAY OF ALDOSTERONE: CPT

## 2024-06-05 PROCEDURE — 84244 ASSAY OF RENIN: CPT

## 2024-06-05 RX ORDER — SODIUM CHLORIDE 9 MG/ML
10 INJECTION, SOLUTION INTRAMUSCULAR; INTRAVENOUS; SUBCUTANEOUS PRN
Status: DISCONTINUED | OUTPATIENT
Start: 2024-06-05 | End: 2024-06-06 | Stop reason: HOSPADM

## 2024-06-05 RX ADMIN — SODIUM CHLORIDE, PRESERVATIVE FREE 10 ML: 5 INJECTION INTRAVENOUS at 10:01

## 2024-06-05 RX ADMIN — IOPAMIDOL 75 ML: 755 INJECTION, SOLUTION INTRAVENOUS at 10:05

## 2024-06-07 LAB
ALDOST SERPL-MCNC: 10.8 NG/DL
RENIN PLAS-CCNC: 2 NG/ML/HR

## 2024-06-08 LAB
ANNOTATION COMMENT IMP: NORMAL
METANEPHS SERPL-SCNC: <0.1 NMOL/L (ref 0–0.49)
NORMETANEPHRINE SERPL-SCNC: 0.61 NMOL/L (ref 0–0.89)

## 2024-06-20 ENCOUNTER — TELEPHONE (OUTPATIENT)
Dept: CARDIOLOGY CLINIC | Age: 64
End: 2024-06-20

## 2024-06-20 DIAGNOSIS — Z72.0 CURRENT TOBACCO USE: ICD-10-CM

## 2024-06-20 DIAGNOSIS — R07.9 CHEST PAIN, UNSPECIFIED TYPE: ICD-10-CM

## 2024-06-20 DIAGNOSIS — K44.9 GASTROESOPHAGEAL REFLUX DISEASE WITH HIATAL HERNIA: Chronic | ICD-10-CM

## 2024-06-20 DIAGNOSIS — I10 ESSENTIAL HYPERTENSION: ICD-10-CM

## 2024-06-20 DIAGNOSIS — I25.10 CORONARY ARTERY DISEASE INVOLVING NATIVE CORONARY ARTERY OF NATIVE HEART WITHOUT ANGINA PECTORIS: ICD-10-CM

## 2024-06-20 DIAGNOSIS — K21.9 GASTROESOPHAGEAL REFLUX DISEASE WITH HIATAL HERNIA: Chronic | ICD-10-CM

## 2024-06-20 NOTE — TELEPHONE ENCOUNTER
Interpretation Summary         Mild (<50%) stenosis in the right internal carotid artery.  Homogeneous plaque in the right internal carotid artery.    Mild (<50%) stenosis in the left internal carotid artery.  Homogeneous plaque in the left internal carotid artery.    Normal antegrade flow involving the right vertebral artery.    Normal antegrade flow involving the left vertebral artery.    Results given to the patient. Patient advised and voices understanding.

## 2024-06-21 RX ORDER — NITROGLYCERIN 0.4 MG/1
TABLET SUBLINGUAL
Qty: 25 TABLET | Refills: 10 | Status: SHIPPED | OUTPATIENT
Start: 2024-06-21

## 2024-06-21 RX ORDER — RANOLAZINE 1000 MG/1
1000 TABLET, EXTENDED RELEASE ORAL 2 TIMES DAILY
Qty: 60 TABLET | Refills: 10 | Status: SHIPPED | OUTPATIENT
Start: 2024-06-21

## 2024-08-14 ENCOUNTER — TELEPHONE (OUTPATIENT)
Dept: CARDIOLOGY CLINIC | Age: 64
End: 2024-08-14

## 2024-08-14 NOTE — TELEPHONE ENCOUNTER
Cardiologist: Dr. Valdez  Surgeon: Dr. Vásquez  Surgery: Right knee Med Meniscectomy  Surgery/Procedure should be done in the hospital setting    _____ yes    _____  no    Anesthesia: General  Date: TBD  Fax# [phone]  # [phone]    Last OV 5/29/2024 w/Courtney      CORONARY ARTERY DISEASE:  symptomatic     All available  tests in chart reviewed. Management discussed .  Testing ordered  no                 Plavix and Ranexa we will continue to monitor  [facility] post PCI  CP                -  Hypertension: Patients blood pressure is normal. Patient is advised about low sodium diet. Present medical regimen will not be changed.       Blood pressure is well controlled patient is on metoprolol    LIPID MANAGEMENT:  Importance of lipid levels discussed with patient   and patient was given dietary advice. NCEP- ATP III guidelines reviewed with patient.    -   Changes  in medicines made: No                 On Lipitor 40 mg p.o. daily we will check the LDL               Last EKG- 5/29/2024      NM- -3/30/2023  Normal tracer uptake in all segments of myocardium on stress ans rest   images. Breast attenuation otherwise normal perfusion in distribution of all   coronaries.   No infarct or ischemia noted.   EF 43 % - correlate with ECHO ( 55%)       Echo- 3/30/2023   Left ventricular systolic function is normal.   Ejection fraction is visually estimated at 55%.   Mild to moderate mitral regurgitation.   No evidence of any pericardial effusion.       Cath- 5/2/2024  The left main is a large tubular vessel has no significant stenosis  The left descending artery has no significant stenosis  Circumflex artery has a stent which is widely patent  Right coronary artery is a dominant vessel has no significant stenosis        Plavix    Aspirin

## 2024-09-20 RX ORDER — EZETIMIBE 10 MG/1
10 TABLET ORAL DAILY
Qty: 30 TABLET | Refills: 10 | Status: SHIPPED | OUTPATIENT
Start: 2024-09-20

## 2024-10-11 ENCOUNTER — HOSPITAL ENCOUNTER (OUTPATIENT)
Dept: PHYSICAL THERAPY | Age: 64
Setting detail: THERAPIES SERIES
Discharge: HOME | End: 2024-10-11
Payer: MEDICAID

## 2024-10-11 PROCEDURE — 97016 VASOPNEUMATIC DEVICE THERAPY: CPT

## 2024-10-11 PROCEDURE — 97161 PT EVAL LOW COMPLEX 20 MIN: CPT

## 2024-10-11 PROCEDURE — 97110 THERAPEUTIC EXERCISES: CPT

## 2024-10-11 NOTE — PROGRESS NOTES
Activity Limitations Requiring Skilled Therapeutic Intervention: Decreased functional mobility , Decreased ADL status, Decreased ROM, Decreased strength, Decreased high-level IADLs, Decreased balance, Increased pain, Decreased endurance    Statement of Medical Necessity: Physical Therapy is both indicated and medically necessary as outlined in the POC to increase the likelihood of meeting the functionally related goals stated below.   Patient agrees with established plan of care and assisted in the development of their short term and long term goals. Patient had no adverse reaction with initial treatment and there are no barriers to learning.  Learning preferences include demonstration, practice, and handouts.  Patient expressed understanding of HEP and appears to be motivated to participate in an active PT program including compliance with HEP expectations.      Patient's Activity Tolerance: Patient tolerated evaluation without incident      Patient's rehabilitation potential/prognosis is considered to be: Good    Factors which may impact rehabilitation potential include: None        GOALS   Patient Goal(s): reduce pain  Short Term Goals Completed by Refer to LTG Goal Status                                                                   Long Term Goals Completed by 10 visits Goal Status   Pt will improve RLE gross strength to 3+/5 to aide in ADLs New   Pt will improve R knee AROM ext to 0 deg to aide in TKE during gait and reduced falls New   Pt will improve R knee AROM flex to 100 deg to aide in curb navigation New   Pt will improve KOOS to 18 or less to show subjective report in condition New   Pt will improve 5x STS to 50 seconds or less to show improved transfer independence New                                      TREATMENT PLAN       Requires PT Follow-Up: Yes  Treatment Initiated : yes on 10/11  Specific Instructions for Next Treatment: nustep, ROM, strength, gait    Pt. actively involved in establishing

## 2024-10-11 NOTE — PLAN OF CARE
Outpatient Physical Therapy           [place]           [x] Phone: [phone]   Fax: [phone]  [place]           [] Phone: [phone]   Fax: [phone]     To:  Arthur Vásquez MD   From: Patricia García, PT, DPT     Patient: Jojo Stone       : 1960  Diagnosis:  Diagnosis: Tear of medial meniscus left knee  Treatment Diagnosis: R knee pain, RLE weakness, impaired gait  Date: 10/11/2024    Physical Therapy Certification/Re-Certification Form  Dear Dr. Vásquez,  The following patient has been evaluated for physical therapy services and for therapy to continue, insurance requires physician review of the treatment plan initially and every 90 days. Please review the attached evaluation and/or summary of the patient's plan of care, and verify that you agree therapy should continue by signing the attached document and sending it back to our office.    Assessment:    Assessment: Pt is a 64-year-old female who presents to therapy s/p R knee arthroscopy w/ partial lateral meniscectomy with chondroplasty performed on 24. Upon assessment, pt does present with impaired RLE strength, impaired gait, impaired balance, impaired R knee ROM, R knee pain and overall reduced independence with ADLs and IADLs after surgery. Pt would benefit from skilled therapy interventions to address listed impairments, progress toward goal completion, and improve ADL/IADL status. PT also warranted to reduce risk for further injury or decline.  Patient agrees with established plan of care and assisted in the development of their short term and long term goals. Patient had no adverse reaction with initial treatment and there are no barriers to learning.  Learning preferences include demonstration, practice, and handouts.  Patient expressed understanding of HEP and appears to be motivated to participate in an active PT program including compliance with HEP expectations.      Plan of Care/Treatment to date:  [x]

## 2024-10-11 NOTE — FLOWSHEET NOTE
Outpatient Physical Therapy  [place]           [x] Phone: [phone]   Fax: [phone]  [place]           [] Phone: [phone]   Fax: [phone]        Physical Therapy Daily Treatment Note  Date:  10/11/2024    Patient Name:  Jojo Stone    :  1960  MRN: [id number]  Restrictions/Precautions: n/a  Diagnosis:    Diagnosis: Tear of medial meniscus left knee  Date of Injury/Surgery: 24  Treatment Diagnosis:  R knee pain, RLE weakness, impaired gait  Insurance/Certification information: Medicare - [employer]  Referring Physician:   Arthur Vásquez MD   Next Doctor Visit:    Plan of care signed (Y/N):  sent 10/11  Outcome Measure: KOOS:   Visit# / total visits:   1/10  Pain level: 9/10   Goals:     Patient goals: reduce pain  Short term goals  Time Frame for Short term goals: Refer to [facility]                 Long Term Goals  Time Frame for Long Term Goals: 10 visits  Pt will improve RLE gross strength to 3+/5 to aide in ADLs  Pt will improve R knee AROM ext to 0 deg to aide in TKE during gait and reduced falls  Pt will improve R knee AROM flex to 100 deg to aide in curb navigation  Pt will improve KOOS to 18 or less to show subjective report in condition  Pt will improve 5x STS to 50 seconds or less to show improved transfer independence      Summary of Evaluation:  Assessment: Pt is a 64-year-old female who presents to therapy s/p R knee arthroscopy w/ partial lateral meniscectomy with chondroplasty performed on 24. Upon assessment, pt does present with impaired RLE strength, impaired gait, impaired balance, impaired R knee ROM, R knee pain and overall reduced independence with ADLs and IADLs after surgery. Pt would benefit from skilled therapy interventions to address listed impairments, progress toward goal completion, and improve ADL/IADL status. PT also warranted to reduce risk for further injury or decline.        Subjective:  See eval         Any changes in Ambulatory Summary

## 2024-10-16 ENCOUNTER — HOSPITAL ENCOUNTER (OUTPATIENT)
Dept: PHYSICAL THERAPY | Age: 64
Setting detail: THERAPIES SERIES
Discharge: HOME | End: 2024-10-16
Payer: MEDICAID

## 2024-10-16 PROCEDURE — 97110 THERAPEUTIC EXERCISES: CPT

## 2024-10-16 PROCEDURE — 97016 VASOPNEUMATIC DEVICE THERAPY: CPT

## 2024-10-16 NOTE — FLOWSHEET NOTE
Outpatient Physical Therapy  [place]           [x] Phone: [phone]   Fax: [phone]  [place]           [] Phone: [phone]   Fax: [phone]        Physical Therapy Daily Treatment Note  Date:  10/16/2024    Patient Name:  Jojo Stone    :  1960  MRN: [id number]  Restrictions/Precautions: n/a  Diagnosis:       Date of Injury/Surgery: 24  Treatment Diagnosis:     Insurance/Certification information:    Referring Physician:       Next Doctor Visit:    Plan of care signed (Y/N):  sent 10/11  Outcome Measure: KOOS:   Visit# / total visits:   1/10  Pain level: \"Painful\"/10   Goals:     Patient goals: reduce pain  Short term goals  Time Frame for Short term goals: Refer to [facility]  Long Term Goals  Time Frame for Long Term Goals: 10 visits  Pt will improve RLE gross strength to 3+/5 to aide in ADLs  Pt will improve R knee AROM ext to 0 deg to aide in TKE during gait and reduced falls  Pt will improve R knee AROM flex to 100 deg to aide in curb navigation  Pt will improve KOOS to 18 or less to show subjective report in condition  Pt will improve 5x STS to 50 seconds or less to show improved transfer independence        Summary of Evaluation:  Assessment: Pt is a 64-year-old female who presents to therapy s/p R knee arthroscopy w/ partial lateral meniscectomy with chondroplasty performed on 24. Upon assessment, pt does present with impaired RLE strength, impaired gait, impaired balance, impaired R knee ROM, R knee pain and overall reduced independence with ADLs and IADLs after surgery. Pt would benefit from skilled therapy interventions to address listed impairments, progress toward goal completion, and improve ADL/IADL status. PT also warranted to reduce risk for further injury or decline.        Subjective:  Patient arrives with moderate pain and TTP in Rt knee. She has hx of stroke and struggles with articulating her thoughts and is moderately [place]. She is tangential and does

## 2024-11-04 ENCOUNTER — HOSPITAL ENCOUNTER (OUTPATIENT)
Dept: PHYSICAL THERAPY | Age: 64
Setting detail: THERAPIES SERIES
Discharge: HOME | End: 2024-11-04
Payer: MEDICAID

## 2024-11-04 PROCEDURE — 97530 THERAPEUTIC ACTIVITIES: CPT

## 2024-11-04 PROCEDURE — 97016 VASOPNEUMATIC DEVICE THERAPY: CPT

## 2024-11-04 PROCEDURE — 97110 THERAPEUTIC EXERCISES: CPT

## 2024-11-04 NOTE — FLOWSHEET NOTE
active at home. Unable to provide pain rating.          Any changes in Ambulatory Summary Sheet?  None        Objective:     Arrived today amb with RW  TTP anterior knee, pain with knee flexion   Hx of stroke and struggles with articulating her thoughts and is moderately [place]    Exercises: (No more than 4 columns)   Exercise/Equipment 10/11/24 #1 10/16/24 #2 Date 11/4/24 #3           WARM UP        Nu step   NuStep Lvl 1, 5' L2 x5'         TABLE      Quad set  x10 5\" 10x   Heel slide x5 X20 AAROM 2x10 with board and strap and assist    Heel prop X30\" 2' x2'   SAQ x5 2x10 2x10   SLR  X10 AAROM 1x10 with assist   march X5 seated  Seated 10x    LAQ      1x10   STANDING                                                     PROPRIOCEPTION                                    MODALITIES      vaso  Vaso  10'             Other Therapeutic Activities/Education:  HEP     Home Exercise Program:  Issued, practiced and pt demo ability to perform 10/11/2024    Manual Treatments:  none    Modalities:  Patient received vasocompression on their R knee for pain and inflammation for 10 min on low pressure. Patient had negative skin reaction afterwards.   See subjective and assessment for pre and post treatment patient reported pain levels.     Communication with other providers:  POC sent    Assessment:  Pt demonstrates fair tolerance to today's session. Remains guarded and apprehensive with all activities. Cueing provided for correct performance and follow thru with exercises. Encouraged compliance with her HEP.  Will continue with therapy progressing as tolerated.    End pain: Reports of less pain following vaso. Does not provide pain rating.       Plan for Next Session:   Specific Instructions for Next Treatment: nustep, ROM, strength, gait    Time In / Time Out: 1433/1523    Timed Code/Total Treatment Minutes:  40'/50'  2 TE,  1 TA, 1 Vaso      Next Progress Note due:  10th visit      Plan of Care Interventions:  [x] Therapeutic

## 2024-11-08 ENCOUNTER — HOSPITAL ENCOUNTER (OUTPATIENT)
Dept: PHYSICAL THERAPY | Age: 64
Setting detail: THERAPIES SERIES
Discharge: HOME | End: 2024-11-08
Payer: MEDICAID

## 2024-11-08 PROCEDURE — 97016 VASOPNEUMATIC DEVICE THERAPY: CPT

## 2024-11-08 PROCEDURE — 97140 MANUAL THERAPY 1/> REGIONS: CPT

## 2024-11-08 PROCEDURE — 97110 THERAPEUTIC EXERCISES: CPT

## 2024-11-08 NOTE — FLOWSHEET NOTE
Outpatient Physical Therapy  [place]           [x] Phone: [phone]   Fax: [phone]  [place]           [] Phone: [phone]   Fax: [phone]        Physical Therapy Daily Treatment Note  Date:  2024    Patient Name:  Jojo Stone    :  1960  MRN: [id number]  Restrictions/Precautions: n/a  Diagnosis:    Diagnosis: Tear of medial meniscus left knee  Date of Injury/Surgery: 24  Treatment Diagnosis:  R knee pain, RLE weakness, impaired gait  Insurance/Certification information: Medicare - [employer]  Referring Physician:   Arthur Vásquez MD   Next Doctor Visit:    Plan of care signed (Y/N):  Y  Outcome Measure: KOOS:   Visit# / total visits:   410  Pain level:      \"painful\"/10       Goals:     Patient goals: reduce pain  Short term goals  Time Frame for Short term goals: Refer to [facility]  Long Term Goals  Time Frame for Long Term Goals: 10 visits  Pt will improve RLE gross strength to 3+/5 to aide in ADLs  Pt will improve R knee AROM ext to 0 deg to aide in TKE during gait and reduced falls  Pt will improve R knee AROM flex to 100 deg to aide in curb navigation  Pt will improve KOOS to 18 or less to show subjective report in condition  Pt will improve 5x STS to 50 seconds or less to show improved transfer independence        Summary of Evaluation:  Assessment: Pt is a 64-year-old female who presents to therapy s/p R knee arthroscopy w/ partial lateral meniscectomy with chondroplasty performed on 24. Upon assessment, pt does present with impaired RLE strength, impaired gait, impaired balance, impaired R knee ROM, R knee pain and overall reduced independence with ADLs and IADLs after surgery. Pt would benefit from skilled therapy interventions to address listed impairments, progress toward goal completion, and improve ADL/IADL status. PT also warranted to reduce risk for further injury or decline.         Subjective:  Pt stated that her R knee pain was \"1000\". Pt stated that she

## 2024-11-13 ENCOUNTER — HOSPITAL ENCOUNTER (OUTPATIENT)
Dept: PHYSICAL THERAPY | Age: 64
Setting detail: THERAPIES SERIES
Discharge: HOME | End: 2024-11-13
Payer: MEDICAID

## 2024-11-13 PROCEDURE — 97016 VASOPNEUMATIC DEVICE THERAPY: CPT

## 2024-11-13 PROCEDURE — 97140 MANUAL THERAPY 1/> REGIONS: CPT

## 2024-11-13 PROCEDURE — 97110 THERAPEUTIC EXERCISES: CPT

## 2024-11-13 NOTE — FLOWSHEET NOTE
rate  on 0-10 scale)       Any changes in Ambulatory Summary Sheet?  None        Objective:   ambulated with FWW into clinic.  Needed assist with R LE throughout treatment.   Hypersensitive to touch so PTA worked on some densensitization again   Decreased tolerance in supine to knee flexion initially, but able to get more knee flexion seated   85° knee flexion after manual/ desensitization  Poor quad set.  Trial of KT tape for poor patella tracking  Hx of stroke and struggles with articulating her thoughts and is moderately [place]    Exercises: (No more than 4 columns)   Exercise/Equipment 10/11/24 #1 10/16/24 #2 Date 11/4/24 #3 11/8/2024 #4 11/13/2024 #5             WARM UP          Nu step   NuStep Lvl 1, 5' L2 x5' L-3 x 5'  L-3 x 5'            TABLE        Quad set  x10 5\" 10x 10 x 5\"  10 x 2 5\" w/ max cues    Heel slide x5 X20 AAROM 2x10 with board and strap and assist  Seated knee flexion 1st  Supine heel slides with AAROM  10x 2  Seated knee flexion 1st  Supine heel slides with AAROM  10x 2    Heel prop X30\" 2' x2' 2'     SAQ x5 2x10 2x10 10x2 3\"  10 x 2 3\"    SLR  X10 AAROM 1x10 with assist 10x AAROM    march X5 seated  Seated 10x  10x2    LAQ      1x10 15x  10 x 2 3\" w/  MA at end range   STANDING                                                                     PROPRIOCEPTION                                                MODALITIES        vaso  Vaso  10' 10'  10'                Other Therapeutic Activities/Education: Instructed to remove tape if it was bothering her.    Home Exercise Program:  Issued, practiced and pt demo ability to perform 10/11/2024    Manual Treatments:PROM and desensitization and KT taping  x 30'    Modalities:  Patient received vasocompression on their R knee for pain and inflammation for 10 min on low pressure. Patient had negative skin reaction afterwards.   See subjective and assessment for pre and post treatment patient reported pain levels.     Communication with other providers:

## 2024-11-15 ENCOUNTER — HOSPITAL ENCOUNTER (OUTPATIENT)
Dept: PHYSICAL THERAPY | Age: 64
Setting detail: THERAPIES SERIES
Discharge: HOME | End: 2024-11-15
Payer: MEDICAID

## 2024-11-15 PROCEDURE — 97016 VASOPNEUMATIC DEVICE THERAPY: CPT

## 2024-11-15 PROCEDURE — 97110 THERAPEUTIC EXERCISES: CPT

## 2024-11-15 NOTE — FLOWSHEET NOTE
Outpatient Physical Therapy  [place]           [x] Phone: [phone]   Fax: [phone]  [place]           [] Phone: [phone]   Fax: [phone]        Physical Therapy Daily Treatment Note  Date:  11/15/2024    Patient Name:  Jojo Stone    :  1960  MRN: [id number]  Restrictions/Precautions: n/a  Diagnosis: Tear of medial meniscus left knee  Date of Injury/Surgery: 24  Treatment Diagnosis:  R knee pain, RLE weakness, impaired gait  Insurance/Certification information: Medicare - [employer]OMN  Referring Physician:   Arthur Vásquez MD   Next Doctor Visit:    Plan of care signed (Y/N):  Y  Outcome Measure: KOOS:   Visit# / total visits:   6/10  Pain level:    8/10       Goals:     Patient goals: reduce pain   Short term goals  Time Frame for Short term goals: Refer to [facility]  Long Term Goals  Time Frame for Long Term Goals: 10 visits  Pt will improve RLE gross strength to 3+/5 to aide in ADLs  Pt will improve R knee AROM ext to 0 deg to aide in TKE during gait and reduced falls  Pt will improve R knee AROM flex to 100 deg to aide in curb navigation  Pt will improve KOOS to 18 or less to show subjective report in condition  Pt will improve 5x STS to 50 seconds or less to show improved transfer independence        Summary of Evaluation:  Assessment: Pt is a 64-year-old female who presents to therapy s/p R knee arthroscopy w/ partial lateral meniscectomy with chondroplasty performed on 24. Upon assessment, pt does present with impaired RLE strength, impaired gait, impaired balance, impaired R knee ROM, R knee pain and overall reduced independence with ADLs and IADLs after surgery. Pt would benefit from skilled therapy interventions to address listed impairments, progress toward goal completion, and improve ADL/IADL status. PT also warranted to reduce risk for further injury or decline.         Subjective: Pt notes that she is having some discomfort this date and some trouble moving it

## 2024-11-18 ENCOUNTER — HOSPITAL ENCOUNTER (OUTPATIENT)
Dept: PHYSICAL THERAPY | Age: 64
Setting detail: THERAPIES SERIES
Discharge: HOME | End: 2024-11-18
Payer: MEDICAID

## 2024-11-18 PROCEDURE — 97110 THERAPEUTIC EXERCISES: CPT

## 2024-11-18 PROCEDURE — 97016 VASOPNEUMATIC DEVICE THERAPY: CPT

## 2024-11-18 NOTE — FLOWSHEET NOTE
Outpatient Physical Therapy  [place]           [x] Phone: [phone]   Fax: [phone]  [place]           [] Phone: [phone]   Fax: [phone]        Physical Therapy Daily Treatment Note  Date:  2024    Patient Name:  Jojo Stone    :  1960  MRN: [id number]  Restrictions/Precautions: n/a  Diagnosis: Tear of medial meniscus left knee  Date of Injury/Surgery: 24  Treatment Diagnosis:  R knee pain, RLE weakness, impaired gait  Insurance/Certification information: Medicare - [employer]OMN  Referring Physician:   Arthur Vásquez MD   Next Doctor Visit:    Plan of care signed (Y/N):  Y  Outcome Measure: KOOS:   Visit# / total visits:   7/10  Pain level:    8/10       Goals:     Patient goals: reduce pain: Progressing   Short term goals  Time Frame for Short term goals: Refer to [facility]  Long Term Goals  Time Frame for Long Term Goals: 10 visits  Pt will improve RLE gross strength to 3+/5 to aide in ADLs: Progressing   Pt will improve R knee AROM ext to 0 deg to aide in TKE during gait and reduced falls: Progressing   Pt will improve R knee AROM flex to 100 deg to aide in curb navigation: Progressing   Pt will improve KOOS to 18 or less to show subjective report in condition: Not MET   Pt will improve 5x STS to 50 seconds or less to show improved transfer independence: Progressing          Summary of Evaluation:  Assessment: Pt is a 64-year-old female who presents to therapy s/p R knee arthroscopy w/ partial lateral meniscectomy with chondroplasty performed on 24. Upon assessment, pt does present with impaired RLE strength, impaired gait, impaired balance, impaired R knee ROM, R knee pain and overall reduced independence with ADLs and IADLs after surgery. Pt would benefit from skilled therapy interventions to address listed impairments, progress toward goal completion, and improve ADL/IADL status. PT also warranted to reduce risk for further injury or

## 2024-11-18 NOTE — PROGRESS NOTES
progress toward goal completion while reducing risk for re-injury or further decline.       Goal Status:  [] Achieved [x] Partially Achieved  [] Not Achieved   Patient goals: reduce pain: Progressing 11/18  Short term goals  Time Frame for Short term goals: Refer to [facility]  Long Term Goals  Time Frame for Long Term Goals: 10 visits  Pt will improve RLE gross strength to 3+/5 to aide in ADLs: Progressing 11/18  Pt will improve R knee AROM ext to 0 deg to aide in TKE during gait and reduced falls: Progressing 11/18  Pt will improve R knee AROM flex to 100 deg to aide in curb navigation: Progressing 11/18  Pt will improve KOOS to 18 or less to show subjective report in condition: Not MET 11/18  Pt will improve 5x STS to 50 seconds or less to show improved transfer independence: Progressing 11/18         Frequency/Duration:  # Days per week: [] 1 day # Weeks: [] 1 week [] 4 weeks [] 8 weeks     [x] 2 days   [] 2 weeks [] 5 weeks [] 10 weeks     [] 3 days   [] 3 weeks [x] 6 weeks [] 12 weeks       Rehab Potential: [] Excellent [x] Good [] Fair  [] Poor         Patient Status: [] Continue per initial plan of Care     [] Patient now discharged     [x] Additional visits requested, Please re-certify for additional visits:      Requested frequency/duration:  2 /week for 6 weeks    If we are requesting more visits, we fully anticipate the patient's condition is expected to improve within the treatment timeframe we are requesting.    Electronically signed by:  Patricia García PT, DPT, 11/18/2024, 10:22 AM    If you have any questions or concerns, please don't hesitate to call.  Thank you for your referral.    Physician Signature:______________________ Date:______ Time: ________  By signing above, therapist’s plan is approved by physician

## 2024-11-19 RX ORDER — CLOPIDOGREL BISULFATE 75 MG/1
75 TABLET ORAL DAILY
Qty: 30 TABLET | Refills: 10 | Status: SHIPPED | OUTPATIENT
Start: 2024-11-19

## 2024-12-02 ENCOUNTER — HOSPITAL ENCOUNTER (OUTPATIENT)
Dept: PHYSICAL THERAPY | Age: 64
Setting detail: THERAPIES SERIES
Discharge: HOME | End: 2024-12-02
Payer: MEDICAID

## 2024-12-02 PROCEDURE — 97110 THERAPEUTIC EXERCISES: CPT

## 2024-12-02 PROCEDURE — 97016 VASOPNEUMATIC DEVICE THERAPY: CPT

## 2024-12-02 NOTE — FLOWSHEET NOTE
Outpatient Physical Therapy  [place]           [x] Phone: [phone]   Fax: [phone]  [place]           [] Phone: [phone]   Fax: [phone]        Physical Therapy Daily Treatment Note  Date:  2024    Patient Name:  Jojo Stone    :  1960  MRN: [id number]  Restrictions/Precautions: n/a  Diagnosis: Tear of medial meniscus left knee  Date of Injury/Surgery: 24  Treatment Diagnosis:  R knee pain, RLE weakness, impaired gait  Insurance/Certification information: Medicare - [employer]OMN  Referring Physician:   Arthur Vásquez MD   Next Doctor Visit:    Plan of care signed (Y/N):  Y  Outcome Measure: KOOS:   Visit# / total visits:   8/10  Pain level:    9/10       Goals:     Patient goals: reduce pain: Progressing   Short term goals  Time Frame for Short term goals: Refer to [facility]  Long Term Goals  Time Frame for Long Term Goals: 10 visits  Pt will improve RLE gross strength to 3+/5 to aide in ADLs: Progressing   Pt will improve R knee AROM ext to 0 deg to aide in TKE during gait and reduced falls: Progressing   Pt will improve R knee AROM flex to 100 deg to aide in curb navigation: Progressing   Pt will improve KOOS to 18 or less to show subjective report in condition: Not MET   Pt will improve 5x STS to 50 seconds or less to show improved transfer independence: Progressing          Summary of Evaluation:  Assessment: Pt is a 64-year-old female who presents to therapy s/p R knee arthroscopy w/ partial lateral meniscectomy with chondroplasty performed on 24. Upon assessment, pt does present with impaired RLE strength, impaired gait, impaired balance, impaired R knee ROM, R knee pain and overall reduced independence with ADLs and IADLs after surgery. Pt would benefit from skilled therapy interventions to address listed impairments, progress toward goal completion, and improve ADL/IADL status. PT also warranted to reduce risk for further injury or

## 2024-12-04 ENCOUNTER — OFFICE VISIT (OUTPATIENT)
Dept: CARDIOLOGY CLINIC | Age: 64
End: 2024-12-04
Payer: MEDICAID

## 2024-12-04 VITALS
HEIGHT: 62 IN | SYSTOLIC BLOOD PRESSURE: 120 MMHG | DIASTOLIC BLOOD PRESSURE: 74 MMHG | BODY MASS INDEX: 36.99 KG/M2 | WEIGHT: 201 LBS | OXYGEN SATURATION: 97 % | HEART RATE: 80 BPM

## 2024-12-04 DIAGNOSIS — I25.10 ASCVD (ARTERIOSCLEROTIC CARDIOVASCULAR DISEASE): Primary | ICD-10-CM

## 2024-12-04 DIAGNOSIS — I10 ESSENTIAL HYPERTENSION: ICD-10-CM

## 2024-12-04 DIAGNOSIS — E78.49 OTHER HYPERLIPIDEMIA: ICD-10-CM

## 2024-12-04 PROCEDURE — 3078F DIAST BP <80 MM HG: CPT | Performed by: NURSE PRACTITIONER

## 2024-12-04 PROCEDURE — 1124F ACP DISCUSS-NO DSCNMKR DOCD: CPT | Performed by: NURSE PRACTITIONER

## 2024-12-04 PROCEDURE — 99214 OFFICE O/P EST MOD 30 MIN: CPT | Performed by: NURSE PRACTITIONER

## 2024-12-04 PROCEDURE — 3074F SYST BP LT 130 MM HG: CPT | Performed by: NURSE PRACTITIONER

## 2024-12-04 ASSESSMENT — ENCOUNTER SYMPTOMS
SHORTNESS OF BREATH: 0
ORTHOPNEA: 0

## 2024-12-04 NOTE — PROGRESS NOTES
12/4/2024  Primary cardiologist: Dr. Valdez    CC:   Jojo  is an established 64 y.o.  female here for a follow up on cad      SUBJECTIVE/OBJECTIVE:  Jojo is a 64 y.o. female with a history of coronary artery disease s/p PCI to Cx, hypertension, hyperlipidemia, TIA, memory impairment/ dementia and COPD     HPI:  Jojo is here today by herself. She reports her memory is getting worse.  She c/o cp to the left breast- last for about 15 minutes or so. Occurs with and without activity. Goes away with message.     Review of Systems   Constitutional: Negative for diaphoresis and malaise/fatigue.   HENT:          Cochlear implant   Cardiovascular:  Positive for chest pain. Negative for claudication, dyspnea on exertion, irregular heartbeat, leg swelling, near-syncope, orthopnea, palpitations and paroxysmal nocturnal dyspnea.   Respiratory:  Negative for shortness of breath.    Musculoskeletal:  Positive for arthritis and joint pain (right knee).   Neurological:  Positive for headaches. Negative for dizziness and light-headedness.   Psychiatric/Behavioral:  Positive for memory loss.        Vitals:    12/04/24 1329   BP: 120/74   Site: Left Upper Arm   Position: Sitting   Cuff Size: Medium Adult   Pulse: 80   SpO2: 97%   Weight: 91.2 kg (201 lb)   Height: 1.575 m (5' 2\")     Wt Readings from Last 3 Encounters:   12/04/24 91.2 kg (201 lb)   09/06/24 90.5 kg (199 lb 9.6 oz)   06/14/24 92.1 kg (203 lb)      Body mass index is 36.76 kg/m².     Physical Exam  Vitals reviewed.   Constitutional:       Appearance: She is obese.   Eyes:      Pupils: Pupils are equal, round, and reactive to light.   Neck:      Vascular: No carotid bruit.   Cardiovascular:      Rate and Rhythm: Normal rate and regular rhythm.      Pulses: Normal pulses.   Pulmonary:      Effort: Pulmonary effort is normal.      Breath sounds: Normal breath sounds. No rales.   Chest:      Chest wall: Tenderness present.   Musculoskeletal:         General: Tenderness

## 2024-12-06 ENCOUNTER — HOSPITAL ENCOUNTER (OUTPATIENT)
Dept: PHYSICAL THERAPY | Age: 64
Setting detail: THERAPIES SERIES
Discharge: HOME | End: 2024-12-06
Payer: MEDICAID

## 2024-12-06 PROCEDURE — 97016 VASOPNEUMATIC DEVICE THERAPY: CPT

## 2024-12-06 PROCEDURE — 97110 THERAPEUTIC EXERCISES: CPT

## 2024-12-06 PROCEDURE — 97140 MANUAL THERAPY 1/> REGIONS: CPT

## 2024-12-09 ENCOUNTER — HOSPITAL ENCOUNTER (OUTPATIENT)
Dept: PHYSICAL THERAPY | Age: 64
Setting detail: THERAPIES SERIES
Discharge: HOME | End: 2024-12-09
Payer: MEDICAID

## 2024-12-09 PROCEDURE — 97110 THERAPEUTIC EXERCISES: CPT

## 2024-12-09 PROCEDURE — 97016 VASOPNEUMATIC DEVICE THERAPY: CPT

## 2024-12-09 NOTE — FLOWSHEET NOTE
Outpatient Physical Therapy  [place]           [x] Phone: [phone]   Fax: [phone]  [place]           [] Phone: [phone]   Fax: [phone]        Physical Therapy Daily Treatment Note  Date:  2024    Patient Name:  Jojo Stone    :  1960  MRN: [id number]  Restrictions/Precautions: n/a  Diagnosis: Tear of medial meniscus left knee  Date of Injury/Surgery: 24  Treatment Diagnosis:  R knee pain, RLE weakness, impaired gait  Insurance/Certification information: Medicare - [employer]OMN  Referring Physician:   Arthur Vásquez MD   Next Doctor Visit:    Plan of care signed (Y/N):  Y  Outcome Measure: KOOS:   Visit# / total visits:   10/22  Pain level:    8/10       Goals:     Patient goals: reduce pain: Progressing   Short term goals  Time Frame for Short term goals: Refer to [facility]  Long Term Goals  Time Frame for Long Term Goals: 10 visits  Pt will improve RLE gross strength to 3+/5 to aide in ADLs: Progressing   Pt will improve R knee AROM ext to 0 deg to aide in TKE during gait and reduced falls: Progressing   Pt will improve R knee AROM flex to 100 deg to aide in curb navigation: Progressing   Pt will improve KOOS to 18 or less to show subjective report in condition: Not MET   Pt will improve 5x STS to 50 seconds or less to show improved transfer independence: Progressing          Summary of Evaluation:  Assessment: Pt is a 64-year-old female who presents to therapy s/p R knee arthroscopy w/ partial lateral meniscectomy with chondroplasty performed on 24. Upon assessment, pt does present with impaired RLE strength, impaired gait, impaired balance, impaired R knee ROM, R knee pain and overall reduced independence with ADLs and IADLs after surgery. Pt would benefit from skilled therapy interventions to address listed impairments, progress toward goal completion, and improve ADL/IADL status. PT also warranted to reduce risk for further injury or

## 2024-12-11 ENCOUNTER — HOSPITAL ENCOUNTER (OUTPATIENT)
Dept: PHYSICAL THERAPY | Age: 64
Setting detail: THERAPIES SERIES
Discharge: HOME | End: 2024-12-11
Payer: MEDICAID

## 2024-12-11 PROCEDURE — 97016 VASOPNEUMATIC DEVICE THERAPY: CPT

## 2024-12-11 PROCEDURE — 97530 THERAPEUTIC ACTIVITIES: CPT

## 2024-12-11 PROCEDURE — 97110 THERAPEUTIC EXERCISES: CPT

## 2024-12-11 NOTE — FLOWSHEET NOTE
Outpatient Physical Therapy  [place]           [x] Phone: [phone]   Fax: [phone]  [place]           [] Phone: [phone]   Fax: [phone]        Physical Therapy Daily Treatment Note  Date:  2024    Patient Name:  Jojo Stone    :  1960  MRN: [id number]  Restrictions/Precautions: n/a  Diagnosis: Tear of medial meniscus left knee  Date of Injury/Surgery: 24  Treatment Diagnosis:  R knee pain, RLE weakness, impaired gait  Insurance/Certification information: Medicare - BOMN  Referring Physician:   Arthur Vásquez MD   Next Doctor Visit:    Plan of care signed (Y/N):  Y  Outcome Measure: KOOS:   Visit# / total visits:     Pain level:    8/10       Goals:     Patient goals: reduce pain: Progressing   Short term goals  Time Frame for Short term goals: Refer to [facility]  Long Term Goals  Time Frame for Long Term Goals: 10 visits  Pt will improve RLE gross strength to 3+/5 to aide in ADLs: Progressing   Pt will improve R knee AROM ext to 0 deg to aide in TKE during gait and reduced falls: Progressing   Pt will improve R knee AROM flex to 100 deg to aide in curb navigation: Progressing   Pt will improve KOOS to 18 or less to show subjective report in condition: Not MET   Pt will improve 5x STS to 50 seconds or less to show improved transfer independence: Progressing          Summary of Evaluation:  Assessment: Pt is a 64-year-old female who presents to therapy s/p R knee arthroscopy w/ partial lateral meniscectomy with chondroplasty performed on 24. Upon assessment, pt does present with impaired RLE strength, impaired gait, impaired balance, impaired R knee ROM, R knee pain and overall reduced independence with ADLs and IADLs after surgery. Pt would benefit from skilled therapy interventions to address listed impairments, progress toward goal completion, and improve ADL/IADL status. PT also warranted to reduce risk for further injury or

## 2024-12-16 ENCOUNTER — HOSPITAL ENCOUNTER (OUTPATIENT)
Dept: PHYSICAL THERAPY | Age: 64
Setting detail: THERAPIES SERIES
Discharge: HOME | End: 2024-12-16
Payer: MEDICAID

## 2024-12-16 PROCEDURE — 97110 THERAPEUTIC EXERCISES: CPT

## 2024-12-16 PROCEDURE — 97140 MANUAL THERAPY 1/> REGIONS: CPT

## 2024-12-16 PROCEDURE — 97016 VASOPNEUMATIC DEVICE THERAPY: CPT

## 2024-12-16 NOTE — FLOWSHEET NOTE
Outpatient Physical Therapy  [place]           [x] Phone: [phone]   Fax: [phone]  [place]           [] Phone: [phone]   Fax: [phone]        Physical Therapy Daily Treatment Note  Date:  2024    Patient Name:  Jojo Stone    :  1960  MRN: [id number]  Restrictions/Precautions: n/a  Diagnosis: Tear of medial meniscus left knee  Date of Injury/Surgery: 24  Treatment Diagnosis:  R knee pain, RLE weakness, impaired gait  Insurance/Certification information: Medicare - BOMN  Referring Physician:   Arthur Vásquez MD   Next Doctor Visit:    Plan of care signed (Y/N):  Y  Outcome Measure: KOOS:   Visit# / total visits:     Pain level:    7/10       Goals:     Patient goals: reduce pain: Progressing   Short term goals  Time Frame for Short term goals: Refer to [facility]  Long Term Goals  Time Frame for Long Term Goals: 10 visits  Pt will improve RLE gross strength to 3+/5 to aide in ADLs: Progressing   Pt will improve R knee AROM ext to 0 deg to aide in TKE during gait and reduced falls: Progressing   Pt will improve R knee AROM flex to 100 deg to aide in curb navigation: Progressing   Pt will improve KOOS to 18 or less to show subjective report in condition: Not MET   Pt will improve 5x STS to 50 seconds or less to show improved transfer independence: Progressing          Summary of Evaluation:  Assessment: Pt is a 64-year-old female who presents to therapy s/p R knee arthroscopy w/ partial lateral meniscectomy with chondroplasty performed on 24. Upon assessment, pt does present with impaired RLE strength, impaired gait, impaired balance, impaired R knee ROM, R knee pain and overall reduced independence with ADLs and IADLs after surgery. Pt would benefit from skilled therapy interventions to address listed impairments, progress toward goal completion, and improve ADL/IADL status. PT also warranted to reduce risk for further injury or

## 2024-12-18 ENCOUNTER — HOSPITAL ENCOUNTER (OUTPATIENT)
Dept: PHYSICAL THERAPY | Age: 64
Setting detail: THERAPIES SERIES
Discharge: HOME | End: 2024-12-18
Payer: MEDICAID

## 2024-12-18 PROCEDURE — 97110 THERAPEUTIC EXERCISES: CPT

## 2024-12-18 PROCEDURE — 97016 VASOPNEUMATIC DEVICE THERAPY: CPT

## 2024-12-18 NOTE — FLOWSHEET NOTE
Outpatient Physical Therapy  [place]           [x] Phone: [phone]   Fax: [phone]  [place]           [] Phone: [phone]   Fax: [phone]        Physical Therapy Daily Treatment Note  Date:  2024    Patient Name:  Jojo Stone    :  1960  MRN: [id number]  Restrictions/Precautions: n/a  Diagnosis: Tear of medial meniscus left knee  Date of Injury/Surgery: 24  Treatment Diagnosis:  R knee pain, RLE weakness, impaired gait  Insurance/Certification information: Medicare - BOMN  Referring Physician:   Arthur Vásquez MD   Next Doctor Visit:    Plan of care signed (Y/N):  Y  Outcome Measure: KOOS:   Visit# / total visits:     Pain level:    7/10       Goals:     Patient goals: reduce pain: Progressing   Short term goals  Time Frame for Short term goals: Refer to [facility]  Long Term Goals  Time Frame for Long Term Goals: 10 visits  Pt will improve RLE gross strength to 3+/5 to aide in ADLs: Progressing   Pt will improve R knee AROM ext to 0 deg to aide in TKE during gait and reduced falls: Progressing   Pt will improve R knee AROM flex to 100 deg to aide in curb navigation: Progressing   Pt will improve KOOS to 18 or less to show subjective report in condition: Not MET   Pt will improve 5x STS to 50 seconds or less to show improved transfer independence: Progressing          Summary of Evaluation:  Assessment: Pt is a 64-year-old female who presents to therapy s/p R knee arthroscopy w/ partial lateral meniscectomy with chondroplasty performed on 24. Upon assessment, pt does present with impaired RLE strength, impaired gait, impaired balance, impaired R knee ROM, R knee pain and overall reduced independence with ADLs and IADLs after surgery. Pt would benefit from skilled therapy interventions to address listed impairments, progress toward goal completion, and improve ADL/IADL status. PT also warranted to reduce risk for further injury or

## 2024-12-23 ENCOUNTER — HOSPITAL ENCOUNTER (OUTPATIENT)
Dept: PHYSICAL THERAPY | Age: 64
Setting detail: THERAPIES SERIES
Discharge: HOME | End: 2024-12-23
Payer: MEDICAID

## 2024-12-23 PROCEDURE — 97016 VASOPNEUMATIC DEVICE THERAPY: CPT

## 2024-12-23 PROCEDURE — 97110 THERAPEUTIC EXERCISES: CPT

## 2024-12-23 PROCEDURE — 97140 MANUAL THERAPY 1/> REGIONS: CPT

## 2024-12-23 NOTE — FLOWSHEET NOTE
Outpatient Physical Therapy  [place]           [x] Phone: [phone]   Fax: [phone]  [place]           [] Phone: [phone]   Fax: [phone]        Physical Therapy Daily Treatment Note  Date:  2024    Patient Name:  Jojo Stone    :  1960  MRN: [id number]  Restrictions/Precautions: n/a  Diagnosis: Tear of medial meniscus left knee  Date of Injury/Surgery: 24  Treatment Diagnosis:  R knee pain, RLE weakness, impaired gait  Insurance/Certification information: Medicare - BOMN  Referring Physician:   Arthur Vásquez MD   Next Doctor Visit:    Plan of care signed (Y/N):  Y  Outcome Measure: KOOS:   Visit# / total visits:     Pain level:    7-8/10       Goals:     Patient goals: reduce pain: Progressing   Short term goals  Time Frame for Short term goals: Refer to [facility]  Long Term Goals  Time Frame for Long Term Goals: 10 visits  Pt will improve RLE gross strength to 3+/5 to aide in ADLs: Progressing   Pt will improve R knee AROM ext to 0 deg to aide in TKE during gait and reduced falls: Progressing   Pt will improve R knee AROM flex to 100 deg to aide in curb navigation: Progressing   Pt will improve KOOS to 18 or less to show subjective report in condition: Not MET   Pt will improve 5x STS to 50 seconds or less to show improved transfer independence: Progressing          Summary of Evaluation:  Assessment: Pt is a 64-year-old female who presents to therapy s/p R knee arthroscopy w/ partial lateral meniscectomy with chondroplasty performed on 24. Upon assessment, pt does present with impaired RLE strength, impaired gait, impaired balance, impaired R knee ROM, R knee pain and overall reduced independence with ADLs and IADLs after surgery. Pt would benefit from skilled therapy interventions to address listed impairments, progress toward goal completion, and improve ADL/IADL status. PT also warranted to reduce risk for further injury or

## 2024-12-27 ENCOUNTER — HOSPITAL ENCOUNTER (OUTPATIENT)
Dept: PHYSICAL THERAPY | Age: 64
Setting detail: THERAPIES SERIES
Discharge: HOME | End: 2024-12-27
Payer: MEDICAID

## 2024-12-27 PROCEDURE — 97110 THERAPEUTIC EXERCISES: CPT

## 2024-12-27 PROCEDURE — 97016 VASOPNEUMATIC DEVICE THERAPY: CPT

## 2024-12-27 NOTE — PROGRESS NOTES
Outpatient Physical Therapy           [place]           [x] Phone: [phone]   Fax: [phone]  [place]           [] Phone: [phone]   Fax: [phone]      To: Arthur Vásquez MD     From: Patricia García, PT, DPT     Patient: Jojo Stone                : 1960  Diagnosis: Tear of medial meniscus left knee       Treatment Diagnosis:     R knee pain, RLE weakness, impaired gait   Date: 2024  [x]  Progress Note                []  Discharge Note    Evaluation Date:  10/11/24 Total Visits to date:  14 Cancels/No-shows to date:  0    Subjective:  Pt is doing alright this date but is having knee pain. She is able to do more at home now and can take care of herself okay but with pain at home. She is still using a walker for ambulation but was using a walker prior to her surgery due to her CVAs.   KOOS:       Plan of Care/Treatment to date:  [x] Therapeutic Exercise    [x] Modalities:  [x] Therapeutic Activity     [] Ultrasound  [] Electrical Stimulation  [x] Gait Training      [] Cervical Traction   [] Lumbar Traction  [x] Neuromuscular Re-education  [x] Cold/hotpack [] Iontophoresis  [x] Instruction in HEP      Other:  [x] Manual Therapy       [x]  Vasopneumatic  [] Aquatic Therapy       []   Dry Needle Therapy                      Objective/Significant Findings At Last Visit/Comments:    R knee AAROM              Flex: 113 deg              Ext: lacking 1 deg  R knee AROM flex: 72°     Strength RLE  R Hip Flexion: 3-/5  R Hip ABduction: 3/5  R Hip ADduction: 3/5  R Knee Flexion: 3+/5  R Knee Extension: 3+/5  R Ankle Dorsiflexion: 3+/5     5x STS: 41.65 seconds BUE use    Assessment:   Pt has shown some progress since last measures taken with improved strength, 5x STS and ROM. She does still need a walker for gait but this is her baseline due to prior CVA history. She is showing some goal progression but has hit a plateau point within sessions with range. PT discussed with pt to

## 2024-12-27 NOTE — FLOWSHEET NOTE
Outpatient Physical Therapy  [place]           [x] Phone: [phone]   Fax: [phone]  [place]           [] Phone: [phone]   Fax: [phone]        Physical Therapy Daily Treatment Note  Date:  2024    Patient Name:  Jojo Stone    :  1960  MRN: [id number]  Restrictions/Precautions: n/a  Diagnosis: Tear of medial meniscus left knee  Date of Injury/Surgery: 24  Treatment Diagnosis:  R knee pain, RLE weakness, impaired gait  Insurance/Certification information: Medicare - BOMN  Referring Physician:   Arthur Vásquez MD   Next Doctor Visit:    Plan of care signed (Y/N):  Y  Outcome Measure: KOOS:   Visit# / total visits:     Pain level:    7-8/10       Goals:     Patient goals: reduce pain: Progressing   Short term goals  Time Frame for Short term goals: Refer to [facility]  Long Term Goals  Time Frame for Long Term Goals: 10 visits  Pt will improve RLE gross strength to 3+/5 to aide in ADLs: Progressing   Pt will improve R knee AROM ext to 0 deg to aide in TKE during gait and reduced falls: Progressing   Pt will improve R knee AROM flex to 100 deg to aide in curb navigation: Progressing   Pt will improve KOOS to 18 or less to show subjective report in condition: Not MET   Pt will improve 5x STS to 50 seconds or less to show improved transfer independence: MET         Summary of Evaluation:  Assessment: Pt is a 64-year-old female who presents to therapy s/p R knee arthroscopy w/ partial lateral meniscectomy with chondroplasty performed on 24. Upon assessment, pt does present with impaired RLE strength, impaired gait, impaired balance, impaired R knee ROM, R knee pain and overall reduced independence with ADLs and IADLs after surgery. Pt would benefit from skilled therapy interventions to address listed impairments, progress toward goal completion, and improve ADL/IADL status. PT also warranted to reduce risk for further injury or

## 2025-01-09 ENCOUNTER — TELEPHONE (OUTPATIENT)
Dept: CARDIOLOGY CLINIC | Age: 65
End: 2025-01-09

## 2025-01-09 NOTE — TELEPHONE ENCOUNTER
Cardiologist: Dr. Valdez  Surgeon: Dr. Vásquez  Surgery: Right Total Knee Arthroplasty    Surgery/Procedure should be done in the hospital setting    _____ yes    _____  no    Anesthesia: Spinal  Date: TBD  Fax# [phone]  # [phone]    Last OV 12/4/2024 w/Cassandra    Coronary artery disease  Had PCI of   [facility] 05/2024: patent stent  Cp non cardiac- reproducible  to touch   Continue plavix / ranexa        HTN  Bp stable  Off losartan   Monitor for hypotension      Hyperlipidemia  Request copy from pcp  Goals and guidelines reviewed with patient   Zetia and atorvastatin- continue         Last EKG- 5/29/2024      NM- 3/30/2023   Normal tracer uptake in all segments of myocardium on stress ans rest   images. Breast attenuation otherwise normal perfusion in distribution of all   coronaries.   No infarct or ischemia noted.   EF 43 % - correlate with ECHO ( 55%)      Echo- 3/30/2023   Left ventricular systolic function is normal.   Ejection fraction is visually estimated at 55%.   Mild to moderate mitral regurgitation.   No evidence of any pericardial effusion.    Cath- 5/2/2024  The left main is a large tubular vessel has no significant stenosis  The left descending artery has no significant stenosis  Circumflex artery has a stent which is widely patent  Right coronary artery is a dominant vessel has no significant stenosis       Plavix    Aspirin

## 2025-02-12 ENCOUNTER — HOSPITAL ENCOUNTER (OUTPATIENT)
Age: 65
Setting detail: SPECIMEN
Discharge: HOME | End: 2025-02-12

## 2025-02-12 LAB — POTASSIUM SERPL-SCNC: 5.2 MMOL/L (ref 3.5–5.1)

## 2025-02-12 PROCEDURE — 84132 ASSAY OF SERUM POTASSIUM: CPT

## 2025-02-17 ENCOUNTER — HOSPITAL ENCOUNTER (OUTPATIENT)
Age: 65
Setting detail: SPECIMEN
Discharge: HOME | End: 2025-02-17
Payer: MEDICAID

## 2025-02-17 LAB
ALBUMIN SERPL-MCNC: 3.2 G/DL (ref 3.4–5)
ALBUMIN/GLOB SERPL: 1.4 {RATIO} (ref 1.1–2.2)
ALP SERPL-CCNC: 96 U/L (ref 40–129)
ALT SERPL-CCNC: 9 U/L (ref 10–40)
ANION GAP SERPL CALCULATED.3IONS-SCNC: 9 MMOL/L (ref 9–17)
AST SERPL-CCNC: 20 U/L (ref 15–37)
BASOPHILS # BLD: 0.08 K/UL
BASOPHILS NFR BLD: 1 % (ref 0–1)
BILIRUB SERPL-MCNC: 0.7 MG/DL (ref 0–1)
BUN SERPL-MCNC: 17 MG/DL (ref 7–20)
CALCIUM SERPL-MCNC: 8.9 MG/DL (ref 8.3–10.6)
CHLORIDE SERPL-SCNC: 108 MMOL/L (ref 99–110)
CO2 SERPL-SCNC: 27 MMOL/L (ref 21–32)
CREAT SERPL-MCNC: 1 MG/DL (ref 0.6–1.2)
EOSINOPHIL # BLD: 0.37 K/UL
EOSINOPHILS RELATIVE PERCENT: 4 % (ref 0–3)
ERYTHROCYTE [DISTWIDTH] IN BLOOD BY AUTOMATED COUNT: 17 % (ref 11.7–14.9)
GFR, ESTIMATED: 55 ML/MIN/1.73M2
GLUCOSE SERPL-MCNC: 120 MG/DL (ref 74–99)
HCT VFR BLD AUTO: 29.3 % (ref 37–47)
HGB BLD-MCNC: 8.9 G/DL (ref 12.5–16)
IMM GRANULOCYTES # BLD AUTO: 0.16 K/UL
IMM GRANULOCYTES NFR BLD: 2 %
LYMPHOCYTES NFR BLD: 1.19 K/UL
LYMPHOCYTES RELATIVE PERCENT: 14 % (ref 24–44)
MCH RBC QN AUTO: 28.1 PG (ref 27–31)
MCHC RBC AUTO-ENTMCNC: 30.4 G/DL (ref 32–36)
MCV RBC AUTO: 92.4 FL (ref 78–100)
MONOCYTES NFR BLD: 0.55 K/UL
MONOCYTES NFR BLD: 7 % (ref 0–4)
NEUTROPHILS NFR BLD: 72 % (ref 36–66)
NEUTS SEG NFR BLD: 6.11 K/UL
PLATELET # BLD AUTO: 299 K/UL (ref 140–440)
PMV BLD AUTO: 10.1 FL (ref 7.5–11.1)
POTASSIUM SERPL-SCNC: 3.8 MMOL/L (ref 3.5–5.1)
PROT SERPL-MCNC: 5.5 G/DL (ref 6.4–8.2)
RBC # BLD AUTO: 3.17 M/UL (ref 4.2–5.4)
SODIUM SERPL-SCNC: 144 MMOL/L (ref 136–145)
WBC OTHER # BLD: 8.5 K/UL (ref 4–10.5)

## 2025-02-17 PROCEDURE — 85025 COMPLETE CBC W/AUTO DIFF WBC: CPT

## 2025-02-17 PROCEDURE — 80053 COMPREHEN METABOLIC PANEL: CPT

## 2025-05-16 DIAGNOSIS — K44.9 GASTROESOPHAGEAL REFLUX DISEASE WITH HIATAL HERNIA: Chronic | ICD-10-CM

## 2025-05-16 DIAGNOSIS — I25.10 CORONARY ARTERY DISEASE INVOLVING NATIVE CORONARY ARTERY OF NATIVE HEART WITHOUT ANGINA PECTORIS: ICD-10-CM

## 2025-05-16 DIAGNOSIS — I10 ESSENTIAL HYPERTENSION: ICD-10-CM

## 2025-05-16 DIAGNOSIS — Z72.0 CURRENT TOBACCO USE: ICD-10-CM

## 2025-05-16 DIAGNOSIS — K21.9 GASTROESOPHAGEAL REFLUX DISEASE WITH HIATAL HERNIA: Chronic | ICD-10-CM

## 2025-05-16 DIAGNOSIS — R07.9 CHEST PAIN, UNSPECIFIED TYPE: ICD-10-CM

## 2025-05-20 RX ORDER — RANOLAZINE 1000 MG/1
1000 TABLET, EXTENDED RELEASE ORAL 2 TIMES DAILY
Qty: 60 TABLET | Refills: 11 | Status: SHIPPED | OUTPATIENT
Start: 2025-05-20

## 2025-05-20 RX ORDER — NITROGLYCERIN 0.4 MG/1
0.4 TABLET SUBLINGUAL EVERY 5 MIN PRN
Qty: 25 TABLET | Refills: 3 | Status: SHIPPED | OUTPATIENT
Start: 2025-05-20

## 2025-05-27 NOTE — PROGRESS NOTES
MRN: [id number]  Name: Jojo Stone  : 1960    Insurance: Pay[place]: [place] /  /  /      Phone #: [phone]  Provider: Courtney Valdez MD     Date of Visit: 2025    Reason for visit: 6mo  Recent Hospitalization Date:    Reason for Hospitalization:    Last EKG: needs   Type of Device:       Vitals BP HR O2% WT HT ORTHO BP LYING ORTHO BP SITTING ORTHO BP SITTING   Today's Findings           Patients work up- Check List     Testing Last Date Completed Date Expected  ([facility]) Additional Notes    MA to document For provider to complete Either MA or Provider    Carotid Duplex  STAT 1 WK 6 MTH       THIS WK 2 WK 1 YEAR     Cardiac CTA  STAT 1 WK 6 MTH       THIS WK 2 WK 1 YEAR     Cardiac CT Calcium scoring  STAT 1 WK 6 MTH       THIS WK 2 WK 1 YEAR     CTA Chest, Abdomen & Pelvis  STAT 1 WK 6 MTH       THIS WK 2 WK 1 YEAR     CT Chest IV w/ Contrast  STAT 1 WK 6 MTH       THIS WK 2 WK 1 YEAR     CT Chest w/o Contrast  STAT 1 WK 6 MTH       THIS WK 2 WK 1 YEAR     CXR  STAT 1 WK 6 MTH       THIS WK 2 WK 1 YEAR     ECHO  Stress Complete Limited     MRI- Cardiac  STAT 1 WK 6 MTH       THIS WK 2 WK 1 YEAR     MUGA Scan  STAT 1 WK 6 MTH       THIS WK 2 WK 1 YEAR     Nuclear Stress  Lexiscan Cardiolite     PFT  STAT 1 WK 6 MTH       THIS WK 2 WK 1 YEAR     Treadmill Stress Test  STAT 1 WK 6 MTH       THIS WK 2 WK 1 YEAR     Vascular Duplex  Lower: Right Left Bilat       Upper: Right Left Bilat     Other Test Not Listed:    Monitors Last Date Completed Day's Request/Ordered     Holter  Short term 24 hours 48 hours      Long term 3 days 7 days 14 days   Event   (1-30 days)      Procedures Last Date Performed Procedure Details Date Expected   Additional Notes    ASD Closure        Carotid Angio        Cardioversion        Heart Cath  R L R&L      Peripheral Angio  R L      PFO Closure        PTCA/PCI        JOANNE        JOANNE/Cardioversion        Venogram        Tilt Table        Other Type of Procedure:

## 2025-06-03 ENCOUNTER — HOSPITAL ENCOUNTER (OUTPATIENT)
Age: 65
Discharge: HOME | End: 2025-06-03
Payer: MEDICAID

## 2025-06-03 DIAGNOSIS — I10 HYPERTENSION, UNSPECIFIED TYPE: ICD-10-CM

## 2025-06-03 LAB
ALBUMIN SERPL-MCNC: 3.9 G/DL (ref 3.4–5)
ALBUMIN/GLOB SERPL: 1.3 {RATIO} (ref 1.1–2.2)
ALP SERPL-CCNC: 115 U/L (ref 40–129)
ALT SERPL-CCNC: <5 U/L (ref 10–40)
ANION GAP SERPL CALCULATED.3IONS-SCNC: 10 MMOL/L (ref 9–17)
AST SERPL-CCNC: 16 U/L (ref 15–37)
BILIRUB SERPL-MCNC: 0.4 MG/DL (ref 0–1)
BILIRUB UR QL STRIP: NEGATIVE
BUN SERPL-MCNC: 13 MG/DL (ref 7–20)
CALCIUM SERPL-MCNC: 9 MG/DL (ref 8.3–10.6)
CHLORIDE SERPL-SCNC: 105 MMOL/L (ref 99–110)
CHOLEST SERPL-MCNC: 158 MG/DL (ref 125–199)
CLARITY UR: CLEAR
CO2 SERPL-SCNC: 26 MMOL/L (ref 21–32)
COLOR UR: YELLOW
COMMENT: NORMAL
CREAT SERPL-MCNC: 0.9 MG/DL (ref 0.6–1.2)
CREAT UR-MCNC: 123 MG/DL (ref 28–217)
GFR, ESTIMATED: 63 ML/MIN/1.73M2
GLUCOSE SERPL-MCNC: 84 MG/DL (ref 74–99)
GLUCOSE UR STRIP-MCNC: NEGATIVE MG/DL
HDLC SERPL-MCNC: 48 MG/DL
HGB UR QL STRIP.AUTO: NEGATIVE
KETONES UR STRIP-MCNC: NEGATIVE MG/DL
LDLC SERPL CALC-MCNC: 87 MG/DL
LEUKOCYTE ESTERASE UR QL STRIP: NEGATIVE
MAGNESIUM SERPL-MCNC: 2 MG/DL (ref 1.8–2.4)
NITRITE UR QL STRIP: NEGATIVE
PH UR STRIP: 6.5 [PH] (ref 5–8)
PHOSPHATE SERPL-MCNC: 2.9 MG/DL (ref 2.5–4.9)
POTASSIUM SERPL-SCNC: 4.4 MMOL/L (ref 3.5–5.1)
PROT SERPL-MCNC: 7 G/DL (ref 6.4–8.2)
PROT UR STRIP-MCNC: NEGATIVE MG/DL
SODIUM SERPL-SCNC: 141 MMOL/L (ref 136–145)
SP GR UR STRIP: 1.01 (ref 1–1.03)
TOTAL PROTEIN, URINE: 10 MG/DL
TRIGL SERPL-MCNC: 116 MG/DL
URINE TOTAL PROTEIN CREATININE RATIO: 0.08 (ref 0–0.2)
UROBILINOGEN UR STRIP-ACNC: 0.2 EU/DL (ref 0–1)

## 2025-06-03 PROCEDURE — 81003 URINALYSIS AUTO W/O SCOPE: CPT

## 2025-06-03 PROCEDURE — 82570 ASSAY OF URINE CREATININE: CPT

## 2025-06-03 PROCEDURE — 80061 LIPID PANEL: CPT

## 2025-06-03 PROCEDURE — 84100 ASSAY OF PHOSPHORUS: CPT

## 2025-06-03 PROCEDURE — 84156 ASSAY OF PROTEIN URINE: CPT

## 2025-06-03 PROCEDURE — 80053 COMPREHEN METABOLIC PANEL: CPT

## 2025-06-03 PROCEDURE — 83735 ASSAY OF MAGNESIUM: CPT

## 2025-06-11 ENCOUNTER — OFFICE VISIT (OUTPATIENT)
Dept: CARDIOLOGY CLINIC | Age: 65
End: 2025-06-11
Payer: MEDICAID

## 2025-06-11 VITALS
SYSTOLIC BLOOD PRESSURE: 122 MMHG | WEIGHT: 200 LBS | HEIGHT: 62 IN | DIASTOLIC BLOOD PRESSURE: 86 MMHG | HEART RATE: 71 BPM | BODY MASS INDEX: 36.8 KG/M2

## 2025-06-11 DIAGNOSIS — R07.2 PRECORDIAL CHEST PAIN: Primary | ICD-10-CM

## 2025-06-11 PROCEDURE — 99214 OFFICE O/P EST MOD 30 MIN: CPT | Performed by: INTERNAL MEDICINE

## 2025-06-11 PROCEDURE — 3074F SYST BP LT 130 MM HG: CPT | Performed by: INTERNAL MEDICINE

## 2025-06-11 PROCEDURE — 93000 ELECTROCARDIOGRAM COMPLETE: CPT | Performed by: INTERNAL MEDICINE

## 2025-06-11 PROCEDURE — 1124F ACP DISCUSS-NO DSCNMKR DOCD: CPT | Performed by: INTERNAL MEDICINE

## 2025-06-11 PROCEDURE — 3079F DIAST BP 80-89 MM HG: CPT | Performed by: INTERNAL MEDICINE

## 2025-06-11 RX ORDER — METOPROLOL TARTRATE 25 MG/1
12.5 TABLET, FILM COATED ORAL 2 TIMES DAILY
Qty: 60 TABLET | Refills: 3 | Status: SHIPPED | OUTPATIENT
Start: 2025-06-11

## 2025-06-11 NOTE — PATIENT INSTRUCTIONS
Thank you for allowing us to care for you today!   We want to ensure we can follow your treatment plan and we strive to give you the best outcomes and experience possible.   If you ever have a life threatening emergency and call 911 - for an ambulance (EMS)  REMEMBER  Our providers can only care for you at:   [facility] or [facility]   Even if you have someone take you or you drive yourself we can only care for you in a [facility] facility. Our providers are not setup at the other healthcare locations!    PLEASE CALL OUR OFFICE DURING NORMAL BUSINESS HOURS  Monday through Friday 8 am to 5 pm  AFTER HOURS the physician on-call cannot help with scheduling, rescheduling, procedure instruction questions or any type of medication need or issue.  [facility] P:[phone] - [facility] P:[phone] - [facility] P:[phone]      If you receive a survey:  We would appreciate you taking the time to share your experience concerning your provider visit in the office.    These surveys are confidential!  We are eager to improve and are counting on you to share your feedback so we can ensure you get the best care possible.

## 2025-06-11 NOTE — PROGRESS NOTES
CLINICAL STAFF DOCUMENTATION    Dr. Courtney Stone  1960  [id number]    Have you had any Chest Pain recently? - Yes  If Yes DO EKG   When did the pain begin? - Years   What type of pain is it? - Sharp Pain   Tender to palpate (touch)? No  Does the pain radiate or stay in one place? -  Left side of chest radiates down left arm.   How long does the pain last? - 15 minutes    How Severe is the pain? - 9  Is there anything that aggravates or triggers the pain?  No  Did you take any medication? Nitro   And did it relieve the pain - No  Is there anything that relieves it?  No  Do you have any other symptoms at the same time? SOB            Have you had any Shortness of Breath - Yes  When did it begin? - Years   If Yes - When on exertion  How many flights of stairs can you go up without having SOB? -  none  Are you on Oxygen during the day or at night? - No  How many pillows do you sleep with under your head? - 2    Have you had any dizziness - No      Have you had any palpitations recently? - Yes  If Yes DO EKG - Do you feel your heart racing    When did they begin? - Years   How long do they last - .2  days    Is there anything that aggravates or triggers them?  No  Is there anything that relieves them?  No  Any thyroid issues? - No    Do you have any edema - swelling in legs    If Yes - CHECK TO SEE IF THE EDEMA IS PITTING  How long have they been having edema - Years  If Yes - Have they worn compression stockings No        When did you have your last labs drawn 6/25  What doctor ordered [place]  Do we have the labs in their chart Yes        Do you need any prescriptions refilled? - Yes    Do you have a surgery or procedure scheduled in the near future - No        Do use tobacco products? - No  Do you drink alcohol? - No  Do you use any illicit drugs? - No  Caffeine? - No  
20 - 30 mmh wear daily and take off at night  Thigh High 2 each 0      No current facility-administered medications for this visit.         Allergies: Adhesive tape, Aspirin, and Pcn [penicillins]  Past Medical History        Past Medical History:   Diagnosis Date    Anxiety      Arthritis       \"Both Legs\"    Asthma      Back problem       \"Curved Spine\"    Bronchitis Last Episode In 2015    CAD (coronary artery disease)       Sees Dr. Ever Potter; 8-16-16 (noted on 10-4-2012 that patient does not have CAD s/p cardiac catheterization).    Cancer (HCC)       skin ca on skin    Chest pain       in ER with this dx 7/2015- admitted- stress test done as outpt 8/7/2015(see report)    Chipped tooth       Upper And Lower    Chronic back pain      COPD (chronic obstructive pulmonary disease) ([facility]) 06/01/2017     Sees Dr. Justo Bo    Cough       Occ. Nonproductive Cough    Emphysema      GERD (gastroesophageal reflux disease)      H/O 24 hour EKG monitoring       3/14/2013-Signif for runs of sinus tachycardia, fastest recorded at 132 bpm lasting almost 38 mins.Dr Potter.-report in epic    H/O cardiac catheterization       10/4/2012- No CAD. False positive stress test.Dr Valdez-report in epic;     H/O cardiac catheterization      H/O Doppler lower venous ultrasound 09/24/2020     No DVT or SVT, No significant venous insufficiency    H/O Doppler ultrasound 04/13/2017     LE doppler - normal study    H/O Doppler ultrasound 05/26/2017     carotid - normal study    H/O exercise stress test 02/28/2017     normal study    Hiatal hernia      History of cardiac cath 08/03/2020     PATENT CX STENT, Normal EF,  NORMAL LAD AND RCA    History of exercise stress test 02/28/2017     treadmill    History of nuclear stress test 8/7/15     EF 70%. WNL    History of nuclear stress test 07/17/2020      scan shows small size, moderate intensity, reversible perfusion defect in anterior wall.normal LVEF .    [place] (hard of hearing)

## 2025-08-28 DIAGNOSIS — I25.10 CORONARY ARTERY DISEASE INVOLVING NATIVE CORONARY ARTERY OF NATIVE HEART WITHOUT ANGINA PECTORIS: ICD-10-CM

## 2025-08-28 DIAGNOSIS — I10 ESSENTIAL HYPERTENSION: ICD-10-CM

## 2025-08-28 RX ORDER — NITROGLYCERIN 0.4 MG/1
0.4 TABLET SUBLINGUAL EVERY 5 MIN PRN
Qty: 25 TABLET | Refills: 3 | Status: SHIPPED | OUTPATIENT
Start: 2025-08-28

## 2025-09-02 ENCOUNTER — HOSPITAL ENCOUNTER (OUTPATIENT)
Dept: CT IMAGING | Age: 65
Discharge: HOME | End: 2025-09-02
Attending: INTERNAL MEDICINE
Payer: MEDICAID

## 2025-09-02 DIAGNOSIS — N28.89 RENAL MASS: ICD-10-CM

## 2025-09-02 LAB
EGFR, POC: 62 ML/MIN/1.73M2
POC CREATININE: 1 MG/DL (ref 0.5–1.2)

## 2025-09-02 PROCEDURE — 82565 ASSAY OF CREATININE: CPT

## 2025-09-02 PROCEDURE — 74178 CT ABD&PLV WO CNTR FLWD CNTR: CPT

## 2025-09-02 PROCEDURE — 2500000003 HC RX 250 WO HCPCS: Performed by: INTERNAL MEDICINE

## 2025-09-02 PROCEDURE — 6360000004 HC RX CONTRAST MEDICATION: Performed by: INTERNAL MEDICINE

## 2025-09-02 RX ORDER — IOPAMIDOL 755 MG/ML
75 INJECTION, SOLUTION INTRAVASCULAR
Status: COMPLETED | OUTPATIENT
Start: 2025-09-02 | End: 2025-09-02

## 2025-09-02 RX ORDER — SODIUM CHLORIDE 9 MG/ML
10 INJECTION, SOLUTION INTRAMUSCULAR; INTRAVENOUS; SUBCUTANEOUS PRN
Status: DISCONTINUED | OUTPATIENT
Start: 2025-09-02 | End: 2025-09-03 | Stop reason: HOSPADM

## 2025-09-02 RX ADMIN — IOPAMIDOL 75 ML: 755 INJECTION, SOLUTION INTRAVENOUS at 11:18

## 2025-09-02 RX ADMIN — SODIUM CHLORIDE, PRESERVATIVE FREE 10 ML: 5 INJECTION INTRAVENOUS at 11:15

## (undated) DEVICE — CORD ES L15FT PT RET REUSE VALLEYLAB REM

## (undated) DEVICE — PENCIL ES CRD L10FT HND SWCHING ROCK SWCH W/ EDGE COAT BLDE

## (undated) DEVICE — SNAP KOVER: Brand: UNBRANDED

## (undated) DEVICE — ELECTRODE ES AD CRDLSS PT RET REM POLYHESIVE

## (undated) DEVICE — INTENDED FOR TISSUE SEPARATION, AND OTHER PROCEDURES THAT REQUIRE A SHARP SURGICAL BLADE TO PUNCTURE OR CUT.: Brand: BARD-PARKER ® STAINLESS STEEL BLADES

## (undated) DEVICE — GAUZE,SPONGE,4"X4",16PLY,XRAY,STRL,LF: Brand: MEDLINE

## (undated) DEVICE — BIT DRL L145MM DIA3.2MM QUIK CPL W/O STP REUSE

## (undated) DEVICE — CATHETER ANGIO 4FR L100CM S STL NYL AL1 3 SEG BRAID SFT

## (undated) DEVICE — SPLINT ORTH W5XL30IN PLSTR OF PARIS LO EXOTHERM SMOOTH

## (undated) DEVICE — C-ARMOR C-ARM EQUIPMENT COVERS CLEAR STERILE UNIVERSAL FIT 12 PER CASE: Brand: C-ARMOR

## (undated) DEVICE — CHLORAPREP 26ML ORANGE

## (undated) DEVICE — TOWEL,OR,DSP,ST,BLUE,STD,6/PK,12PK/CS: Brand: MEDLINE

## (undated) DEVICE — SUTURE VCRL SZ 2-0 L18IN ABSRB UD CT-1 L36MM 1/2 CIR J839D

## (undated) DEVICE — YANKAUER,FLEXIBLE HANDLE,REGLR CAPACITY: Brand: MEDLINE INDUSTRIES, INC.

## (undated) DEVICE — DRAPE,EXTREMITY,89X128,STERILE: Brand: MEDLINE

## (undated) DEVICE — TUBING, SUCTION, 3/16" X 6', STRAIGHT: Brand: MEDLINE

## (undated) DEVICE — ANESTHESIA CIRCUIT ADULT-LF: Brand: MEDLINE INDUSTRIES, INC.

## (undated) DEVICE — PAD,ABDOMINAL,5"X9",ST,LF,25/BX: Brand: MEDLINE INDUSTRIES, INC.

## (undated) DEVICE — KIT MICROINTRODUCER 4FR ECHOGENIC NDL L7CM 21GA STIFF COAX

## (undated) DEVICE — GLOVE SURG SZ 7 L12IN FNGR THK87MIL WHT LTX FREE

## (undated) DEVICE — JELLY LUBRICATING 3 GM BACTERIOSTATIC

## (undated) DEVICE — BANDAGE,SELF ADHRNT,COFLEX,4"X5YD,STRL: Brand: COLABEL

## (undated) DEVICE — PACK,BASIC,IX: Brand: MEDLINE

## (undated) DEVICE — MARKER,SKIN,WI/RULER AND LABELS: Brand: MEDLINE

## (undated) DEVICE — PINNACLE INTRODUCER SHEATH: Brand: PINNACLE

## (undated) DEVICE — PADDING,UNDERCAST,COTTON, 4"X4YD STERILE: Brand: MEDLINE

## (undated) DEVICE — 3M™ STERI-DRAPE™ U-DRAPE 1015: Brand: STERI-DRAPE™

## (undated) DEVICE — TUBING, SUCTION, 9/32" X 10', STRAIGHT: Brand: MEDLINE

## (undated) DEVICE — BANDAGE COMPR M W6INXL10YD WHT BGE VELC E MTRX HK AND LOOP

## (undated) DEVICE — LINER SUCT CANSTR 1500CC SEMI RIG W/ POR HYDROPHOBIC SHUT

## (undated) DEVICE — BIT DRL L160MM DIA2.7MM CANN QUIK CPL ADJ STP REUSE FOR

## (undated) DEVICE — LINER,SEMI-RIGID,3000CC,50EA/CS: Brand: MEDLINE

## (undated) DEVICE — BIT DRL L140MM DIA2MM QUIK CPL 3 FLUT CALIB DEPTH MRK W/O

## (undated) DEVICE — GLOVE SURG SZ 65 L12IN FNGR THK87MIL WHT LTX FREE

## (undated) DEVICE — CATHETER 4FR CORDIS JL4 100CM

## (undated) DEVICE — WIRE GUID DIAG PTFE COAT FIX COR STD XIBLE J TIP 7MM

## (undated) DEVICE — Z DISCONTINUED (USE MFG CAT MVABO)  TUBING GAS SAMPLING STD 6.5 FT FEMALE CONN SMRT CAPNOLINE

## (undated) DEVICE — SOLUTION IV IRRIG WATER 1000ML POUR BRL 2F7114

## (undated) DEVICE — COUNTER NDL 60 COUNT FOAM STRP SGL MAG

## (undated) DEVICE — ANGIOGRAPHY KIT CUST MANIFOLD

## (undated) DEVICE — SUTURE ETHLN SZ 3-0 L30IN NONABSORBABLE BLK FS-1 L24MM 3/8 669H

## (undated) DEVICE — Device

## (undated) DEVICE — COTTON UNDERCAST PADDING,CRIMPED FINISH: Brand: WEBRIL

## (undated) DEVICE — SPONGE LAP W18XL18IN WHT COT 4 PLY FLD STRUNG RADPQ DISP ST

## (undated) DEVICE — DRAPE,U/ SHT,SPLIT,PLAS,STERIL: Brand: MEDLINE

## (undated) DEVICE — SYRINGE IRRIG 60ML SFT PLIABLE BLB EZ TO GRP 1 HND USE W/

## (undated) DEVICE — CATHETER DIAG AD 4FR L100CM STD NYL JUDKINS R 4 TRULUMEN

## (undated) DEVICE — SPONGE GZ W4XL8IN COT WVN 12 PLY

## (undated) DEVICE — SOLUTION IV IRRIG POUR BRL 0.9% SODIUM CHL 2F7124

## (undated) DEVICE — BRUSH CLN DIA5MM NYL SGL END CBL ASST FLEX DSTL TIP POLYPR

## (undated) DEVICE — BIT DRL L110MM DIA2.5MM G QUIK CPL W/O STP REUSE

## (undated) DEVICE — TUBING SUCT 12FR MAL ALUM SHFT FN CAP VENT UNIV CONN W/ OBT

## (undated) DEVICE — GOWN,SIRUS,POLYRNF,BRTHSLV,XLN/XL,20/CS: Brand: MEDLINE

## (undated) DEVICE — DRESSING,GAUZE,XEROFORM,CURAD,1"X8",ST: Brand: CURAD

## (undated) DEVICE — DRAPE SHEET ULTRAGARD: Brand: MEDLINE

## (undated) DEVICE — SUTURE VCRL SZ 0 L18IN ABSRB UD L36MM CT-1 1/2 CIR J840D